# Patient Record
Sex: FEMALE | Race: WHITE | NOT HISPANIC OR LATINO | Employment: OTHER | URBAN - NONMETROPOLITAN AREA
[De-identification: names, ages, dates, MRNs, and addresses within clinical notes are randomized per-mention and may not be internally consistent; named-entity substitution may affect disease eponyms.]

---

## 2017-01-27 ENCOUNTER — TRANSCRIBE ORDERS (OUTPATIENT)
Dept: INTERNAL MEDICINE | Facility: CLINIC | Age: 62
End: 2017-01-27

## 2017-01-27 DIAGNOSIS — R10.9 ABDOMINAL PAIN, UNSPECIFIED LOCATION: Primary | ICD-10-CM

## 2017-01-27 DIAGNOSIS — R63.4 WEIGHT LOSS: ICD-10-CM

## 2017-01-27 DIAGNOSIS — R19.7 DIARRHEA, UNSPECIFIED TYPE: ICD-10-CM

## 2017-01-30 ENCOUNTER — APPOINTMENT (OUTPATIENT)
Dept: LAB | Facility: HOSPITAL | Age: 62
End: 2017-01-30
Attending: FAMILY MEDICINE

## 2017-01-30 ENCOUNTER — HOSPITAL ENCOUNTER (OUTPATIENT)
Dept: CT IMAGING | Facility: HOSPITAL | Age: 62
Discharge: HOME | End: 2017-01-30
Attending: FAMILY MEDICINE | Admitting: FAMILY MEDICINE

## 2017-01-30 DIAGNOSIS — R10.9 ABDOMINAL PAIN, UNSPECIFIED LOCATION: ICD-10-CM

## 2017-01-30 DIAGNOSIS — R19.7 DIARRHEA, UNSPECIFIED TYPE: ICD-10-CM

## 2017-01-30 DIAGNOSIS — R63.4 WEIGHT LOSS: ICD-10-CM

## 2017-01-30 LAB
ALBUMIN SERPL-MCNC: 4 G/DL (ref 3.5–5)
ALBUMIN/GLOB SERPL: 1.2 G/DL (ref 1.1–2.5)
ALP SERPL-CCNC: 59 U/L (ref 24–120)
ALT SERPL W P-5'-P-CCNC: 33 U/L (ref 0–54)
AMYLASE SERPL-CCNC: 43 U/L (ref 30–110)
ANION GAP SERPL CALCULATED.3IONS-SCNC: 8 MMOL/L (ref 4–13)
AST SERPL-CCNC: 22 U/L (ref 7–45)
BASOPHILS # BLD AUTO: 0.03 10*3/MM3 (ref 0–0.2)
BASOPHILS NFR BLD AUTO: 0.3 % (ref 0–2)
BILIRUB SERPL-MCNC: 0.5 MG/DL (ref 0.1–1)
BILIRUB UR QL STRIP: NEGATIVE
BUN BLD-MCNC: 10 MG/DL (ref 5–21)
BUN/CREAT SERPL: 13 (ref 7–25)
CALCIUM SPEC-SCNC: 9.2 MG/DL (ref 8.4–10.4)
CHLORIDE SERPL-SCNC: 104 MMOL/L (ref 98–110)
CLARITY UR: CLEAR
CO2 SERPL-SCNC: 31 MMOL/L (ref 24–31)
COLOR UR: YELLOW
CREAT BLD-MCNC: 0.77 MG/DL (ref 0.5–1.4)
CREAT BLDA-MCNC: 0.8 MG/DL (ref 0.6–1.3)
DEPRECATED RDW RBC AUTO: 41.7 FL (ref 40–54)
EOSINOPHIL # BLD AUTO: 0.26 10*3/MM3 (ref 0–0.7)
EOSINOPHIL NFR BLD AUTO: 2.8 % (ref 0–4)
ERYTHROCYTE [DISTWIDTH] IN BLOOD BY AUTOMATED COUNT: 13.1 % (ref 12–15)
GFR SERPL CREATININE-BSD FRML MDRD: 76 ML/MIN/1.73
GLOBULIN UR ELPH-MCNC: 3.4 GM/DL
GLUCOSE BLD-MCNC: 103 MG/DL (ref 70–100)
GLUCOSE UR STRIP-MCNC: NEGATIVE MG/DL
HCT VFR BLD AUTO: 42.9 % (ref 37–47)
HGB BLD-MCNC: 13.9 G/DL (ref 12–16)
HGB UR QL STRIP.AUTO: NEGATIVE
IMM GRANULOCYTES # BLD: 0.02 10*3/MM3 (ref 0–0.03)
IMM GRANULOCYTES NFR BLD: 0.2 % (ref 0–5)
KETONES UR QL STRIP: NEGATIVE
LEUKOCYTE ESTERASE UR QL STRIP.AUTO: NEGATIVE
LIPASE SERPL-CCNC: 50 U/L (ref 23–203)
LYMPHOCYTES # BLD AUTO: 2.43 10*3/MM3 (ref 0.72–4.86)
LYMPHOCYTES NFR BLD AUTO: 26.3 % (ref 15–45)
MAGNESIUM SERPL-MCNC: 1.9 MG/DL (ref 1.4–2.2)
MCH RBC QN AUTO: 28.5 PG (ref 28–32)
MCHC RBC AUTO-ENTMCNC: 32.4 G/DL (ref 33–36)
MCV RBC AUTO: 87.9 FL (ref 82–98)
MONOCYTES # BLD AUTO: 0.8 10*3/MM3 (ref 0.19–1.3)
MONOCYTES NFR BLD AUTO: 8.7 % (ref 4–12)
NEUTROPHILS # BLD AUTO: 5.7 10*3/MM3 (ref 1.87–8.4)
NEUTROPHILS NFR BLD AUTO: 61.7 % (ref 39–78)
NITRITE UR QL STRIP: NEGATIVE
PH UR STRIP.AUTO: 6 [PH] (ref 5–8)
PLATELET # BLD AUTO: 282 10*3/MM3 (ref 130–400)
PMV BLD AUTO: 12.4 FL (ref 6–12)
POTASSIUM BLD-SCNC: 4 MMOL/L (ref 3.5–5.3)
PROT SERPL-MCNC: 7.4 G/DL (ref 6.3–8.7)
PROT UR QL STRIP: NEGATIVE
RBC # BLD AUTO: 4.88 10*6/MM3 (ref 4.2–5.4)
SODIUM BLD-SCNC: 143 MMOL/L (ref 135–145)
SP GR UR STRIP: <=1.005 (ref 1–1.03)
T4 FREE SERPL-MCNC: 1.66 NG/DL (ref 0.78–2.19)
TSH SERPL DL<=0.05 MIU/L-ACNC: 2.31 MIU/ML (ref 0.47–4.68)
UROBILINOGEN UR QL STRIP: NORMAL
WBC NRBC COR # BLD: 9.24 10*3/MM3 (ref 4.8–10.8)

## 2017-01-30 PROCEDURE — 87086 URINE CULTURE/COLONY COUNT: CPT | Performed by: FAMILY MEDICINE

## 2017-01-30 PROCEDURE — 80053 COMPREHEN METABOLIC PANEL: CPT | Performed by: FAMILY MEDICINE

## 2017-01-30 PROCEDURE — 82150 ASSAY OF AMYLASE: CPT | Performed by: FAMILY MEDICINE

## 2017-01-30 PROCEDURE — 83690 ASSAY OF LIPASE: CPT | Performed by: FAMILY MEDICINE

## 2017-01-30 PROCEDURE — 85025 COMPLETE CBC W/AUTO DIFF WBC: CPT | Performed by: FAMILY MEDICINE

## 2017-01-30 PROCEDURE — 82565 ASSAY OF CREATININE: CPT

## 2017-01-30 PROCEDURE — 0 IOPAMIDOL PER 1 ML: Performed by: FAMILY MEDICINE

## 2017-01-30 PROCEDURE — 36415 COLL VENOUS BLD VENIPUNCTURE: CPT | Performed by: FAMILY MEDICINE

## 2017-01-30 PROCEDURE — 84443 ASSAY THYROID STIM HORMONE: CPT | Performed by: FAMILY MEDICINE

## 2017-01-30 PROCEDURE — 84439 ASSAY OF FREE THYROXINE: CPT | Performed by: FAMILY MEDICINE

## 2017-01-30 PROCEDURE — 81003 URINALYSIS AUTO W/O SCOPE: CPT | Performed by: FAMILY MEDICINE

## 2017-01-30 PROCEDURE — 83735 ASSAY OF MAGNESIUM: CPT | Performed by: FAMILY MEDICINE

## 2017-01-30 PROCEDURE — 74177 CT ABD & PELVIS W/CONTRAST: CPT

## 2017-01-30 RX ADMIN — IOPAMIDOL 100 ML: 755 INJECTION, SOLUTION INTRAVENOUS at 08:15

## 2017-02-01 ENCOUNTER — APPOINTMENT (OUTPATIENT)
Dept: LAB | Facility: HOSPITAL | Age: 62
End: 2017-02-01
Attending: FAMILY MEDICINE

## 2017-02-01 ENCOUNTER — TRANSCRIBE ORDERS (OUTPATIENT)
Dept: ADMINISTRATIVE | Facility: HOSPITAL | Age: 62
End: 2017-02-01

## 2017-02-01 DIAGNOSIS — R63.4 ABNORMAL WEIGHT LOSS: ICD-10-CM

## 2017-02-01 DIAGNOSIS — R19.7 DIARRHEA, UNSPECIFIED TYPE: ICD-10-CM

## 2017-02-01 DIAGNOSIS — R10.9 STOMACH ACHE: Primary | ICD-10-CM

## 2017-02-01 LAB
BACTERIA SPEC AEROBE CULT: NORMAL
COLLECT DATE SP2 STL: NORMAL
COLLECT DATE SP3 STL: NORMAL
COLLECT DATE STL: NORMAL
GASTROCULT GAST QL: NEGATIVE
Lab: 1015
Lab: 2000
Lab: 524

## 2017-02-01 PROCEDURE — 87205 SMEAR GRAM STAIN: CPT | Performed by: FAMILY MEDICINE

## 2017-02-01 PROCEDURE — 87046 STOOL CULTR AEROBIC BACT EA: CPT | Performed by: FAMILY MEDICINE

## 2017-02-01 PROCEDURE — 82270 OCCULT BLOOD FECES: CPT | Performed by: FAMILY MEDICINE

## 2017-02-01 PROCEDURE — 87209 SMEAR COMPLEX STAIN: CPT | Performed by: FAMILY MEDICINE

## 2017-02-01 PROCEDURE — 87177 OVA AND PARASITES SMEARS: CPT | Performed by: FAMILY MEDICINE

## 2017-02-01 PROCEDURE — 87046 STOOL CULTR AEROBIC BACT EA: CPT

## 2017-02-01 PROCEDURE — 87899 AGENT NOS ASSAY W/OPTIC: CPT | Performed by: FAMILY MEDICINE

## 2017-02-01 PROCEDURE — 87045 FECES CULTURE AEROBIC BACT: CPT | Performed by: FAMILY MEDICINE

## 2017-02-01 PROCEDURE — 87493 C DIFF AMPLIFIED PROBE: CPT | Performed by: FAMILY MEDICINE

## 2017-02-01 PROCEDURE — 82272 OCCULT BLD FECES 1-3 TESTS: CPT | Performed by: FAMILY MEDICINE

## 2017-02-02 LAB
C DIFF TOX GENS STL QL NAA+PROBE: NEGATIVE
WBC STL QL MICRO: NORMAL

## 2017-02-03 LAB
BACTERIA SPEC AEROBE CULT: NORMAL
E COLI SXT1 STL QL IA: NEGATIVE
E COLI SXT2 STL QL IA: NEGATIVE
O+P SPEC MICRO: NORMAL
O+P STL TRI STN: NORMAL

## 2017-02-08 ENCOUNTER — OFFICE VISIT (OUTPATIENT)
Dept: GASTROENTEROLOGY | Facility: CLINIC | Age: 62
End: 2017-02-08

## 2017-02-08 ENCOUNTER — TELEPHONE (OUTPATIENT)
Dept: GASTROENTEROLOGY | Facility: CLINIC | Age: 62
End: 2017-02-08

## 2017-02-08 VITALS
WEIGHT: 136 LBS | BODY MASS INDEX: 23.22 KG/M2 | OXYGEN SATURATION: 98 % | HEIGHT: 64 IN | TEMPERATURE: 98.4 F | HEART RATE: 63 BPM

## 2017-02-08 DIAGNOSIS — R11.0 NAUSEA: ICD-10-CM

## 2017-02-08 DIAGNOSIS — R10.9 ABDOMINAL CRAMPING: ICD-10-CM

## 2017-02-08 DIAGNOSIS — R19.7 DIARRHEA, UNSPECIFIED TYPE: Primary | ICD-10-CM

## 2017-02-08 PROCEDURE — 99204 OFFICE O/P NEW MOD 45 MIN: CPT | Performed by: NURSE PRACTITIONER

## 2017-02-08 RX ORDER — SODIUM, POTASSIUM,MAG SULFATES 17.5-3.13G
2 SOLUTION, RECONSTITUTED, ORAL ORAL ONCE
Qty: 2 BOTTLE | Refills: 0 | Status: SHIPPED | OUTPATIENT
Start: 2017-02-08 | End: 2017-02-08

## 2017-02-08 RX ORDER — AZELASTINE HYDROCHLORIDE, FLUTICASONE PROPIONATE 137; 50 UG/1; UG/1
SPRAY, METERED NASAL
COMMUNITY
End: 2022-06-08

## 2017-02-08 RX ORDER — DICYCLOMINE HYDROCHLORIDE 10 MG/1
10 CAPSULE ORAL
COMMUNITY
End: 2022-04-19

## 2017-02-08 NOTE — PROGRESS NOTES
Chief Complaint:   Chief Complaint   Patient presents with   • Colon Cancer Screening     Patient is here today for  colon screening and complaints of diarrhea for a month.   • Diarrhea         Patient ID: Giana Yepez is a 62 y.o. female     History of Present Illness:  This is a very pleasant 62-year-old female who was referred to our office by Dr. Jeimy Villatoro with complaints of diarrhea and abdominal cramping.  Patient tells me that she has been having diarrhea for approximately 4 weeks.  She states that when it initially started she would have 8-10 loose stools a day.  She dates that within the past week it has decreased to about 4 stools a day.  Patient states that she has noted this to be worse right after eating.  She states that Dr. Rapp discontinued her Glucophage and started her on Bentyl which seem to have helped some.    The patient denies any nausea, vomiting, epigastric pain or dysphagia.  She denies any fever or chills.  She denies any unintentional weight loss or loss of appetite.  Patient denies any bright red blood per rectum or black tarry stools.  She denies any constipation.    Stools for occult blood were found to be negative.  She was found to have negative C. difficile, fecal leukocytes and ova and parasite resulted 2/1/17. unremarkable CBC and CMP resulted on 1/30/17.  A CT scan on 1/30/17 reveal the stomach and small bowel to be a normal appearance.  Large bowel appeared grossly normal.  Narrowing of the proximal transverse colon felt to be due to a normal sphincter and peristalsis.  There was no surrounding inflammatory change.  No free air or fluid noted.  no obstruction noted      Colonoscopy done 2006 normal findings. The patient states that it was done in [facility] per Dr.David Swann 6/13/06.    Past Medical History   Diagnosis Date   • COPD (chronic obstructive pulmonary disease) 12/22/2016   • Diabetes mellitus    • Hypercholesterolemia 12/22/2016   • Intermittent chest pain  2016   • Vitamin B12 deficiency 2016   • Vitamin D deficiency 2016       Past Surgical History   Procedure Laterality Date   • Breast biopsy     •  section       X 2   • Cholecystectomy     • Appendectomy     • Tonsillectomy     • Ankle surgery Right    • Skin cancer excision  2016     FACE   • Colonoscopy       [facility]         Current Outpatient Prescriptions:   •  Azelastine-Fluticasone (DYMISTA) 137-50 MCG/ACT suspension, into each nostril., Disp: , Rfl:   •  calcium carbonate (OS-ZUNILDA) 600 MG tablet, Take 600 mg by mouth Daily. 2 TABLETS DAILY, Disp: , Rfl:   •  Cyanocobalamin (VITAMIN B-12 SL), Place 2,500 mcg under the tongue Daily., Disp: , Rfl:   •  dicyclomine (BENTYL) 10 MG capsule, Take 10 mg by mouth 4 (Four) Times a Day Before Meals & at Bedtime., Disp: , Rfl:   •  fexofenadine (ALLEGRA ALLERGY) 180 MG tablet, Take 1 tablet by mouth daily, Disp: , Rfl:   •  glipiZIDE (GLUCOTROL) 2.5 MG 24 hr tablet, Take 2.5 mg by mouth Daily., Disp: , Rfl:   •  Insulin Degludec (TRESIBA FLEXTOUCH) 200 UNIT/ML solution pen-injector, Inject 10 Units under the skin Every Night., Disp: , Rfl:   •  Insulin Pen Needle 32G X 4 MM misc, 1 each by Does not apply route daily, Disp: , Rfl:   •  omeprazole (PRILOSEC) 40 MG capsule, Take 1 capsule by mouth daily, Disp: , Rfl:   •  pravastatin (PRAVACHOL) 10 MG tablet, Take 10 mg by mouth Daily., Disp: , Rfl:   •  SITagliptin (JANUVIA) 100 MG tablet, Take 100 mg by mouth Daily., Disp: , Rfl:   •  sodium-potassium-magnesium sulfates (SUPREP BOWEL PREP) solution oral solution, Take 2 bottles by mouth 1 (One) Time for 1 dose. Split dose prep as directed by office instructions provided.  2 bottles = one kit., Disp: 2 bottle, Rfl: 0    Allergies   Allergen Reactions   • Corn-Containing Products Anaphylaxis   • Eggs Or Egg-Derived Products Anaphylaxis   • Nuts Anaphylaxis     peanuts   • Gabapentin    • Levemir [Insulin Detemir]   "      Social History     Social History   • Marital status:      Spouse name: N/A   • Number of children: N/A   • Years of education: N/A     Occupational History   • Not on file.     Social History Main Topics   • Smoking status: Former Smoker     Types: Cigarettes     Quit date: 07/2015   • Smokeless tobacco: Never Used   • Alcohol use No   • Drug use: No   • Sexual activity: Defer     Other Topics Concern   • Not on file     Social History Narrative       Family History   Problem Relation Age of Onset   • Heart disease Father    • Heart disease Sister    • Heart disease Brother    • Breast cancer Paternal Aunt    • Asthma Mother    • Heart disease Mother        Vitals:    02/08/17 1410   Pulse: 63   Temp: 98.4 °F (36.9 °C)   SpO2: 98%   Weight: 136 lb (61.7 kg)   Height: 64\" (162.6 cm)       Review of Systems:    General:    Present -feeling well   Skin:    Not Present-Rash   HEENT:     Not Present-Acute visual changes or Acute hearing changes   Neck :    Not Present- swollen glands   Genitourinary:      Not Present- burning, frequency, urgency hematuria, dysuria,   Cardiovascular:   Not Present-chest pain, palpitations, or pressure   Respiratory:   Not Present- shortness of breath or cough   Gastrointestinal:  Musculoskeletal:  Neurological:  Psychiatric:   Present as mentioned in the HP    Not Present. Recent gait disturbances.    Not Present-Seizures and weakness in extremities.    Not Present- Anxiety or Depression.       Physical Exam:    General Appearance:    Alert, cooperative, in no acute distress   Psych:    Mood appropriate    Eyes:          conjunctivae and sclerae normal, no   icterus, no pallor   ENMT:    Ears appear intact with no abnormalities noted oral mucosa moist   Neck:   No adenopathy, supple, trachea midline, no thyromegaly, no   carotid bruit, no JVD    Cardiovascular:    Regular rhythm and normal rate, normal S1 and S2, no            murmur, no gallop, no rub, no click "   Gastrointestinal:     Inspection normal.  Normal bowel sounds, no masses, no organomegaly, soft round non-tender, non-distended, no guarding, no rebound or tenderness. No hepatosplenomegaly.   Skin:   No bleeding, bruising or rash   Neurologic:   nonfocal       Lab Results   Component Value Date    WBC 9.24 01/30/2017    WBC 7.40 07/14/2015    HGB 13.9 01/30/2017    HGB 14.2 07/14/2015    HCT 42.9 01/30/2017    HCT 41.2 07/14/2015     01/30/2017     07/14/2015        Lab Results   Component Value Date     01/30/2017     07/14/2015    K 4.0 01/30/2017    K 4.5 07/14/2015     01/30/2017     07/14/2015    CO2 31.0 01/30/2017    CO2 23 (L) 07/14/2015    BUN 10 01/30/2017    BUN 12 07/14/2015    CREATININE 0.80 01/30/2017    CREATININE 0.77 01/30/2017    CREATININE 0.73 07/14/2015    BILITOT 0.5 01/30/2017    BILITOT 0.5 07/14/2015    ALKPHOS 59 01/30/2017    ALKPHOS 91 07/14/2015    ALT 33 01/30/2017    ALT 32 07/14/2015    AST 22 01/30/2017    AST 24 07/14/2015    GLUCOSE 103 (H) 01/30/2017    GLUCOSE 296 (H) 07/14/2015       Lab Results   Component Value Date    INR 0.88 (L) 07/14/2015       Assessment and Plan:    Giana was seen today for colon cancer screening and diarrhea.    Diagnoses and all orders for this visit:    Diarrhea, unspecified type  -     Case request colonoscopy    Abdominal cramping  -     Case request    Nausea  -     Case request    Other orders  -     sodium-potassium-magnesium sulfates (SUPREP BOWEL PREP) solution oral solution; Take 2 bottles by mouth 1 (One) Time for 1 dose. Split dose prep as directed by office instructions provided.  2 bottles = one kit.    Because the patient did not have a full gastrointestinal pathogen stool study I am going to order this today.      There are no Patient Instructions on file for this visit.    Next follow-up appointment          EMR Dragon/Transcription disclaimer:  Much of this encounter note is an electronic  transcription/translation of spoken language to printed text. The electronic translation of spoken language may permit erroneous, or at times, nonsensical words or phrases to be inadvertently transcribed; although I have reviewed the note for such errors, some may still exist.

## 2017-02-08 NOTE — TELEPHONE ENCOUNTER
----- Message from JAY Cintron sent at 2/8/2017  3:17 PM CST -----  Please call the patient and have her come by and  bottles as I decided to order stools on her. She had some cultures done prior but a full panel was not done and I want to rule out any other pathogen that could be causing her diarrhea. Thank you.      I explained this to her. She will go by the lab one day soon before colonoscopy on 3/3 and get this done.

## 2017-02-13 ENCOUNTER — LAB (OUTPATIENT)
Dept: LAB | Facility: HOSPITAL | Age: 62
End: 2017-02-13

## 2017-02-13 DIAGNOSIS — R10.9 ABDOMINAL CRAMPING: ICD-10-CM

## 2017-02-13 DIAGNOSIS — R19.7 DIARRHEA, UNSPECIFIED TYPE: ICD-10-CM

## 2017-02-13 PROCEDURE — 87507 IADNA-DNA/RNA PROBE TQ 12-25: CPT

## 2017-02-13 PROCEDURE — 87999 UNLISTED MICROBIOLOGY PX: CPT | Performed by: NURSE PRACTITIONER

## 2017-02-14 LAB
ADV 40+41 DNA STL QL NAA+NON-PROBE: NOT DETECTED
ASTRO TYP 1-8 RNA STL QL NAA+NON-PROBE: NOT DETECTED
C CAYETANENSIS DNA STL QL NAA+NON-PROBE: NOT DETECTED
C DIFF TOX GENS STL QL NAA+PROBE: NOT DETECTED
CAMPY SP DNA.DIARRHEA STL QL NAA+PROBE: NOT DETECTED
CRYPTOSP STL CULT: NOT DETECTED
E COLI DNA SPEC QL NAA+PROBE: NOT DETECTED
E HISTOLYT AG STL-ACNC: NOT DETECTED
EAEC PAA PLAS AGGR+AATA ST NAA+NON-PRB: NOT DETECTED
EC STX1+STX2 GENES STL QL NAA+NON-PROBE: NOT DETECTED
EPEC EAE GENE STL QL NAA+NON-PROBE: NOT DETECTED
ETEC LTA+ST1A+ST1B TOX ST NAA+NON-PROBE: NOT DETECTED
G LAMBLIA DNA SPEC QL NAA+PROBE: NOT DETECTED
NOROVIRUS GI+II RNA STL QL NAA+NON-PROBE: NOT DETECTED
P SHIGELLOIDES DNA STL QL NAA+NON-PROBE: NOT DETECTED
RV RNA STL NAA+PROBE: NOT DETECTED
SALMONELLA DNA SPEC QL NAA+PROBE: NOT DETECTED
SAPO I+II+IV+V RNA STL QL NAA+NON-PROBE: NOT DETECTED
SHIGELLA SP+EIEC IPAH ST NAA+NON-PROBE: NOT DETECTED
V CHOLERAE DNA SPEC QL NAA+PROBE: NOT DETECTED
VIBRIO DNA SPEC NAA+PROBE: NOT DETECTED
YERSINIA STL CULT: NOT DETECTED

## 2017-02-28 ENCOUNTER — ANESTHESIA EVENT (OUTPATIENT)
Dept: GASTROENTEROLOGY | Facility: HOSPITAL | Age: 62
End: 2017-02-28

## 2017-03-02 ENCOUNTER — HOSPITAL ENCOUNTER (OUTPATIENT)
Dept: GENERAL RADIOLOGY | Facility: HOSPITAL | Age: 62
Discharge: HOME | End: 2017-03-02
Attending: INTERNAL MEDICINE | Admitting: INTERNAL MEDICINE

## 2017-03-02 ENCOUNTER — TRANSCRIBE ORDERS (OUTPATIENT)
Dept: ADMINISTRATIVE | Facility: HOSPITAL | Age: 62
End: 2017-03-02

## 2017-03-02 DIAGNOSIS — R07.9 CHEST PAIN, UNSPECIFIED: Primary | ICD-10-CM

## 2017-03-03 ENCOUNTER — HOSPITAL ENCOUNTER (OUTPATIENT)
Facility: HOSPITAL | Age: 62
Setting detail: HOSPITAL OUTPATIENT SURGERY
Discharge: HOME | End: 2017-03-03
Attending: INTERNAL MEDICINE | Admitting: INTERNAL MEDICINE

## 2017-03-03 ENCOUNTER — ANESTHESIA (OUTPATIENT)
Dept: GASTROENTEROLOGY | Facility: HOSPITAL | Age: 62
End: 2017-03-03

## 2017-03-03 VITALS
RESPIRATION RATE: 19 BRPM | HEART RATE: 67 BPM | BODY MASS INDEX: 22.2 KG/M2 | TEMPERATURE: 98.2 F | DIASTOLIC BLOOD PRESSURE: 65 MMHG | HEIGHT: 64 IN | WEIGHT: 130 LBS | OXYGEN SATURATION: 100 % | SYSTOLIC BLOOD PRESSURE: 112 MMHG

## 2017-03-03 DIAGNOSIS — R10.9 ABDOMINAL CRAMPING: ICD-10-CM

## 2017-03-03 DIAGNOSIS — R11.0 NAUSEA: ICD-10-CM

## 2017-03-03 DIAGNOSIS — R19.7 DIARRHEA, UNSPECIFIED TYPE: ICD-10-CM

## 2017-03-03 LAB
GLUCOSE BLDC GLUCOMTR-MCNC: 122 MG/DL (ref 70–130)
GLUCOSE BLDC GLUCOMTR-MCNC: 86 MG/DL (ref 70–130)

## 2017-03-03 PROCEDURE — 82962 GLUCOSE BLOOD TEST: CPT

## 2017-03-03 PROCEDURE — 88305 TISSUE EXAM BY PATHOLOGIST: CPT | Performed by: INTERNAL MEDICINE

## 2017-03-03 PROCEDURE — 45380 COLONOSCOPY AND BIOPSY: CPT | Performed by: INTERNAL MEDICINE

## 2017-03-03 PROCEDURE — 25010000002 ONDANSETRON PER 1 MG: Performed by: NURSE ANESTHETIST, CERTIFIED REGISTERED

## 2017-03-03 PROCEDURE — 25010000002 PROPOFOL 10 MG/ML EMULSION: Performed by: NURSE ANESTHETIST, CERTIFIED REGISTERED

## 2017-03-03 RX ORDER — PROPOFOL 10 MG/ML
VIAL (ML) INTRAVENOUS AS NEEDED
Status: DISCONTINUED | OUTPATIENT
Start: 2017-03-03 | End: 2017-03-03 | Stop reason: SURG

## 2017-03-03 RX ORDER — SODIUM CHLORIDE 9 MG/ML
100 INJECTION, SOLUTION INTRAVENOUS CONTINUOUS
Status: DISCONTINUED | OUTPATIENT
Start: 2017-03-03 | End: 2017-03-03 | Stop reason: HOSPADM

## 2017-03-03 RX ORDER — DEXTROSE MONOHYDRATE 25 G/50ML
25 INJECTION, SOLUTION INTRAVENOUS ONCE
Status: COMPLETED | OUTPATIENT
Start: 2017-03-03 | End: 2017-03-03

## 2017-03-03 RX ORDER — SODIUM CHLORIDE 0.9 % (FLUSH) 0.9 %
1-10 SYRINGE (ML) INJECTION AS NEEDED
Status: DISCONTINUED | OUTPATIENT
Start: 2017-03-03 | End: 2017-03-03 | Stop reason: HOSPADM

## 2017-03-03 RX ORDER — ONDANSETRON 2 MG/ML
4 INJECTION INTRAMUSCULAR; INTRAVENOUS ONCE
Status: COMPLETED | OUTPATIENT
Start: 2017-03-03 | End: 2017-03-03

## 2017-03-03 RX ADMIN — DEXTROSE MONOHYDRATE 25 ML: 25 INJECTION, SOLUTION INTRAVENOUS at 08:34

## 2017-03-03 RX ADMIN — LIDOCAINE HYDROCHLORIDE 0.5 ML: 10 INJECTION, SOLUTION EPIDURAL; INFILTRATION; INTRACAUDAL; PERINEURAL at 07:47

## 2017-03-03 RX ADMIN — PROPOFOL 20 MG: 10 INJECTION, EMULSION INTRAVENOUS at 08:58

## 2017-03-03 RX ADMIN — PROPOFOL 20 MG: 10 INJECTION, EMULSION INTRAVENOUS at 08:46

## 2017-03-03 RX ADMIN — PROPOFOL 20 MG: 10 INJECTION, EMULSION INTRAVENOUS at 08:53

## 2017-03-03 RX ADMIN — PROPOFOL 20 MG: 10 INJECTION, EMULSION INTRAVENOUS at 08:55

## 2017-03-03 RX ADMIN — PROPOFOL 20 MG: 10 INJECTION, EMULSION INTRAVENOUS at 08:57

## 2017-03-03 RX ADMIN — PROPOFOL 20 MG: 10 INJECTION, EMULSION INTRAVENOUS at 08:59

## 2017-03-03 RX ADMIN — PROPOFOL 20 MG: 10 INJECTION, EMULSION INTRAVENOUS at 08:49

## 2017-03-03 RX ADMIN — PROPOFOL 20 MG: 10 INJECTION, EMULSION INTRAVENOUS at 09:00

## 2017-03-03 RX ADMIN — ONDANSETRON HYDROCHLORIDE 4 MG: 2 SOLUTION INTRAMUSCULAR; INTRAVENOUS at 08:33

## 2017-03-03 RX ADMIN — SODIUM CHLORIDE 100 ML/HR: 9 INJECTION, SOLUTION INTRAVENOUS at 07:48

## 2017-03-03 RX ADMIN — PROPOFOL 20 MG: 10 INJECTION, EMULSION INTRAVENOUS at 08:48

## 2017-03-03 RX ADMIN — PROPOFOL 20 MG: 10 INJECTION, EMULSION INTRAVENOUS at 08:51

## 2017-03-03 RX ADMIN — PROPOFOL 20 MG: 10 INJECTION, EMULSION INTRAVENOUS at 08:44

## 2017-03-03 NOTE — PLAN OF CARE
Problem: Patient Care Overview (Adult)  Goal: Plan of Care Review  Outcome: Ongoing (interventions implemented as appropriate)    03/03/17 0906   Coping/Psychosocial Response Interventions   Plan Of Care Reviewed With patient   Patient Care Overview   Progress no change   Outcome Evaluation   Outcome Summary/Follow up Plan pt tolerated procadure well

## 2017-03-03 NOTE — INTERVAL H&P NOTE
H&P updated. The patient was examined and the following changes are noted:  see below    Stool studies neg.  TFTs normal.

## 2017-03-03 NOTE — PLAN OF CARE
Problem: Patient Care Overview (Adult)  Goal: Plan of Care Review  Outcome: Outcome(s) achieved Date Met:  03/03/17

## 2017-03-03 NOTE — PLAN OF CARE
Problem: Patient Care Overview (Adult)  Goal: Adult Individualization and Mutuality    03/03/17 0746   Individualization   Patient Specific Preferences none

## 2017-03-03 NOTE — H&P (VIEW-ONLY)
Chief Complaint:   Chief Complaint   Patient presents with   • Colon Cancer Screening     Patient is here today for  colon screening and complaints of diarrhea for a month.   • Diarrhea         Patient ID: Giana Yepez is a 62 y.o. female     History of Present Illness:  This is a very pleasant 62-year-old female who was referred to our office by Dr. Jeimy Villatoro with complaints of diarrhea and abdominal cramping.  Patient tells me that she has been having diarrhea for approximately 4 weeks.  She states that when it initially started she would have 8-10 loose stools a day.  She dates that within the past week it has decreased to about 4 stools a day.  Patient states that she has noted this to be worse right after eating.  She states that Dr. Rapp discontinued her Glucophage and started her on Bentyl which seem to have helped some.    The patient denies any nausea, vomiting, epigastric pain or dysphagia.  She denies any fever or chills.  She denies any unintentional weight loss or loss of appetite.  Patient denies any bright red blood per rectum or black tarry stools.  She denies any constipation.    Stools for occult blood were found to be negative.  She was found to have negative C. difficile, fecal leukocytes and ova and parasite resulted 2/1/17. unremarkable CBC and CMP resulted on 1/30/17.  A CT scan on 1/30/17 reveal the stomach and small bowel to be a normal appearance.  Large bowel appeared grossly normal.  Narrowing of the proximal transverse colon felt to be due to a normal sphincter and peristalsis.  There was no surrounding inflammatory change.  No free air or fluid noted.  no obstruction noted      Colonoscopy done 2006 normal findings. The patient states that it was done in [facility] per Dr.David Swann 6/13/06.    Past Medical History   Diagnosis Date   • COPD (chronic obstructive pulmonary disease) 12/22/2016   • Diabetes mellitus    • Hypercholesterolemia 12/22/2016   • Intermittent chest pain  2016   • Vitamin B12 deficiency 2016   • Vitamin D deficiency 2016       Past Surgical History   Procedure Laterality Date   • Breast biopsy     •  section       X 2   • Cholecystectomy     • Appendectomy     • Tonsillectomy     • Ankle surgery Right    • Skin cancer excision  2016     FACE   • Colonoscopy       [facility]         Current Outpatient Prescriptions:   •  Azelastine-Fluticasone (DYMISTA) 137-50 MCG/ACT suspension, into each nostril., Disp: , Rfl:   •  calcium carbonate (OS-ZUNILDA) 600 MG tablet, Take 600 mg by mouth Daily. 2 TABLETS DAILY, Disp: , Rfl:   •  Cyanocobalamin (VITAMIN B-12 SL), Place 2,500 mcg under the tongue Daily., Disp: , Rfl:   •  dicyclomine (BENTYL) 10 MG capsule, Take 10 mg by mouth 4 (Four) Times a Day Before Meals & at Bedtime., Disp: , Rfl:   •  fexofenadine (ALLEGRA ALLERGY) 180 MG tablet, Take 1 tablet by mouth daily, Disp: , Rfl:   •  glipiZIDE (GLUCOTROL) 2.5 MG 24 hr tablet, Take 2.5 mg by mouth Daily., Disp: , Rfl:   •  Insulin Degludec (TRESIBA FLEXTOUCH) 200 UNIT/ML solution pen-injector, Inject 10 Units under the skin Every Night., Disp: , Rfl:   •  Insulin Pen Needle 32G X 4 MM misc, 1 each by Does not apply route daily, Disp: , Rfl:   •  omeprazole (PRILOSEC) 40 MG capsule, Take 1 capsule by mouth daily, Disp: , Rfl:   •  pravastatin (PRAVACHOL) 10 MG tablet, Take 10 mg by mouth Daily., Disp: , Rfl:   •  SITagliptin (JANUVIA) 100 MG tablet, Take 100 mg by mouth Daily., Disp: , Rfl:   •  sodium-potassium-magnesium sulfates (SUPREP BOWEL PREP) solution oral solution, Take 2 bottles by mouth 1 (One) Time for 1 dose. Split dose prep as directed by office instructions provided.  2 bottles = one kit., Disp: 2 bottle, Rfl: 0    Allergies   Allergen Reactions   • Corn-Containing Products Anaphylaxis   • Eggs Or Egg-Derived Products Anaphylaxis   • Nuts Anaphylaxis     peanuts   • Gabapentin    • Levemir [Insulin Detemir]   "      Social History     Social History   • Marital status:      Spouse name: N/A   • Number of children: N/A   • Years of education: N/A     Occupational History   • Not on file.     Social History Main Topics   • Smoking status: Former Smoker     Types: Cigarettes     Quit date: 07/2015   • Smokeless tobacco: Never Used   • Alcohol use No   • Drug use: No   • Sexual activity: Defer     Other Topics Concern   • Not on file     Social History Narrative       Family History   Problem Relation Age of Onset   • Heart disease Father    • Heart disease Sister    • Heart disease Brother    • Breast cancer Paternal Aunt    • Asthma Mother    • Heart disease Mother        Vitals:    02/08/17 1410   Pulse: 63   Temp: 98.4 °F (36.9 °C)   SpO2: 98%   Weight: 136 lb (61.7 kg)   Height: 64\" (162.6 cm)       Review of Systems:    General:    Present -feeling well   Skin:    Not Present-Rash   HEENT:     Not Present-Acute visual changes or Acute hearing changes   Neck :    Not Present- swollen glands   Genitourinary:      Not Present- burning, frequency, urgency hematuria, dysuria,   Cardiovascular:   Not Present-chest pain, palpitations, or pressure   Respiratory:   Not Present- shortness of breath or cough   Gastrointestinal:  Musculoskeletal:  Neurological:  Psychiatric:   Present as mentioned in the HP    Not Present. Recent gait disturbances.    Not Present-Seizures and weakness in extremities.    Not Present- Anxiety or Depression.       Physical Exam:    General Appearance:    Alert, cooperative, in no acute distress   Psych:    Mood appropriate    Eyes:          conjunctivae and sclerae normal, no   icterus, no pallor   ENMT:    Ears appear intact with no abnormalities noted oral mucosa moist   Neck:   No adenopathy, supple, trachea midline, no thyromegaly, no   carotid bruit, no JVD    Cardiovascular:    Regular rhythm and normal rate, normal S1 and S2, no            murmur, no gallop, no rub, no click "   Gastrointestinal:     Inspection normal.  Normal bowel sounds, no masses, no organomegaly, soft round non-tender, non-distended, no guarding, no rebound or tenderness. No hepatosplenomegaly.   Skin:   No bleeding, bruising or rash   Neurologic:   nonfocal       Lab Results   Component Value Date    WBC 9.24 01/30/2017    WBC 7.40 07/14/2015    HGB 13.9 01/30/2017    HGB 14.2 07/14/2015    HCT 42.9 01/30/2017    HCT 41.2 07/14/2015     01/30/2017     07/14/2015        Lab Results   Component Value Date     01/30/2017     07/14/2015    K 4.0 01/30/2017    K 4.5 07/14/2015     01/30/2017     07/14/2015    CO2 31.0 01/30/2017    CO2 23 (L) 07/14/2015    BUN 10 01/30/2017    BUN 12 07/14/2015    CREATININE 0.80 01/30/2017    CREATININE 0.77 01/30/2017    CREATININE 0.73 07/14/2015    BILITOT 0.5 01/30/2017    BILITOT 0.5 07/14/2015    ALKPHOS 59 01/30/2017    ALKPHOS 91 07/14/2015    ALT 33 01/30/2017    ALT 32 07/14/2015    AST 22 01/30/2017    AST 24 07/14/2015    GLUCOSE 103 (H) 01/30/2017    GLUCOSE 296 (H) 07/14/2015       Lab Results   Component Value Date    INR 0.88 (L) 07/14/2015       Assessment and Plan:    Giana was seen today for colon cancer screening and diarrhea.    Diagnoses and all orders for this visit:    Diarrhea, unspecified type  -     Case request colonoscopy    Abdominal cramping  -     Case request    Nausea  -     Case request    Other orders  -     sodium-potassium-magnesium sulfates (SUPREP BOWEL PREP) solution oral solution; Take 2 bottles by mouth 1 (One) Time for 1 dose. Split dose prep as directed by office instructions provided.  2 bottles = one kit.    Because the patient did not have a full gastrointestinal pathogen stool study I am going to order this today.      There are no Patient Instructions on file for this visit.    Next follow-up appointment          EMR Dragon/Transcription disclaimer:  Much of this encounter note is an electronic  transcription/translation of spoken language to printed text. The electronic translation of spoken language may permit erroneous, or at times, nonsensical words or phrases to be inadvertently transcribed; although I have reviewed the note for such errors, some may still exist.

## 2017-03-03 NOTE — PLAN OF CARE
Problem: GI Endoscopy (Adult)  Goal: Signs and Symptoms of Listed Potential Problems Will be Absent or Manageable (GI Endoscopy)  Outcome: Outcome(s) achieved Date Met:  03/03/17

## 2017-03-03 NOTE — ANESTHESIA PREPROCEDURE EVALUATION
Anesthesia Evaluation     Patient summary reviewed and Nursing notes reviewed   history of anesthetic complications: PONV  NPO Status: > 8 hours   Airway   Mallampati: II  TM distance: >3 FB  Neck ROM: full  no difficulty expected  Dental    (+) poor dentation        Pulmonary    (+) a smoker (1/4ppd smoker) Current, asthma,   (-) COPD  Cardiovascular - negative cardio ROS  Exercise tolerance: good (4-7 METS)        Neuro/Psych  (+) numbness (BLE neuropathy ),    GI/Hepatic/Renal/Endo    (+)  GERD well controlled, diabetes mellitus (BG 86 - patient symptomatic, will treat) poorly controlled using insulin,     ROS Comment: Patient currently nauseous - will treat with Zofran    Musculoskeletal     (+) back pain,   Abdominal    Substance History - negative use     OB/GYN negative ob/gyn ROS         Other        ROS/Med Hx Other: Corn allergy - patient states not a true allergy   Egg allergy - patient states it gives her GI upset                              Anesthesia Plan    ASA 3     general     intravenous induction   Anesthetic plan and risks discussed with patient.

## 2017-03-06 ENCOUNTER — TELEPHONE (OUTPATIENT)
Dept: GASTROENTEROLOGY | Facility: CLINIC | Age: 62
End: 2017-03-06

## 2017-03-06 LAB
CYTO UR: NORMAL
LAB AP CASE REPORT: NORMAL
LAB AP CLINICAL INFORMATION: NORMAL
Lab: NORMAL
PATH REPORT.FINAL DX SPEC: NORMAL
PATH REPORT.GROSS SPEC: NORMAL

## 2017-07-19 ENCOUNTER — OFFICE VISIT (OUTPATIENT)
Dept: GASTROENTEROLOGY | Facility: CLINIC | Age: 62
End: 2017-07-19

## 2017-07-19 VITALS
SYSTOLIC BLOOD PRESSURE: 124 MMHG | OXYGEN SATURATION: 99 % | HEIGHT: 64 IN | HEART RATE: 72 BPM | WEIGHT: 138 LBS | DIASTOLIC BLOOD PRESSURE: 66 MMHG | BODY MASS INDEX: 23.56 KG/M2 | TEMPERATURE: 97.9 F

## 2017-07-19 DIAGNOSIS — K52.831 COLLAGENOUS COLITIS: ICD-10-CM

## 2017-07-19 DIAGNOSIS — K21.9 GASTROESOPHAGEAL REFLUX DISEASE, ESOPHAGITIS PRESENCE NOT SPECIFIED: Primary | ICD-10-CM

## 2017-07-19 PROCEDURE — 99214 OFFICE O/P EST MOD 30 MIN: CPT | Performed by: INTERNAL MEDICINE

## 2017-07-19 RX ORDER — TRAMADOL HYDROCHLORIDE 50 MG/1
50 TABLET ORAL EVERY 6 HOURS PRN
COMMUNITY
End: 2022-04-19

## 2017-07-19 NOTE — PATIENT INSTRUCTIONS
Anti-reflux measures were reviewed and discussed with patient.  They were advised to refrain from chocolate, alcohol, smoking, peppermint and caffeine.  They were advised to limit fatty foods, large meals, and eating late at nighttime.  They were advised to reach an ideal body mass index.

## 2017-07-19 NOTE — PROGRESS NOTES
Primary Physician: Jeimy Villatoro DO    Chief Complaint   Patient presents with   • Follow-up     Patient is here today for 3 mo follow up.       Macy Yepez is a 62 y.o. female.    Heartburn   She complains of heartburn. She reports no abdominal pain, no belching, no chest pain, no coughing, no globus sensation or no nausea. This is a chronic problem. The current episode started more than 1 year ago. The problem occurs occasionally. The problem has been unchanged. The heartburn is located in the substernum. The heartburn is of moderate intensity. The heartburn does not wake her from sleep. The heartburn does not limit her activity. The heartburn doesn't change with position. Nothing (worse in the afternoon) aggravates the symptoms. Pertinent negatives include no fatigue, melena, muscle weakness or weight loss. There are no known risk factors. She has tried nothing for the symptoms. The treatment provided no relief (she is on omeprazole 40mg daily). Past procedures include an EGD (1970s).      This is a new complaint to me.    Collagenous colitis  07/19/17  The patient has had a long-standing history of diarrhea.  She states that when it initially started she would have 8-10 loose stools a day.  She dates that within the past week it has decreased to about 4 stools a day.  Patient states that she has noted this to be worse right after eating.  She states that Dr. Rapp discontinued her Glucophage and started her on Bentyl which seem to have helped some.     The patient denies any nausea, vomiting, epigastric pain or dysphagia.  She denies any fever or chills.  She denies any unintentional weight loss or loss of appetite.  Patient denies any bright red blood per rectum or black tarry stools.  She denies any constipation.     Stools for occult blood were found to be negative.  She was found to have negative C. difficile, fecal leukocytes and ova and parasite resulted 2/1/17. unremarkable CBC and  "CMP resulted on 17.  A CT scan on 17 reveal the stomach and small bowel to be a normal appearance.  Large bowel appeared grossly normal.  Narrowing of the proximal transverse colon felt to be due to a normal sphincter and peristalsis.  There was no surrounding inflammatory change.  No free air or fluid noted.  no obstruction noted.     I performed colonoscopy on 3/3/2017 for this complaint.  The biopsies returned positive for collagenous colitis.  Use Pepto-Bismol once we secured the diagnosis however she tells me that she is not able to tolerate it because she had it so much \"as a kid\".  She is currently using Bentyl any milligrams twice a day.  She is not using any Imodium.  She tells me that this strategy is working for her.  She is have diarrhea may be once a week and when she has it, it is no longer 10-12 times a day.  It is no melena or hematochezia.    Past Medical History:   Diagnosis Date   • COPD (chronic obstructive pulmonary disease) 2016   • Diabetes mellitus    • Hypercholesterolemia 2016   • Intermittent chest pain 2016   • Vitamin B12 deficiency 2016   • Vitamin D deficiency 2016       Past Surgical History:   Procedure Laterality Date   • ANKLE SURGERY Right    • APPENDECTOMY     • BREAST BIOPSY     •  SECTION      X 2   • CHOLECYSTECTOMY     • COLONOSCOPY      [facility]   • COLONOSCOPY N/A 3/3/2017    Procedure: COLONOSCOPY WITH ANESTHESIA;  Surgeon: Ondina Abdul MD;  Location: [facility] ENDOSCOPY;  Service:    • SKIN CANCER EXCISION      FACE   • TONSILLECTOMY          Current Outpatient Prescriptions:   •  Azelastine-Fluticasone (DYMISTA) 137-50 MCG/ACT suspension, into each nostril., Disp: , Rfl:   •  calcium carbonate (OS-ZUNILDA) 600 MG tablet, Take 600 mg by mouth Daily. 2 TABLETS DAILY, Disp: , Rfl:   •  Cyanocobalamin (VITAMIN B-12 SL), Place 2,500 mcg under the tongue Daily., Disp: , Rfl:   •  dicyclomine (BENTYL) 10 MG " "capsule, Take 10 mg by mouth 4 (Four) Times a Day Before Meals & at Bedtime., Disp: , Rfl:   •  fexofenadine (ALLEGRA ALLERGY) 180 MG tablet, Take 1 tablet by mouth daily, Disp: , Rfl:   •  glipiZIDE (GLUCOTROL) 2.5 MG 24 hr tablet, Take 2.5 mg by mouth Daily., Disp: , Rfl:   •  Insulin Degludec (TRESIBA FLEXTOUCH) 200 UNIT/ML solution pen-injector, Inject 10 Units under the skin Every Night., Disp: , Rfl:   •  Insulin Pen Needle 32G X 4 MM misc, 1 each by Does not apply route daily, Disp: , Rfl:   •  omeprazole (PRILOSEC) 40 MG capsule, Take 1 capsule by mouth daily, Disp: , Rfl:   •  pravastatin (PRAVACHOL) 10 MG tablet, Take 10 mg by mouth Daily., Disp: , Rfl:   •  SITagliptin (JANUVIA) 100 MG tablet, Take 100 mg by mouth Daily., Disp: , Rfl:   •  traMADol (ULTRAM) 50 MG tablet, Take 50 mg by mouth Every 6 (Six) Hours As Needed for Moderate Pain ., Disp: , Rfl:     Allergies   Allergen Reactions   • Corn-Containing Products Anaphylaxis   • Eggs Or Egg-Derived Products Anaphylaxis   • Nuts Anaphylaxis     peanuts   • Milk-Related Compounds Nausea And Vomiting   • Gabapentin Dizziness     Patient states becomes weak and dizzy if she takes Gabapentin   • Levemir [Insulin Detemir] Itching and Other (See Comments)     Patient states gets a \"hard knot and itching\" at injection site.       Social History     Social History   • Marital status:      Spouse name: N/A   • Number of children: N/A   • Years of education: N/A     Occupational History   • Not on file.     Social History Main Topics   • Smoking status: Former Smoker     Types: Cigarettes     Quit date: 07/2015   • Smokeless tobacco: Never Used   • Alcohol use No   • Drug use: No   • Sexual activity: Defer     Other Topics Concern   • Not on file     Social History Narrative       Family History   Problem Relation Age of Onset   • Heart disease Father    • Heart disease Sister    • Heart disease Brother    • Breast cancer Paternal Aunt    • Asthma Mother  " "  • Heart disease Mother        Review of Systems   Constitutional: Negative for fatigue, fever and weight loss.   Respiratory: Negative for cough and shortness of breath.    Cardiovascular: Negative for chest pain.   Gastrointestinal: Positive for heartburn. Negative for abdominal pain, melena and nausea.   Genitourinary: Negative for dysuria.   Musculoskeletal: Negative for muscle weakness.   Skin: Negative for rash.   Neurological: Negative for seizures.       Objective     /66  Pulse 72  Temp 97.9 °F (36.6 °C)  Ht 64\" (162.6 cm)  Wt 138 lb (62.6 kg)  SpO2 99%  BMI 23.69 kg/m2    Physical Exam   Constitutional: She is oriented to person, place, and time. She appears well-developed.   Eyes: No scleral icterus.   Cardiovascular: Normal rate and regular rhythm.    Pulmonary/Chest: Breath sounds normal.   Abdominal: Soft. Bowel sounds are normal. She exhibits no distension and no mass. There is no tenderness. There is no rebound and no guarding.   Musculoskeletal: Normal range of motion.   Neurological: She is alert and oriented to person, place, and time.   Skin: No rash noted.   Psychiatric: She has a normal mood and affect. Her behavior is normal.   Vitals reviewed.      Lab Results   Component Value Date    WBC 9.24 01/30/2017    WBC 7.40 07/14/2015    HGB 13.9 01/30/2017    HGB 14.2 07/14/2015    HCT 42.9 01/30/2017    HCT 41.2 07/14/2015     01/30/2017     07/14/2015        Lab Results   Component Value Date     01/30/2017     07/14/2015    K 4.0 01/30/2017    K 4.5 07/14/2015     01/30/2017     07/14/2015    CO2 31.0 01/30/2017    CO2 23 (L) 07/14/2015    BUN 10 01/30/2017    BUN 12 07/14/2015    CREATININE 0.80 01/30/2017    CREATININE 0.77 01/30/2017    CREATININE 0.73 07/14/2015    BILITOT 0.5 01/30/2017    BILITOT 0.5 07/14/2015    ALKPHOS 59 01/30/2017    ALKPHOS 91 07/14/2015    ALT 33 01/30/2017    ALT 32 07/14/2015    AST 22 01/30/2017    AST 24 " 07/14/2015    GLUCOSE 103 (H) 01/30/2017    GLUCOSE 296 (H) 07/14/2015       Lab Results   Component Value Date    INR 0.88 (L) 07/14/2015       IMPRESSION/PLAN:    Gastroesophageal reflux disease, esophagitis presence not specified  -     Case Request; Standing-Endoscopy  -     Implement Anesthesia Orders Day of Procedure; Standing  -     Obtain Informed Consent; Standing  -     Case Request    The risks, benefits, and alternatives of endoscopy were reviewed with the patient today.  Risks including perforation, with or without dilation, possibly requiring surgery.  Additional risks include risk of bleeding.  There is also the risk of a drug reaction or problems with anesthesia.  This will be discussed with the further by the anesthesia team on the day of the procedure. The benefits include the diagnosis and management of disease of the upper digestive tract.  Alternatives to endoscopy include upper GI series, laboratory testing, radiographic evaluation, or no intervention.  The patient verbalizes understanding and agrees.    She has not had a recent endoscopy.  We need to screen for Linton's esophagus.  She also carries a history of peptic ulcer disease in the very remote past.    Anti-reflux measures were reviewed and discussed with patient.  They were advised to refrain from chocolate, alcohol, smoking, peppermint and caffeine.  They were advised to limit fatty foods, large meals, and eating late at nighttime.  They were advised to reach an ideal body mass index.    Collagenous colitis  Review collagenous colitis with her.  I explained why it can lead to diarrhea.  The best treatment option as Pepto-Bismol but she does not tolerate this.  At the present time however she is doing well with her current strategy of Bentyl 20 mg twice a day.  She will continue this.         Ondina Abdul MD  07/19/17  3:43 PM    Much of this encounter note is an electronic transcription/translation of spoken language to printed  text. The electronic translation of spoken language may permit erroneous, or at times, nonsensical words or phrases to be inadvertently transcribed; although I have reviewed the note for such errors, some may still exist.

## 2017-08-31 ENCOUNTER — ANESTHESIA (OUTPATIENT)
Dept: GASTROENTEROLOGY | Facility: HOSPITAL | Age: 62
End: 2017-08-31

## 2017-08-31 ENCOUNTER — HOSPITAL ENCOUNTER (OUTPATIENT)
Facility: HOSPITAL | Age: 62
Setting detail: HOSPITAL OUTPATIENT SURGERY
Discharge: HOME | End: 2017-08-31
Attending: INTERNAL MEDICINE | Admitting: INTERNAL MEDICINE

## 2017-08-31 ENCOUNTER — ANESTHESIA EVENT (OUTPATIENT)
Dept: GASTROENTEROLOGY | Facility: HOSPITAL | Age: 62
End: 2017-08-31

## 2017-08-31 VITALS
HEIGHT: 64 IN | BODY MASS INDEX: 22.88 KG/M2 | DIASTOLIC BLOOD PRESSURE: 58 MMHG | SYSTOLIC BLOOD PRESSURE: 103 MMHG | RESPIRATION RATE: 21 BRPM | WEIGHT: 134 LBS | TEMPERATURE: 97.7 F | OXYGEN SATURATION: 98 % | HEART RATE: 69 BPM

## 2017-08-31 DIAGNOSIS — K21.9 GASTROESOPHAGEAL REFLUX DISEASE, ESOPHAGITIS PRESENCE NOT SPECIFIED: ICD-10-CM

## 2017-08-31 LAB — GLUCOSE BLDC GLUCOMTR-MCNC: 83 MG/DL (ref 70–130)

## 2017-08-31 PROCEDURE — 87081 CULTURE SCREEN ONLY: CPT | Performed by: INTERNAL MEDICINE

## 2017-08-31 PROCEDURE — 82962 GLUCOSE BLOOD TEST: CPT

## 2017-08-31 PROCEDURE — 88305 TISSUE EXAM BY PATHOLOGIST: CPT | Performed by: INTERNAL MEDICINE

## 2017-08-31 PROCEDURE — 25010000002 PROPOFOL 10 MG/ML EMULSION: Performed by: NURSE ANESTHETIST, CERTIFIED REGISTERED

## 2017-08-31 PROCEDURE — 43239 EGD BIOPSY SINGLE/MULTIPLE: CPT | Performed by: INTERNAL MEDICINE

## 2017-08-31 RX ORDER — LIDOCAINE HYDROCHLORIDE 20 MG/ML
INJECTION, SOLUTION INFILTRATION; PERINEURAL AS NEEDED
Status: DISCONTINUED | OUTPATIENT
Start: 2017-08-31 | End: 2017-08-31 | Stop reason: SURG

## 2017-08-31 RX ORDER — LIDOCAINE HYDROCHLORIDE 10 MG/ML
0.5 INJECTION, SOLUTION INFILTRATION; PERINEURAL ONCE AS NEEDED
Status: COMPLETED | OUTPATIENT
Start: 2017-08-31 | End: 2017-08-31

## 2017-08-31 RX ORDER — SODIUM CHLORIDE 0.9 % (FLUSH) 0.9 %
3 SYRINGE (ML) INJECTION AS NEEDED
Status: DISCONTINUED | OUTPATIENT
Start: 2017-08-31 | End: 2017-08-31 | Stop reason: HOSPADM

## 2017-08-31 RX ORDER — SODIUM CHLORIDE 9 MG/ML
500 INJECTION, SOLUTION INTRAVENOUS CONTINUOUS PRN
Status: DISCONTINUED | OUTPATIENT
Start: 2017-08-31 | End: 2017-08-31 | Stop reason: HOSPADM

## 2017-08-31 RX ORDER — PROPOFOL 10 MG/ML
VIAL (ML) INTRAVENOUS AS NEEDED
Status: DISCONTINUED | OUTPATIENT
Start: 2017-08-31 | End: 2017-08-31 | Stop reason: SURG

## 2017-08-31 RX ADMIN — SODIUM CHLORIDE 500 ML: 9 INJECTION, SOLUTION INTRAVENOUS at 08:03

## 2017-08-31 RX ADMIN — PROPOFOL 20 MG: 10 INJECTION, EMULSION INTRAVENOUS at 09:05

## 2017-08-31 RX ADMIN — LIDOCAINE HYDROCHLORIDE 100 MG: 20 INJECTION, SOLUTION INFILTRATION; PERINEURAL at 09:04

## 2017-08-31 RX ADMIN — LIDOCAINE HYDROCHLORIDE 0.5 ML: 10 INJECTION, SOLUTION INFILTRATION; PERINEURAL at 08:03

## 2017-08-31 RX ADMIN — PROPOFOL 80 MG: 10 INJECTION, EMULSION INTRAVENOUS at 09:04

## 2017-09-01 LAB — UREASE TISS QL: NEGATIVE

## 2017-09-08 ENCOUNTER — TELEPHONE (OUTPATIENT)
Dept: GASTROENTEROLOGY | Facility: CLINIC | Age: 62
End: 2017-09-08

## 2017-09-08 NOTE — TELEPHONE ENCOUNTER
----- Message from Ondina Abdul MD sent at 9/3/2017 10:04 PM CDT -----  I am writing to inform you that your endoscopy biopsies were all normal.     If you have any questions, please call our office.      Sincerely,        Ondina Abdul MD

## 2017-11-14 ENCOUNTER — TRANSCRIBE ORDERS (OUTPATIENT)
Dept: ADMINISTRATIVE | Facility: HOSPITAL | Age: 62
End: 2017-11-14

## 2017-11-14 DIAGNOSIS — Z12.31 ENCOUNTER FOR SCREENING MAMMOGRAM FOR MALIGNANT NEOPLASM OF BREAST: Primary | ICD-10-CM

## 2017-11-30 ENCOUNTER — HOSPITAL ENCOUNTER (OUTPATIENT)
Dept: MAMMOGRAPHY | Facility: HOSPITAL | Age: 62
Discharge: HOME | End: 2017-11-30
Attending: FAMILY MEDICINE | Admitting: FAMILY MEDICINE

## 2017-11-30 DIAGNOSIS — Z12.31 ENCOUNTER FOR SCREENING MAMMOGRAM FOR MALIGNANT NEOPLASM OF BREAST: ICD-10-CM

## 2017-11-30 PROCEDURE — 77063 BREAST TOMOSYNTHESIS BI: CPT

## 2017-11-30 PROCEDURE — G0202 SCR MAMMO BI INCL CAD: HCPCS

## 2020-03-14 NOTE — ANESTHESIA POSTPROCEDURE EVALUATION
Bone Marrow Transplant Progress Note      Patient Name: Calvin  MRN: [id number]  Date: 3/14/2020      Allo MUD Day +17    History of Present Illness  Calvin is a 62 year old  male  with a PMH of HTN and HIV (HIV treatment since 2014; known undetectable viral load). In Oct 2018, he was referred to [facility] for thrombocytopenia (~88k) for which he has had at least a mild thrombocytopenia since late 2014. He was monitored closely with labs and follow-up appointments until Oct 2019 when his labs demonstrated pancytopenia of unclear etiology. As a result, it was recommended that he undergo a bone marrow biopsy and aspiration to consider primary bone marrow disorders. Up until this point, his workup demonstrated no B12/folate deficiency, no acute hepatitis infections with normal US of liver and spleen. On 11/12/19, he underwent bone marrow biopsy and aspiration. Results demonstrated cellular bone marrow with dyserythropoiesis and dysmegakaryopoiesis consistent with myelodysplastic syndrome with multilineage dysplasia; overall cellularity ~70%; myeloid to erythroid ratio 1.6:1; paucispicular aspirate smears; blasts ~2%; dyspoietic forms ~15%; abnormal megakaryocytes ~20%. FISH negative for monosomy 5 or deletion of 5q31 region. 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 As a result, per Dr. Ivania Smalls, he was referred to our BMT clinic for consultation regarding BMT evaluation.      He completed his pre-transplant workup which revealed that he is HSV I and II positive.  He is positive for CMV, Syphilis, and HIV (on Triumeq; last viral load undetectable on 11/26/19). Negative for Hep C, Toxoplasma, HTLV I/II, West Nile Virus, and  Patient: Giana Green    Procedure Summary     Date Anesthesia Start Anesthesia Stop Room / Location    03/03/17 0842 0905 [facility] ENDOSCOPY 6 / [facility] ENDOSCOPY       Procedure Diagnosis Surgeon Provider    COLONOSCOPY WITH ANESTHESIA (N/A ) Nausea; Abdominal cramping; Diarrhea, unspecified type  (Nausea [R11.0]; Abdominal cramping [R10.9]; Diarrhea, unspecified type [R19.7]) MD Curly Taylor, LIS          Anesthesia Type: general  Last vitals  BP      Temp      Pulse     Resp      SpO2        Post Anesthesia Care and Evaluation    Patient location during evaluation: PACU  Patient participation: complete - patient participated  Level of consciousness: awake and alert  Pain score: 0  Pain management: adequate  Airway patency: patent  Anesthetic complications: No anesthetic complications    Cardiovascular status: acceptable and stable  Respiratory status: acceptable and unassisted  Hydration status: acceptable       T.cruzi. Of note, Hep B core antibody reactive. Plan to begin treatment for Syphilis this admission with ID to closely follow.  He presented to the [facility] unit on 2/19/20 for his MUD allogeneic SCT for MDS. He denied any complaints. He was afebrile without shortness of breath, chest pain, palpitations, N/V/D, skin changes, vision changes or bleeding.      2/20/20: Allo MUD Day -6. No acute events overnight. He reports mild fatigue. Remains afebrile without shortness of breath, chest pain, palpitations, N/V/D, skin changes, vision changes or bleeding. He received Busulfan this morning and tolerated well. Plan to continue to conditioning chemotherapy.      2/21/20: Allo MUD Day -5. Reports he developed a headache yesterday evening relieved with tramadol. Denies vision changes or N/V. States he has a history of intermittent headaches. Neuro assessment WNL. Remains afebrile without shortness of breath, chest pain, palpitations, skin changes or bleeding.     2/22/20:  Allo MUD Day -4.  No acute issues overnight.  He is tolerating chemotherapy well without any evidence of TLS or VOD.  He reported some mild nausea which was relieved with Ativan.  He remains afebrile and without any shortness of breath, chest pain, abdominal pain, nausea, vomiting, and diarrhea.  RPR negative.      2/23/20:  Allo MUD Day -3.  No acute issues overnight.  He continues to tolerate chemotherapy well with planned ATG test dosing today.  He remains afebrile and without any shortness of breath, chest pain, abdominal pain, vomiting, and diarrhea.  Continues with mild nausea and reports constipation.  Will add Miralax.  No evidence of VOD or opportunistic infection.    2/24/20: Allo MUD Day -2. No acute events overnight. He continues to have constipation despite miralax and colace. (+) bowel sounds. Tolerated ATG yesterday well with some mild nausea afterwards relieved with anti-emetics. Remains afebrile without  shortness of breath, chest pain, palpitations, diarrhea, skin changes, vision changes or bleeding.     2/25/20: Allo MUD Day -1. Reports a few small BMs. Mild lower abdominal discomfort with palpation. Continues with Colace and Miralax. Remains ab=febrile without shortness of breath, chest pain, palpitations, diarrhea, skin changes, vision changes, headache or bleeding. Tacrolimus was started yesterday. No fine hand tremors. No peripheral edema. Plan for stem cell infusion tomorrow.    2/26/20:  Allo MUD Day 0. No acute events overnight. He reports a small BM. He states he feels anxious regarding the stem cell infusion today. Reports nausea has resolved. Remains afebrile without shortness of breath, chest pain, palpitations, N/V/D, skin changes, vision changes or bleeding.     2/27/20: Allo MUD Day +1. Received stem cell infusion yesterday afternoon. He reports anxiety, fatigue, nausea, and headache. Rmains afebrile, shortness of breath, chest pain, palpitations, abdominal pain, dysuria, skin changes, vision changes or bleeding. Will add Tramadol PRN headache.     2/28/20: Allo MUD Day +2. Reports he continues to have a headache, nausea without emesis, and fatigue. Remains afebrile without shortness of breath, chest pain, palpitations, dysuria, skin changes, vision changes or bleeding. No signs of opportunistic infection, GHV, or VOD.     2/29/20: Allo MUD Day +3. No acute events overnight. He appears more fatigued this morning. Continues to have nausea and headache is slightly improved. Remains afebrile without shortness of breath, chest pain, palpitations, diarrhea, dysuria, skin changes, vision changes or bleeding. Bilirubin is trending up; will continue to monitor closely. No RUQ pain.     3/1/20: Allo MUD Day +4. No acute events overnight. He reports sleeping well. Reports headache and nausea still persist but slightly improved. Denies drinking caffeine prior to hospitalization. Appetite has decreased and more  fatigued. Remains afebrile without shortness of breath, chest pain, palpitations, N/V/D, skin changes, vision changes or bleeding. Denies RUQ pain or tenderness. Bilirubin is 1.4 from 1.3. Will continue to monitor closely.     3/2/20: Allo MUD Day +5. No acute events overnight. Reports sleeping well and having less nausea and better control with Zofran IVP. Reports headaches have resolved. Remains afebrile without shortness of breath, chest pain, palpitations, diarrhea, dysuria, skin changes, vision changes or bleeding Bilirubin level has improved. No signs of opportunistic infection, GHV, or VOD.     3/3/20: Allo MUD Day+6. No acute events overnight. Responds appropriately to questions; A&Ox4; NAD. This morning, he does endorse continued, mild headache, decreased appetite, in addition to nausea. He describes his headache as squeezing tension in the frontal and temporal area; nausea responds well to Zofran. No other complaints at this time. Afebrile. Denies chills, HA, vision changes, SOB, dyspnea, CP, palpitations, abdominal pain, n/v/d/c, skin changes, and bleeding. No evidence of GVHD, VOD, or OIs. T. Bili continues to downtrend (0.9 (03/02) --> 0.6 (03/03)) however, LFTs have been steadily trending up the past few days; weight has been downtrending (118.1kg (03/02/20)--> 117.7kg (03/03/20)). Plan for liver US today.    3/4/20: Allo MUD Day +7. No acute events overnight. Continues to do well; tolerated MTX without issues yesterday, 03/04/20. States that HA and nausea have since resolved and are no longer an issue at this point in time. However, he does endorse early satiety, intestinal gas, and tenderness localized to his bottom lip. No other complaints at this time. Afebrile. Denies chills, HA, vision changes, SOB, dyspnea, CP, palpitations, abdominal pain, n/v/d/c, skin changes, and bleeding. No evidence of GVHD, VOD, or OIs. His LFTs continue to down trend; t.bili WNL. US liver demonstrated GB polyps but no  liver lesions or dilatation. Wt stable this morning; -768.3 cc fluid balance.     3/5/20: Allo MUD Day +8. No acute events overnight. Started neupogen yesterday without issue. Endorses continued decreased appetite, in addition to headache, nausea, and generalized fatigue. Currently has Zofran and Ativan for antiemetics. No other complaints besides this. Continues to ambulate frequently around the unit without issue. LFTs continue to downtrend towards WNL; t.bili stable at 0.6. Afebrile. Denies chills, HA, vision changes, SOB, dyspnea, CP, palpitations, abdominal pain (william RUQ pain), n/v/d/c, skin changes, and bleeding. No evidence of GVHD, VOD, or OIs. Wt stable at 117.7 kg; I/Os +61.6 cc (03/04/20). Plan to continue supportive care while awaiting cell recovery    3/6/20: Allo MUD Day +9. No acute events overnight. Continues on Neupogen without issue. Reports no issues with nausea and headache. However, he does endorse continued decrease appetite, constipation, and oral pain. He reports that the oral pain started late yesterday afternoon. LFTs continue to downtrend to WNL; T.bili WNL. Plt 3 this AM requiring 1 unit which was actively infusing when assessed this AM. Remains Afebrile. Denies chills, HA, dysphagia, odynophagia, vision changes, SOB, dyspnea, CP, palpitations, abdominal pain, n/v/d/c, skin changes, and bleeding. No evidence of GVHD, VOD, or OIs.    3/7/20: Allo MUD Day +10. No acute events overnight. Yesterday evening, endorsed continued HA. Tramadol increased from 50 mg q6h to 100 mg q8h. This AM, pt reports good response to increase in Tramadol and will trial caffeine with breakfast later. Reports continued generalized fatigue, decreased appetite, and mouth pain. Pt states he has been actively ambulating the unit and has been attempting to increase oral intake despite decreased appetite. Possible discrepancy in pt's I/Os; reported +1109.6 fluid balance yesterday, 03/06/20. However, wt is exactly one  kilogram less than the day before. Remains afebrile. Denies chills, confusion, HA, odynophagia, dysphagia, CP, palpitations, SOB, dyspnea, abdominal pain, n/v/d/c, skin changes, and bleeding. No evidence of GVHD, VOD, or OIs. T.bili remains WNL and at baseline; similarly, LFTs continue to trend towards WNL. Plan to continue GCSF and supportive care while awaiting cell recovery. Cepacol for mouth pain.     3/8/20: Allo MUD Day +11. No acute events overnight. Continues with generalized fatigue and MSK aches, in addition to oral pain affecting appetite; however, reports good relief with Cepacol. Pt continues ambulate frequently; continues receiving GCSF. I/Os -2710 cc yesterday, 03/08/20; wt increase of roughly 1 kg noted. Pt's IVF was resumed and increased to 125 cc/hr given mild hyponatremia; trend continues with Na being 131 this AM. Plan to increase IVF rate to 150 cc/hr; no indication for 3% NS given mild decrease and asymptomatic. Afebrile. Denies chills, nasal congestion ,rhinrrhea, confusion, HA, odynophagia, dysphagia, CP, palpitations, SOB, dyspnea, abdominal pain, n/v/d/c, skin changes, and bleeding. No evidence of GVHD, VOD, or OIs. T.bili continues to remain WNL; LFTs continue to trend towards WNL.    3/9/20: Allo MUD Day +12. No acute events overnight. C/o headache and mucositis with painful swallowing. He has some dry blood on the external nares. He reports streaks of blood with blowing his nose but denies continuous bleeding. Reports constipation unrelieved with Miralax. Will add Colace. Reports he did not walk yesterday; encouraged to do so for today. Appetite is fair. T max 100.4 overnight with chills. ID ordered infectious workup and broadened antibiotics with Cefepime. Denies shortness of breath, chest pain, palpitations, N/V/D, skin changes or vision changes.     3/10/20: Allo MUD Day +13. No acute events overnight. Reports he had a large, formed BM after lactulose yesterday. Continues to have a  headache. Reports mouth soreness has improved. Remains afebrile. ID broadened abx to Cefepime yesterday. Denies shortness of breath, chest pain, palpitations, N/V/D, skin changes, vision changes or bleeding. No evidence of opportunistic infection, GVH, or VOD. Encouraged oral intake and physical activity.    3/11/20: Allo MUD Day +14. He is reporting low back and bilateral upper leg pain. He received Tramadol without much relief. Currently using a heat pad. Continues to have mouth sores with erythema. Increased white patches assessed. He is using Nystatin suspension. Reports stools are becoming more loose with abdominal pain or cramping. Remains afebrile without shortness of breath, chest pain, palpitations, dysuria, skin changes, vision changes or bleeding. Will discontinue laxatives. Bony pains likely 2/2 engraftment. Continues with daily GSF.     3/12/20: Allo MUD Day +15. Continues to c/o bone pain despite alternating Tramadol 100 mg and Dilaudid 2 mg. He is verbalizing frustration with bone pain. Mouth discomfort has improved but he reports discomfort with swallowing. Reports constipation. Lung sounds are slightly diminished. Oral sores and thrush are improved. (+) bowel sounds. Remains afebrile without shortness of breath, chest pain, palpitations, diarrhea, abdominal pain/cramping, dysuria, skin changes, vision changes or bleeding. Bony pain likely 2/2 engraftment; WBC is 0.5 today. Will increase Dilaudid dose. Continue with daily GSF.     3/13/20: Allo MUD Day +16. Continues to have bone pain worse at bedtime and he is unable to sleep. Denies numbness or tingling in bilateral lower extremities. Continues to have mucositis but improved. Headache is mild without vision disturbances. He was able to have a soft, formed BM without straining. Remains afebrile without shortness of breath, chest pain, palpitations, diarrhea, abdominal pain/cramping, dysuria, skin changes or bleeding. Will give hydrocortisone 50 mg  IV Q12H for 2 doses.     3/14/20:  Allo MUD Day +17.  No acute events were reported overnight.  The patient remains experiencing back pain and arthralgias but they are significantly improved since he started the hydromorphone and hydrocortisone.  On the other hand, his speech is slightly slurred although he remains awake, coherent, and conversational.  He is not in distress and denies cough, shortness of breath, chest pain, nausea, abdominal pain, dysuria, or bleeding.    Review of Systems   Constitutional: Positive for appetite change and fatigue. Negative for activity change, chills, diaphoresis, fever and unexpected weight change.   HENT: Negative for congestion, ear pain, mouth sores, nosebleeds, rhinorrhea, sinus pressure, sinus pain, sneezing and sore throat.         Mouth pain   Eyes: Negative for pain, discharge, redness and visual disturbance.   Respiratory: Negative for cough, chest tightness, shortness of breath, wheezing and stridor.    Cardiovascular: Negative for chest pain, palpitations and leg swelling.   Gastrointestinal: Negative for abdominal distention, abdominal pain, diarrhea, nausea and vomiting.   Endocrine: Negative.    Genitourinary: Negative for difficulty urinating, dysuria, frequency, hematuria and urgency.   Musculoskeletal: Positive for arthralgias and back pain. Negative for joint swelling and neck pain.   Skin: Negative for color change, pallor, rash and wound.   Allergic/Immunologic: Negative for immunocompromised state.   Neurological: Negative for dizziness, syncope, weakness, light-headedness and headaches.   Hematological: Negative for adenopathy. Does not bruise/bleed easily (  ).   Psychiatric/Behavioral: Negative for agitation (  ), behavioral problems, confusion, hallucinations and sleep disturbance.       ALLERGIES:   Allergen Reactions   • Pollen Other (See Comments)     Sneezing, watery eyes       Current Facility-Administered Medications   Medication Dose Route Frequency  Provider Last Rate Last Dose   • SODIUM CHLORIDE 0.9 % IV SOLN Pyxis Override            • valACYclovir (VALTREX) tablet 1,000 mg  1,000 mg Oral 2 times per day Jeannie Messer MD   1,000 mg at 03/14/20 0845   • HYDROmorphone (DILAUDID) 4 MG tablet 4 mg  4 mg Oral Q4H PRN Tammy Nguyen CNP   4 mg at 03/13/20 2243   • docusate sodium (COLACE) capsule 100 mg  100 mg Oral Daily Tammy Nguyen CNP   100 mg at 03/14/20 0844   • TACROlimus (PROGRAF) 1.7 mg in dextrose 5 % 250 mL  1.7 mg Intravenous Q24H Trae Anna MD   1.7 mg at 03/13/20 1721   • sodium chloride 0.9% infusion   Intravenous Continuous PRN Sen Martinez PA-C       • sodium chloride 0.9% infusion   Intravenous Continuous PRN Tammy Nguyen CNP       • levoFLOXacin (LEVAQUIN) tablet 500 mg  500 mg Oral QAM AC Jeannie Messer MD   500 mg at 03/14/20 0707   • nystatin (MYCOSTATIN) 244647 UNIT/ML suspension 500,000 Units  500,000 Units Swish & Spit 4x Daily Jeannie Messer MD   500,000 Units at 03/14/20 0844   • prochlorperazine (COMPAZINE) injection 5 mg  5 mg Intravenous Q6H PRN Tammy Nguyen CNP   5 mg at 03/13/20 0912   • traMADol (ULTRAM) tablet 100 mg  100 mg Oral Q8H PRN Sen Martinez PA-C   100 mg at 03/13/20 0006   • LORazepam (ATIVAN) tablet 0.5 mg  0.5 mg Oral On Call PRN Amira Najera MD   0.5 mg at 03/12/20 2136   • CARBOXYMethylcellulose (REFRESH PLUS) 0.5 % ophthalmic solution 1 drop  1 drop Both Eyes 4x Daily PRN Randa Monte CNP   1 drop at 03/09/20 0857   • voriconazole (VFEND) tablet 200 mg  200 mg Oral 2 times per day Trae Anna MD   200 mg at 03/14/20 0843   • filgrastim (G-CSF) (NEUPOGEN) injection 300 mcg  300 mcg Subcutaneous Daily at noon Trae E Wilfrido, MD   300 mcg at 03/13/20 1109   • filgrastim (G-CSF) (NEUPOGEN) injection 480 mcg  480 mcg Subcutaneous Daily at ana Anna MD   480 mcg at 03/13/20 1110   • sodium chloride 0.9 % flush bag 25 mL  25 mL Intravenous PRN Tammy Nguyen CNP 25 mL/hr at  03/10/20 1323 25 mL at 03/10/20 1323   • lisinopril (ZESTRIL) tablet 20 mg  20 mg Oral Daily Tammy Nguyen CNP   20 mg at 03/14/20 0844   • hydrochlorothiazide (HYDRODIURIL) tablet 25 mg  25 mg Oral Daily Tammy Nguyen CNP   25 mg at 03/14/20 0843   • [Held by provider] traZODone (DESYREL) tablet 50 mg  50 mg Oral Nightly Tammy Nguyen CNP   Stopped at 03/09/20 2100   • abacavir-dolutegravir-lamiVUDine (TRIUMEQ) 600- MG tablet 1 tablet  1 tablet Oral Daily Jeannie Messer MD   1 tablet at 03/14/20 0846   • sodium chloride (PF) 0.9 % injection 10 mL  10 mL Intracatheter 2 times per day Trae Anna MD   10 mL at 03/14/20 0846   • potassium CHLORIDE 20 MEQ/50ML IVPB premix 40 mEq  40 mEq Intravenous PRN Trae Anna MD   Stopped at 02/24/20 1304   • magnesium sulfate 4 g in sterile water IVPB  4 g Intravenous PRN Trae Anna MD 12.5 mL/hr at 03/12/20 0804 4 g at 03/12/20 0804   • calcium gluconate 4 g in sodium chloride 0.9 % 250 mL IVPB   Intravenous PRN Trae Anna MD       • sodium phosphate 20 mmol in sodium chloride 0.9 % 250 mL IVPB   Intravenous PRN Trae Anna MD       • sodium phosphate 40 mmol in sodium chloride 0.9 % 250 mL IVPB   Intravenous PRN Trae Anna MD       • potassium phosphate 20 mmol in sodium chloride 0.9 % 250 mL IVPB   Intravenous PRN Trae Anna MD       • potassium phosphate 40 mmol in sodium chloride 0.9 % 250 mL IVPB   Intravenous PRN Trae Anna MD       • heparin 100 UNIT/ML lock flush 80 Units  80 Units Intracatheter PRN Trae Anna MD       • heparin 100 UNIT/ML lock flush 90 Units  90 Units Intracatheter PRN Trae Anna MD       • sodium chloride 0.9% infusion   Intravenous Continuous Tammy Nguyen  mL/hr at 03/13/20 0913     • ursodiol (ACTIGALL) capsule 300 mg  300 mg Oral TID Trae Anna MD   300 mg at 03/14/20 0891   • nystatin (MYCOSTATIN) powder   Topical TID PRN Tammy Nguyen, CNP        • EPINEPHrine (ADRENALIN) injection 0.3 mg  0.3 mg Intramuscular Q5 Min PRN Trae Anna MD       • diphenhydrAMINE (BENADRYL) injection 50 mg  50 mg Intravenous Once PRN Trae Anna MD       • famotidine (PEPCID) injection 20 mg  20 mg Intravenous Once PRN Trae Anna MD       • methylPREDNISolone (SOLU-Medrol) PF injection 125 mg  125 mg Intravenous Once PRN Trae Anna MD       • albuterol inhaler 2 puff  2 puff Inhalation Q20 Min PRN Trae Anna MD       • furosemide (LASIX INJECT) injection 20 mg  20 mg Intravenous Daily PRN Trae Anna MD   20 mg at 02/28/20 1820   • mycophenolate (CELLCEPT) tablet 1,000 mg  1,000 mg Oral Q12H Trae Anna MD   1,000 mg at 03/14/20 0845   • LORazepam (ATIVAN) injection 0.5 mg  0.5 mg Intravenous Q4H PRN Tammy Nguyen, CNP   0.5 mg at 03/13/20 2244       Vital Last Value 24 Hour Range   Temperature 97.3 °F (36.3 °C) (03/14/20 0948) Temp  Min: 97.3 °F (36.3 °C)  Max: 98.6 °F (37 °C)   Pulse 73 (03/14/20 0948) Pulse  Min: 73  Max: 94   Respiratory 16 (03/14/20 0948) Resp  Min: 16  Max: 18   Non-Invasive  Blood Pressure 110/71 (03/14/20 0948) BP  Min: 98/58  Max: 148/89   Pulse Oximetry 97 % (03/14/20 0901) SpO2  Min: 96 %  Max: 100 %   Arterial   Blood Pressure   No data recorded      Wt Readings from Last 1 Encounters:   03/14/20 121.5 kg (267 lb 13.7 oz)          Weight change: 1.1 kg (2 lb 6.8 oz)       Intake/Output Summary (Last 24 hours) at 3/14/2020 1007  Last data filed at 3/14/2020 0300  Gross per 24 hour   Intake 360 ml   Output 1250 ml   Net -890 ml       Physical Exam  Vitals signs and nursing note reviewed.   Constitutional:       General: He is not in acute distress.     Appearance: Normal appearance. He is well-developed. He is obese. He is not ill-appearing, toxic-appearing or diaphoretic.   HENT:      Head: Normocephalic and atraumatic.      Right Ear: External ear normal.      Left Ear: External ear normal.       Nose: Nose normal. No congestion or rhinorrhea.      Mouth/Throat:      Mouth: Mucous membranes are moist.      Pharynx: Posterior oropharyngeal erythema present. No oropharyngeal exudate.      Comments: Snow, white buccal mucosa. Solitary, dark red blistering on the left palatopharyngeal arch with trace blood in posterior oropharynx. No discharge or blistering.   Eyes:      General: No scleral icterus.        Right eye: No discharge.         Left eye: No discharge.      Extraocular Movements: Extraocular movements intact.      Conjunctiva/sclera: Conjunctivae normal.      Pupils: Pupils are equal, round, and reactive to light.   Neck:      Musculoskeletal: Normal range of motion and neck supple. No neck rigidity or muscular tenderness.      Thyroid: No thyromegaly.      Vascular: No JVD.   Cardiovascular:      Rate and Rhythm: Normal rate and regular rhythm.      Heart sounds: Normal heart sounds. No murmur. No friction rub. No gallop.    Pulmonary:      Effort: Pulmonary effort is normal. No respiratory distress.      Breath sounds: Normal breath sounds. No stridor. No wheezing or rales.   Chest:      Chest wall: No tenderness.   Abdominal:      General: Bowel sounds are normal. There is no distension.      Palpations: Abdomen is soft. There is no mass.      Tenderness: There is no abdominal tenderness (No RUQ tenderness).   Musculoskeletal: Normal range of motion.         General: No swelling, tenderness, deformity or signs of injury.      Right lower leg: No edema.      Left lower leg: No edema.   Lymphadenopathy:      Cervical: No cervical adenopathy.   Skin:     General: Skin is warm and dry.      Capillary Refill: Capillary refill takes less than 2 seconds.      Coloration: Skin is not jaundiced or pale.      Findings: No bruising, erythema, lesion or rash.   Neurological:      General: No focal deficit present.      Mental Status: He is alert and oriented to person, place, and time. Mental status is at  baseline.      Cranial Nerves: No cranial nerve deficit.      Sensory: No sensory deficit.      Motor: No weakness.      Coordination: Coordination normal.      Gait: Gait normal.   Psychiatric:         Mood and Affect: Mood normal.         Behavior: Behavior normal.         Thought Content: Thought content normal.         Judgment: Judgment normal.       Laboratory Results  Recent Results (from the past 24 hour(s))   Magnesium    Collection Time: 03/14/20  4:30 AM   Result Value Ref Range    MAGNESIUM 1.5 (L) 1.7 - 2.4 mg/dL   Phosphorus    Collection Time: 03/14/20  4:30 AM   Result Value Ref Range    PHOSPHORUS 3.4 2.4 - 4.7 mg/dL   CBC with Automated Differential    Collection Time: 03/14/20  4:30 AM   Result Value Ref Range    WBC 0.4 (LL) 4.2 - 11.0 K/mcL    RBC 2.04 (L) 4.50 - 5.90 mil/mcL    HGB 6.4 (LL) 13.0 - 17.0 g/dL    HCT 18.9 (L) 39.0 - 51.0 %    MCV 92.6 78.0 - 100.0 fl    MCH 31.4 26.0 - 34.0 pg    MCHC 33.9 32.0 - 36.5 g/dL    RDW-CV 14.1 11.0 - 15.0 %    PLT 5 (LL) 140 - 450 K/mcL    NRBC 0 0 /100 WBC    DIFF TYPE AUTOMATED DIFFERENTIAL    Comprehensive Metabolic Panel    Collection Time: 03/14/20  4:30 AM   Result Value Ref Range    Sodium 136 135 - 145 mmol/L    Potassium 3.7 3.4 - 5.1 mmol/L    Chloride 105 98 - 107 mmol/L    Carbon Dioxide 24 21 - 32 mmol/L    Anion Gap 11 10 - 20 mmol/L    Glucose 110 (H) 65 - 99 mg/dL    BUN 12 6 - 20 mg/dL    Creatinine 0.71 0.67 - 1.17 mg/dL    GFR Estimate,  >90     GFR Estimate, Non African American >90     BUN/Creatinine Ratio 17 7 - 25    CALCIUM 7.8 (L) 8.4 - 10.2 mg/dL    TOTAL BILIRUBIN 1.3 (H) 0.2 - 1.0 mg/dL    AST/SGOT 8 <38 Units/L    ALT/SGPT 39 <64 Units/L    ALK PHOSPHATASE 45 45 - 117 Units/L    TOTAL PROTEIN 5.2 (L) 6.4 - 8.2 g/dL    Albumin 2.4 (L) 3.6 - 5.1 g/dL    GLOBULIN 2.8 2.0 - 4.0 g/dL    A/G Ratio, Serum 0.9 (L) 1.0 - 2.4   C Reactive Protein    Collection Time: 03/14/20  4:30 AM   Result Value Ref Range     C-REACTIVE PROTEIN 6.0 (H) <1.0 mg/dL   Prepare Platelets    Collection Time: 03/14/20  6:59 AM   Result Value Ref Range    UNITS ORDERED 1     UNIT NUMBER [id number]     BLOOD COMPONENT TYPE PLAT PHERESIS LR IRRAD 2     UNIT DIVISION 0     STATUS OF UNIT ISSUED     TRANSFUSION STATUS OK TO TRANSFUSE        Imaging  XR CHEST PA OR AP 1 VIEW   Final Result   No acute abnormality      Electronically Signed by: SAHIL THAKKAR M.D.    Signed on: 3/9/2020 9:24 AM          US LIVER   Final Result   1.  No liver lesion or biliary dilatation   2.  Gallbladder polyps      Electronically Signed by: SAHIL THAKKAR M.D.    Signed on: 3/3/2020 3:26 PM          IR TUNNELED CENTRAL LINE INSERTION AGE 6 OR OLDER   Final Result   Impression:      Status post right IJ tunneled Trifusion catheter placement.  Ready for use.       Electronically Signed by: DENAE DUTTON M.D.    Signed on: 2/19/2020 12:53 PM              Assessment and Plan  Active Hospital Problems    Diagnosis   • Myelodysplastic syndrome (CMS/HCC)     Bu/Flu+MTX  Allogeneic Stem Cell Transplant  - Day -6     - Busulfan     - Fludarabine  - Day -5     - Busulfan      - Fludarabine  - Day -4     - Busulfan     - Fludarabine  - Day -3     - Busulfan     - Fludarabine  - Day -2     - GVHD/Rejection Prophylaxis        - Tacrolimus     - Infectious Disease Prophylaxis        - Levofloxacin 500 mg PO Daily        - Fluconazole 400 mg PO Daily        - Valtrex 500 mg PO Q12H  - Day 0 (2/26/20)     - Stem Cell Infusion- 1.2 x 10(6) cells/kg     - GVHD/Rejection Prophylaxis        - Mycophenolate  - Day +1     - GVHD Prophylaxis        - Methotrexate  - Day +2     - Leucovorin  - Day +3     - GVHD Prophylaxis        - Methotrexate  - Day +4     - Leucovorin  - Day +6     - GVHD Prophylaxis        - Methotrexate  - Day +7     - Leucovorin     - Growth Factors        - Neupogen           • Bone pain     2/2 Cell Engraftment   - Tramadol 100 mg Q8H PRN    - Dilaudid 4 mg PO  Q4H PRN   - Heating pad   - Hydrocortisone 50 mg IV Q12H for 2 doses on 3/13/20     • Neutropenic fever (CMS/HCC)     2/2 Infectious Process vs Engraftment   - ID is following and ordered infectious workup on 3/9/20   - Cefepime IVPB starting 3/9/20 per ID        • Mild Hyponatremia     -HOLD continuous IVF (03/06/20)   -Resume IVF 0.9% NS at 125 cc/hr (03/07/20)   -Increase to 150 cc/hr (03/08/20)  -Monitor CMP closely   -Consider hypertonic saline (3%) bolus should continue to downtrend and become symptomatic  -Monitor for signs and symptoms hyponatremia  -Strict I/Os; monitor urine output     • Mucositis due to antineoplastic therapy     -2/2 chemotherapy (likely) vs HSV reactivation  -Biotin and good oral hygiene  -Begin Cepacol PRN (03/07/20)  -Valtrex ppx  -Magic mouthwash TID PRN   -Monitor closely     • Mild Transaminitis     2/2 Chemotherapy vs Voriconazole vs Veno-Occlusive Disease   - Monitor bilirubin level and LFTs closely   - Daily weights   - Strict I/Os   - Liver US (03/03/20)    -No liver lesions or dilatation; GB polyps   -HBV Level    -Not detected (03/06/20)   - Voriconazole Level (03/03/20)    -PENDING     • Nausea     2/2 Chemotherapy-induced vs Antibiotic-induced   -Zofran 4 mg IVP Q6H PRN - discontinued 3/10/20   -Compazine 5 mg IVP Q6H PRN     • Pancytopenia due to chemotherapy (CMS/HCC)     - Continue ppx antibiotics  - Monitor counts closely  - 1 unit PRBCs leukoreduced and irradiated for hgb <7  - 1 unit plt leukoreduced and irradiated for plt <10     • Headache     2/2 Hypovolemia   - Increase IVF rate from 83 ml/hr to 100 ml/hr    -Increased to 125 mg (03/07/20)   - Tramadol 50 mg PO Q6H PRN    -Increased to 100 mg PO q8h PRN   - Encourage increase in oral intake   - Discontinue Zofran     • Drug-induced pancytopenia (CMS/HCC)     2/2 chemotherapy-induced   - Infectious Disease Prophylaxis to begin on Day 0   - Levofloxacin 500 mg QD    - Voriconazole 200 mg Daily   - Valtrex 500 mg PO  BID    - Discontinue Neupogen when ANC >1.5   - Transfuse PRBCs leukoreduced and irradiated for Hgb <7.0   - Transfuse Platelets leukoreduced and irradiated for plt <10, or <20 if bleeding   - Closely monitor daily labs and VS     • Exposure to syphilis     - Syphilis confirmatory test negative from 02/08/20  - RPR negative from 02/19/20     • Other constipation     - Colace 100 mg PO Daily  - Miralax 17 gm PO Daily     • Skin candidiasis     - 2/2 Topical Fungal Infection from Immunocompromised State  - Nystatin powder QID      • Insomnia due to medical condition     - Trazodone 50 mg QHS PRN     • Essential hypertension     - Lisinopril/HCTZ 20-25 mg PO daily     • HIV (human immunodeficiency virus infection) (CMS/HCC)     - Triumeq 600- PO daily       Plan  1.)  Continue IVF rate to 125 ml/hr given sodium level is improving  2.) FU Voriconazole (In process as of 03/08/20)   -Monitor LFTs and t.bili  3.) Maintain good oral hygiene given mucositis   -Nystatin suspension   4.) Continue GCSF and supportive care while awaiting cell recovery   5.) Tramadol 100 mg PO Q8H PRN and Dilaudid 4 mg PO Q4H PRN  6.) Heating pad for back pain  7.) Hydrocortisone 50 mg IV Q12H for 2 doses (therapy completed)  8.)  Activity and oral intake encouraged.    Time  Total face to face time spent with patient 35 minutes.  Greater than 50% of the total time was spent counseling and/or coordinating care.      Trae Anna MD

## 2022-03-30 ENCOUNTER — TRANSCRIBE ORDERS (OUTPATIENT)
Dept: ADMINISTRATIVE | Facility: HOSPITAL | Age: 67
End: 2022-03-30

## 2022-03-30 DIAGNOSIS — R09.89 OTHER SPECIFIED SYMPTOMS AND SIGNS INVOLVING THE CIRCULATORY AND RESPIRATORY SYSTEMS: ICD-10-CM

## 2022-03-30 DIAGNOSIS — R26.0 ATAXIC GAIT: Primary | ICD-10-CM

## 2022-03-31 ENCOUNTER — TRANSCRIBE ORDERS (OUTPATIENT)
Dept: ADMINISTRATIVE | Facility: HOSPITAL | Age: 67
End: 2022-03-31

## 2022-03-31 ENCOUNTER — HOSPITAL ENCOUNTER (OUTPATIENT)
Dept: GENERAL RADIOLOGY | Facility: HOSPITAL | Age: 67
Discharge: HOME | End: 2022-03-31

## 2022-03-31 ENCOUNTER — LAB (OUTPATIENT)
Dept: LAB | Facility: HOSPITAL | Age: 67
End: 2022-03-31

## 2022-03-31 ENCOUNTER — HOSPITAL ENCOUNTER (EMERGENCY)
Facility: HOSPITAL | Age: 67
Discharge: LEFT BEFORE DISCHARGE | End: 2022-03-31

## 2022-03-31 VITALS
TEMPERATURE: 98.6 F | WEIGHT: 142 LBS | DIASTOLIC BLOOD PRESSURE: 67 MMHG | RESPIRATION RATE: 28 BRPM | HEIGHT: 64 IN | BODY MASS INDEX: 24.24 KG/M2 | OXYGEN SATURATION: 99 % | SYSTOLIC BLOOD PRESSURE: 139 MMHG | HEART RATE: 95 BPM

## 2022-03-31 DIAGNOSIS — N18.30 ANEMIA DUE TO STAGE 3 CHRONIC KIDNEY DISEASE, UNSPECIFIED WHETHER STAGE 3A OR 3B CKD: ICD-10-CM

## 2022-03-31 DIAGNOSIS — R79.89 ABNORMAL PEAK TOTAL BILIRUBIN LEVEL: ICD-10-CM

## 2022-03-31 DIAGNOSIS — D63.1 ANEMIA DUE TO STAGE 3 CHRONIC KIDNEY DISEASE, UNSPECIFIED WHETHER STAGE 3A OR 3B CKD: ICD-10-CM

## 2022-03-31 DIAGNOSIS — D72.829 LEUKOCYTOSIS, UNSPECIFIED TYPE: ICD-10-CM

## 2022-03-31 DIAGNOSIS — D64.9 ANEMIA, UNSPECIFIED TYPE: ICD-10-CM

## 2022-03-31 DIAGNOSIS — D72.829 LEUKOCYTOSIS, UNSPECIFIED TYPE: Primary | ICD-10-CM

## 2022-03-31 DIAGNOSIS — R79.89 ABNORMAL PEAK TOTAL BILIRUBIN LEVEL: Primary | ICD-10-CM

## 2022-03-31 LAB
BACTERIA UR QL AUTO: ABNORMAL /HPF
BILIRUB CONJ SERPL-MCNC: 0.6 MG/DL (ref 0–0.3)
BILIRUB INDIRECT SERPL-MCNC: 2.3 MG/DL
BILIRUB SERPL-MCNC: 2.9 MG/DL (ref 0–1.2)
BILIRUB UR QL STRIP: NEGATIVE
CLARITY UR: CLEAR
COLOR UR: ABNORMAL
DAT IGG GEL: POSITIVE
DAT POLY-SP REAG RBC QL: POSITIVE
DEPRECATED RDW RBC AUTO: 71.6 FL (ref 37–54)
ERYTHROCYTE [DISTWIDTH] IN BLOOD BY AUTOMATED COUNT: 20.1 % (ref 12.3–15.4)
FERRITIN SERPL-MCNC: 454.6 NG/ML (ref 13–150)
GGT SERPL-CCNC: 42 U/L (ref 5–36)
GLUCOSE UR STRIP-MCNC: ABNORMAL MG/DL
HCT VFR BLD AUTO: 21.1 % (ref 34–46.6)
HGB BLD-MCNC: 7.1 G/DL (ref 12–15.9)
HGB UR QL STRIP.AUTO: ABNORMAL
HYALINE CASTS UR QL AUTO: ABNORMAL /LPF
IRON 24H UR-MRATE: 129 MCG/DL (ref 37–145)
IRON SATN MFR SERPL: 42 % (ref 20–50)
KETONES UR QL STRIP: NEGATIVE
LEUKOCYTE ESTERASE UR QL STRIP.AUTO: NEGATIVE
MCH RBC QN AUTO: 36.2 PG (ref 26.6–33)
MCHC RBC AUTO-ENTMCNC: 33.6 G/DL (ref 31.5–35.7)
MCV RBC AUTO: 107.7 FL (ref 79–97)
NITRITE UR QL STRIP: NEGATIVE
PH UR STRIP.AUTO: 5.5 [PH] (ref 5–8)
PLATELET # BLD AUTO: 353 10*3/MM3 (ref 140–450)
PMV BLD AUTO: 11.6 FL (ref 6–12)
PROT UR QL STRIP: NEGATIVE
RBC # BLD AUTO: 1.96 10*6/MM3 (ref 3.77–5.28)
RBC # UR STRIP: ABNORMAL /HPF
REF LAB TEST METHOD: ABNORMAL
RETICS # AUTO: 0.39 10*6/MM3 (ref 0.02–0.13)
RETICS/RBC NFR AUTO: 19.82 % (ref 0.7–1.9)
SP GR UR STRIP: <=1.005 (ref 1–1.03)
SQUAMOUS #/AREA URNS HPF: ABNORMAL /HPF
TIBC SERPL-MCNC: 310 MCG/DL (ref 298–536)
TRANSFERRIN SERPL-MCNC: 208 MG/DL (ref 200–360)
UROBILINOGEN UR QL STRIP: ABNORMAL
WBC # UR STRIP: ABNORMAL /HPF
WBC NRBC COR # BLD: 21.68 10*3/MM3 (ref 3.4–10.8)

## 2022-03-31 PROCEDURE — 83540 ASSAY OF IRON: CPT

## 2022-03-31 PROCEDURE — 85045 AUTOMATED RETICULOCYTE COUNT: CPT

## 2022-03-31 PROCEDURE — 71046 X-RAY EXAM CHEST 2 VIEWS: CPT

## 2022-03-31 PROCEDURE — 85027 COMPLETE CBC AUTOMATED: CPT

## 2022-03-31 PROCEDURE — 99211 OFF/OP EST MAY X REQ PHY/QHP: CPT

## 2022-03-31 PROCEDURE — 36415 COLL VENOUS BLD VENIPUNCTURE: CPT

## 2022-03-31 PROCEDURE — 81001 URINALYSIS AUTO W/SCOPE: CPT

## 2022-03-31 PROCEDURE — 82728 ASSAY OF FERRITIN: CPT

## 2022-03-31 PROCEDURE — 86880 COOMBS TEST DIRECT: CPT

## 2022-03-31 PROCEDURE — 87086 URINE CULTURE/COLONY COUNT: CPT

## 2022-03-31 PROCEDURE — 82248 BILIRUBIN DIRECT: CPT

## 2022-03-31 PROCEDURE — 84466 ASSAY OF TRANSFERRIN: CPT

## 2022-03-31 PROCEDURE — 82977 ASSAY OF GGT: CPT

## 2022-03-31 PROCEDURE — 82247 BILIRUBIN TOTAL: CPT

## 2022-04-01 LAB — BACTERIA SPEC AEROBE CULT: NO GROWTH

## 2022-04-04 ENCOUNTER — HOSPITAL ENCOUNTER (EMERGENCY)
Facility: HOSPITAL | Age: 67
Discharge: HOME | End: 2022-04-04
Admitting: EMERGENCY MEDICINE

## 2022-04-04 ENCOUNTER — APPOINTMENT (OUTPATIENT)
Dept: GENERAL RADIOLOGY | Facility: HOSPITAL | Age: 67
End: 2022-04-04

## 2022-04-04 VITALS
HEART RATE: 81 BPM | RESPIRATION RATE: 14 BRPM | OXYGEN SATURATION: 93 % | BODY MASS INDEX: 23.9 KG/M2 | HEIGHT: 64 IN | TEMPERATURE: 98.1 F | WEIGHT: 140 LBS | SYSTOLIC BLOOD PRESSURE: 111 MMHG | DIASTOLIC BLOOD PRESSURE: 52 MMHG

## 2022-04-04 DIAGNOSIS — D72.829 LEUKOCYTOSIS, UNSPECIFIED TYPE: ICD-10-CM

## 2022-04-04 DIAGNOSIS — R53.1 GENERALIZED WEAKNESS: ICD-10-CM

## 2022-04-04 DIAGNOSIS — D64.9 ANEMIA, UNSPECIFIED TYPE: Primary | ICD-10-CM

## 2022-04-04 LAB
ABO GROUP BLD: NORMAL
ALBUMIN SERPL-MCNC: 3.7 G/DL (ref 3.5–5.2)
ALBUMIN/GLOB SERPL: 1.5 G/DL
ALP SERPL-CCNC: 77 U/L (ref 39–117)
ALT SERPL W P-5'-P-CCNC: 10 U/L (ref 1–33)
ANION GAP SERPL CALCULATED.3IONS-SCNC: 12 MMOL/L (ref 5–15)
ANISOCYTOSIS BLD QL: ABNORMAL
APTT PPP: 27.2 SECONDS (ref 24.1–35)
AST SERPL-CCNC: 29 U/L (ref 1–32)
BACTERIA UR QL AUTO: ABNORMAL /HPF
BASOPHILS # BLD MANUAL: 0.19 10*3/MM3 (ref 0–0.2)
BASOPHILS NFR BLD MANUAL: 1 % (ref 0–1.5)
BILIRUB SERPL-MCNC: 2.1 MG/DL (ref 0–1.2)
BILIRUB UR QL STRIP: NEGATIVE
BLD GP AB SCN SERPL QL: POSITIVE
BLD GP AB SCN SERPL QL: POSITIVE
BUN SERPL-MCNC: 11 MG/DL (ref 8–23)
BUN/CREAT SERPL: 20.8 (ref 7–25)
CALCIUM SPEC-SCNC: 8.3 MG/DL (ref 8.6–10.5)
CHLORIDE SERPL-SCNC: 101 MMOL/L (ref 98–107)
CLARITY UR: CLEAR
CO2 SERPL-SCNC: 22 MMOL/L (ref 22–29)
COLOR UR: YELLOW
CREAT SERPL-MCNC: 0.53 MG/DL (ref 0.57–1)
DEPRECATED RDW RBC AUTO: 88 FL (ref 37–54)
EGFRCR SERPLBLD CKD-EPI 2021: 102.1 ML/MIN/1.73
EOSINOPHIL # BLD MANUAL: 0.19 10*3/MM3 (ref 0–0.4)
EOSINOPHIL NFR BLD MANUAL: 1 % (ref 0.3–6.2)
ERYTHROCYTE [DISTWIDTH] IN BLOOD BY AUTOMATED COUNT: 22.4 % (ref 12.3–15.4)
FERRITIN SERPL-MCNC: 311.9 NG/ML (ref 13–150)
FOLATE SERPL-MCNC: 14.2 NG/ML (ref 4.78–24.2)
GLOBULIN UR ELPH-MCNC: 2.4 GM/DL
GLUCOSE SERPL-MCNC: 206 MG/DL (ref 65–99)
GLUCOSE UR STRIP-MCNC: NEGATIVE MG/DL
HCT VFR BLD AUTO: 22.7 % (ref 34–46.6)
HGB BLD-MCNC: 7 G/DL (ref 12–15.9)
HGB UR QL STRIP.AUTO: ABNORMAL
HOLD SPECIMEN: NORMAL
HOLD SPECIMEN: NORMAL
HYALINE CASTS UR QL AUTO: ABNORMAL /LPF
INR PPP: 1.09 (ref 0.91–1.09)
IRON 24H UR-MRATE: 44 MCG/DL (ref 37–145)
IRON SATN MFR SERPL: 17 % (ref 20–50)
KETONES UR QL STRIP: ABNORMAL
LEUKOCYTE ESTERASE UR QL STRIP.AUTO: NEGATIVE
LYMPHOCYTES # BLD MANUAL: 1.96 10*3/MM3 (ref 0.7–3.1)
LYMPHOCYTES NFR BLD MANUAL: 8.2 % (ref 5–12)
MACROCYTES BLD QL SMEAR: ABNORMAL
MAGNESIUM SERPL-MCNC: 2 MG/DL (ref 1.6–2.4)
MCH RBC QN AUTO: 34.8 PG (ref 26.6–33)
MCHC RBC AUTO-ENTMCNC: 30.8 G/DL (ref 31.5–35.7)
MCV RBC AUTO: 112.9 FL (ref 79–97)
METAMYELOCYTES NFR BLD MANUAL: 2.1 % (ref 0–0)
MONOCYTES # BLD: 1.56 10*3/MM3 (ref 0.1–0.9)
MYELOCYTES NFR BLD MANUAL: 2.1 % (ref 0–0)
NEUTROPHILS # BLD AUTO: 14.29 10*3/MM3 (ref 1.7–7)
NEUTROPHILS NFR BLD MANUAL: 74.2 % (ref 42.7–76)
NEUTS BAND NFR BLD MANUAL: 1 % (ref 0–5)
NITRITE UR QL STRIP: NEGATIVE
PH UR STRIP.AUTO: 5.5 [PH] (ref 5–8)
PLAT MORPH BLD: NORMAL
PLATELET # BLD AUTO: 329 10*3/MM3 (ref 140–450)
PMV BLD AUTO: 11.2 FL (ref 6–12)
POIKILOCYTOSIS BLD QL SMEAR: ABNORMAL
POLYCHROMASIA BLD QL SMEAR: ABNORMAL
POTASSIUM SERPL-SCNC: 4 MMOL/L (ref 3.5–5.2)
PROT SERPL-MCNC: 6.1 G/DL (ref 6–8.5)
PROT UR QL STRIP: NEGATIVE
PROTHROMBIN TIME: 13.7 SECONDS (ref 11.9–14.6)
RBC # BLD AUTO: 2.01 10*6/MM3 (ref 3.77–5.28)
RBC # UR STRIP: ABNORMAL /HPF
REF LAB TEST METHOD: ABNORMAL
RETICS # AUTO: 0.43 10*6/MM3 (ref 0.02–0.13)
RETICS/RBC NFR AUTO: 21.99 % (ref 0.7–1.9)
RH BLD: POSITIVE
SODIUM SERPL-SCNC: 135 MMOL/L (ref 136–145)
SP GR UR STRIP: 1.01 (ref 1–1.03)
SQUAMOUS #/AREA URNS HPF: ABNORMAL /HPF
STOMATOCYTES BLD QL SMEAR: ABNORMAL
T&S EXPIRATION DATE: NORMAL
T3FREE SERPL-MCNC: 2.1 PG/ML (ref 2–4.4)
T4 FREE SERPL-MCNC: 1.29 NG/DL (ref 0.93–1.7)
TARGETS BLD QL SMEAR: ABNORMAL
TIBC SERPL-MCNC: 261 MCG/DL (ref 298–536)
TRANSFERRIN SERPL-MCNC: 175 MG/DL (ref 200–360)
TROPONIN T SERPL-MCNC: <0.01 NG/ML (ref 0–0.03)
TSH SERPL DL<=0.05 MIU/L-ACNC: 2.1 UIU/ML (ref 0.27–4.2)
UROBILINOGEN UR QL STRIP: ABNORMAL
VARIANT LYMPHS NFR BLD MANUAL: 10.3 % (ref 19.6–45.3)
VIT B12 BLD-MCNC: >2000 PG/ML (ref 211–946)
WBC # UR STRIP: ABNORMAL /HPF
WBC MORPH BLD: NORMAL
WBC NRBC COR # BLD: 18.99 10*3/MM3 (ref 3.4–10.8)
WHOLE BLOOD HOLD SPECIMEN: NORMAL
WHOLE BLOOD HOLD SPECIMEN: NORMAL

## 2022-04-04 PROCEDURE — 86850 RBC ANTIBODY SCREEN: CPT

## 2022-04-04 PROCEDURE — 85007 BL SMEAR W/DIFF WBC COUNT: CPT | Performed by: NURSE PRACTITIONER

## 2022-04-04 PROCEDURE — 85730 THROMBOPLASTIN TIME PARTIAL: CPT | Performed by: NURSE PRACTITIONER

## 2022-04-04 PROCEDURE — 86978 RBC PRETREATMENT SERUM: CPT

## 2022-04-04 PROCEDURE — 80053 COMPREHEN METABOLIC PANEL: CPT | Performed by: NURSE PRACTITIONER

## 2022-04-04 PROCEDURE — 84481 FREE ASSAY (FT-3): CPT | Performed by: NURSE PRACTITIONER

## 2022-04-04 PROCEDURE — 84484 ASSAY OF TROPONIN QUANT: CPT | Performed by: NURSE PRACTITIONER

## 2022-04-04 PROCEDURE — 93010 ELECTROCARDIOGRAM REPORT: CPT | Performed by: INTERNAL MEDICINE

## 2022-04-04 PROCEDURE — 86920 COMPATIBILITY TEST SPIN: CPT

## 2022-04-04 PROCEDURE — 81403 MOPATH PROCEDURE LEVEL 4: CPT

## 2022-04-04 PROCEDURE — 86850 RBC ANTIBODY SCREEN: CPT | Performed by: NURSE PRACTITIONER

## 2022-04-04 PROCEDURE — 82746 ASSAY OF FOLIC ACID SERUM: CPT | Performed by: NURSE PRACTITIONER

## 2022-04-04 PROCEDURE — 86901 BLOOD TYPING SEROLOGIC RH(D): CPT

## 2022-04-04 PROCEDURE — 86900 BLOOD TYPING SEROLOGIC ABO: CPT

## 2022-04-04 PROCEDURE — 86870 RBC ANTIBODY IDENTIFICATION: CPT

## 2022-04-04 PROCEDURE — 86905 BLOOD TYPING RBC ANTIGENS: CPT

## 2022-04-04 PROCEDURE — 83540 ASSAY OF IRON: CPT | Performed by: NURSE PRACTITIONER

## 2022-04-04 PROCEDURE — 84439 ASSAY OF FREE THYROXINE: CPT | Performed by: NURSE PRACTITIONER

## 2022-04-04 PROCEDURE — 81001 URINALYSIS AUTO W/SCOPE: CPT | Performed by: NURSE PRACTITIONER

## 2022-04-04 PROCEDURE — 85045 AUTOMATED RETICULOCYTE COUNT: CPT | Performed by: NURSE PRACTITIONER

## 2022-04-04 PROCEDURE — 86922 COMPATIBILITY TEST ANTIGLOB: CPT

## 2022-04-04 PROCEDURE — 85610 PROTHROMBIN TIME: CPT | Performed by: NURSE PRACTITIONER

## 2022-04-04 PROCEDURE — 84443 ASSAY THYROID STIM HORMONE: CPT | Performed by: NURSE PRACTITIONER

## 2022-04-04 PROCEDURE — 93005 ELECTROCARDIOGRAM TRACING: CPT | Performed by: NURSE PRACTITIONER

## 2022-04-04 PROCEDURE — 86870 RBC ANTIBODY IDENTIFICATION: CPT | Performed by: NURSE PRACTITIONER

## 2022-04-04 PROCEDURE — 99283 EMERGENCY DEPT VISIT LOW MDM: CPT

## 2022-04-04 PROCEDURE — 82607 VITAMIN B-12: CPT | Performed by: NURSE PRACTITIONER

## 2022-04-04 PROCEDURE — 86860 RBC ANTIBODY ELUTION: CPT

## 2022-04-04 PROCEDURE — 83735 ASSAY OF MAGNESIUM: CPT | Performed by: NURSE PRACTITIONER

## 2022-04-04 PROCEDURE — 86901 BLOOD TYPING SEROLOGIC RH(D): CPT | Performed by: NURSE PRACTITIONER

## 2022-04-04 PROCEDURE — 82728 ASSAY OF FERRITIN: CPT | Performed by: NURSE PRACTITIONER

## 2022-04-04 PROCEDURE — 85025 COMPLETE CBC W/AUTO DIFF WBC: CPT | Performed by: NURSE PRACTITIONER

## 2022-04-04 PROCEDURE — 86900 BLOOD TYPING SEROLOGIC ABO: CPT | Performed by: NURSE PRACTITIONER

## 2022-04-04 PROCEDURE — 71045 X-RAY EXAM CHEST 1 VIEW: CPT

## 2022-04-04 PROCEDURE — 84466 ASSAY OF TRANSFERRIN: CPT | Performed by: NURSE PRACTITIONER

## 2022-04-04 PROCEDURE — 86880 COOMBS TEST DIRECT: CPT

## 2022-04-04 RX ORDER — MELATONIN
2000 DAILY
COMMUNITY

## 2022-04-04 RX ORDER — SODIUM CHLORIDE 0.9 % (FLUSH) 0.9 %
10 SYRINGE (ML) INJECTION AS NEEDED
Status: DISCONTINUED | OUTPATIENT
Start: 2022-04-04 | End: 2022-04-04 | Stop reason: HOSPADM

## 2022-04-04 RX ORDER — ROSUVASTATIN CALCIUM 20 MG/1
20 TABLET, COATED ORAL DAILY
COMMUNITY

## 2022-04-04 RX ORDER — MONTELUKAST SODIUM 10 MG/1
10 TABLET ORAL NIGHTLY
COMMUNITY
End: 2022-04-19

## 2022-04-04 NOTE — ED NOTES
Blood bank called and notified this RN that blood will need to be sent out d/t antibodies. Pt will not be able to receive blood today. ELINA Mckenzie aware.

## 2022-04-04 NOTE — ED PROVIDER NOTES
Subjective   Patient is a 57-year-old female presents ER with chief complaints of headache and generalized weakness.  She states she has felt weak for the past 3 days.  She additionally had pain to her head describing sharp pain to the back of her skull and along the left side of her scalp.  She denies this being the worst headache of her life.  She has had history of migraine headaches, typically reports having a few migrainous headaches a year.  Patient reports photophobia without any phonophobia.  Patient reports having outpatient labs performed on Thursday by her PCP.  She was instructed to come to the ER.  She ultimately was evaluated on Friday at [facility].  Labs revealed patient was anemic with a hemoglobin in the range of 7.  She had CT scans which were reviewed by this examiner.  CT of the chest with IV contrast revealed mild mediastinal adenopathy.  Right middle lobe and right lower lobe atelectasis.  CT of abdomen pelvis with IV contrast revealed mildly distended bladder otherwise negative CT scan.  Patient denies any nausea, vomiting, or diarrhea.  She denies any recorded fevers.  Positive history significant for arthritis, asthma, basal cell skin cancer, diabetes type 1, hypercholesterolemia, spinal headache, B12 deficiency and vitamin D deficiency.  She denies any black or bloody stools.  She denies hematemesis.  She reports a change in her overall skin coloring describing mild jaundice.  She has never had history of blood transfusions.  Due to symptoms described in the ER for evaluation and treatment.          Review of Systems   Constitutional: Negative for fever.   HENT: Negative for congestion.    Eyes: Positive for photophobia.   Respiratory: Negative.  Negative for cough and shortness of breath.    Cardiovascular: Negative.  Negative for chest pain.   Gastrointestinal: Negative.  Negative for abdominal pain, blood in stool, diarrhea, nausea and vomiting.   Genitourinary:  "Negative.  Negative for dysuria.   Musculoskeletal: Negative.  Negative for back pain.   Skin: Positive for color change.   Neurological: Positive for weakness and headaches.   All other systems reviewed and are negative.      Past Medical History:   Diagnosis Date   • Arthritis    • Asthma    • Cancer (HCC)     skin basal cell   • Diabetes mellitus (HCC)    • Hypercholesterolemia 2016   • Intermittent chest pain 2016   • PONV (postoperative nausea and vomiting)    • Spinal headache    • Vitamin B12 deficiency 2016   • Vitamin D deficiency 2016       Allergies   Allergen Reactions   • Corn-Containing Products Anaphylaxis   • Eggs Or Egg-Derived Products Anaphylaxis   • Nuts Anaphylaxis     peanuts   • Milk-Related Compounds Nausea And Vomiting   • Gabapentin Dizziness     Patient states becomes weak and dizzy if she takes Gabapentin   • Levemir [Insulin Detemir] Itching and Other (See Comments)     Patient states gets a \"hard knot and itching\" at injection site.   • Wheat Rash       Past Surgical History:   Procedure Laterality Date   • ANKLE SURGERY Right    • APPENDECTOMY     • BREAST BIOPSY     •  SECTION      X 2   • CHOLECYSTECTOMY     • COLONOSCOPY      [facility]   • COLONOSCOPY N/A 3/3/2017    Procedure: COLONOSCOPY WITH ANESTHESIA;  Surgeon: Ondina Abdul MD;  Location: [facility] ENDOSCOPY;  Service:    • ENDOSCOPY N/A 2017    Procedure: ESOPHAGOGASTRODUODENOSCOPY WITH ANESTHESIA;  Surgeon: Ondina Abdul MD;  Location: [facility] ENDOSCOPY;  Service:    • SKIN CANCER EXCISION      FACE   • TONSILLECTOMY         Family History   Problem Relation Age of Onset   • Heart disease Father    • Heart disease Sister    • Heart disease Brother    • Breast cancer Paternal Aunt    • Asthma Mother    • Heart disease Mother        Social History     Socioeconomic History   • Marital status:    Tobacco Use   • Smoking status: Former Smoker     Types: " Cigarettes     Quit date: 2015     Years since quittin.7   • Smokeless tobacco: Never Used   Substance and Sexual Activity   • Alcohol use: No   • Drug use: No   • Sexual activity: Defer           Objective   Physical Exam  Vitals and nursing note reviewed.   Constitutional:       Appearance: She is well-developed. She is ill-appearing.   HENT:      Head: Normocephalic and atraumatic.      Right Ear: External ear normal.      Left Ear: External ear normal.      Nose: Nose normal.   Eyes:      General: Scleral icterus present.      Conjunctiva/sclera: Conjunctivae normal.      Pupils: Pupils are equal, round, and reactive to light.   Cardiovascular:      Rate and Rhythm: Normal rate and regular rhythm.      Pulses: Normal pulses.      Heart sounds: Normal heart sounds.   Pulmonary:      Effort: Pulmonary effort is normal.      Breath sounds: Normal breath sounds.   Abdominal:      General: Bowel sounds are normal.      Palpations: Abdomen is soft.   Genitourinary:     Rectum: Guaiac result negative.      Comments: External hemorrhoid noted, occult stool is negative  Musculoskeletal:         General: Normal range of motion.      Cervical back: Normal range of motion and neck supple.   Skin:     General: Skin is warm and dry.      Coloration: Skin is jaundiced.   Neurological:      General: No focal deficit present.      Mental Status: She is alert and oriented to person, place, and time.   Psychiatric:         Mood and Affect: Mood normal.         Behavior: Behavior normal.         Thought Content: Thought content normal.         Judgment: Judgment normal.         Procedures           ED Course patient will need her outpatient blood transfusion as scheduled. She will need f/u with pcp and recommend f/u with hematology. Patient and  both expressed understanding.   Labs Reviewed   COMPREHENSIVE METABOLIC PANEL - Abnormal; Notable for the following components:       Result Value    Glucose 206 (*)      Creatinine 0.53 (*)     Sodium 135 (*)     Calcium 8.3 (*)     Total Bilirubin 2.1 (*)     All other components within normal limits    Narrative:     GFR Normal >60  Chronic Kidney Disease <60  Kidney Failure <15     CBC WITH AUTO DIFFERENTIAL - Abnormal; Notable for the following components:    WBC 18.99 (*)     RBC 2.01 (*)     Hemoglobin 7.0 (*)     Hematocrit 22.7 (*)     .9 (*)     MCH 34.8 (*)     MCHC 30.8 (*)     RDW 22.4 (*)     RDW-SD 88.0 (*)     All other components within normal limits   MANUAL DIFFERENTIAL - Abnormal; Notable for the following components:    Lymphocyte % 10.3 (*)     Metamyelocyte % 2.1 (*)     Myelocyte % 2.1 (*)     Neutrophils Absolute 14.29 (*)     Monocytes Absolute 1.56 (*)     All other components within normal limits   IRON PROFILE - Abnormal; Notable for the following components:    Iron Saturation 17 (*)     Transferrin 175 (*)     TIBC 261 (*)     All other components within normal limits   FERRITIN - Abnormal; Notable for the following components:    Ferritin 311.90 (*)     All other components within normal limits    Narrative:     Results may be falsely decreased if patient taking Biotin.     RETICULOCYTES - Abnormal; Notable for the following components:    Reticulocyte % 21.99 (*)     Reticulocyte Absolute 0.4290 (*)     All other components within normal limits   URINALYSIS W/ CULTURE IF INDICATED - Abnormal; Notable for the following components:    Ketones, UA 15 mg/dL (1+) (*)     Blood, UA Trace (*)     All other components within normal limits   URINALYSIS, MICROSCOPIC ONLY - Abnormal; Notable for the following components:    RBC, UA 3-5 (*)     WBC, UA 0-2 (*)     All other components within normal limits   PROTIME-INR - Normal   APTT - Normal   TSH - Normal   T4, FREE - Normal    Narrative:     Results may be falsely increased if patient taking Biotin.     MAGNESIUM - Normal   TROPONIN (IN-HOUSE) - Normal    Narrative:     Troponin T Reference Range:  <=  0.03 ng/mL-   Negative for AMI  >0.03 ng/mL-     Abnormal for myocardial necrosis.  Clinicians would have to utilize clinical acumen, EKG, Troponin and serial changes to determine if it is an Acute Myocardial Infarction or myocardial injury due to an underlying chronic condition.       Results may be falsely decreased if patient taking Biotin.     RAINBOW DRAW    Narrative:     The following orders were created for panel order [place] Draw.  Procedure                               Abnormality         Status                     ---------                               -----------         ------                     Green Top (Gel)[[id number]]                                  Final result               Lavender Top[[id number]]                                     Final result               Gray Top[[id number]]                                         Final result               Light Blue Top[[id number]]                                   Final result                 Please view results for these tests on the individual orders.   T3, FREE   FOLATE   VITAMIN B12   POCT GASTRIC OCCULT BLOOD   TYPE AND SCREEN   ANTIBODY SCREEN   PREPARE RBC   CBC AND DIFFERENTIAL    Narrative:     The following orders were created for panel order CBC & Differential.  Procedure                               Abnormality         Status                     ---------                               -----------         ------                     CBC Auto Differential[[id number]]        Abnormal            Final result                 Please view results for these tests on the individual orders.   GREEN TOP   LAVENDER TOP   GRAY TOP   LIGHT BLUE TOP     ED Course as of 04/04/22 1805   Mon Apr 04, 2022   1551 Ordered 1 unit of prbc however patient apparently has an antibody an unable to transfuse today. Spoke with blood bank and the transfusion center and have patient scheduled for outpatient blood transfusion on 4/6/22 at 9:15.  [TW]      ED Course User  Index  [TW] Magaly aCrlos, APRN                                                 MDM  Number of Diagnoses or Management Options  Anemia, unspecified type: new and requires workup  Generalized weakness: new and requires workup  Leukocytosis, unspecified type: new and requires workup     Amount and/or Complexity of Data Reviewed  Clinical lab tests: ordered and reviewed  Tests in the radiology section of CPT®: ordered and reviewed  Decide to obtain previous medical records or to obtain history from someone other than the patient: yes  Discuss the patient with other providers: yes    Risk of Complications, Morbidity, and/or Mortality  Presenting problems: moderate  Diagnostic procedures: moderate  Management options: moderate    Patient Progress  Patient progress: improved      Final diagnoses:   Anemia, unspecified type   Generalized weakness   Leukocytosis, unspecified type       ED Disposition  ED Disposition     ED Disposition   Discharge    Condition   Good    Comment   --             No follow-up provider specified.       Medication List      No changes were made to your prescriptions during this visit.          Magaly Carlos, APRN  04/04/22 1803

## 2022-04-04 NOTE — DISCHARGE INSTRUCTIONS
Known history of Vitamin D and Vitamin B12 deficiency. [facility] will [facility] (which is [facility] to the hospital just down from ER) on 4/6/22 - scheduled outpatient blood transfusion is at 9:15    F/u with pcp for re-evaluation and to review all recent work-ups. Recommend evaluation by hematology    CT scans of the chest, abd/pelvis performed at [facility] were essentially unremarkable.

## 2022-04-05 LAB
Lab: NORMAL
WARM AUTOANTIBODY: NORMAL

## 2022-04-06 ENCOUNTER — LAB (OUTPATIENT)
Dept: LAB | Facility: HOSPITAL | Age: 67
End: 2022-04-06

## 2022-04-06 ENCOUNTER — INFUSION (OUTPATIENT)
Dept: ONCOLOGY | Facility: HOSPITAL | Age: 67
End: 2022-04-06

## 2022-04-06 VITALS
OXYGEN SATURATION: 97 % | DIASTOLIC BLOOD PRESSURE: 50 MMHG | HEART RATE: 80 BPM | WEIGHT: 140 LBS | BODY MASS INDEX: 23.9 KG/M2 | HEIGHT: 64 IN | TEMPERATURE: 97.7 F | RESPIRATION RATE: 18 BRPM | SYSTOLIC BLOOD PRESSURE: 104 MMHG

## 2022-04-06 DIAGNOSIS — D64.9 ANEMIA, UNSPECIFIED TYPE: Primary | ICD-10-CM

## 2022-04-06 PROCEDURE — 86900 BLOOD TYPING SEROLOGIC ABO: CPT

## 2022-04-06 PROCEDURE — 86902 BLOOD TYPE ANTIGEN DONOR EA: CPT

## 2022-04-06 PROCEDURE — 36430 TRANSFUSION BLD/BLD COMPNT: CPT

## 2022-04-06 PROCEDURE — P9016 RBC LEUKOCYTES REDUCED: HCPCS

## 2022-04-06 RX ORDER — FAMOTIDINE 10 MG/ML
20 INJECTION, SOLUTION INTRAVENOUS AS NEEDED
Status: DISCONTINUED | OUTPATIENT
Start: 2022-04-06 | End: 2022-04-06 | Stop reason: HOSPADM

## 2022-04-06 RX ORDER — DIPHENHYDRAMINE HYDROCHLORIDE 50 MG/ML
25 INJECTION INTRAMUSCULAR; INTRAVENOUS AS NEEDED
Status: DISCONTINUED | OUTPATIENT
Start: 2022-04-06 | End: 2022-04-25

## 2022-04-06 RX ORDER — SODIUM CHLORIDE 9 MG/ML
250 INJECTION, SOLUTION INTRAVENOUS AS NEEDED
Status: DISCONTINUED | OUTPATIENT
Start: 2022-04-06 | End: 2022-04-06 | Stop reason: HOSPADM

## 2022-04-06 RX ORDER — METHYLPREDNISOLONE SODIUM SUCCINATE 125 MG/2ML
125 INJECTION, POWDER, LYOPHILIZED, FOR SOLUTION INTRAMUSCULAR; INTRAVENOUS AS NEEDED
Status: DISCONTINUED | OUTPATIENT
Start: 2022-04-06 | End: 2022-04-25

## 2022-04-06 NOTE — PROGRESS NOTES
0930 pt arrived here at the cancer center for her transfusion. She will receive 1 unit of prbc today, it is ready. However, pt had taken off her red blood band at home and left it there, stating nobody told her to keep it on. I called the blood bank, spoke with Rosetta, and she checked and stated if the pt can go home and get the red band we can proceed with the transfusion today. If she cant, she will have to be type and crossed again and that will take 3-3.5 hours to complete and then transfuse, bc of the antibodies. The pt has sent her  home to retrieve the red blood band and then when he returns we can proceed. One unit of blood is ready when we are. I let Rosetta in blood bank know that the pt's spouse is going to get the arm band.

## 2022-04-06 NOTE — PROGRESS NOTES
[id number] called the er and spoke with marnie murcia, regarding need for orders for the blood transfusion today. Awaiting a fax from her.

## 2022-04-07 LAB
BH BB BLOOD EXPIRATION DATE: NORMAL
BH BB BLOOD TYPE BARCODE: 5100
BH BB DISPENSE STATUS: NORMAL
BH BB PRODUCT CODE: NORMAL
BH BB UNIT NUMBER: NORMAL
CROSSMATCH INTERPRETATION: NORMAL
QT INTERVAL: 400 MS
QTC INTERVAL: 464 MS
UNIT  ABO: NORMAL
UNIT  RH: NORMAL

## 2022-04-18 NOTE — PROGRESS NOTES
"[facility] HEMATOLOGY & ONCOLOGY  [address]  [facility] [address] [phone]    Patient Name: Giana Yepez  Encounter Date: 04/19/2022  YOB: 1955  Patient Number: [id number]    Initial Note    REASON FOR CONSULTATION: Patient states \" I was a bad anemic \".    HISTORY OF PRESENT ILLNESS: Giana Yepez is a 67 y.o. female referred by Jeimy Villatoro, * for diagnostic and management recommendations for anemia and leukocytosis.  History is obtained from patient. History is considered to be accurate.  She last saw her PCP on March 29, 2022.    She has health history significant for Type 1 DM, GERD,  Vitamin B12 Deficiency, Hyperlipidemia.     About a month ago she started to feel weak and have no energy which progressively worsened.    She had upper and lower scope in 2017 which were unremarkable.  She denies any illness. States only seasonal allergies.  She denies any fever, N/V/D.  She reports she weighed 146#  on Feb 28.  Today she weighs 139#.  She states she has not changed activity level or eating.  She is a Type 1 Diabetic so she does not take any medications other than insulin.  She denies any palpable lymph nodes.      ER note from on 04/04/22 was reviewed.  It appears that pt was seen in ER at [facility] on 04/01/22 and was found to have an anemia and leukocytosis.  WBC 18.99 and H/H 7.0 / 22.7.  At that time, neutrophils were elevated. Anemia profile was drawn and the only deficiency was iron saturation of 17%.   She was advised to start oral iron once daily.  She states she has antigens in her blood which required her blood transfusion to be postponed until April 6. She did get 1 unit PRBC at that time.      LABS    Lab Results - Last 18 Months   Lab Units 04/19/22  0859 04/04/22  1034 03/31/22  1226   HEMOGLOBIN g/dL 7.6* 7.0* 7.1*   HEMATOCRIT % 24.1* 22.7* 21.1*   MCV fL 110.0* 112.9* 107.7*   WBC 10*3/mm3 " "18.23* 18.99* 21.68*   RDW % 21.3* 22.4* 20.1*   MPV fL 11.5 11.2 11.6   PLATELETS 10*3/mm3 348 329 353   NEUTROS ABS 10*3/mm3 12.44* 14.29*  --    LYMPHS ABS 10*3/mm3 2.84  --   --    MONOS ABS 10*3/mm3 1.35*  --   --    EOS ABS 10*3/mm3 0.60* 0.19  --    BASOS ABS 10*3/mm3 0.13 0.19  --    NEUTROPHIL % %  --  74.2  --    MONOCYTES % %  --  8.2  --    BASOPHIL % %  --  1.0  --    ANISOCYTOSIS   --  Mod/2+  --        Lab Results - Last 18 Months   Lab Units 04/19/22  0859 04/04/22  1035 03/31/22  1226   GLUCOSE mg/dL 263* 206*  --    SODIUM mmol/L 135* 135*  --    POTASSIUM mmol/L 3.9 4.0  --    CO2 mmol/L 25.0 22.0  --    CHLORIDE mmol/L 98 101  --    ANION GAP mmol/L 12.0 12.0  --    CREATININE mg/dL 0.62 0.53*  --    BUN mg/dL 16 11  --    BUN / CREAT RATIO  25.8* 20.8  --    CALCIUM mg/dL 8.8 8.3*  --    ALK PHOS U/L 76 77  --    TOTAL PROTEIN g/dL 6.7 6.1  --    ALT (SGPT) U/L 12 10  --    AST (SGOT) U/L 33* 29  --    BILIRUBIN mg/dL 2.3* 2.1* 2.9*   ALBUMIN g/dL 4.20 3.70  --    GLOBULIN gm/dL 2.5 2.4  --        Lab Results - Last 18 Months   Lab Units 04/19/22  0859   LDH U/L 834*       Lab Results - Last 18 Months   Lab Units 04/19/22  0859 04/04/22  1219 04/04/22  1035 03/31/22  1226 08/17/21  0932 01/07/21  1243   IRON mcg/dL  --   --  44 129  --   --    TIBC mcg/dL  --   --  261* 310  --   --    IRON SATURATION %  --   --  17* 42  --   --    FERRITIN ng/mL  --   --  311.90* 454.60*  --   --    TSH uIU/mL 1.960  --  2.100  --  1.302 1.857   FOLATE ng/mL  --  14.20  --   --   --   --          PAST MEDICAL HISTORY:  ALLERGIES:  Allergies   Allergen Reactions   • Corn-Containing Products Anaphylaxis   • Eggs Or Egg-Derived Products Anaphylaxis   • Nuts Anaphylaxis     peanuts   • Milk-Related Compounds Nausea And Vomiting   • Gabapentin Dizziness     Patient states becomes weak and dizzy if she takes Gabapentin   • Levemir [Insulin Detemir] Itching and Other (See Comments)     Patient states gets a \"hard knot " "and itching\" at injection site.   • Wheat Rash     CURRENT MEDICATIONS:  Outpatient Encounter Medications as of 4/19/2022   Medication Sig Dispense Refill   • Azelastine-Fluticasone 137-50 MCG/ACT suspension into each nostril.     • calcium carbonate (OS-ZUNILDA) 600 MG tablet Take 600 mg by mouth Daily. 2 TABLETS DAILY     • cholecalciferol (VITAMIN D3) 25 MCG (1000 UT) tablet Take 2,000 Units by mouth Daily.     • Continuous Blood Gluc  (Dexcom G5  Kit) device Continuous.     • Cyanocobalamin (VITAMIN B-12 SL) Place 2,500 mcg under the tongue Daily.     • ferrous sulfate 325 (65 FE) MG tablet Take 325 mg by mouth Daily With Breakfast.     • Insulin Infusion Pump device Continuous.     • omeprazole (priLOSEC) 40 MG capsule Take 1 capsule by mouth daily     • rosuvastatin (CRESTOR) 20 MG tablet Take 20 mg by mouth Daily.     • [DISCONTINUED] Insulin Pen Needle 32G X 4 MM misc 1 each by Does not apply route daily     • [DISCONTINUED] dicyclomine (BENTYL) 10 MG capsule Take 10 mg by mouth 4 (Four) Times a Day Before Meals & at Bedtime.     • [DISCONTINUED] fexofenadine (ALLEGRA) 180 MG tablet Take 1 tablet by mouth daily     • [DISCONTINUED] glipiZIDE (GLUCOTROL) 2.5 MG 24 hr tablet Take 2.5 mg by mouth Daily.     • [DISCONTINUED] Insulin Degludec (TRESIBA FLEXTOUCH) 200 UNIT/ML solution pen-injector pen injection Inject 10 Units under the skin Every Night.     • [DISCONTINUED] insulin lispro (humaLOG) 100 UNIT/ML injection Inject 1 Units under the skin into the appropriate area as directed 3 (Three) Times a Day Before Meals. Sliding scale     • [DISCONTINUED] montelukast (SINGULAIR) 10 MG tablet Take 10 mg by mouth Every Night.     • [DISCONTINUED] pravastatin (PRAVACHOL) 10 MG tablet Take 10 mg by mouth Daily.     • [DISCONTINUED] SITagliptin (JANUVIA) 100 MG tablet Take 100 mg by mouth Daily.     • [DISCONTINUED] traMADol (ULTRAM) 50 MG tablet Take 50 mg by mouth Every 6 (Six) Hours As Needed for " Moderate Pain .       Facility-Administered Encounter Medications as of 2022   Medication Dose Route Frequency Provider Last Rate Last Admin   • diphenhydrAMINE (BENADRYL) injection 25 mg  25 mg Intravenous PRN Magaly Carlos, JAY       • methylPREDNISolone sodium succinate (SOLU-Medrol) injection 125 mg  125 mg Intravenous PRN Magaly Carlos, JAY         ADULT ILLNESSES:  Patient Active Problem List   Diagnosis Code   • Gastroesophageal reflux disease K21.9   • Diabetes mellitus (HCC) E11.9   • Intermittent chest pain R07.9   • Hypercholesterolemia E78.00   • Vitamin D deficiency E55.9   • Vitamin B12 deficiency E53.8   • Diarrhea R19.7   • Abdominal cramping R10.9   • Nausea R11.0   • Collagenous colitis K52.831     SURGERIES:  Past Surgical History:   Procedure Laterality Date   • ANKLE SURGERY Right    • APPENDECTOMY     • BREAST BIOPSY     •  SECTION      X 2   • CHOLECYSTECTOMY     • COLONOSCOPY      [facility]   • COLONOSCOPY N/A 3/3/2017    Procedure: COLONOSCOPY WITH ANESTHESIA;  Surgeon: Ondina Abdul MD;  Location: [facility] ENDOSCOPY;  Service:    • ENDOSCOPY N/A 2017    Procedure: ESOPHAGOGASTRODUODENOSCOPY WITH ANESTHESIA;  Surgeon: Ondina Abdul MD;  Location: [facility] ENDOSCOPY;  Service:    • SKIN CANCER EXCISION  2016    FACE   • TONSILLECTOMY       HEALTH MAINTENANCE ITEMS:  Health Maintenance Due   Topic Date Due   • COVID-19 Vaccine (1) Never done   • Pneumococcal Vaccine 65+ (1 - PCV) Never done   • TDAP/TD VACCINES (1 - Tdap) Never done   • ZOSTER VACCINE (1 of 2) Never done   • HEPATITIS C SCREENING  Never done   • ANNUAL WELLNESS VISIT  Never done   • DIABETIC FOOT EXAM  Never done   • PAP SMEAR  Never done   • DIABETIC EYE EXAM  Never done   • DXA SCAN  2018   • MAMMOGRAM  2019   • HEMOGLOBIN A1C  2022       <no information>  Last Completed Colonoscopy          COLORECTAL CANCER SCREENING (COLONOSCOPY - Every 10 Years) Next  due on 3/3/2027    03/03/2017  Surgical Procedure: COLONOSCOPY    03/03/2017  COLONOSCOPY    02/01/2017  Occult Blood X 3, Stool                There is no immunization history on file for this patient.  Last Completed Mammogram     This patient has no relevant Health Maintenance data.            FAMILY HISTORY:  Family History   Problem Relation Age of Onset   • Heart disease Father    • Heart disease Sister    • Heart disease Brother    • Breast cancer Paternal Aunt    • Asthma Mother    • Heart disease Mother      SOCIAL HISTORY:  Social History     Socioeconomic History   • Marital status:    Tobacco Use   • Smoking status: Current Every Day Smoker     Packs/day: 0.25     Years: 40.00     Pack years: 10.00     Types: Cigarettes   • Smokeless tobacco: Never Used   Vaping Use   • Vaping Use: Never used   Substance and Sexual Activity   • Alcohol use: No   • Drug use: No   • Sexual activity: Defer       REVIEW OF SYSTEMS:  Review of Systems   Constitutional: Positive for diaphoresis, fatigue and unexpected weight loss. Negative for activity change, appetite change, chills, fever and unexpected weight gain.   HENT: Negative for congestion, dental problem, drooling, ear discharge, ear pain, facial swelling, hearing loss, mouth sores, nosebleeds, postnasal drip, rhinorrhea, sinus pressure, sneezing, sore throat, swollen glands, tinnitus, trouble swallowing and voice change.         States she can hear her heartbeat in her ears.    Eyes: Negative.  Negative for blurred vision, double vision, photophobia, pain, discharge, redness, itching and visual disturbance.   Respiratory: Positive for cough (attributes to sinus / allergy drainage). Negative for apnea, choking, chest tightness, shortness of breath, wheezing and stridor.    Cardiovascular: Positive for leg swelling (states they swell often). Negative for chest pain and palpitations.   Gastrointestinal: Positive for GERD. Negative for abdominal distention,  "abdominal pain, anal bleeding, blood in stool, constipation, diarrhea, nausea, vomiting and indigestion.   Endocrine: Positive for cold intolerance and polydipsia. Negative for heat intolerance.   Genitourinary: Negative for amenorrhea, breast discharge, breast lump, breast pain, decreased libido, decreased urine volume, difficulty urinating, dyspareunia, dysuria, flank pain, frequency, genital sores, hematuria, menstrual problem, pelvic pain, pelvic pressure, urgency, urinary incontinence, vaginal bleeding, vaginal discharge and vaginal pain.   Musculoskeletal: Positive for arthralgias. Negative for back pain, gait problem, joint swelling, myalgias, neck pain, neck stiffness and bursitis.        Arthritis,   Skin: Negative for color change, dry skin, pallor, rash, skin lesions, wound and bruise.   Allergic/Immunologic: Negative for environmental allergies, food allergies and immunocompromised state.   Neurological: Negative.  Negative for dizziness, tremors, seizures, syncope, facial asymmetry, speech difficulty, weakness, light-headedness and numbness.   Hematological: Negative for adenopathy. Bruises/bleeds easily.   Psychiatric/Behavioral: Negative.  Negative for agitation, behavioral problems, decreased concentration, dysphoric mood, hallucinations, self-injury, sleep disturbance, suicidal ideas, negative for hyperactivity, depressed mood and stress. The patient is not nervous/anxious.        /74   Pulse 78   Temp 96.9 °F (36.1 °C) (Temporal)   Resp 16   Ht 162.6 cm (64\")   Wt 63.5 kg (139 lb 14.4 oz)   SpO2 98%   Breastfeeding No   BMI 24.01 kg/m²  Body surface area is 1.68 meters squared.  Pain Score    04/19/22 0748   PainSc: 0-No pain       Physical Exam:  Physical Exam  Vitals reviewed.   Constitutional:       Appearance: Normal appearance.   HENT:      Head: Normocephalic and atraumatic.   Eyes:      Pupils: Pupils are equal, round, and reactive to light.   Cardiovascular:      Rate and " Rhythm: Normal rate and regular rhythm.   Pulmonary:      Breath sounds: Examination of the right-upper field reveals wheezing. Examination of the right-middle field reveals wheezing. Examination of the right-lower field reveals wheezing. Wheezing present.   Abdominal:      General: Bowel sounds are normal.      Palpations: Abdomen is soft.   Musculoskeletal:         General: Normal range of motion.      Cervical back: Normal range of motion.   Skin:     General: Skin is warm and dry.   Neurological:      General: No focal deficit present.      Mental Status: She is alert and oriented to person, place, and time.   Psychiatric:         Mood and Affect: Mood normal.         Behavior: Behavior normal.         Thought Content: Thought content normal.         Judgment: Judgment normal.         Giana Yepez reports a pain score of 0.  No interventions indicated.        ASSESSMENT / PLAN:    Diagnoses:  1. Leukocytosis, unspecified type    2. Anemia, unspecified type      PLAN:   1.   Re: Apprise of why she is here to see this examiner.   2.   Re: Anemia and the other differential considerations.   3. Labs:    Orders Placed This Encounter   Procedures   • BCR-ABL1, CML / ALL, PCR, Quant   • Comprehensive Metabolic Panel   • Lactate Dehydrogenase   • Sedimentation Rate   • Erythropoietin   • Haptoglobin   • TSH   • T4, free   • Vitamin B12   • Folate   • CBC & Differential      4.  Continue currently identified medications.   5.  Continue ongoing management per primary care physician and other specialists.   6.  She will RTC in 1 week where we will review test results, discuss possible diagnoses and create treatment plan.     7.  Plan of care discussed with patient.  Understanding expressed.  Patient agreeable to proceed.      Thank you for the referral.    I spent 45 minutes caring for Giana on this date of service. This time includes time spent by me in the following activities: preparing for the visit,  reviewing tests, obtaining and/or reviewing a separately obtained history, performing a medically appropriate examination and/or evaluation, counseling and educating the patient/family/caregiver, ordering medications, tests, or procedures, documenting information in the medical record and care coordination.

## 2022-04-19 ENCOUNTER — CONSULT (OUTPATIENT)
Dept: ONCOLOGY | Facility: CLINIC | Age: 67
End: 2022-04-19

## 2022-04-19 ENCOUNTER — LAB (OUTPATIENT)
Dept: LAB | Facility: HOSPITAL | Age: 67
End: 2022-04-19

## 2022-04-19 VITALS
BODY MASS INDEX: 23.88 KG/M2 | SYSTOLIC BLOOD PRESSURE: 136 MMHG | HEIGHT: 64 IN | WEIGHT: 139.9 LBS | RESPIRATION RATE: 16 BRPM | OXYGEN SATURATION: 98 % | DIASTOLIC BLOOD PRESSURE: 74 MMHG | HEART RATE: 78 BPM | TEMPERATURE: 96.9 F

## 2022-04-19 DIAGNOSIS — D72.829 LEUKOCYTOSIS, UNSPECIFIED TYPE: Primary | ICD-10-CM

## 2022-04-19 DIAGNOSIS — D59.10 AUTOIMMUNE HEMOLYTIC ANEMIA: ICD-10-CM

## 2022-04-19 DIAGNOSIS — D72.829 LEUKOCYTOSIS, UNSPECIFIED TYPE: ICD-10-CM

## 2022-04-19 DIAGNOSIS — D64.9 ANEMIA, UNSPECIFIED TYPE: ICD-10-CM

## 2022-04-19 LAB
ALBUMIN SERPL-MCNC: 4.2 G/DL (ref 3.5–5.2)
ALBUMIN/GLOB SERPL: 1.7 G/DL
ALP SERPL-CCNC: 76 U/L (ref 39–117)
ALT SERPL W P-5'-P-CCNC: 12 U/L (ref 1–33)
ANION GAP SERPL CALCULATED.3IONS-SCNC: 12 MMOL/L (ref 5–15)
AST SERPL-CCNC: 33 U/L (ref 1–32)
BASOPHILS # BLD AUTO: 0.13 10*3/MM3 (ref 0–0.2)
BASOPHILS NFR BLD AUTO: 0.7 % (ref 0–1.5)
BILIRUB SERPL-MCNC: 2.3 MG/DL (ref 0–1.2)
BUN SERPL-MCNC: 16 MG/DL (ref 8–23)
BUN/CREAT SERPL: 25.8 (ref 7–25)
CALCIUM SPEC-SCNC: 8.8 MG/DL (ref 8.6–10.5)
CHLORIDE SERPL-SCNC: 98 MMOL/L (ref 98–107)
CO2 SERPL-SCNC: 25 MMOL/L (ref 22–29)
CREAT SERPL-MCNC: 0.62 MG/DL (ref 0.57–1)
DEPRECATED RDW RBC AUTO: 83.6 FL (ref 37–54)
EGFRCR SERPLBLD CKD-EPI 2021: 98.4 ML/MIN/1.73
EOSINOPHIL # BLD AUTO: 0.6 10*3/MM3 (ref 0–0.4)
EOSINOPHIL NFR BLD AUTO: 3.3 % (ref 0.3–6.2)
ERYTHROCYTE [DISTWIDTH] IN BLOOD BY AUTOMATED COUNT: 21.3 % (ref 12.3–15.4)
ERYTHROCYTE [SEDIMENTATION RATE] IN BLOOD: 3 MM/HR (ref 0–30)
FOLATE SERPL-MCNC: 12.6 NG/ML (ref 4.78–24.2)
GLOBULIN UR ELPH-MCNC: 2.5 GM/DL
GLUCOSE SERPL-MCNC: 263 MG/DL (ref 65–99)
HAPTOGLOB SERPL-MCNC: <10 MG/DL (ref 30–200)
HCT VFR BLD AUTO: 24.1 % (ref 34–46.6)
HGB BLD-MCNC: 7.6 G/DL (ref 12–15.9)
LDH SERPL-CCNC: 834 U/L (ref 135–214)
LYMPHOCYTES # BLD AUTO: 2.84 10*3/MM3 (ref 0.7–3.1)
LYMPHOCYTES NFR BLD AUTO: 15.6 % (ref 19.6–45.3)
MCH RBC QN AUTO: 34.7 PG (ref 26.6–33)
MCHC RBC AUTO-ENTMCNC: 31.5 G/DL (ref 31.5–35.7)
MCV RBC AUTO: 110 FL (ref 79–97)
MONOCYTES # BLD AUTO: 1.35 10*3/MM3 (ref 0.1–0.9)
MONOCYTES NFR BLD AUTO: 7.4 % (ref 5–12)
NEUTROPHILS NFR BLD AUTO: 12.44 10*3/MM3 (ref 1.7–7)
NEUTROPHILS NFR BLD AUTO: 68.2 % (ref 42.7–76)
PLATELET # BLD AUTO: 348 10*3/MM3 (ref 140–450)
PMV BLD AUTO: 11.5 FL (ref 6–12)
POTASSIUM SERPL-SCNC: 3.9 MMOL/L (ref 3.5–5.2)
PROT SERPL-MCNC: 6.7 G/DL (ref 6–8.5)
RBC # BLD AUTO: 2.19 10*6/MM3 (ref 3.77–5.28)
SODIUM SERPL-SCNC: 135 MMOL/L (ref 136–145)
T4 FREE SERPL-MCNC: 1.45 NG/DL (ref 0.93–1.7)
TSH SERPL DL<=0.05 MIU/L-ACNC: 1.96 UIU/ML (ref 0.27–4.2)
VIT B12 BLD-MCNC: 1988 PG/ML (ref 211–946)
WBC NRBC COR # BLD: 18.23 10*3/MM3 (ref 3.4–10.8)

## 2022-04-19 PROCEDURE — 85652 RBC SED RATE AUTOMATED: CPT

## 2022-04-19 PROCEDURE — 85025 COMPLETE CBC W/AUTO DIFF WBC: CPT

## 2022-04-19 PROCEDURE — 82746 ASSAY OF FOLIC ACID SERUM: CPT

## 2022-04-19 PROCEDURE — 82607 VITAMIN B-12: CPT

## 2022-04-19 PROCEDURE — 84439 ASSAY OF FREE THYROXINE: CPT

## 2022-04-19 PROCEDURE — 83615 LACTATE (LD) (LDH) ENZYME: CPT

## 2022-04-19 PROCEDURE — 82668 ASSAY OF ERYTHROPOIETIN: CPT

## 2022-04-19 PROCEDURE — 82784 ASSAY IGA/IGD/IGG/IGM EACH: CPT

## 2022-04-19 PROCEDURE — 84443 ASSAY THYROID STIM HORMONE: CPT

## 2022-04-19 PROCEDURE — 36415 COLL VENOUS BLD VENIPUNCTURE: CPT

## 2022-04-19 PROCEDURE — 85060 BLOOD SMEAR INTERPRETATION: CPT

## 2022-04-19 PROCEDURE — 99204 OFFICE O/P NEW MOD 45 MIN: CPT | Performed by: NURSE PRACTITIONER

## 2022-04-19 PROCEDURE — 83010 ASSAY OF HAPTOGLOBIN QUANT: CPT

## 2022-04-19 PROCEDURE — 80053 COMPREHEN METABOLIC PANEL: CPT

## 2022-04-19 RX ORDER — BLOOD-GLUCOSE,RECEIVER,CONT
EACH MISCELLANEOUS CONTINUOUS
COMMUNITY

## 2022-04-19 RX ORDER — FERROUS SULFATE 325(65) MG
325 TABLET ORAL
COMMUNITY
End: 2022-10-13

## 2022-04-20 LAB
CYTOLOGIST CVX/VAG CYTO: NORMAL
EPO SERPL-ACNC: 61.5 MIU/ML (ref 2.6–18.5)
PATH INTERP BLD-IMP: NORMAL

## 2022-04-21 ENCOUNTER — DOCUMENTATION (OUTPATIENT)
Dept: ONCOLOGY | Facility: CLINIC | Age: 67
End: 2022-04-21

## 2022-04-21 DIAGNOSIS — D59.10 AUTOIMMUNE HEMOLYTIC ANEMIA: ICD-10-CM

## 2022-04-21 DIAGNOSIS — D64.9 ANEMIA, UNSPECIFIED TYPE: Primary | ICD-10-CM

## 2022-04-21 RX ORDER — PREDNISONE 20 MG/1
60 TABLET ORAL DAILY
Status: CANCELLED | OUTPATIENT
Start: 2022-04-21

## 2022-04-21 NOTE — PROGRESS NOTES
Significant message from patient stating she feels really bad and was seeking advice for intervention.   She was seen in the office on 4/19.  Labs were drawn and WBC 13.3, hemoglobin 7.6, hematocrit 29.9.  Platelet count 25 chemistry with bilirubin 13.3, LDH 3 4,.  Peripheral blood smear showed moderate macrocytic anemia and anisopoikilocytosis.  There is also mild leukocytosis with absolute neutrophilia, monocytosis and eosinophilia.  Erythropoetin 61.5.  Hapatoglobin <10.  Pt was typed and screened on 4/4/22 and found to have presence of warm antibody.      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  Of note, pt is a type 1 diabetic and has an insulin pump.  Insulin has been noted to be associated with AIHA.  Discussed case with Dr. Goldberg who suggested oral corticosteroids and IVIG infusion.  Pt is allergic to corn with a reaction of anaphylaxis.  EMR alerted prednisone was contraindicated with corn allergy.  Spoke with pharmacist who advised it would be reasonable to avoid this drug especially since the reaction listed was anaphylaxis.      We will proceed with IVIG infusion at 400 mg / kg, will give 25,000 mg daily x 5 days.  Pt will have CBC drawn before each infusion to monitor for effectiveness.  She will come to the lab tomorrow (04/22/22) to have CBC, Reticulocyte count, Direct Rona, and IgG. Pt will start her IVIG infusion on Monday 04/25/2022.  She voices understanding and agrees to this treatment plan.

## 2022-04-22 ENCOUNTER — HOSPITAL ENCOUNTER (INPATIENT)
Facility: HOSPITAL | Age: 67
LOS: 7 days | Discharge: HOME | End: 2022-04-29
Attending: EMERGENCY MEDICINE | Admitting: INTERNAL MEDICINE

## 2022-04-22 ENCOUNTER — LAB (OUTPATIENT)
Dept: LAB | Facility: HOSPITAL | Age: 67
End: 2022-04-22

## 2022-04-22 ENCOUNTER — DOCUMENTATION (OUTPATIENT)
Dept: ONCOLOGY | Facility: CLINIC | Age: 67
End: 2022-04-22

## 2022-04-22 DIAGNOSIS — R59.0 MEDIASTINAL LYMPHADENOPATHY: ICD-10-CM

## 2022-04-22 DIAGNOSIS — D59.10 AUTOIMMUNE HEMOLYTIC ANEMIA: ICD-10-CM

## 2022-04-22 DIAGNOSIS — R53.83 FATIGUE, UNSPECIFIED TYPE: Primary | ICD-10-CM

## 2022-04-22 PROBLEM — E80.6 HYPERBILIRUBINEMIA: Status: ACTIVE | Noted: 2022-04-22

## 2022-04-22 PROBLEM — Z96.41 INSULIN PUMP IN PLACE: Status: ACTIVE | Noted: 2022-04-22

## 2022-04-22 LAB
ALBUMIN SERPL-MCNC: 3.7 G/DL (ref 3.5–5.2)
ALBUMIN/GLOB SERPL: 1.3 G/DL
ALP SERPL-CCNC: 74 U/L (ref 39–117)
ALT SERPL W P-5'-P-CCNC: 14 U/L (ref 1–33)
ANION GAP SERPL CALCULATED.3IONS-SCNC: 14 MMOL/L (ref 5–15)
AST SERPL-CCNC: 35 U/L (ref 1–32)
BASOPHILS # BLD AUTO: 0.12 10*3/MM3 (ref 0–0.2)
BASOPHILS # BLD AUTO: 0.14 10*3/MM3 (ref 0–0.2)
BASOPHILS NFR BLD AUTO: 0.8 % (ref 0–1.5)
BASOPHILS NFR BLD AUTO: 0.9 % (ref 0–1.5)
BILIRUB CONJ SERPL-MCNC: 0.6 MG/DL (ref 0–0.3)
BILIRUB SERPL-MCNC: 2.3 MG/DL (ref 0–1.2)
BUN SERPL-MCNC: 19 MG/DL (ref 8–23)
BUN/CREAT SERPL: 32.8 (ref 7–25)
CALCIUM SPEC-SCNC: 8.9 MG/DL (ref 8.6–10.5)
CHLORIDE SERPL-SCNC: 97 MMOL/L (ref 98–107)
CO2 SERPL-SCNC: 23 MMOL/L (ref 22–29)
CREAT SERPL-MCNC: 0.58 MG/DL (ref 0.57–1)
DAT IGG GEL: POSITIVE
DAT IGG GEL: POSITIVE
DAT POLY-SP REAG RBC QL: POSITIVE
DAT POLY-SP REAG RBC QL: POSITIVE
DEPRECATED RDW RBC AUTO: 80 FL (ref 37–54)
DEPRECATED RDW RBC AUTO: 81.9 FL (ref 37–54)
EGFRCR SERPLBLD CKD-EPI 2021: 99.9 ML/MIN/1.73
EOSINOPHIL # BLD AUTO: 0.45 10*3/MM3 (ref 0–0.4)
EOSINOPHIL # BLD AUTO: 0.57 10*3/MM3 (ref 0–0.4)
EOSINOPHIL NFR BLD AUTO: 2.7 % (ref 0.3–6.2)
EOSINOPHIL NFR BLD AUTO: 3.6 % (ref 0.3–6.2)
ERYTHROCYTE [DISTWIDTH] IN BLOOD BY AUTOMATED COUNT: 21 % (ref 12.3–15.4)
ERYTHROCYTE [DISTWIDTH] IN BLOOD BY AUTOMATED COUNT: 21.2 % (ref 12.3–15.4)
GLOBULIN UR ELPH-MCNC: 2.9 GM/DL
GLUCOSE BLDC GLUCOMTR-MCNC: 586 MG/DL (ref 70–130)
GLUCOSE BLDC GLUCOMTR-MCNC: 592 MG/DL (ref 70–130)
GLUCOSE SERPL-MCNC: 413 MG/DL (ref 65–99)
HCT VFR BLD AUTO: 23.3 % (ref 34–46.6)
HCT VFR BLD AUTO: 23.5 % (ref 34–46.6)
HGB BLD-MCNC: 7.4 G/DL (ref 12–15.9)
HGB BLD-MCNC: 7.4 G/DL (ref 12–15.9)
HIV1+2 AB SER QL: NORMAL
IGG1 SER-MCNC: 1035 MG/DL (ref 700–1600)
IMM GRANULOCYTES # BLD AUTO: 0.71 10*3/MM3 (ref 0–0.05)
IMM GRANULOCYTES NFR BLD AUTO: 4.3 % (ref 0–0.5)
LDH SERPL-CCNC: 772 U/L (ref 135–214)
LYMPHOCYTES # BLD AUTO: 2.5 10*3/MM3 (ref 0.7–3.1)
LYMPHOCYTES # BLD AUTO: 2.78 10*3/MM3 (ref 0.7–3.1)
LYMPHOCYTES NFR BLD AUTO: 15.9 % (ref 19.6–45.3)
LYMPHOCYTES NFR BLD AUTO: 17 % (ref 19.6–45.3)
MCH RBC QN AUTO: 34.4 PG (ref 26.6–33)
MCH RBC QN AUTO: 34.6 PG (ref 26.6–33)
MCHC RBC AUTO-ENTMCNC: 31.5 G/DL (ref 31.5–35.7)
MCHC RBC AUTO-ENTMCNC: 31.8 G/DL (ref 31.5–35.7)
MCV RBC AUTO: 108.4 FL (ref 79–97)
MCV RBC AUTO: 109.8 FL (ref 79–97)
MONOCYTES # BLD AUTO: 1 10*3/MM3 (ref 0.1–0.9)
MONOCYTES # BLD AUTO: 1.16 10*3/MM3 (ref 0.1–0.9)
MONOCYTES NFR BLD AUTO: 6.4 % (ref 5–12)
MONOCYTES NFR BLD AUTO: 7.1 % (ref 5–12)
NEUTROPHILS NFR BLD AUTO: 10.87 10*3/MM3 (ref 1.7–7)
NEUTROPHILS NFR BLD AUTO: 11.13 10*3/MM3 (ref 1.7–7)
NEUTROPHILS NFR BLD AUTO: 68 % (ref 42.7–76)
NEUTROPHILS NFR BLD AUTO: 69 % (ref 42.7–76)
NRBC BLD AUTO-RTO: 1.4 /100 WBC (ref 0–0.2)
PLATELET # BLD AUTO: 315 10*3/MM3 (ref 140–450)
PLATELET # BLD AUTO: 334 10*3/MM3 (ref 140–450)
PMV BLD AUTO: 11.3 FL (ref 6–12)
PMV BLD AUTO: 11.7 FL (ref 6–12)
POTASSIUM SERPL-SCNC: 4.9 MMOL/L (ref 3.5–5.2)
PROT SERPL-MCNC: 6.6 G/DL (ref 6–8.5)
RBC # BLD AUTO: 2.14 10*6/MM3 (ref 3.77–5.28)
RBC # BLD AUTO: 2.15 10*6/MM3 (ref 3.77–5.28)
RETICS # AUTO: 0.55 10*6/MM3 (ref 0.02–0.13)
RETICS # AUTO: 0.7 10*6/MM3 (ref 0.02–0.13)
RETICS/RBC NFR AUTO: 25.69 % (ref 0.7–1.9)
RETICS/RBC NFR AUTO: >30 % (ref 0.7–1.9)
SARS-COV-2 RNA PNL SPEC NAA+PROBE: NOT DETECTED
SODIUM SERPL-SCNC: 134 MMOL/L (ref 136–145)
URATE SERPL-MCNC: 5.8 MG/DL (ref 2.4–5.7)
WBC NRBC COR # BLD: 15.73 10*3/MM3 (ref 3.4–10.8)
WBC NRBC COR # BLD: 16.37 10*3/MM3 (ref 3.4–10.8)

## 2022-04-22 PROCEDURE — 82248 BILIRUBIN DIRECT: CPT | Performed by: INTERNAL MEDICINE

## 2022-04-22 PROCEDURE — 83010 ASSAY OF HAPTOGLOBIN QUANT: CPT | Performed by: INTERNAL MEDICINE

## 2022-04-22 PROCEDURE — 80053 COMPREHEN METABOLIC PANEL: CPT | Performed by: INTERNAL MEDICINE

## 2022-04-22 PROCEDURE — 86706 HEP B SURFACE ANTIBODY: CPT | Performed by: INTERNAL MEDICINE

## 2022-04-22 PROCEDURE — 82746 ASSAY OF FOLIC ACID SERUM: CPT | Performed by: INTERNAL MEDICINE

## 2022-04-22 PROCEDURE — 99223 1ST HOSP IP/OBS HIGH 75: CPT | Performed by: INTERNAL MEDICINE

## 2022-04-22 PROCEDURE — 63710000001 PREDNISONE PER 1 MG: Performed by: INTERNAL MEDICINE

## 2022-04-22 PROCEDURE — 86704 HEP B CORE ANTIBODY TOTAL: CPT | Performed by: INTERNAL MEDICINE

## 2022-04-22 PROCEDURE — 86235 NUCLEAR ANTIGEN ANTIBODY: CPT | Performed by: INTERNAL MEDICINE

## 2022-04-22 PROCEDURE — 85025 COMPLETE CBC W/AUTO DIFF WBC: CPT

## 2022-04-22 PROCEDURE — 84630 ASSAY OF ZINC: CPT | Performed by: INTERNAL MEDICINE

## 2022-04-22 PROCEDURE — 82525 ASSAY OF COPPER: CPT | Performed by: INTERNAL MEDICINE

## 2022-04-22 PROCEDURE — 83615 LACTATE (LD) (LDH) ENZYME: CPT | Performed by: INTERNAL MEDICINE

## 2022-04-22 PROCEDURE — 88275 CYTOGENETICS 100-300: CPT

## 2022-04-22 PROCEDURE — 63710000001 INSULIN LISPRO (HUMAN) PER 5 UNITS: Performed by: INTERNAL MEDICINE

## 2022-04-22 PROCEDURE — 36415 COLL VENOUS BLD VENIPUNCTURE: CPT

## 2022-04-22 PROCEDURE — 86880 COOMBS TEST DIRECT: CPT | Performed by: INTERNAL MEDICINE

## 2022-04-22 PROCEDURE — G0432 EIA HIV-1/HIV-2 SCREEN: HCPCS | Performed by: INTERNAL MEDICINE

## 2022-04-22 PROCEDURE — 85045 AUTOMATED RETICULOCYTE COUNT: CPT | Performed by: INTERNAL MEDICINE

## 2022-04-22 PROCEDURE — 86225 DNA ANTIBODY NATIVE: CPT | Performed by: INTERNAL MEDICINE

## 2022-04-22 PROCEDURE — 88271 CYTOGENETICS DNA PROBE: CPT

## 2022-04-22 PROCEDURE — 86880 COOMBS TEST DIRECT: CPT

## 2022-04-22 PROCEDURE — 82962 GLUCOSE BLOOD TEST: CPT

## 2022-04-22 PROCEDURE — 84550 ASSAY OF BLOOD/URIC ACID: CPT | Performed by: INTERNAL MEDICINE

## 2022-04-22 PROCEDURE — 99284 EMERGENCY DEPT VISIT MOD MDM: CPT

## 2022-04-22 PROCEDURE — 85045 AUTOMATED RETICULOCYTE COUNT: CPT

## 2022-04-22 PROCEDURE — 87635 SARS-COV-2 COVID-19 AMP PRB: CPT | Performed by: EMERGENCY MEDICINE

## 2022-04-22 PROCEDURE — 85025 COMPLETE CBC W/AUTO DIFF WBC: CPT | Performed by: INTERNAL MEDICINE

## 2022-04-22 RX ORDER — SODIUM CHLORIDE 0.9 % (FLUSH) 0.9 %
10 SYRINGE (ML) INJECTION AS NEEDED
Status: DISCONTINUED | OUTPATIENT
Start: 2022-04-22 | End: 2022-04-29 | Stop reason: HOSPADM

## 2022-04-22 RX ORDER — ACETAMINOPHEN 325 MG/1
650 TABLET ORAL EVERY 4 HOURS PRN
Status: DISCONTINUED | OUTPATIENT
Start: 2022-04-22 | End: 2022-04-29 | Stop reason: HOSPADM

## 2022-04-22 RX ORDER — FOLIC ACID 1 MG/1
1 TABLET ORAL DAILY
Status: DISCONTINUED | OUTPATIENT
Start: 2022-04-22 | End: 2022-04-22

## 2022-04-22 RX ORDER — PANTOPRAZOLE SODIUM 40 MG/1
40 TABLET, DELAYED RELEASE ORAL EVERY MORNING
Status: DISCONTINUED | OUTPATIENT
Start: 2022-04-23 | End: 2022-04-29 | Stop reason: HOSPADM

## 2022-04-22 RX ORDER — SODIUM CHLORIDE 0.9 % (FLUSH) 0.9 %
10 SYRINGE (ML) INJECTION EVERY 12 HOURS SCHEDULED
Status: DISCONTINUED | OUTPATIENT
Start: 2022-04-22 | End: 2022-04-29 | Stop reason: HOSPADM

## 2022-04-22 RX ORDER — DEXAMETHASONE 4 MG/1
4 TABLET ORAL 2 TIMES DAILY WITH MEALS
Status: CANCELLED | OUTPATIENT
Start: 2022-04-22

## 2022-04-22 RX ORDER — PREDNISONE 20 MG/1
60 TABLET ORAL
Status: DISCONTINUED | OUTPATIENT
Start: 2022-04-22 | End: 2022-04-29 | Stop reason: HOSPADM

## 2022-04-22 RX ORDER — ACETAMINOPHEN 650 MG/1
650 SUPPOSITORY RECTAL EVERY 4 HOURS PRN
Status: DISCONTINUED | OUTPATIENT
Start: 2022-04-22 | End: 2022-04-29 | Stop reason: HOSPADM

## 2022-04-22 RX ORDER — ALLOPURINOL 100 MG/1
100 TABLET ORAL 2 TIMES DAILY
Status: DISCONTINUED | OUTPATIENT
Start: 2022-04-22 | End: 2022-04-23

## 2022-04-22 RX ORDER — DEXTROSE MONOHYDRATE 25 G/50ML
25 INJECTION, SOLUTION INTRAVENOUS
Status: DISCONTINUED | OUTPATIENT
Start: 2022-04-22 | End: 2022-04-22 | Stop reason: SDUPTHER

## 2022-04-22 RX ORDER — NICOTINE POLACRILEX 4 MG
15 LOZENGE BUCCAL
Status: DISCONTINUED | OUTPATIENT
Start: 2022-04-22 | End: 2022-04-24 | Stop reason: SDUPTHER

## 2022-04-22 RX ORDER — DEXTROSE MONOHYDRATE 25 G/50ML
25 INJECTION, SOLUTION INTRAVENOUS
Status: DISCONTINUED | OUTPATIENT
Start: 2022-04-22 | End: 2022-04-24 | Stop reason: SDUPTHER

## 2022-04-22 RX ORDER — ONDANSETRON 2 MG/ML
4 INJECTION INTRAMUSCULAR; INTRAVENOUS EVERY 6 HOURS PRN
Status: DISCONTINUED | OUTPATIENT
Start: 2022-04-22 | End: 2022-04-29 | Stop reason: HOSPADM

## 2022-04-22 RX ORDER — SODIUM CHLORIDE 9 MG/ML
75 INJECTION, SOLUTION INTRAVENOUS CONTINUOUS
Status: DISCONTINUED | OUTPATIENT
Start: 2022-04-22 | End: 2022-04-22

## 2022-04-22 RX ORDER — FOLIC ACID 1 MG/1
1 TABLET ORAL DAILY
Status: DISCONTINUED | OUTPATIENT
Start: 2022-04-22 | End: 2022-04-29 | Stop reason: HOSPADM

## 2022-04-22 RX ORDER — NICOTINE POLACRILEX 4 MG
15 LOZENGE BUCCAL
Status: DISCONTINUED | OUTPATIENT
Start: 2022-04-22 | End: 2022-04-22 | Stop reason: SDUPTHER

## 2022-04-22 RX ORDER — SODIUM CHLORIDE, SODIUM LACTATE, POTASSIUM CHLORIDE, CALCIUM CHLORIDE 600; 310; 30; 20 MG/100ML; MG/100ML; MG/100ML; MG/100ML
100 INJECTION, SOLUTION INTRAVENOUS CONTINUOUS
Status: DISCONTINUED | OUTPATIENT
Start: 2022-04-22 | End: 2022-04-29 | Stop reason: HOSPADM

## 2022-04-22 RX ORDER — ONDANSETRON 4 MG/1
4 TABLET, FILM COATED ORAL EVERY 6 HOURS PRN
Status: DISCONTINUED | OUTPATIENT
Start: 2022-04-22 | End: 2022-04-29 | Stop reason: HOSPADM

## 2022-04-22 RX ADMIN — Medication 10 ML: at 20:14

## 2022-04-22 RX ADMIN — ALLOPURINOL 100 MG: 100 TABLET ORAL at 20:14

## 2022-04-22 RX ADMIN — PREDNISONE 60 MG: 20 TABLET ORAL at 14:37

## 2022-04-22 RX ADMIN — SODIUM CHLORIDE, POTASSIUM CHLORIDE, SODIUM LACTATE AND CALCIUM CHLORIDE 75 ML/HR: 600; 310; 30; 20 INJECTION, SOLUTION INTRAVENOUS at 17:26

## 2022-04-22 RX ADMIN — ACETAMINOPHEN 650 MG: 325 TABLET ORAL at 21:20

## 2022-04-22 RX ADMIN — INSULIN LISPRO 14 UNITS: 100 INJECTION, SOLUTION INTRAVENOUS; SUBCUTANEOUS at 23:30

## 2022-04-22 NOTE — PROGRESS NOTES
Pt presented to clinic today for lab draw.  She is noted today with persistent fatigue and slightly jaundiced sclera.  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  She was initially seen in the office on April 19.  At that time labs were as follows: WBC 18.23, absolute neutrophil count 12.44 , hemoglobin 7.6, hematocrit 24.1, platelet count 348.  Patient has had a recent chest x-ray which was unremarkable for any acute cardiopulmonary disease.  Total bilirubin was 2.3.  .      Case was discussed with Dr. Goldberg.  We discussed treatment with prednisone.  EMR warned that the patient's allergy to corn was a contraindication to use as there is corn starch listed as an inactive ingredient.   IVIG would be a reasonable consideration, however, per pharmacy we are unable to give it with a normal IgG level, despite being for hemolytic anemia and not low IgG.      Currently, treatment plan is for patient to present to the ER for evaluation.  Patient was contacted by office staff and voices understanding and agreed to this plan.

## 2022-04-23 ENCOUNTER — APPOINTMENT (OUTPATIENT)
Dept: CT IMAGING | Facility: HOSPITAL | Age: 67
End: 2022-04-23

## 2022-04-23 LAB
ALBUMIN SERPL-MCNC: 4 G/DL (ref 3.5–5.2)
ALBUMIN/GLOB SERPL: 1.8 G/DL
ALP SERPL-CCNC: 71 U/L (ref 39–117)
ALT SERPL W P-5'-P-CCNC: 14 U/L (ref 1–33)
ANION GAP SERPL CALCULATED.3IONS-SCNC: 18 MMOL/L (ref 5–15)
ANION GAP SERPL CALCULATED.3IONS-SCNC: 22 MMOL/L (ref 5–15)
AST SERPL-CCNC: 28 U/L (ref 1–32)
BASOPHILS # BLD AUTO: 0.13 10*3/MM3 (ref 0–0.2)
BASOPHILS NFR BLD AUTO: 0.6 % (ref 0–1.5)
BILIRUB SERPL-MCNC: 1.9 MG/DL (ref 0–1.2)
BUN SERPL-MCNC: 23 MG/DL (ref 8–23)
BUN SERPL-MCNC: 23 MG/DL (ref 8–23)
BUN/CREAT SERPL: 30.3 (ref 7–25)
BUN/CREAT SERPL: 32.4 (ref 7–25)
CALCIUM SPEC-SCNC: 9 MG/DL (ref 8.6–10.5)
CALCIUM SPEC-SCNC: 9.1 MG/DL (ref 8.6–10.5)
CHLORIDE SERPL-SCNC: 94 MMOL/L (ref 98–107)
CHLORIDE SERPL-SCNC: 96 MMOL/L (ref 98–107)
CO2 SERPL-SCNC: 15 MMOL/L (ref 22–29)
CO2 SERPL-SCNC: 17 MMOL/L (ref 22–29)
CREAT SERPL-MCNC: 0.71 MG/DL (ref 0.57–1)
CREAT SERPL-MCNC: 0.76 MG/DL (ref 0.57–1)
DEPRECATED RDW RBC AUTO: 77.7 FL (ref 37–54)
EGFRCR SERPLBLD CKD-EPI 2021: 86.5 ML/MIN/1.73
EGFRCR SERPLBLD CKD-EPI 2021: 93.9 ML/MIN/1.73
EOSINOPHIL # BLD AUTO: 0.21 10*3/MM3 (ref 0–0.4)
EOSINOPHIL NFR BLD AUTO: 1 % (ref 0.3–6.2)
ERYTHROCYTE [DISTWIDTH] IN BLOOD BY AUTOMATED COUNT: 20.6 % (ref 12.3–15.4)
FOLATE SERPL-MCNC: 13.7 NG/ML (ref 4.78–24.2)
GLOBULIN UR ELPH-MCNC: 2.2 GM/DL
GLUCOSE BLDC GLUCOMTR-MCNC: 249 MG/DL (ref 70–130)
GLUCOSE BLDC GLUCOMTR-MCNC: 280 MG/DL (ref 70–130)
GLUCOSE BLDC GLUCOMTR-MCNC: 330 MG/DL (ref 70–130)
GLUCOSE BLDC GLUCOMTR-MCNC: 336 MG/DL (ref 70–130)
GLUCOSE BLDC GLUCOMTR-MCNC: 390 MG/DL (ref 70–130)
GLUCOSE BLDC GLUCOMTR-MCNC: 413 MG/DL (ref 70–130)
GLUCOSE BLDC GLUCOMTR-MCNC: 417 MG/DL (ref 70–130)
GLUCOSE BLDC GLUCOMTR-MCNC: 431 MG/DL (ref 70–130)
GLUCOSE BLDC GLUCOMTR-MCNC: 504 MG/DL (ref 70–130)
GLUCOSE BLDC GLUCOMTR-MCNC: 513 MG/DL (ref 70–130)
GLUCOSE BLDC GLUCOMTR-MCNC: 516 MG/DL (ref 70–130)
GLUCOSE BLDC GLUCOMTR-MCNC: 521 MG/DL (ref 70–130)
GLUCOSE BLDC GLUCOMTR-MCNC: 522 MG/DL (ref 70–130)
GLUCOSE SERPL-MCNC: 334 MG/DL (ref 65–99)
GLUCOSE SERPL-MCNC: 339 MG/DL (ref 65–99)
HAPTOGLOB SERPL-MCNC: <10 MG/DL (ref 30–200)
HAPTOGLOB SERPL-MCNC: <10 MG/DL (ref 30–200)
HAV IGM SERPL QL IA: NORMAL
HBA1C MFR BLD: 4.3 % (ref 4.8–5.6)
HBV CORE AB SERPL QL IA: NEGATIVE
HBV CORE IGM SERPL QL IA: NORMAL
HBV SURFACE AB SER RIA-ACNC: ABNORMAL
HBV SURFACE AG SERPL QL IA: NORMAL
HCT VFR BLD AUTO: 23.5 % (ref 34–46.6)
HCV AB SER DONR QL: NORMAL
HGB BLD-MCNC: 7.6 G/DL (ref 12–15.9)
LDH SERPL-CCNC: 749 U/L (ref 135–214)
LYMPHOCYTES # BLD AUTO: 2.67 10*3/MM3 (ref 0.7–3.1)
LYMPHOCYTES NFR BLD AUTO: 12.1 % (ref 19.6–45.3)
MCH RBC QN AUTO: 34.9 PG (ref 26.6–33)
MCHC RBC AUTO-ENTMCNC: 32.3 G/DL (ref 31.5–35.7)
MCV RBC AUTO: 107.8 FL (ref 79–97)
MONOCYTES # BLD AUTO: 1.73 10*3/MM3 (ref 0.1–0.9)
MONOCYTES NFR BLD AUTO: 7.8 % (ref 5–12)
NEUTROPHILS NFR BLD AUTO: 16.45 10*3/MM3 (ref 1.7–7)
NEUTROPHILS NFR BLD AUTO: 74.4 % (ref 42.7–76)
PLATELET # BLD AUTO: 340 10*3/MM3 (ref 140–450)
PMV BLD AUTO: 11.2 FL (ref 6–12)
POTASSIUM SERPL-SCNC: 4.6 MMOL/L (ref 3.5–5.2)
POTASSIUM SERPL-SCNC: 4.9 MMOL/L (ref 3.5–5.2)
PROT SERPL-MCNC: 6.2 G/DL (ref 6–8.5)
RBC # BLD AUTO: 2.18 10*6/MM3 (ref 3.77–5.28)
RETICS # AUTO: 0.69 10*6/MM3 (ref 0.02–0.13)
RETICS/RBC NFR AUTO: >30 % (ref 0.7–1.9)
SODIUM SERPL-SCNC: 129 MMOL/L (ref 136–145)
SODIUM SERPL-SCNC: 133 MMOL/L (ref 136–145)
URATE SERPL-MCNC: 8.2 MG/DL (ref 2.4–5.7)
WBC NRBC COR # BLD: 22.09 10*3/MM3 (ref 3.4–10.8)

## 2022-04-23 PROCEDURE — 83036 HEMOGLOBIN GLYCOSYLATED A1C: CPT | Performed by: NURSE PRACTITIONER

## 2022-04-23 PROCEDURE — 63710000001 INSULIN REGULAR HUMAN PER 5 UNITS: Performed by: INTERNAL MEDICINE

## 2022-04-23 PROCEDURE — 25010000002 IOPAMIDOL 61 % SOLUTION: Performed by: INTERNAL MEDICINE

## 2022-04-23 PROCEDURE — 85045 AUTOMATED RETICULOCYTE COUNT: CPT | Performed by: INTERNAL MEDICINE

## 2022-04-23 PROCEDURE — 83615 LACTATE (LD) (LDH) ENZYME: CPT | Performed by: INTERNAL MEDICINE

## 2022-04-23 PROCEDURE — 63710000001 INSULIN LISPRO (HUMAN) PER 5 UNITS: Performed by: INTERNAL MEDICINE

## 2022-04-23 PROCEDURE — 80053 COMPREHEN METABOLIC PANEL: CPT | Performed by: INTERNAL MEDICINE

## 2022-04-23 PROCEDURE — 82962 GLUCOSE BLOOD TEST: CPT

## 2022-04-23 PROCEDURE — 84550 ASSAY OF BLOOD/URIC ACID: CPT | Performed by: INTERNAL MEDICINE

## 2022-04-23 PROCEDURE — 63710000001 PREDNISONE PER 1 MG: Performed by: INTERNAL MEDICINE

## 2022-04-23 PROCEDURE — 99233 SBSQ HOSP IP/OBS HIGH 50: CPT | Performed by: INTERNAL MEDICINE

## 2022-04-23 PROCEDURE — 70491 CT SOFT TISSUE NECK W/DYE: CPT

## 2022-04-23 PROCEDURE — 74177 CT ABD & PELVIS W/CONTRAST: CPT

## 2022-04-23 PROCEDURE — 83010 ASSAY OF HAPTOGLOBIN QUANT: CPT | Performed by: INTERNAL MEDICINE

## 2022-04-23 PROCEDURE — 85025 COMPLETE CBC W/AUTO DIFF WBC: CPT | Performed by: INTERNAL MEDICINE

## 2022-04-23 PROCEDURE — 80074 ACUTE HEPATITIS PANEL: CPT | Performed by: INTERNAL MEDICINE

## 2022-04-23 PROCEDURE — 71260 CT THORAX DX C+: CPT

## 2022-04-23 RX ORDER — ALLOPURINOL 300 MG/1
300 TABLET ORAL 2 TIMES DAILY
Status: DISCONTINUED | OUTPATIENT
Start: 2022-04-23 | End: 2022-04-29

## 2022-04-23 RX ORDER — AMOXICILLIN 250 MG
1 CAPSULE ORAL 2 TIMES DAILY
Status: DISCONTINUED | OUTPATIENT
Start: 2022-04-23 | End: 2022-04-28

## 2022-04-23 RX ORDER — POLYETHYLENE GLYCOL 3350 17 G/17G
17 POWDER, FOR SOLUTION ORAL DAILY
Status: DISCONTINUED | OUTPATIENT
Start: 2022-04-23 | End: 2022-04-28

## 2022-04-23 RX ADMIN — Medication 10 ML: at 08:48

## 2022-04-23 RX ADMIN — PANTOPRAZOLE SODIUM 40 MG: 40 TABLET, DELAYED RELEASE ORAL at 08:48

## 2022-04-23 RX ADMIN — INSULIN LISPRO 10 UNITS: 100 INJECTION, SOLUTION INTRAVENOUS; SUBCUTANEOUS at 12:51

## 2022-04-23 RX ADMIN — Medication 10 ML: at 20:00

## 2022-04-23 RX ADMIN — SODIUM CHLORIDE, POTASSIUM CHLORIDE, SODIUM LACTATE AND CALCIUM CHLORIDE 100 ML/HR: 600; 310; 30; 20 INJECTION, SOLUTION INTRAVENOUS at 18:18

## 2022-04-23 RX ADMIN — ALLOPURINOL 300 MG: 300 TABLET ORAL at 11:33

## 2022-04-23 RX ADMIN — ALLOPURINOL 100 MG: 100 TABLET ORAL at 08:48

## 2022-04-23 RX ADMIN — INSULIN HUMAN 15 UNITS: 100 INJECTION, SOLUTION PARENTERAL at 10:06

## 2022-04-23 RX ADMIN — ALLOPURINOL 300 MG: 300 TABLET ORAL at 20:00

## 2022-04-23 RX ADMIN — DOCUSATE SODIUM 50 MG AND SENNOSIDES 8.6 MG 1 TABLET: 8.6; 5 TABLET, FILM COATED ORAL at 20:00

## 2022-04-23 RX ADMIN — IOPAMIDOL 100 ML: 612 INJECTION, SOLUTION INTRAVENOUS at 11:03

## 2022-04-23 RX ADMIN — POLYETHYLENE GLYCOL 3350 17 G: 17 POWDER, FOR SOLUTION ORAL at 14:46

## 2022-04-23 RX ADMIN — FOLIC ACID 1 MG: 1 TABLET ORAL at 08:48

## 2022-04-23 RX ADMIN — PREDNISONE 60 MG: 20 TABLET ORAL at 08:48

## 2022-04-23 RX ADMIN — SODIUM CHLORIDE, POTASSIUM CHLORIDE, SODIUM LACTATE AND CALCIUM CHLORIDE 75 ML/HR: 600; 310; 30; 20 INJECTION, SOLUTION INTRAVENOUS at 05:02

## 2022-04-23 RX ADMIN — ACETAMINOPHEN 650 MG: 325 TABLET ORAL at 10:01

## 2022-04-24 ENCOUNTER — APPOINTMENT (OUTPATIENT)
Dept: CT IMAGING | Facility: HOSPITAL | Age: 67
End: 2022-04-24

## 2022-04-24 LAB
ALBUMIN SERPL-MCNC: 3.8 G/DL (ref 3.5–5.2)
ALBUMIN/GLOB SERPL: 1.5 G/DL
ALP SERPL-CCNC: 65 U/L (ref 39–117)
ALT SERPL W P-5'-P-CCNC: 11 U/L (ref 1–33)
ANION GAP SERPL CALCULATED.3IONS-SCNC: 19 MMOL/L (ref 5–15)
AST SERPL-CCNC: 27 U/L (ref 1–32)
BASOPHILS # BLD AUTO: 0.11 10*3/MM3 (ref 0–0.2)
BASOPHILS NFR BLD AUTO: 0.5 % (ref 0–1.5)
BILIRUB SERPL-MCNC: 1.7 MG/DL (ref 0–1.2)
BUN SERPL-MCNC: 20 MG/DL (ref 8–23)
BUN/CREAT SERPL: 25 (ref 7–25)
CALCIUM SPEC-SCNC: 8.9 MG/DL (ref 8.6–10.5)
CHLORIDE SERPL-SCNC: 98 MMOL/L (ref 98–107)
CO2 SERPL-SCNC: 17 MMOL/L (ref 22–29)
CREAT SERPL-MCNC: 0.8 MG/DL (ref 0.57–1)
DEPRECATED RDW RBC AUTO: 79.4 FL (ref 37–54)
EGFRCR SERPLBLD CKD-EPI 2021: 81.4 ML/MIN/1.73
EOSINOPHIL # BLD AUTO: 0.31 10*3/MM3 (ref 0–0.4)
EOSINOPHIL NFR BLD AUTO: 1.5 % (ref 0.3–6.2)
ERYTHROCYTE [DISTWIDTH] IN BLOOD BY AUTOMATED COUNT: 20.9 % (ref 12.3–15.4)
GLOBULIN UR ELPH-MCNC: 2.5 GM/DL
GLUCOSE BLDC GLUCOMTR-MCNC: 134 MG/DL (ref 70–130)
GLUCOSE BLDC GLUCOMTR-MCNC: 356 MG/DL (ref 70–130)
GLUCOSE BLDC GLUCOMTR-MCNC: 364 MG/DL (ref 70–130)
GLUCOSE BLDC GLUCOMTR-MCNC: 415 MG/DL (ref 70–130)
GLUCOSE BLDC GLUCOMTR-MCNC: 449 MG/DL (ref 70–130)
GLUCOSE BLDC GLUCOMTR-MCNC: 450 MG/DL (ref 70–130)
GLUCOSE BLDC GLUCOMTR-MCNC: 452 MG/DL (ref 70–130)
GLUCOSE BLDC GLUCOMTR-MCNC: 475 MG/DL (ref 70–130)
GLUCOSE BLDC GLUCOMTR-MCNC: 479 MG/DL (ref 70–130)
GLUCOSE BLDC GLUCOMTR-MCNC: 488 MG/DL (ref 70–130)
GLUCOSE SERPL-MCNC: 224 MG/DL (ref 65–99)
HAPTOGLOB SERPL-MCNC: <10 MG/DL (ref 30–200)
HBV SURFACE AG SERPL QL IA: NORMAL
HCT VFR BLD AUTO: 22.4 % (ref 34–46.6)
HGB BLD-MCNC: 7 G/DL (ref 12–15.9)
LDH SERPL-CCNC: 652 U/L (ref 135–214)
LYMPHOCYTES # BLD AUTO: 3.29 10*3/MM3 (ref 0.7–3.1)
LYMPHOCYTES NFR BLD AUTO: 16.4 % (ref 19.6–45.3)
MCH RBC QN AUTO: 34 PG (ref 26.6–33)
MCHC RBC AUTO-ENTMCNC: 31.3 G/DL (ref 31.5–35.7)
MCV RBC AUTO: 108.7 FL (ref 79–97)
MONOCYTES # BLD AUTO: 1.42 10*3/MM3 (ref 0.1–0.9)
MONOCYTES NFR BLD AUTO: 7.1 % (ref 5–12)
NEUTROPHILS NFR BLD AUTO: 14.25 10*3/MM3 (ref 1.7–7)
NEUTROPHILS NFR BLD AUTO: 70.8 % (ref 42.7–76)
PLATELET # BLD AUTO: 328 10*3/MM3 (ref 140–450)
PMV BLD AUTO: 11.3 FL (ref 6–12)
POTASSIUM SERPL-SCNC: 4.3 MMOL/L (ref 3.5–5.2)
PROT SERPL-MCNC: 6.3 G/DL (ref 6–8.5)
RBC # BLD AUTO: 2.06 10*6/MM3 (ref 3.77–5.28)
RETICS # AUTO: 0.57 10*6/MM3 (ref 0.02–0.13)
RETICS/RBC NFR AUTO: 27.6 % (ref 0.7–1.9)
SODIUM SERPL-SCNC: 134 MMOL/L (ref 136–145)
URATE SERPL-MCNC: 6.4 MG/DL (ref 2.4–5.7)
WBC NRBC COR # BLD: 20.12 10*3/MM3 (ref 3.4–10.8)

## 2022-04-24 PROCEDURE — 87340 HEPATITIS B SURFACE AG IA: CPT | Performed by: INTERNAL MEDICINE

## 2022-04-24 PROCEDURE — 63710000001 INSULIN LISPRO (HUMAN) PER 5 UNITS: Performed by: INTERNAL MEDICINE

## 2022-04-24 PROCEDURE — 80053 COMPREHEN METABOLIC PANEL: CPT | Performed by: INTERNAL MEDICINE

## 2022-04-24 PROCEDURE — 85045 AUTOMATED RETICULOCYTE COUNT: CPT | Performed by: INTERNAL MEDICINE

## 2022-04-24 PROCEDURE — 63710000001 PREDNISONE PER 1 MG: Performed by: INTERNAL MEDICINE

## 2022-04-24 PROCEDURE — 36415 COLL VENOUS BLD VENIPUNCTURE: CPT | Performed by: INTERNAL MEDICINE

## 2022-04-24 PROCEDURE — 99233 SBSQ HOSP IP/OBS HIGH 50: CPT | Performed by: INTERNAL MEDICINE

## 2022-04-24 PROCEDURE — 85025 COMPLETE CBC W/AUTO DIFF WBC: CPT | Performed by: INTERNAL MEDICINE

## 2022-04-24 PROCEDURE — 83010 ASSAY OF HAPTOGLOBIN QUANT: CPT | Performed by: INTERNAL MEDICINE

## 2022-04-24 PROCEDURE — 82962 GLUCOSE BLOOD TEST: CPT

## 2022-04-24 PROCEDURE — 83615 LACTATE (LD) (LDH) ENZYME: CPT | Performed by: INTERNAL MEDICINE

## 2022-04-24 PROCEDURE — 84550 ASSAY OF BLOOD/URIC ACID: CPT | Performed by: INTERNAL MEDICINE

## 2022-04-24 RX ORDER — NICOTINE POLACRILEX 4 MG
15 LOZENGE BUCCAL
Status: DISCONTINUED | OUTPATIENT
Start: 2022-04-24 | End: 2022-04-29 | Stop reason: HOSPADM

## 2022-04-24 RX ORDER — DEXTROSE MONOHYDRATE 25 G/50ML
25 INJECTION, SOLUTION INTRAVENOUS
Status: DISCONTINUED | OUTPATIENT
Start: 2022-04-24 | End: 2022-04-29 | Stop reason: HOSPADM

## 2022-04-24 RX ADMIN — SODIUM CHLORIDE, POTASSIUM CHLORIDE, SODIUM LACTATE AND CALCIUM CHLORIDE 100 ML/HR: 600; 310; 30; 20 INJECTION, SOLUTION INTRAVENOUS at 20:48

## 2022-04-24 RX ADMIN — FOLIC ACID 1 MG: 1 TABLET ORAL at 08:57

## 2022-04-24 RX ADMIN — INSULIN LISPRO 18 UNITS: 100 INJECTION, SOLUTION INTRAVENOUS; SUBCUTANEOUS at 00:35

## 2022-04-24 RX ADMIN — PANTOPRAZOLE SODIUM 40 MG: 40 TABLET, DELAYED RELEASE ORAL at 06:12

## 2022-04-24 RX ADMIN — POLYETHYLENE GLYCOL 3350 17 G: 17 POWDER, FOR SOLUTION ORAL at 08:57

## 2022-04-24 RX ADMIN — ACETAMINOPHEN 650 MG: 325 TABLET ORAL at 06:22

## 2022-04-24 RX ADMIN — Medication 10 ML: at 08:57

## 2022-04-24 RX ADMIN — INSULIN LISPRO 12 UNITS: 100 INJECTION, SOLUTION INTRAVENOUS; SUBCUTANEOUS at 17:08

## 2022-04-24 RX ADMIN — DOCUSATE SODIUM 50 MG AND SENNOSIDES 8.6 MG 1 TABLET: 8.6; 5 TABLET, FILM COATED ORAL at 20:52

## 2022-04-24 RX ADMIN — Medication 10 ML: at 20:48

## 2022-04-24 RX ADMIN — DOCUSATE SODIUM 50 MG AND SENNOSIDES 8.6 MG 1 TABLET: 8.6; 5 TABLET, FILM COATED ORAL at 08:57

## 2022-04-24 RX ADMIN — INSULIN LISPRO 14 UNITS: 100 INJECTION, SOLUTION INTRAVENOUS; SUBCUTANEOUS at 11:04

## 2022-04-24 RX ADMIN — ALLOPURINOL 300 MG: 300 TABLET ORAL at 20:48

## 2022-04-24 RX ADMIN — SODIUM CHLORIDE, POTASSIUM CHLORIDE, SODIUM LACTATE AND CALCIUM CHLORIDE 100 ML/HR: 600; 310; 30; 20 INJECTION, SOLUTION INTRAVENOUS at 06:15

## 2022-04-24 RX ADMIN — PREDNISONE 60 MG: 20 TABLET ORAL at 08:57

## 2022-04-24 RX ADMIN — ALLOPURINOL 300 MG: 300 TABLET ORAL at 08:57

## 2022-04-25 ENCOUNTER — APPOINTMENT (OUTPATIENT)
Dept: CT IMAGING | Facility: HOSPITAL | Age: 67
End: 2022-04-25

## 2022-04-25 ENCOUNTER — APPOINTMENT (OUTPATIENT)
Dept: ONCOLOGY | Facility: HOSPITAL | Age: 67
End: 2022-04-25

## 2022-04-25 ENCOUNTER — APPOINTMENT (OUTPATIENT)
Dept: LAB | Facility: HOSPITAL | Age: 67
End: 2022-04-25

## 2022-04-25 LAB
ABO GROUP BLD: NORMAL
ALBUMIN SERPL-MCNC: 3.7 G/DL (ref 3.5–5.2)
ALBUMIN/GLOB SERPL: 2.1 G/DL
ALP SERPL-CCNC: 59 U/L (ref 39–117)
ALT SERPL W P-5'-P-CCNC: 11 U/L (ref 1–33)
ANION GAP SERPL CALCULATED.3IONS-SCNC: 10 MMOL/L (ref 5–15)
AST SERPL-CCNC: 23 U/L (ref 1–32)
BASOPHILS # BLD AUTO: 0.08 10*3/MM3 (ref 0–0.2)
BASOPHILS # BLD AUTO: 0.1 10*3/MM3 (ref 0–0.2)
BASOPHILS NFR BLD AUTO: 0.5 % (ref 0–1.5)
BASOPHILS NFR BLD AUTO: 0.6 % (ref 0–1.5)
BILIRUB SERPL-MCNC: 1.8 MG/DL (ref 0–1.2)
BLD GP AB SCN SERPL QL: POSITIVE
BLD GP AB SCN SERPL QL: POSITIVE
BUN SERPL-MCNC: 14 MG/DL (ref 8–23)
BUN/CREAT SERPL: 22.6 (ref 7–25)
CALCIUM SPEC-SCNC: 8.6 MG/DL (ref 8.6–10.5)
CENTROMERE B AB SER-ACNC: <0.2 AI (ref 0–0.9)
CHLORIDE SERPL-SCNC: 101 MMOL/L (ref 98–107)
CHROMATIN AB SERPL-ACNC: <0.2 AI (ref 0–0.9)
CO2 SERPL-SCNC: 25 MMOL/L (ref 22–29)
CREAT SERPL-MCNC: 0.62 MG/DL (ref 0.57–1)
DEPRECATED RDW RBC AUTO: 77.4 FL (ref 37–54)
DEPRECATED RDW RBC AUTO: 78.7 FL (ref 37–54)
DSDNA AB SER-ACNC: 1 IU/ML (ref 0–9)
EGFRCR SERPLBLD CKD-EPI 2021: 98.4 ML/MIN/1.73
ENA JO1 AB SER-ACNC: <0.2 AI (ref 0–0.9)
ENA RNP AB SER-ACNC: <0.2 AI (ref 0–0.9)
ENA SCL70 AB SER-ACNC: <0.2 AI (ref 0–0.9)
ENA SM AB SER-ACNC: <0.2 AI (ref 0–0.9)
ENA SS-A AB SER-ACNC: <0.2 AI (ref 0–0.9)
ENA SS-B AB SER-ACNC: <0.2 AI (ref 0–0.9)
EOSINOPHIL # BLD AUTO: 0.28 10*3/MM3 (ref 0–0.4)
EOSINOPHIL # BLD AUTO: 0.31 10*3/MM3 (ref 0–0.4)
EOSINOPHIL NFR BLD AUTO: 1.6 % (ref 0.3–6.2)
EOSINOPHIL NFR BLD AUTO: 2 % (ref 0.3–6.2)
ERYTHROCYTE [DISTWIDTH] IN BLOOD BY AUTOMATED COUNT: 21 % (ref 12.3–15.4)
ERYTHROCYTE [DISTWIDTH] IN BLOOD BY AUTOMATED COUNT: 21.1 % (ref 12.3–15.4)
GLOBULIN UR ELPH-MCNC: 1.8 GM/DL
GLUCOSE BLDC GLUCOMTR-MCNC: 128 MG/DL (ref 70–130)
GLUCOSE BLDC GLUCOMTR-MCNC: 129 MG/DL (ref 70–130)
GLUCOSE BLDC GLUCOMTR-MCNC: 144 MG/DL (ref 70–130)
GLUCOSE BLDC GLUCOMTR-MCNC: 233 MG/DL (ref 70–130)
GLUCOSE BLDC GLUCOMTR-MCNC: 446 MG/DL (ref 70–130)
GLUCOSE SERPL-MCNC: 99 MG/DL (ref 65–99)
HAPTOGLOB SERPL-MCNC: <10 MG/DL (ref 30–200)
HCT VFR BLD AUTO: 20.9 % (ref 34–46.6)
HCT VFR BLD AUTO: 22.1 % (ref 34–46.6)
HGB BLD-MCNC: 6.6 G/DL (ref 12–15.9)
HGB BLD-MCNC: 7 G/DL (ref 12–15.9)
INR PPP: 1.09 (ref 0.91–1.09)
LDH SERPL-CCNC: 593 U/L (ref 135–214)
LYMPHOCYTES # BLD AUTO: 3.01 10*3/MM3 (ref 0.7–3.1)
LYMPHOCYTES # BLD AUTO: 3.06 10*3/MM3 (ref 0.7–3.1)
LYMPHOCYTES NFR BLD AUTO: 17.5 % (ref 19.6–45.3)
LYMPHOCYTES NFR BLD AUTO: 19.5 % (ref 19.6–45.3)
Lab: NORMAL
Lab: NORMAL
MCH RBC QN AUTO: 34.4 PG (ref 26.6–33)
MCH RBC QN AUTO: 34.7 PG (ref 26.6–33)
MCHC RBC AUTO-ENTMCNC: 31.6 G/DL (ref 31.5–35.7)
MCHC RBC AUTO-ENTMCNC: 31.7 G/DL (ref 31.5–35.7)
MCV RBC AUTO: 108.9 FL (ref 79–97)
MCV RBC AUTO: 109.4 FL (ref 79–97)
MONOCYTES # BLD AUTO: 1.09 10*3/MM3 (ref 0.1–0.9)
MONOCYTES # BLD AUTO: 1.36 10*3/MM3 (ref 0.1–0.9)
MONOCYTES NFR BLD AUTO: 7.1 % (ref 5–12)
MONOCYTES NFR BLD AUTO: 7.8 % (ref 5–12)
NEUTROPHILS NFR BLD AUTO: 10.4 10*3/MM3 (ref 1.7–7)
NEUTROPHILS NFR BLD AUTO: 12.01 10*3/MM3 (ref 1.7–7)
NEUTROPHILS NFR BLD AUTO: 67.6 % (ref 42.7–76)
NEUTROPHILS NFR BLD AUTO: 68.5 % (ref 42.7–76)
PLATELET # BLD AUTO: 272 10*3/MM3 (ref 140–450)
PLATELET # BLD AUTO: 275 10*3/MM3 (ref 140–450)
PMV BLD AUTO: 10.9 FL (ref 6–12)
PMV BLD AUTO: 11.3 FL (ref 6–12)
POTASSIUM SERPL-SCNC: 3.6 MMOL/L (ref 3.5–5.2)
PROT SERPL-MCNC: 5.5 G/DL (ref 6–8.5)
PROTHROMBIN TIME: 13.7 SECONDS (ref 11.9–14.6)
RBC # BLD AUTO: 1.92 10*6/MM3 (ref 3.77–5.28)
RBC # BLD AUTO: 2.02 10*6/MM3 (ref 3.77–5.28)
RETICS # AUTO: 0.46 10*6/MM3 (ref 0.02–0.13)
RETICS/RBC NFR AUTO: 23.79 % (ref 0.7–1.9)
RH BLD: POSITIVE
SODIUM SERPL-SCNC: 136 MMOL/L (ref 136–145)
T&S EXPIRATION DATE: NORMAL
URATE SERPL-MCNC: 3.7 MG/DL (ref 2.4–5.7)
WARM AUTOANTIBODY: NORMAL
WBC NRBC COR # BLD: 15.41 10*3/MM3 (ref 3.4–10.8)
WBC NRBC COR # BLD: 17.5 10*3/MM3 (ref 3.4–10.8)

## 2022-04-25 PROCEDURE — 88262 CHROMOSOME ANALYSIS 15-20: CPT | Performed by: INTERNAL MEDICINE

## 2022-04-25 PROCEDURE — 86860 RBC ANTIBODY ELUTION: CPT

## 2022-04-25 PROCEDURE — 87517 HEPATITIS B DNA QUANT: CPT | Performed by: INTERNAL MEDICINE

## 2022-04-25 PROCEDURE — 82962 GLUCOSE BLOOD TEST: CPT

## 2022-04-25 PROCEDURE — 88313 SPECIAL STAINS GROUP 2: CPT

## 2022-04-25 PROCEDURE — 85025 COMPLETE CBC W/AUTO DIFF WBC: CPT | Performed by: INTERNAL MEDICINE

## 2022-04-25 PROCEDURE — 80053 COMPREHEN METABOLIC PANEL: CPT | Performed by: INTERNAL MEDICINE

## 2022-04-25 PROCEDURE — 88342 IMHCHEM/IMCYTCHM 1ST ANTB: CPT

## 2022-04-25 PROCEDURE — 63710000001 PREDNISONE PER 1 MG: Performed by: INTERNAL MEDICINE

## 2022-04-25 PROCEDURE — 86900 BLOOD TYPING SEROLOGIC ABO: CPT | Performed by: INTERNAL MEDICINE

## 2022-04-25 PROCEDURE — 0 LIDOCAINE 1 % SOLUTION: Performed by: RADIOLOGY

## 2022-04-25 PROCEDURE — 86850 RBC ANTIBODY SCREEN: CPT

## 2022-04-25 PROCEDURE — 85045 AUTOMATED RETICULOCYTE COUNT: CPT | Performed by: INTERNAL MEDICINE

## 2022-04-25 PROCEDURE — 86920 COMPATIBILITY TEST SPIN: CPT

## 2022-04-25 PROCEDURE — 25010000002 MIDAZOLAM PER 1 MG: Performed by: RADIOLOGY

## 2022-04-25 PROCEDURE — 86901 BLOOD TYPING SEROLOGIC RH(D): CPT

## 2022-04-25 PROCEDURE — 86850 RBC ANTIBODY SCREEN: CPT | Performed by: INTERNAL MEDICINE

## 2022-04-25 PROCEDURE — 88264 CHROMOSOME ANALYSIS 20-25: CPT | Performed by: INTERNAL MEDICINE

## 2022-04-25 PROCEDURE — 88305 TISSUE EXAM BY PATHOLOGIST: CPT | Performed by: INTERNAL MEDICINE

## 2022-04-25 PROCEDURE — 86870 RBC ANTIBODY IDENTIFICATION: CPT

## 2022-04-25 PROCEDURE — 36415 COLL VENOUS BLD VENIPUNCTURE: CPT | Performed by: INTERNAL MEDICINE

## 2022-04-25 PROCEDURE — 88184 FLOWCYTOMETRY/ TC 1 MARKER: CPT | Performed by: INTERNAL MEDICINE

## 2022-04-25 PROCEDURE — 77012 CT SCAN FOR NEEDLE BIOPSY: CPT

## 2022-04-25 PROCEDURE — 86880 COOMBS TEST DIRECT: CPT

## 2022-04-25 PROCEDURE — 83615 LACTATE (LD) (LDH) ENZYME: CPT | Performed by: INTERNAL MEDICINE

## 2022-04-25 PROCEDURE — 88185 FLOWCYTOMETRY/TC ADD-ON: CPT | Performed by: INTERNAL MEDICINE

## 2022-04-25 PROCEDURE — 85610 PROTHROMBIN TIME: CPT | Performed by: RADIOLOGY

## 2022-04-25 PROCEDURE — 07DR3ZX EXTRACTION OF ILIAC BONE MARROW, PERCUTANEOUS APPROACH, DIAGNOSTIC: ICD-10-PCS | Performed by: INTERNAL MEDICINE

## 2022-04-25 PROCEDURE — 83010 ASSAY OF HAPTOGLOBIN QUANT: CPT | Performed by: INTERNAL MEDICINE

## 2022-04-25 PROCEDURE — 86902 BLOOD TYPE ANTIGEN DONOR EA: CPT

## 2022-04-25 PROCEDURE — 25010000002: Performed by: INTERNAL MEDICINE

## 2022-04-25 PROCEDURE — 86900 BLOOD TYPING SEROLOGIC ABO: CPT

## 2022-04-25 PROCEDURE — 86921 COMPATIBILITY TEST INCUBATE: CPT

## 2022-04-25 PROCEDURE — 86978 RBC PRETREATMENT SERUM: CPT

## 2022-04-25 PROCEDURE — 25010000002 IMMUNE GLOBULIN (HUMAN) 5 GM/50ML SOLUTION 50 ML VIAL: Performed by: INTERNAL MEDICINE

## 2022-04-25 PROCEDURE — 63710000001 INSULIN LISPRO (HUMAN) PER 5 UNITS: Performed by: INTERNAL MEDICINE

## 2022-04-25 PROCEDURE — 99233 SBSQ HOSP IP/OBS HIGH 50: CPT | Performed by: INTERNAL MEDICINE

## 2022-04-25 PROCEDURE — 88341 IMHCHEM/IMCYTCHM EA ADD ANTB: CPT

## 2022-04-25 PROCEDURE — 99222 1ST HOSP IP/OBS MODERATE 55: CPT | Performed by: INTERNAL MEDICINE

## 2022-04-25 PROCEDURE — 88313 SPECIAL STAINS GROUP 2: CPT | Performed by: INTERNAL MEDICINE

## 2022-04-25 PROCEDURE — 86901 BLOOD TYPING SEROLOGIC RH(D): CPT | Performed by: INTERNAL MEDICINE

## 2022-04-25 PROCEDURE — 86922 COMPATIBILITY TEST ANTIGLOB: CPT

## 2022-04-25 PROCEDURE — 25010000002 FENTANYL CITRATE (PF) 50 MCG/ML SOLUTION: Performed by: RADIOLOGY

## 2022-04-25 PROCEDURE — 88305 TISSUE EXAM BY PATHOLOGIST: CPT

## 2022-04-25 PROCEDURE — 86870 RBC ANTIBODY IDENTIFICATION: CPT | Performed by: INTERNAL MEDICINE

## 2022-04-25 PROCEDURE — 88237 TISSUE CULTURE BONE MARROW: CPT | Performed by: INTERNAL MEDICINE

## 2022-04-25 PROCEDURE — 88311 DECALCIFY TISSUE: CPT | Performed by: INTERNAL MEDICINE

## 2022-04-25 PROCEDURE — 84550 ASSAY OF BLOOD/URIC ACID: CPT | Performed by: INTERNAL MEDICINE

## 2022-04-25 RX ORDER — PHENOL 1.4 %
600 AEROSOL, SPRAY (ML) MUCOUS MEMBRANE DAILY
COMMUNITY
End: 2022-05-17

## 2022-04-25 RX ORDER — NICOTINE 21 MG/24HR
1 PATCH, TRANSDERMAL 24 HOURS TRANSDERMAL
Status: DISCONTINUED | OUTPATIENT
Start: 2022-04-25 | End: 2022-04-29 | Stop reason: HOSPADM

## 2022-04-25 RX ORDER — MIDAZOLAM HYDROCHLORIDE 1 MG/ML
INJECTION INTRAMUSCULAR; INTRAVENOUS
Status: COMPLETED | OUTPATIENT
Start: 2022-04-25 | End: 2022-04-25

## 2022-04-25 RX ORDER — LIDOCAINE HYDROCHLORIDE 10 MG/ML
INJECTION, SOLUTION INFILTRATION; PERINEURAL
Status: COMPLETED | OUTPATIENT
Start: 2022-04-25 | End: 2022-04-25

## 2022-04-25 RX ORDER — LANOLIN ALCOHOL/MO/W.PET/CERES
1000 CREAM (GRAM) TOPICAL DAILY
COMMUNITY

## 2022-04-25 RX ORDER — FENTANYL CITRATE 50 UG/ML
INJECTION, SOLUTION INTRAMUSCULAR; INTRAVENOUS
Status: COMPLETED | OUTPATIENT
Start: 2022-04-25 | End: 2022-04-25

## 2022-04-25 RX ADMIN — SODIUM CHLORIDE, POTASSIUM CHLORIDE, SODIUM LACTATE AND CALCIUM CHLORIDE 100 ML/HR: 600; 310; 30; 20 INJECTION, SOLUTION INTRAVENOUS at 05:58

## 2022-04-25 RX ADMIN — FOLIC ACID 1 MG: 1 TABLET ORAL at 11:28

## 2022-04-25 RX ADMIN — INSULIN LISPRO 8 UNITS: 100 INJECTION, SOLUTION INTRAVENOUS; SUBCUTANEOUS at 17:53

## 2022-04-25 RX ADMIN — MIDAZOLAM 1 MG: 1 INJECTION INTRAMUSCULAR; INTRAVENOUS at 10:21

## 2022-04-25 RX ADMIN — PANTOPRAZOLE SODIUM 40 MG: 40 TABLET, DELAYED RELEASE ORAL at 05:58

## 2022-04-25 RX ADMIN — DOCUSATE SODIUM 50 MG AND SENNOSIDES 8.6 MG 1 TABLET: 8.6; 5 TABLET, FILM COATED ORAL at 20:16

## 2022-04-25 RX ADMIN — LIDOCAINE HYDROCHLORIDE 15 ML: 10 INJECTION, SOLUTION INFILTRATION; PERINEURAL at 10:23

## 2022-04-25 RX ADMIN — FENTANYL CITRATE 25 MCG: 50 INJECTION, SOLUTION INTRAMUSCULAR; INTRAVENOUS at 10:25

## 2022-04-25 RX ADMIN — Medication 10 ML: at 20:16

## 2022-04-25 RX ADMIN — DOCUSATE SODIUM 50 MG AND SENNOSIDES 8.6 MG 1 TABLET: 8.6; 5 TABLET, FILM COATED ORAL at 11:28

## 2022-04-25 RX ADMIN — INSULIN LISPRO 5 UNITS: 100 INJECTION, SOLUTION INTRAVENOUS; SUBCUTANEOUS at 11:41

## 2022-04-25 RX ADMIN — ALLOPURINOL 300 MG: 300 TABLET ORAL at 11:28

## 2022-04-25 RX ADMIN — Medication 10 ML: at 11:32

## 2022-04-25 RX ADMIN — INSULIN LISPRO 14 UNITS: 100 INJECTION, SOLUTION INTRAVENOUS; SUBCUTANEOUS at 16:19

## 2022-04-25 RX ADMIN — ALLOPURINOL 300 MG: 300 TABLET ORAL at 20:16

## 2022-04-25 RX ADMIN — PREDNISONE 60 MG: 20 TABLET ORAL at 11:28

## 2022-04-25 RX ADMIN — IMMUNE GLOBULIN INFUSION (HUMAN) 25 G: 100 INJECTION, SOLUTION INTRAVENOUS; SUBCUTANEOUS at 14:44

## 2022-04-25 RX ADMIN — POLYETHYLENE GLYCOL 3350 17 G: 17 POWDER, FOR SOLUTION ORAL at 11:29

## 2022-04-25 RX ADMIN — NICOTINE 1 PATCH: 14 PATCH, EXTENDED RELEASE TRANSDERMAL at 11:30

## 2022-04-25 RX ADMIN — ACETAMINOPHEN 650 MG: 325 TABLET ORAL at 16:18

## 2022-04-25 RX ADMIN — FENTANYL CITRATE 25 MCG: 50 INJECTION, SOLUTION INTRAMUSCULAR; INTRAVENOUS at 10:21

## 2022-04-25 RX ADMIN — SODIUM CHLORIDE, POTASSIUM CHLORIDE, SODIUM LACTATE AND CALCIUM CHLORIDE 100 ML/HR: 600; 310; 30; 20 INJECTION, SOLUTION INTRAVENOUS at 22:43

## 2022-04-26 ENCOUNTER — APPOINTMENT (OUTPATIENT)
Dept: ONCOLOGY | Facility: HOSPITAL | Age: 67
End: 2022-04-26

## 2022-04-26 ENCOUNTER — APPOINTMENT (OUTPATIENT)
Dept: LAB | Facility: HOSPITAL | Age: 67
End: 2022-04-26

## 2022-04-26 PROBLEM — R59.0 MEDIASTINAL LYMPHADENOPATHY: Status: ACTIVE | Noted: 2022-04-26

## 2022-04-26 PROBLEM — Z72.0 TOBACCO ABUSE: Status: ACTIVE | Noted: 2022-04-26

## 2022-04-26 LAB
ALBUMIN SERPL-MCNC: 3.6 G/DL (ref 3.5–5.2)
ALBUMIN/GLOB SERPL: 1.4 G/DL
ALP SERPL-CCNC: 61 U/L (ref 39–117)
ALT SERPL W P-5'-P-CCNC: 12 U/L (ref 1–33)
ANION GAP SERPL CALCULATED.3IONS-SCNC: 8 MMOL/L (ref 5–15)
ANISOCYTOSIS BLD QL: ABNORMAL
AST SERPL-CCNC: 39 U/L (ref 1–32)
BILIRUB SERPL-MCNC: 2 MG/DL (ref 0–1.2)
BUN SERPL-MCNC: 11 MG/DL (ref 8–23)
BUN/CREAT SERPL: 19.6 (ref 7–25)
CALCIUM SPEC-SCNC: 8.4 MG/DL (ref 8.6–10.5)
CHLORIDE SERPL-SCNC: 98 MMOL/L (ref 98–107)
CO2 SERPL-SCNC: 26 MMOL/L (ref 22–29)
CREAT SERPL-MCNC: 0.56 MG/DL (ref 0.57–1)
DEPRECATED RDW RBC AUTO: 81.1 FL (ref 37–54)
EGFRCR SERPLBLD CKD-EPI 2021: 100.8 ML/MIN/1.73
ERYTHROCYTE [DISTWIDTH] IN BLOOD BY AUTOMATED COUNT: 21.7 % (ref 12.3–15.4)
GLOBULIN UR ELPH-MCNC: 2.6 GM/DL
GLUCOSE BLDC GLUCOMTR-MCNC: 82 MG/DL (ref 70–130)
GLUCOSE SERPL-MCNC: 115 MG/DL (ref 65–99)
HAPTOGLOB SERPL-MCNC: <10 MG/DL (ref 30–200)
HBV DNA SERPL NAA+PROBE-ACNC: NORMAL IU/ML
HBV DNA SERPL NAA+PROBE-LOG IU: NORMAL {LOG_IU}/ML
HCT VFR BLD AUTO: 21.7 % (ref 34–46.6)
HGB BLD-MCNC: 6.8 G/DL (ref 12–15.9)
LDH SERPL-CCNC: 634 U/L (ref 135–214)
LYMPHOCYTES # BLD MANUAL: 2.45 10*3/MM3 (ref 0.7–3.1)
LYMPHOCYTES NFR BLD MANUAL: 9.1 % (ref 5–12)
MACROCYTES BLD QL SMEAR: ABNORMAL
MCH RBC QN AUTO: 34.5 PG (ref 26.6–33)
MCHC RBC AUTO-ENTMCNC: 31.3 G/DL (ref 31.5–35.7)
MCV RBC AUTO: 110.2 FL (ref 79–97)
MONOCYTES # BLD: 1.46 10*3/MM3 (ref 0.1–0.9)
MYELOCYTES NFR BLD MANUAL: 1 % (ref 0–0)
NEUTROPHILS # BLD AUTO: 12.03 10*3/MM3 (ref 1.7–7)
NEUTROPHILS NFR BLD MANUAL: 70.7 % (ref 42.7–76)
NEUTS BAND NFR BLD MANUAL: 4 % (ref 0–5)
NRBC SPEC MANUAL: 3 /100 WBC (ref 0–0.2)
PLAT MORPH BLD: NORMAL
PLATELET # BLD AUTO: 235 10*3/MM3 (ref 140–450)
PMV BLD AUTO: 11.2 FL (ref 6–12)
POIKILOCYTOSIS BLD QL SMEAR: ABNORMAL
POLYCHROMASIA BLD QL SMEAR: ABNORMAL
POTASSIUM SERPL-SCNC: 3.4 MMOL/L (ref 3.5–5.2)
PROT SERPL-MCNC: 6.2 G/DL (ref 6–8.5)
RBC # BLD AUTO: 1.97 10*6/MM3 (ref 3.77–5.28)
REF LAB TEST REF RANGE: NORMAL
RETICS # AUTO: 0.47 10*6/MM3 (ref 0.02–0.13)
RETICS/RBC NFR AUTO: 23.66 % (ref 0.7–1.9)
SCHISTOCYTES BLD QL SMEAR: ABNORMAL
SODIUM SERPL-SCNC: 132 MMOL/L (ref 136–145)
SPHEROCYTES BLD QL SMEAR: ABNORMAL
URATE SERPL-MCNC: 2.1 MG/DL (ref 2.4–5.7)
VARIANT LYMPHS NFR BLD MANUAL: 15.2 % (ref 19.6–45.3)
WBC MORPH BLD: NORMAL
WBC NRBC COR # BLD: 16.09 10*3/MM3 (ref 3.4–10.8)

## 2022-04-26 PROCEDURE — 36415 COLL VENOUS BLD VENIPUNCTURE: CPT | Performed by: INTERNAL MEDICINE

## 2022-04-26 PROCEDURE — 84550 ASSAY OF BLOOD/URIC ACID: CPT | Performed by: INTERNAL MEDICINE

## 2022-04-26 PROCEDURE — 85025 COMPLETE CBC W/AUTO DIFF WBC: CPT | Performed by: INTERNAL MEDICINE

## 2022-04-26 PROCEDURE — 99232 SBSQ HOSP IP/OBS MODERATE 35: CPT | Performed by: INTERNAL MEDICINE

## 2022-04-26 PROCEDURE — 63710000001 PREDNISONE PER 1 MG: Performed by: INTERNAL MEDICINE

## 2022-04-26 PROCEDURE — 80053 COMPREHEN METABOLIC PANEL: CPT | Performed by: INTERNAL MEDICINE

## 2022-04-26 PROCEDURE — 85045 AUTOMATED RETICULOCYTE COUNT: CPT | Performed by: INTERNAL MEDICINE

## 2022-04-26 PROCEDURE — 25010000002: Performed by: INTERNAL MEDICINE

## 2022-04-26 PROCEDURE — 85007 BL SMEAR W/DIFF WBC COUNT: CPT | Performed by: INTERNAL MEDICINE

## 2022-04-26 PROCEDURE — 25010000002 IMMUNE GLOBULIN (HUMAN) 5 GM/50ML SOLUTION 50 ML VIAL: Performed by: INTERNAL MEDICINE

## 2022-04-26 PROCEDURE — 36430 TRANSFUSION BLD/BLD COMPNT: CPT

## 2022-04-26 PROCEDURE — P9016 RBC LEUKOCYTES REDUCED: HCPCS

## 2022-04-26 PROCEDURE — 83010 ASSAY OF HAPTOGLOBIN QUANT: CPT | Performed by: INTERNAL MEDICINE

## 2022-04-26 PROCEDURE — 83615 LACTATE (LD) (LDH) ENZYME: CPT | Performed by: INTERNAL MEDICINE

## 2022-04-26 PROCEDURE — 82962 GLUCOSE BLOOD TEST: CPT

## 2022-04-26 PROCEDURE — 86900 BLOOD TYPING SEROLOGIC ABO: CPT

## 2022-04-26 RX ORDER — INSULIN LISPRO 100 [IU]/ML
3-14 INJECTION, SOLUTION INTRAVENOUS; SUBCUTANEOUS
Status: DISCONTINUED | OUTPATIENT
Start: 2022-04-26 | End: 2022-04-27

## 2022-04-26 RX ORDER — INSULIN LISPRO 100 [IU]/ML
8 INJECTION, SOLUTION INTRAVENOUS; SUBCUTANEOUS
Status: DISCONTINUED | OUTPATIENT
Start: 2022-04-26 | End: 2022-04-26

## 2022-04-26 RX ORDER — POTASSIUM CHLORIDE 750 MG/1
40 CAPSULE, EXTENDED RELEASE ORAL ONCE
Status: COMPLETED | OUTPATIENT
Start: 2022-04-26 | End: 2022-04-26

## 2022-04-26 RX ADMIN — PREDNISONE 60 MG: 20 TABLET ORAL at 08:24

## 2022-04-26 RX ADMIN — FOLIC ACID 1 MG: 1 TABLET ORAL at 08:24

## 2022-04-26 RX ADMIN — ACETAMINOPHEN 650 MG: 325 TABLET ORAL at 01:50

## 2022-04-26 RX ADMIN — IMMUNE GLOBULIN INFUSION (HUMAN) 25 G: 100 INJECTION, SOLUTION INTRAVENOUS; SUBCUTANEOUS at 15:04

## 2022-04-26 RX ADMIN — DOCUSATE SODIUM 50 MG AND SENNOSIDES 8.6 MG 1 TABLET: 8.6; 5 TABLET, FILM COATED ORAL at 08:24

## 2022-04-26 RX ADMIN — NICOTINE 1 PATCH: 14 PATCH, EXTENDED RELEASE TRANSDERMAL at 08:26

## 2022-04-26 RX ADMIN — SODIUM CHLORIDE, POTASSIUM CHLORIDE, SODIUM LACTATE AND CALCIUM CHLORIDE 100 ML/HR: 600; 310; 30; 20 INJECTION, SOLUTION INTRAVENOUS at 22:23

## 2022-04-26 RX ADMIN — POTASSIUM CHLORIDE 40 MEQ: 10 CAPSULE, COATED, EXTENDED RELEASE ORAL at 13:49

## 2022-04-26 RX ADMIN — Medication 10 ML: at 08:25

## 2022-04-26 RX ADMIN — PANTOPRAZOLE SODIUM 40 MG: 40 TABLET, DELAYED RELEASE ORAL at 06:01

## 2022-04-26 RX ADMIN — ALLOPURINOL 300 MG: 300 TABLET ORAL at 21:05

## 2022-04-26 RX ADMIN — DOCUSATE SODIUM 50 MG AND SENNOSIDES 8.6 MG 1 TABLET: 8.6; 5 TABLET, FILM COATED ORAL at 21:05

## 2022-04-26 RX ADMIN — ALLOPURINOL 300 MG: 300 TABLET ORAL at 08:24

## 2022-04-27 ENCOUNTER — APPOINTMENT (OUTPATIENT)
Dept: ONCOLOGY | Facility: HOSPITAL | Age: 67
End: 2022-04-27

## 2022-04-27 ENCOUNTER — APPOINTMENT (OUTPATIENT)
Dept: GENERAL RADIOLOGY | Facility: HOSPITAL | Age: 67
End: 2022-04-27

## 2022-04-27 ENCOUNTER — APPOINTMENT (OUTPATIENT)
Dept: LAB | Facility: HOSPITAL | Age: 67
End: 2022-04-27

## 2022-04-27 LAB
ALBUMIN SERPL-MCNC: 3.9 G/DL (ref 3.5–5.2)
ALBUMIN/GLOB SERPL: 1.2 G/DL
ALP SERPL-CCNC: 70 U/L (ref 39–117)
ALT SERPL W P-5'-P-CCNC: 15 U/L (ref 1–33)
ANION GAP SERPL CALCULATED.3IONS-SCNC: 10 MMOL/L (ref 5–15)
ANISOCYTOSIS BLD QL: ABNORMAL
AST SERPL-CCNC: 36 U/L (ref 1–32)
BASO STIPL COARSE BLD QL SMEAR: ABNORMAL
BH BB BLOOD EXPIRATION DATE: NORMAL
BH BB BLOOD TYPE BARCODE: 6200
BH BB DISPENSE STATUS: NORMAL
BH BB PRODUCT CODE: NORMAL
BH BB UNIT NUMBER: NORMAL
BILIRUB SERPL-MCNC: 2 MG/DL (ref 0–1.2)
BUN SERPL-MCNC: 9 MG/DL (ref 8–23)
BUN/CREAT SERPL: 16.7 (ref 7–25)
CALCIUM SPEC-SCNC: 8.5 MG/DL (ref 8.6–10.5)
CHLORIDE SERPL-SCNC: 98 MMOL/L (ref 98–107)
CO2 SERPL-SCNC: 27 MMOL/L (ref 22–29)
COPPER SERPL-MCNC: 150 UG/DL (ref 80–158)
CREAT SERPL-MCNC: 0.54 MG/DL (ref 0.57–1)
CROSSMATCH INTERPRETATION: NORMAL
DEPRECATED RDW RBC AUTO: 85.5 FL (ref 37–54)
EGFRCR SERPLBLD CKD-EPI 2021: 101.7 ML/MIN/1.73
EOSINOPHIL # BLD MANUAL: 0.78 10*3/MM3 (ref 0–0.4)
EOSINOPHIL NFR BLD MANUAL: 5.1 % (ref 0.3–6.2)
ERYTHROCYTE [DISTWIDTH] IN BLOOD BY AUTOMATED COUNT: 25.2 % (ref 12.3–15.4)
GLOBULIN UR ELPH-MCNC: 3.2 GM/DL
GLUCOSE SERPL-MCNC: 160 MG/DL (ref 65–99)
HAPTOGLOB SERPL-MCNC: <10 MG/DL (ref 30–200)
HCT VFR BLD AUTO: 29.4 % (ref 34–46.6)
HGB BLD-MCNC: 9.4 G/DL (ref 12–15.9)
LDH SERPL-CCNC: 668 U/L (ref 135–214)
LYMPHOCYTES # BLD MANUAL: 1.86 10*3/MM3 (ref 0.7–3.1)
LYMPHOCYTES NFR BLD MANUAL: 9.2 % (ref 5–12)
MACROCYTES BLD QL SMEAR: ABNORMAL
MCH RBC QN AUTO: 33.9 PG (ref 26.6–33)
MCHC RBC AUTO-ENTMCNC: 32 G/DL (ref 31.5–35.7)
MCV RBC AUTO: 106.1 FL (ref 79–97)
METAMYELOCYTES NFR BLD MANUAL: 3.1 % (ref 0–0)
MONOCYTES # BLD: 1.4 10*3/MM3 (ref 0.1–0.9)
MYELOCYTES NFR BLD MANUAL: 4.1 % (ref 0–0)
NEUTROPHILS # BLD AUTO: 10.11 10*3/MM3 (ref 1.7–7)
NEUTROPHILS NFR BLD MANUAL: 64.3 % (ref 42.7–76)
NEUTS BAND NFR BLD MANUAL: 2 % (ref 0–5)
NRBC SPEC MANUAL: 3.1 /100 WBC (ref 0–0.2)
PLAT MORPH BLD: NORMAL
PLATELET # BLD AUTO: 218 10*3/MM3 (ref 140–450)
PMV BLD AUTO: 11.5 FL (ref 6–12)
POLYCHROMASIA BLD QL SMEAR: ABNORMAL
POTASSIUM SERPL-SCNC: 3.8 MMOL/L (ref 3.5–5.2)
PROT SERPL-MCNC: 7.1 G/DL (ref 6–8.5)
RBC # BLD AUTO: 2.77 10*6/MM3 (ref 3.77–5.28)
RETICS # AUTO: 0.46 10*6/MM3 (ref 0.02–0.13)
RETICS/RBC NFR AUTO: 17.28 % (ref 0.7–1.9)
SODIUM SERPL-SCNC: 135 MMOL/L (ref 136–145)
UNIT  ABO: NORMAL
UNIT  RH: NORMAL
URATE SERPL-MCNC: 1.5 MG/DL (ref 2.4–5.7)
VARIANT LYMPHS NFR BLD MANUAL: 12.2 % (ref 19.6–45.3)
WBC MORPH BLD: NORMAL
WBC NRBC COR # BLD: 15.25 10*3/MM3 (ref 3.4–10.8)
ZINC SERPL-MCNC: 61 UG/DL (ref 44–115)

## 2022-04-27 PROCEDURE — 63710000001 PREDNISONE PER 1 MG: Performed by: INTERNAL MEDICINE

## 2022-04-27 PROCEDURE — 85007 BL SMEAR W/DIFF WBC COUNT: CPT | Performed by: INTERNAL MEDICINE

## 2022-04-27 PROCEDURE — 94799 UNLISTED PULMONARY SVC/PX: CPT

## 2022-04-27 PROCEDURE — 85045 AUTOMATED RETICULOCYTE COUNT: CPT | Performed by: INTERNAL MEDICINE

## 2022-04-27 PROCEDURE — 83010 ASSAY OF HAPTOGLOBIN QUANT: CPT | Performed by: INTERNAL MEDICINE

## 2022-04-27 PROCEDURE — 99232 SBSQ HOSP IP/OBS MODERATE 35: CPT | Performed by: INTERNAL MEDICINE

## 2022-04-27 PROCEDURE — 71046 X-RAY EXAM CHEST 2 VIEWS: CPT

## 2022-04-27 PROCEDURE — 85025 COMPLETE CBC W/AUTO DIFF WBC: CPT | Performed by: INTERNAL MEDICINE

## 2022-04-27 PROCEDURE — 80053 COMPREHEN METABOLIC PANEL: CPT | Performed by: INTERNAL MEDICINE

## 2022-04-27 PROCEDURE — 84550 ASSAY OF BLOOD/URIC ACID: CPT | Performed by: INTERNAL MEDICINE

## 2022-04-27 PROCEDURE — 83615 LACTATE (LD) (LDH) ENZYME: CPT | Performed by: INTERNAL MEDICINE

## 2022-04-27 RX ORDER — IPRATROPIUM BROMIDE AND ALBUTEROL SULFATE 2.5; .5 MG/3ML; MG/3ML
3 SOLUTION RESPIRATORY (INHALATION) EVERY 4 HOURS PRN
Status: DISCONTINUED | OUTPATIENT
Start: 2022-04-27 | End: 2022-04-29 | Stop reason: HOSPADM

## 2022-04-27 RX ADMIN — PANTOPRAZOLE SODIUM 40 MG: 40 TABLET, DELAYED RELEASE ORAL at 06:00

## 2022-04-27 RX ADMIN — FOLIC ACID 1 MG: 1 TABLET ORAL at 09:33

## 2022-04-27 RX ADMIN — IPRATROPIUM BROMIDE AND ALBUTEROL SULFATE 3 ML: .5; 3 SOLUTION RESPIRATORY (INHALATION) at 16:13

## 2022-04-27 RX ADMIN — ALLOPURINOL 300 MG: 300 TABLET ORAL at 09:33

## 2022-04-27 RX ADMIN — PREDNISONE 60 MG: 20 TABLET ORAL at 09:33

## 2022-04-27 RX ADMIN — SODIUM CHLORIDE, POTASSIUM CHLORIDE, SODIUM LACTATE AND CALCIUM CHLORIDE 100 ML/HR: 600; 310; 30; 20 INJECTION, SOLUTION INTRAVENOUS at 16:11

## 2022-04-27 RX ADMIN — Medication 10 ML: at 09:34

## 2022-04-27 RX ADMIN — ALLOPURINOL 300 MG: 300 TABLET ORAL at 20:45

## 2022-04-27 RX ADMIN — DOCUSATE SODIUM 50 MG AND SENNOSIDES 8.6 MG 1 TABLET: 8.6; 5 TABLET, FILM COATED ORAL at 09:33

## 2022-04-27 RX ADMIN — NICOTINE 1 PATCH: 14 PATCH, EXTENDED RELEASE TRANSDERMAL at 09:35

## 2022-04-28 ENCOUNTER — APPOINTMENT (OUTPATIENT)
Dept: LAB | Facility: HOSPITAL | Age: 67
End: 2022-04-28

## 2022-04-28 ENCOUNTER — APPOINTMENT (OUTPATIENT)
Dept: ONCOLOGY | Facility: HOSPITAL | Age: 67
End: 2022-04-28

## 2022-04-28 DIAGNOSIS — D59.10 AUTOIMMUNE HEMOLYTIC ANEMIA: Primary | ICD-10-CM

## 2022-04-28 LAB
ALBUMIN SERPL-MCNC: 3.6 G/DL (ref 3.5–5.2)
ALBUMIN/GLOB SERPL: 1 G/DL
ALP SERPL-CCNC: 74 U/L (ref 39–117)
ALT SERPL W P-5'-P-CCNC: 14 U/L (ref 1–33)
ANION GAP SERPL CALCULATED.3IONS-SCNC: 12 MMOL/L (ref 5–15)
AST SERPL-CCNC: 31 U/L (ref 1–32)
BASOPHILS # BLD AUTO: 0.1 10*3/MM3 (ref 0–0.2)
BASOPHILS NFR BLD AUTO: 0.8 % (ref 0–1.5)
BILIRUB SERPL-MCNC: 2.1 MG/DL (ref 0–1.2)
BUN SERPL-MCNC: 7 MG/DL (ref 8–23)
BUN/CREAT SERPL: 12.5 (ref 7–25)
CALCIUM SPEC-SCNC: 8.9 MG/DL (ref 8.6–10.5)
CHLORIDE SERPL-SCNC: 96 MMOL/L (ref 98–107)
CO2 SERPL-SCNC: 27 MMOL/L (ref 22–29)
CREAT SERPL-MCNC: 0.56 MG/DL (ref 0.57–1)
DEPRECATED RDW RBC AUTO: 93.6 FL (ref 37–54)
EGFRCR SERPLBLD CKD-EPI 2021: 100.8 ML/MIN/1.73
EOSINOPHIL # BLD AUTO: 0.41 10*3/MM3 (ref 0–0.4)
EOSINOPHIL NFR BLD AUTO: 3.3 % (ref 0.3–6.2)
ERYTHROCYTE [DISTWIDTH] IN BLOOD BY AUTOMATED COUNT: 25.7 % (ref 12.3–15.4)
GLOBULIN UR ELPH-MCNC: 3.5 GM/DL
GLUCOSE SERPL-MCNC: 134 MG/DL (ref 65–99)
HAPTOGLOB SERPL-MCNC: <10 MG/DL (ref 30–200)
HCT VFR BLD AUTO: 29.8 % (ref 34–46.6)
HGB BLD-MCNC: 9.5 G/DL (ref 12–15.9)
LDH SERPL-CCNC: 638 U/L (ref 135–214)
LYMPHOCYTES # BLD AUTO: 1.79 10*3/MM3 (ref 0.7–3.1)
LYMPHOCYTES NFR BLD AUTO: 14.3 % (ref 19.6–45.3)
MCH RBC QN AUTO: 34.5 PG (ref 26.6–33)
MCHC RBC AUTO-ENTMCNC: 31.9 G/DL (ref 31.5–35.7)
MCV RBC AUTO: 108.4 FL (ref 79–97)
MONOCYTES # BLD AUTO: 0.74 10*3/MM3 (ref 0.1–0.9)
MONOCYTES NFR BLD AUTO: 5.9 % (ref 5–12)
NEUTROPHILS NFR BLD AUTO: 72.5 % (ref 42.7–76)
NEUTROPHILS NFR BLD AUTO: 9.06 10*3/MM3 (ref 1.7–7)
PLATELET # BLD AUTO: 208 10*3/MM3 (ref 140–450)
PMV BLD AUTO: 11.7 FL (ref 6–12)
POTASSIUM SERPL-SCNC: 3.1 MMOL/L (ref 3.5–5.2)
PROT SERPL-MCNC: 7.1 G/DL (ref 6–8.5)
RBC # BLD AUTO: 2.75 10*6/MM3 (ref 3.77–5.28)
REF LAB TEST METHOD: NORMAL
RETICS # AUTO: 0.59 10*6/MM3 (ref 0.02–0.13)
RETICS/RBC NFR AUTO: 21.42 % (ref 0.7–1.9)
SODIUM SERPL-SCNC: 135 MMOL/L (ref 136–145)
URATE SERPL-MCNC: 1.6 MG/DL (ref 2.4–5.7)
WBC NRBC COR # BLD: 12.5 10*3/MM3 (ref 3.4–10.8)

## 2022-04-28 PROCEDURE — 83615 LACTATE (LD) (LDH) ENZYME: CPT | Performed by: INTERNAL MEDICINE

## 2022-04-28 PROCEDURE — 83010 ASSAY OF HAPTOGLOBIN QUANT: CPT | Performed by: INTERNAL MEDICINE

## 2022-04-28 PROCEDURE — 25010000002 RITUXIMAB 10 MG/ML SOLUTION 50 ML VIAL: Performed by: INTERNAL MEDICINE

## 2022-04-28 PROCEDURE — 80053 COMPREHEN METABOLIC PANEL: CPT | Performed by: INTERNAL MEDICINE

## 2022-04-28 PROCEDURE — 84550 ASSAY OF BLOOD/URIC ACID: CPT | Performed by: INTERNAL MEDICINE

## 2022-04-28 PROCEDURE — 25010000002 RITUXIMAB 10 MG/ML SOLUTION 10 ML VIAL: Performed by: INTERNAL MEDICINE

## 2022-04-28 PROCEDURE — 85045 AUTOMATED RETICULOCYTE COUNT: CPT | Performed by: INTERNAL MEDICINE

## 2022-04-28 PROCEDURE — 25010000002 DIPHENHYDRAMINE PER 50 MG: Performed by: INTERNAL MEDICINE

## 2022-04-28 PROCEDURE — 36415 COLL VENOUS BLD VENIPUNCTURE: CPT | Performed by: INTERNAL MEDICINE

## 2022-04-28 PROCEDURE — 85025 COMPLETE CBC W/AUTO DIFF WBC: CPT | Performed by: INTERNAL MEDICINE

## 2022-04-28 PROCEDURE — 99232 SBSQ HOSP IP/OBS MODERATE 35: CPT | Performed by: INTERNAL MEDICINE

## 2022-04-28 PROCEDURE — 63710000001 PREDNISONE PER 1 MG: Performed by: INTERNAL MEDICINE

## 2022-04-28 RX ORDER — ACETAMINOPHEN 325 MG/1
650 TABLET ORAL ONCE
Status: COMPLETED | OUTPATIENT
Start: 2022-04-28 | End: 2022-04-28

## 2022-04-28 RX ORDER — POTASSIUM CHLORIDE 750 MG/1
40 CAPSULE, EXTENDED RELEASE ORAL ONCE
Status: COMPLETED | OUTPATIENT
Start: 2022-04-28 | End: 2022-04-28

## 2022-04-28 RX ADMIN — Medication 10 ML: at 20:03

## 2022-04-28 RX ADMIN — ACETAMINOPHEN 650 MG: 325 TABLET ORAL at 12:53

## 2022-04-28 RX ADMIN — PREDNISONE 60 MG: 20 TABLET ORAL at 08:37

## 2022-04-28 RX ADMIN — NICOTINE 1 PATCH: 14 PATCH, EXTENDED RELEASE TRANSDERMAL at 08:41

## 2022-04-28 RX ADMIN — RITUXIMAB 610 MG: 10 INJECTION, SOLUTION INTRAVENOUS at 13:36

## 2022-04-28 RX ADMIN — DIPHENHYDRAMINE HYDROCHLORIDE 50 MG: 50 INJECTION INTRAMUSCULAR; INTRAVENOUS at 12:56

## 2022-04-28 RX ADMIN — DOCUSATE SODIUM 50 MG AND SENNOSIDES 8.6 MG 1 TABLET: 8.6; 5 TABLET, FILM COATED ORAL at 08:37

## 2022-04-28 RX ADMIN — Medication 10 ML: at 08:37

## 2022-04-28 RX ADMIN — PANTOPRAZOLE SODIUM 40 MG: 40 TABLET, DELAYED RELEASE ORAL at 06:42

## 2022-04-28 RX ADMIN — SODIUM CHLORIDE, POTASSIUM CHLORIDE, SODIUM LACTATE AND CALCIUM CHLORIDE 100 ML/HR: 600; 310; 30; 20 INJECTION, SOLUTION INTRAVENOUS at 11:07

## 2022-04-28 RX ADMIN — FOLIC ACID 1 MG: 1 TABLET ORAL at 08:37

## 2022-04-28 RX ADMIN — ALLOPURINOL 300 MG: 300 TABLET ORAL at 08:37

## 2022-04-28 RX ADMIN — POTASSIUM CHLORIDE 40 MEQ: 10 CAPSULE, COATED, EXTENDED RELEASE ORAL at 11:05

## 2022-04-28 RX ADMIN — ALLOPURINOL 300 MG: 300 TABLET ORAL at 20:03

## 2022-04-29 ENCOUNTER — APPOINTMENT (OUTPATIENT)
Dept: ONCOLOGY | Facility: HOSPITAL | Age: 67
End: 2022-04-29

## 2022-04-29 ENCOUNTER — APPOINTMENT (OUTPATIENT)
Dept: LAB | Facility: HOSPITAL | Age: 67
End: 2022-04-29

## 2022-04-29 VITALS
HEART RATE: 80 BPM | SYSTOLIC BLOOD PRESSURE: 96 MMHG | TEMPERATURE: 98.1 F | RESPIRATION RATE: 18 BRPM | DIASTOLIC BLOOD PRESSURE: 72 MMHG | WEIGHT: 141 LBS | HEIGHT: 64 IN | OXYGEN SATURATION: 98 % | BODY MASS INDEX: 24.07 KG/M2

## 2022-04-29 DIAGNOSIS — D59.10 AUTOIMMUNE HEMOLYTIC ANEMIA: Primary | ICD-10-CM

## 2022-04-29 PROBLEM — E87.1 HYPONATREMIA: Status: ACTIVE | Noted: 2022-04-29

## 2022-04-29 LAB
ALBUMIN SERPL-MCNC: 3.3 G/DL (ref 3.5–5.2)
ALBUMIN/GLOB SERPL: 1.1 G/DL
ALP SERPL-CCNC: 65 U/L (ref 39–117)
ALT SERPL W P-5'-P-CCNC: 13 U/L (ref 1–33)
ANION GAP SERPL CALCULATED.3IONS-SCNC: 12 MMOL/L (ref 5–15)
AST SERPL-CCNC: 33 U/L (ref 1–32)
BASOPHILS # BLD AUTO: 0.08 10*3/MM3 (ref 0–0.2)
BASOPHILS NFR BLD AUTO: 0.8 % (ref 0–1.5)
BILIRUB SERPL-MCNC: 1.8 MG/DL (ref 0–1.2)
BUN SERPL-MCNC: 11 MG/DL (ref 8–23)
BUN/CREAT SERPL: 14.9 (ref 7–25)
CALCIUM SPEC-SCNC: 8.5 MG/DL (ref 8.6–10.5)
CHLORIDE SERPL-SCNC: 94 MMOL/L (ref 98–107)
CO2 SERPL-SCNC: 23 MMOL/L (ref 22–29)
CREAT SERPL-MCNC: 0.74 MG/DL (ref 0.57–1)
DEPRECATED RDW RBC AUTO: 84.1 FL (ref 37–54)
EGFRCR SERPLBLD CKD-EPI 2021: 89.4 ML/MIN/1.73
EOSINOPHIL # BLD AUTO: 0.3 10*3/MM3 (ref 0–0.4)
EOSINOPHIL NFR BLD AUTO: 2.9 % (ref 0.3–6.2)
ERYTHROCYTE [DISTWIDTH] IN BLOOD BY AUTOMATED COUNT: 23.1 % (ref 12.3–15.4)
GLOBULIN UR ELPH-MCNC: 2.9 GM/DL
GLUCOSE SERPL-MCNC: 199 MG/DL (ref 65–99)
HAPTOGLOB SERPL-MCNC: <10 MG/DL (ref 30–200)
HCT VFR BLD AUTO: 26.1 % (ref 34–46.6)
HGB BLD-MCNC: 8.3 G/DL (ref 12–15.9)
IMM GRANULOCYTES # BLD AUTO: 0.2 10*3/MM3 (ref 0–0.05)
IMM GRANULOCYTES NFR BLD AUTO: 1.9 % (ref 0–0.5)
LDH SERPL-CCNC: 570 U/L (ref 135–214)
LDH SERPL-CCNC: 650 U/L (ref 135–214)
LYMPHOCYTES # BLD AUTO: 0.79 10*3/MM3 (ref 0.7–3.1)
LYMPHOCYTES NFR BLD AUTO: 7.7 % (ref 19.6–45.3)
MCH RBC QN AUTO: 33.2 PG (ref 26.6–33)
MCHC RBC AUTO-ENTMCNC: 31.8 G/DL (ref 31.5–35.7)
MCV RBC AUTO: 104.4 FL (ref 79–97)
MONOCYTES # BLD AUTO: 0.8 10*3/MM3 (ref 0.1–0.9)
MONOCYTES NFR BLD AUTO: 7.8 % (ref 5–12)
NEUTROPHILS NFR BLD AUTO: 78.9 % (ref 42.7–76)
NEUTROPHILS NFR BLD AUTO: 8.09 10*3/MM3 (ref 1.7–7)
NRBC BLD AUTO-RTO: 1.3 /100 WBC (ref 0–0.2)
PLATELET # BLD AUTO: 155 10*3/MM3 (ref 140–450)
PMV BLD AUTO: 11.9 FL (ref 6–12)
POTASSIUM SERPL-SCNC: 3.8 MMOL/L (ref 3.5–5.2)
PROT SERPL-MCNC: 6.2 G/DL (ref 6–8.5)
RBC # BLD AUTO: 2.5 10*6/MM3 (ref 3.77–5.28)
RETICS # AUTO: 0.55 10*6/MM3 (ref 0.02–0.13)
RETICS/RBC NFR AUTO: 22.71 % (ref 0.7–1.9)
SODIUM SERPL-SCNC: 129 MMOL/L (ref 136–145)
URATE SERPL-MCNC: 1.9 MG/DL (ref 2.4–5.7)
WBC NRBC COR # BLD: 10.26 10*3/MM3 (ref 3.4–10.8)

## 2022-04-29 PROCEDURE — 80053 COMPREHEN METABOLIC PANEL: CPT | Performed by: INTERNAL MEDICINE

## 2022-04-29 PROCEDURE — 83010 ASSAY OF HAPTOGLOBIN QUANT: CPT | Performed by: INTERNAL MEDICINE

## 2022-04-29 PROCEDURE — 85045 AUTOMATED RETICULOCYTE COUNT: CPT | Performed by: INTERNAL MEDICINE

## 2022-04-29 PROCEDURE — 99232 SBSQ HOSP IP/OBS MODERATE 35: CPT | Performed by: INTERNAL MEDICINE

## 2022-04-29 PROCEDURE — 84550 ASSAY OF BLOOD/URIC ACID: CPT | Performed by: INTERNAL MEDICINE

## 2022-04-29 PROCEDURE — 83615 LACTATE (LD) (LDH) ENZYME: CPT | Performed by: INTERNAL MEDICINE

## 2022-04-29 PROCEDURE — 85025 COMPLETE CBC W/AUTO DIFF WBC: CPT | Performed by: INTERNAL MEDICINE

## 2022-04-29 PROCEDURE — 63710000001 PREDNISONE PER 1 MG: Performed by: INTERNAL MEDICINE

## 2022-04-29 RX ORDER — FOLIC ACID 1 MG/1
1 TABLET ORAL DAILY
Qty: 30 TABLET | Refills: 2 | Status: SHIPPED | OUTPATIENT
Start: 2022-04-30 | End: 2022-08-10 | Stop reason: SDUPTHER

## 2022-04-29 RX ORDER — DIPHENHYDRAMINE HYDROCHLORIDE 50 MG/ML
50 INJECTION INTRAMUSCULAR; INTRAVENOUS AS NEEDED
Status: CANCELLED | OUTPATIENT
Start: 2022-05-05

## 2022-04-29 RX ORDER — FAMOTIDINE 10 MG/ML
20 INJECTION, SOLUTION INTRAVENOUS AS NEEDED
Status: CANCELLED | OUTPATIENT
Start: 2022-05-05

## 2022-04-29 RX ORDER — MEPERIDINE HYDROCHLORIDE 50 MG/ML
25 INJECTION INTRAMUSCULAR; INTRAVENOUS; SUBCUTANEOUS
Status: CANCELLED | OUTPATIENT
Start: 2022-05-05

## 2022-04-29 RX ORDER — ACETAMINOPHEN 325 MG/1
650 TABLET ORAL ONCE
Status: CANCELLED | OUTPATIENT
Start: 2022-05-05

## 2022-04-29 RX ORDER — SODIUM CHLORIDE 9 MG/ML
250 INJECTION, SOLUTION INTRAVENOUS ONCE
Status: CANCELLED | OUTPATIENT
Start: 2022-05-05

## 2022-04-29 RX ORDER — PREDNISONE 20 MG/1
60 TABLET ORAL
Qty: 9 TABLET | Refills: 0 | Status: SHIPPED | OUTPATIENT
Start: 2022-04-30 | End: 2022-05-03

## 2022-04-29 RX ORDER — ALLOPURINOL 100 MG/1
100 TABLET ORAL DAILY
Qty: 30 TABLET | Refills: 1 | Status: SHIPPED | OUTPATIENT
Start: 2022-04-30 | End: 2022-07-05 | Stop reason: SDUPTHER

## 2022-04-29 RX ORDER — ALLOPURINOL 100 MG/1
100 TABLET ORAL DAILY
Status: DISCONTINUED | OUTPATIENT
Start: 2022-04-29 | End: 2022-04-29 | Stop reason: HOSPADM

## 2022-04-29 RX ADMIN — FOLIC ACID 1 MG: 1 TABLET ORAL at 08:08

## 2022-04-29 RX ADMIN — ACETAMINOPHEN 650 MG: 325 TABLET ORAL at 04:25

## 2022-04-29 RX ADMIN — ALLOPURINOL 300 MG: 300 TABLET ORAL at 08:08

## 2022-04-29 RX ADMIN — Medication 10 ML: at 08:08

## 2022-04-29 RX ADMIN — PANTOPRAZOLE SODIUM 40 MG: 40 TABLET, DELAYED RELEASE ORAL at 06:00

## 2022-04-29 RX ADMIN — SODIUM CHLORIDE, POTASSIUM CHLORIDE, SODIUM LACTATE AND CALCIUM CHLORIDE 100 ML/HR: 600; 310; 30; 20 INJECTION, SOLUTION INTRAVENOUS at 03:17

## 2022-04-29 RX ADMIN — NICOTINE 1 PATCH: 14 PATCH, EXTENDED RELEASE TRANSDERMAL at 08:10

## 2022-04-29 RX ADMIN — PREDNISONE 60 MG: 20 TABLET ORAL at 08:07

## 2022-04-30 ENCOUNTER — READMISSION MANAGEMENT (OUTPATIENT)
Dept: CALL CENTER | Facility: HOSPITAL | Age: 67
End: 2022-04-30

## 2022-04-30 NOTE — OUTREACH NOTE
Prep Survey    Flowsheet Row Responses   [facility] facility patient discharged from? [facility]   Is LACE score < 7 ? No   Emergency Room discharge w/ pulse ox? No   Eligibility Readm Mgmt   Discharge diagnosis Autoimmune hemolytic anemia     Does the patient have one of the following disease processes/diagnoses(primary or secondary)? Other   Does the patient have Home health ordered? No   Is there a DME ordered? No   Prep survey completed? Yes          RENETTA LORA - Registered Nurse

## 2022-05-02 ENCOUNTER — LAB (OUTPATIENT)
Dept: LAB | Facility: HOSPITAL | Age: 67
End: 2022-05-02

## 2022-05-02 DIAGNOSIS — D59.10 AUTOIMMUNE HEMOLYTIC ANEMIA: ICD-10-CM

## 2022-05-02 LAB
ALBUMIN SERPL-MCNC: 3.7 G/DL (ref 3.5–5.2)
ALBUMIN/GLOB SERPL: 1.3 G/DL
ALP SERPL-CCNC: 74 U/L (ref 39–117)
ALT SERPL W P-5'-P-CCNC: 18 U/L (ref 1–33)
ANION GAP SERPL CALCULATED.3IONS-SCNC: 11 MMOL/L (ref 5–15)
AST SERPL-CCNC: 45 U/L (ref 1–32)
BASOPHILS # BLD AUTO: 0.1 10*3/MM3 (ref 0–0.2)
BASOPHILS NFR BLD AUTO: 0.6 % (ref 0–1.5)
BILIRUB SERPL-MCNC: 1.8 MG/DL (ref 0–1.2)
BUN SERPL-MCNC: 12 MG/DL (ref 8–23)
BUN/CREAT SERPL: 17.4 (ref 7–25)
CALCIUM SPEC-SCNC: 8.5 MG/DL (ref 8.6–10.5)
CHLORIDE SERPL-SCNC: 102 MMOL/L (ref 98–107)
CO2 SERPL-SCNC: 23 MMOL/L (ref 22–29)
CREAT SERPL-MCNC: 0.69 MG/DL (ref 0.57–1)
DAT IGG GEL: POSITIVE
DAT POLY-SP REAG RBC QL: POSITIVE
DEPRECATED RDW RBC AUTO: 66.4 FL (ref 37–54)
EGFRCR SERPLBLD CKD-EPI 2021: 95.9 ML/MIN/1.73
EOSINOPHIL # BLD AUTO: 0.53 10*3/MM3 (ref 0–0.4)
EOSINOPHIL NFR BLD AUTO: 3.3 % (ref 0.3–6.2)
ERYTHROCYTE [DISTWIDTH] IN BLOOD BY AUTOMATED COUNT: 18.6 % (ref 12.3–15.4)
GLOBULIN UR ELPH-MCNC: 2.9 GM/DL
GLUCOSE SERPL-MCNC: 216 MG/DL (ref 65–99)
HAPTOGLOB SERPL-MCNC: <10 MG/DL (ref 30–200)
HBV SURFACE AB SER RIA-ACNC: REACTIVE
HBV SURFACE AG SERPL QL IA: NORMAL
HCT VFR BLD AUTO: 24.8 % (ref 34–46.6)
HGB BLD-MCNC: 8.3 G/DL (ref 12–15.9)
IMM GRANULOCYTES # BLD AUTO: 0.27 10*3/MM3 (ref 0–0.05)
IMM GRANULOCYTES NFR BLD AUTO: 1.7 % (ref 0–0.5)
LYMPHOCYTES # BLD AUTO: 1.42 10*3/MM3 (ref 0.7–3.1)
LYMPHOCYTES NFR BLD AUTO: 8.9 % (ref 19.6–45.3)
MCH RBC QN AUTO: 33.2 PG (ref 26.6–33)
MCHC RBC AUTO-ENTMCNC: 33.5 G/DL (ref 31.5–35.7)
MCV RBC AUTO: 99.2 FL (ref 79–97)
MONOCYTES # BLD AUTO: 1.43 10*3/MM3 (ref 0.1–0.9)
MONOCYTES NFR BLD AUTO: 8.9 % (ref 5–12)
NEUTROPHILS NFR BLD AUTO: 12.27 10*3/MM3 (ref 1.7–7)
NEUTROPHILS NFR BLD AUTO: 76.6 % (ref 42.7–76)
NRBC BLD AUTO-RTO: 0.2 /100 WBC (ref 0–0.2)
PLATELET # BLD AUTO: 195 10*3/MM3 (ref 140–450)
PMV BLD AUTO: 12.1 FL (ref 6–12)
POTASSIUM SERPL-SCNC: 3.9 MMOL/L (ref 3.5–5.2)
PROT SERPL-MCNC: 6.6 G/DL (ref 6–8.5)
RBC # BLD AUTO: 2.5 10*6/MM3 (ref 3.77–5.28)
RETICS # AUTO: 0.41 10*6/MM3 (ref 0.02–0.13)
RETICS/RBC NFR AUTO: 16.04 % (ref 0.7–1.9)
SODIUM SERPL-SCNC: 136 MMOL/L (ref 136–145)
WBC NRBC COR # BLD: 16.02 10*3/MM3 (ref 3.4–10.8)

## 2022-05-02 PROCEDURE — 36415 COLL VENOUS BLD VENIPUNCTURE: CPT

## 2022-05-02 PROCEDURE — 83010 ASSAY OF HAPTOGLOBIN QUANT: CPT

## 2022-05-02 PROCEDURE — 86706 HEP B SURFACE ANTIBODY: CPT

## 2022-05-02 PROCEDURE — 87340 HEPATITIS B SURFACE AG IA: CPT

## 2022-05-02 PROCEDURE — 80053 COMPREHEN METABOLIC PANEL: CPT

## 2022-05-02 PROCEDURE — 85025 COMPLETE CBC W/AUTO DIFF WBC: CPT

## 2022-05-02 PROCEDURE — 85045 AUTOMATED RETICULOCYTE COUNT: CPT

## 2022-05-02 PROCEDURE — 86880 COOMBS TEST DIRECT: CPT

## 2022-05-02 PROCEDURE — 86704 HEP B CORE ANTIBODY TOTAL: CPT

## 2022-05-03 LAB — HBV CORE AB SERPL QL IA: NEGATIVE

## 2022-05-04 ENCOUNTER — READMISSION MANAGEMENT (OUTPATIENT)
Dept: CALL CENTER | Facility: HOSPITAL | Age: 67
End: 2022-05-04

## 2022-05-04 LAB
LAB AP CASE REPORT: NORMAL
PATH REPORT.FINAL DX SPEC: NORMAL
PATH REPORT.GROSS SPEC: NORMAL

## 2022-05-04 NOTE — OUTREACH NOTE
Medical Week 1 Survey    Flowsheet Row Responses   [facility] patient discharged from? [place]   Does the patient have one of the following disease processes/diagnoses(primary or secondary)? Other   Week 1 attempt successful? No  [Please call patient only for updates per request of spouse.]   Call start time 1501   Unsuccessful attempts Attempt 1   General alerts for this patient Please call patient only for updates-per request of spouse.          CHI ANTONIO - Registered Nurse

## 2022-05-04 NOTE — PROGRESS NOTES
"[facility] ON[facility] HEMATOLOGY & ONCOLOGY  [address]  [facility] [address] [phone]    Patient Name: Giana Yepez  Encounter Date: 05/09/2022  YOB: 1955  Patient Number: [id number]      REASON FOR FOLLOW-UP: iGana Yepez is a pleasant 67 y.o.  female who is seen on follow-up for autoimmune hemolytic anemia.  She was started on rituximab on 04/28/2022 through present. \"My mouth felt like I ate a mouthful of hot peepers, blisters roof of mouth, nose burning, sneezing like smelt burned peppers, and my eyes were pouring. All within 45 minutes of infusion.  Low blood pressure the next 4 days.\"  Plan for 4 weekly treatments, however she is intolerant.  She is seen sister, Kellee.  She is a reliable historian.      DIAGNOSTIC ABNORMALITIES:  She presented with worsening fatigue and weight loss approximately 10 pounds in the last 2 months.  WBC 18.2, ANC 12.4, hemoglobin 7.6, hematocrit 24.1, , and platelet 348, folate 12.6, B12 at 1988, TSH 1.96, erythropoietin 61.5, sed rate 3, , haptoglobin less than 10, CMP remarkable for AST of 33 and bilirubin 2.3, IgG 1035, and negative BCR ABL on 04/19/2022.  Reticulocyte 25.69% and Rona test was positive on 04/22/2022.  Negative hepatitis B core antibody, negative HIV, negative LIZ panel and blood for flow cytometry 04/22/2022, negative Bcl-2, BCL6 and MYC.  CT neck, chest, abdomen and pelvis on 04/23/2022.9 mm relatively hypodense well-circumscribed asymmetry at the level of the upper left vallecula and base of the tongue on the left. No associated enhancement of the wall or intraluminal air making an infectious process less likely. This measures Hounsfield units of 36 suggesting it may be soft tissue in nature. Direct visualization is recommended.  No pathologically enlarged cervical chain or posterior triangle lymphadenopathy is present. There are some borderline " enlarged left supraclavicular nodes with measurements as above. No additional discrete mass lesions are appreciated. Level of the true and false cords is unremarkable. The thyroid gland is homogeneous in density without evidence of nodularity or mass.  Borderline enlarged mediastinal nodes are present as well as a few left supraclavicular nodes. No additional enlarged nodes are identified.  Right middle lobe atelectasis or scarring is present. There is mild patchy pneumonitis within the superior segment of both lower lobes and apical posterior segment of the left upper lobe suggesting a patchy pneumonitis/bronchopneumonia. Lungs are otherwise clear. No evidence of lobar consolidation. No discrete endobronchial lesion is identified.  The heart is normal in size. The thoracic aorta and great vessels are normal in caliber.  No evidence of intraperitoneal or retroperitoneal lymphadenopathy.  Homogeneous enhancement of the kidneys. No evidence of nephrolithiasis or obstructive uropathy.  Constipation with increased stool throughout the colon. No evidence of mechanical obstruction or free air.  The uterus and adnexa are unremarkable.  Bone marrow biopsy 04/25/2022.  Hypercellular marrow for age 70 to 80% with maturing trilineage hematopoiesis.  Erythroid hyperplasia.  Slight megakaryocyte ptosis.  Stainable iron is present.  No evidence of acute leukemia, lymphoproliferative disorder or plasma cell dyscrasia.  No morphologic evidence of an overt or advanced myeloid neoplasm.  Cytogenetics 46 XX. Flow cytometry showed no evidence for abnormal myeloid maturation or an increased blast population.  No evidence for a limp or proliferative disorder.  Antibody identification, autoantibody LE specificity on 04/26/2022.      PREVIOUS INTERVENTIONS:  1 unit packed RBC on 04/06/2022 and 04/26/2022.  Prednisone 60 mg on 04/22/2022 through 05/02/2022. Resume 05/09/2022.   IVIG on 04/26/2020 04/27/2022.  Folic acid 1 mg p.o. daily from  "04/30/2022 through present.  Rituximab 04/28/2022, intolerant. \"My mouth felt like I ate a mouthful of hot peepers, blisters roof of mouth, nose burning, sneezing like smelt burned peppers, and my eyes were pouring. All within 45 minutes of infusion.  Low blood pressure the next 4 days.\"         Oncology/Hematology History    No history exists.       PAST MEDICAL HISTORY:  ALLERGIES:  Allergies   Allergen Reactions   • Corn-Containing Products Diarrhea   • Eggs Or Egg-Derived Products Diarrhea   • Nuts Shortness Of Breath     peanuts   • Milk-Related Compounds Nausea And Vomiting   • Gabapentin Dizziness     Patient states becomes weak and dizzy if she takes Gabapentin   • Levemir [Insulin Detemir] Itching and Other (See Comments)     Patient states gets a \"hard knot and itching\" at injection site.   • Wheat Rash     CURRENT MEDICATIONS:  Outpatient Encounter Medications as of 5/9/2022   Medication Sig Dispense Refill   • allopurinol (ZYLOPRIM) 100 MG tablet Take 1 tablet by mouth Daily. 30 tablet 1   • Azelastine-Fluticasone 137-50 MCG/ACT suspension into each nostril.     • calcium carbonate (OS-ZUNILDA) 600 MG tablet Take 600 mg by mouth Daily.     • cholecalciferol (VITAMIN D3) 25 MCG (1000 UT) tablet Take 2,000 Units by mouth Daily.     • Continuous Blood Gluc  (Dexcom G5  Kit) device Continuous.     • ferrous sulfate 325 (65 FE) MG tablet Take 325 mg by mouth Daily With Breakfast.     • folic acid (FOLVITE) 1 MG tablet Take 1 tablet by mouth Daily. 30 tablet 2   • Insulin Infusion Pump device Continuous.     • Insulin Lispro (humaLOG) 100 UNIT/ML injection Inject 25-40 Units under the skin into the appropriate area as directed Daily.     • omeprazole (priLOSEC) 40 MG capsule Take 40 mg by mouth Daily.     • rosuvastatin (CRESTOR) 20 MG tablet Take 20 mg by mouth Daily.     • vitamin B-12 (CYANOCOBALAMIN) 1000 MCG tablet Take 1,000 mcg by mouth Daily.     • cyanocobalamin (VITAMIN B-12) 1000 MCG " tablet Take  by mouth Daily.     • fluticasone (FLONASE) 50 MCG/ACT nasal spray into the nostril(s) as directed by provider.     • [] predniSONE (DELTASONE) 20 MG tablet Take 3 tablets by mouth Daily With Breakfast for 3 doses. 9 tablet 0     No facility-administered encounter medications on file as of 2022.     ADULT ILLNESSES:  Patient Active Problem List   Diagnosis Code   • Gastroesophageal reflux disease K21.9   • Type 1 diabetes mellitus with hyperglycemia (HCC), insulin depdendent E10.65   • Intermittent chest pain R07.9   • Hypercholesterolemia E78.00   • Vitamin D deficiency E55.9   • Vitamin B12 deficiency E53.8   • Diarrhea R19.7   • Abdominal cramping R10.9   • Nausea R11.0   • Collagenous colitis K52.831   • Autoimmune hemolytic anemia (HCC) D59.10   • Insulin pump in place Z96.41   • Hyperbilirubinemia E80.6   • Mediastinal lymphadenopathy R59.0   • Tobacco abuse Z72.0   • Hyponatremia E87.1     SURGERIES:  Past Surgical History:   Procedure Laterality Date   • ANKLE SURGERY Right    • APPENDECTOMY     • BREAST BIOPSY     •  SECTION      X 2   • CHOLECYSTECTOMY     • COLONOSCOPY      [facility]   • COLONOSCOPY N/A 3/3/2017    Procedure: COLONOSCOPY WITH ANESTHESIA;  Surgeon: Ondina Abdul MD;  Location: [facility] ENDOSCOPY;  Service:    • ENDOSCOPY N/A 2017    Procedure: ESOPHAGOGASTRODUODENOSCOPY WITH ANESTHESIA;  Surgeon: Ondina Abdul MD;  Location: [facility] ENDOSCOPY;  Service:    • SKIN CANCER EXCISION  2016    FACE   • TONSILLECTOMY       HEALTH MAINTENANCE ITEMS:  Health Maintenance Due   Topic Date Due   • COVID-19 Vaccine (1) Never done   • Pneumococcal Vaccine 65+ (1 - PCV) Never done   • TDAP/TD VACCINES (1 - Tdap) Never done   • ZOSTER VACCINE (1 of 2) Never done   • ANNUAL WELLNESS VISIT  Never done   • DIABETIC FOOT EXAM  Never done   • PAP SMEAR  Never done   • DIABETIC EYE EXAM  Never done   • DXA SCAN  2018   • MAMMOGRAM  2019  "      <no information>  Last Completed Colonoscopy          COLORECTAL CANCER SCREENING (COLONOSCOPY - Every 10 Years) Next due on 3/3/2027    2017  Surgical Procedure: COLONOSCOPY    2017  COLONOSCOPY    2017  Occult Blood X 3, Stool                There is no immunization history on file for this patient.  Last Completed Mammogram     This patient has no relevant Health Maintenance data.            FAMILY HISTORY:  Family History   Problem Relation Age of Onset   • Asthma Mother    • Heart disease Mother    • Heart disease Father    • Heart disease Sister    • Hypertension Sister    • Diabetes Sister    • Other Sister         removal of lung tumor (benign), gastroperises   • Heart disease Brother    • Breast cancer Paternal Aunt    • No Known Problems Son    • No Known Problems Son      SOCIAL HISTORY:  Social History     Socioeconomic History   • Marital status:    Tobacco Use   • Smoking status: Former Smoker     Packs/day: 0.25     Years: 40.00     Pack years: 10.00     Types: Cigarettes     Quit date: 2022     Years since quittin.0   • Smokeless tobacco: Never Used   Vaping Use   • Vaping Use: Never used   Substance and Sexual Activity   • Alcohol use: Not Currently     Alcohol/week: 12.0 standard drinks     Types: 12 Cans of beer per week     Comment: drank a lot for 10 years   • Drug use: No   • Sexual activity: Defer       REVIEW OF SYSTEMS:    Review of Systems   Constitutional: Positive for fatigue. Negative for chills and fever.        \"Not as bad.\"   HENT: Negative for congestion, mouth sores and trouble swallowing.    Eyes: Negative for redness and visual disturbance.   Respiratory: Negative for cough, shortness of breath and wheezing.    Cardiovascular: Negative for chest pain and palpitations.   Gastrointestinal: Negative for abdominal pain, blood in stool, constipation, diarrhea, nausea and vomiting.   Endocrine: Negative for polydipsia and polyphagia. " "  Genitourinary: Negative for difficulty urinating, dysuria and hematuria.   Musculoskeletal: Negative for gait problem and neck stiffness.   Skin: Positive for pallor.   Allergic/Immunologic: Positive for food allergies.   Neurological: Negative for dizziness, speech difficulty and weakness.   Hematological: Negative for adenopathy. Does not bruise/bleed easily.   Psychiatric/Behavioral: Negative for agitation, confusion and hallucinations.       VITAL SIGNS: /62   Pulse 83   Temp 96.8 °F (36 °C)   Resp 18   Ht 162.6 cm (64\")   Wt 64.7 kg (142 lb 11.2 oz)   SpO2 98%   Breastfeeding No   BMI 24.49 kg/m²   Pain Score    05/09/22 0811   PainSc: 0-No pain       PHYSICAL EXAMINATION:     Physical Exam  Vitals reviewed.   Constitutional:       General: She is not in acute distress.  HENT:      Head: Normocephalic and atraumatic.   Eyes:      General: No scleral icterus.  Cardiovascular:      Rate and Rhythm: Normal rate.   Pulmonary:      Breath sounds: No wheezing or rales.   Abdominal:      General: Bowel sounds are normal.      Palpations: Abdomen is soft.      Tenderness: There is no abdominal tenderness.   Musculoskeletal:      Cervical back: Neck supple.      Comments: 2 + bilateral leg edema. No calf tenderness.    Skin:     General: Skin is warm and dry.      Coloration: Skin is pale.   Neurological:      Mental Status: She is alert and oriented to person, place, and time.   Psychiatric:         Mood and Affect: Mood normal.         Behavior: Behavior normal.         Thought Content: Thought content normal.         Judgment: Judgment normal.         LABS    Lab Results - Last 18 Months   Lab Units 05/09/22  0738 05/02/22  0833 04/29/22  0536 04/28/22  0709 04/27/22  0539 04/26/22  0646 04/25/22  1226 04/25/22  0630 04/23/22  0743 04/22/22  1322 04/19/22  0859 04/04/22  1034   HEMOGLOBIN g/dL 7.5* 8.3* 8.3* 9.5* 9.4* 6.8* 7.0* 6.6*   < > 7.4*   < > 7.0*   HEMATOCRIT % 24.5* 24.8* 26.1* 29.8* 29.4* " 21.7* 22.1* 20.9*   < > 23.3*   < > 22.7*   MCV fL 104.3* 99.2* 104.4* 108.4* 106.1* 110.2* 109.4* 108.9*   < > 108.4*   < > 112.9*   WBC 10*3/mm3 13.54* 16.02* 10.26 12.50* 15.25* 16.09* 15.41* 17.50*   < > 16.37*   < > 18.99*   RDW % 20.2* 18.6* 23.1* 25.7* 25.2* 21.7* 21.1* 21.0*   < > 21.0*   < > 22.4*   MPV fL 11.6 12.1* 11.9 11.7 11.5 11.2 11.3 10.9   < > 11.7   < > 11.2   PLATELETS 10*3/mm3 331 195 155 208 218 235 272 275   < > 315   < > 329   IMM GRAN % % 4.5* 1.7* 1.9*  --   --   --   --   --   --  4.3*  --   --    NEUTROS ABS 10*3/mm3 9.11* 12.27* 8.09* 9.06* 10.11* 12.03* 10.40* 12.01*   < > 11.13*   < > 14.29*   LYMPHS ABS 10*3/mm3 1.86 1.42 0.79 1.79  --   --  3.01 3.06   < > 2.78   < >  --    MONOS ABS 10*3/mm3 1.28* 1.43* 0.80 0.74  --   --  1.09* 1.36*   < > 1.16*   < >  --    EOS ABS 10*3/mm3 0.48* 0.53* 0.30 0.41* 0.78*  --  0.31 0.28   < > 0.45*   < > 0.19   BASOS ABS 10*3/mm3 0.20 0.10 0.08 0.10  --   --  0.10 0.08   < > 0.14   < > 0.19   IMMATURE GRANS (ABS) 10*3/mm3 0.61* 0.27* 0.20*  --   --   --   --   --   --  0.71*  --   --    NRBC /100 WBC 0.4* 0.2 1.3*  --  3.1* 3.0*  --   --   --  1.4*  --   --    NEUTROPHIL % %  --   --   --   --  64.3 70.7  --   --   --   --   --  74.2   MONOCYTES % %  --   --   --   --  9.2 9.1  --   --   --   --   --  8.2   BASOPHIL % %  --   --   --   --   --   --   --   --   --   --   --  1.0   ANISOCYTOSIS   --   --   --   --  Mod/2+ Mod/2+  --   --   --   --   --  Mod/2+    < > = values in this interval not displayed.       Lab Results - Last 18 Months   Lab Units 05/09/22  0738 05/02/22  0833 04/29/22  0536 04/28/22  0709 04/27/22  0539 04/26/22  0646   GLUCOSE mg/dL 232* 216* 199* 134* 160* 115*   SODIUM mmol/L 133* 136 129* 135* 135* 132*   POTASSIUM mmol/L 4.2 3.9 3.8 3.1* 3.8 3.4*   CO2 mmol/L 24.0 23.0 23.0 27.0 27.0 26.0   CHLORIDE mmol/L 99 102 94* 96* 98 98   ANION GAP mmol/L 10.0 11.0 12.0 12.0 10.0 8.0   CREATININE mg/dL 0.84 0.69 0.74 0.56* 0.54*  0.56*   BUN mg/dL 11 12 11 7* 9 11   BUN / CREAT RATIO  13.1 17.4 14.9 12.5 16.7 19.6   CALCIUM mg/dL 8.6 8.5* 8.5* 8.9 8.5* 8.4*   ALK PHOS U/L 81 74 65 74 70 61   TOTAL PROTEIN g/dL 6.6 6.6 6.2 7.1 7.1 6.2   ALT (SGPT) U/L 22 18 13 14 15 12   AST (SGOT) U/L 40* 45* 33* 31 36* 39*   BILIRUBIN mg/dL 1.7* 1.8* 1.8* 2.1* 2.0* 2.0*   ALBUMIN g/dL 3.80 3.70 3.30* 3.60 3.90 3.60   GLOBULIN gm/dL 2.8 2.9 2.9 3.5 3.2 2.6       Lab Results - Last 18 Months   Lab Units 05/09/22  0738 04/29/22  0536 04/28/22  0709 04/27/22  0539 04/26/22  0646 04/25/22  0630 04/24/22  0819 04/23/22  0743 04/22/22  1322 04/19/22  0859   URIC ACID mg/dL  --  1.9* 1.6* 1.5* 2.1* 3.7 6.4*   < > 5.8*  --    LDH U/L 639* 570* 650*  638* 668* 634* 593* 652*   < > 772* 834*   REFERENCE LAB REPORT   --   --   --   --   --   --   --   --  See Attached Report SEE ATTACHED REPORT    < > = values in this interval not displayed.       Lab Results - Last 18 Months   Lab Units 04/22/22  1542 04/19/22  0859 04/04/22  1219 04/04/22  1035 03/31/22  1226 08/17/21  0932 01/07/21  1243   IRON mcg/dL  --   --   --  44 129  --   --    TIBC mcg/dL  --   --   --  261* 310  --   --    IRON SATURATION %  --   --   --  17* 42  --   --    FERRITIN ng/mL  --   --   --  311.90* 454.60*  --   --    TSH uIU/mL  --  1.960  --  2.100  --  1.302 1.857   FOLATE ng/mL 13.70 12.60 14.20  --   --   --   --        Giana Yepez reports a pain score of 0.       ASSESSMENT:  1.  Autoimmune hemolytic anemia.  Treatment status: Post IVIG x2 doses on 04/27/2022.  On prednisone 1 mg/kg and on rituximab 375 mgm2 from 04/20/2022 through present.  2.  Borderline enlarged mediastinal nodes on 04/23/2022.  3.  9 mm hypodense well-circumscribed asymmetry at the level of the upper left vallecula and base of the tongue on the left on 04/23/2022.  4.  LE autoantibody.       PLAN:  1.   Re: The reason for the follow-up.  2.   Re: Poor tolerance to rituximab.  3.   Re: Heme  status.  WBC 13.5, hemoglobin 7.5, .3, and platelet 331.  Reticulocyte count 21.4 from 16.04.  4.   Re: CMP.  Total bilirubin 1.7 from 1.8 and glucose 232.  5.   regarding haptoglobin pending and .  6.  Blood for CBC with differential, CMP, haptoglobin, LDH and reticulocyte count today.  7.  eRx prednisone 60 mg p.o. daily taken with food or milk #60 with 2 refills if needed. Observe for gastrointestinal irritation or bleed.   8.  eRx folic acid 1 mg p.o. daily #90 with 3 refills if needed.  9.  Hold rituximab 375 mg/m2 on 05/09/2022 due to poor tolerance and until seen at [facility].  10.  Premed:  Tylenol 500 mg p.o.  Benadryl 25 mg IV  Pepcid 20 mg IV  11.  CBC with differential, CMP, reticulocyte count, LDH and haptoglobin weekly.  12.  Refer to [facility] for second opinion, autoimmune hemolytic anemia.  13.  Continue care per primary care physician.  14.  Plan of care discussed with patient and sister.  Understanding expressed.  Patient agreeable to proceed.  15.  Transfuse 1 unit packed RBC if hemoglobin less than 6.9.  Premed Tylenol 500 mg p.o.  Lasix 20 mg IV push after transfusion.  Monitor for transfusion reactions.  16.  Advance Care Planning   ACP discussion was declined by the patient. Patient does not have an advance directive, information provided.   17.  Return to office in 2 weeks.      I have reviewed the assessment and plan and verified the accuracy of it. No changes to assessment and plan since the information was documented. Filemon Elizalde MD 05/09/22       I spent 46 total minutes, face-to-face, caring for Giana today.  Greater than 50% of this time involved counseling and/or coordination of care as documented within this note regarding the patient's illness(es), pros and cons of various treatment options, instructions and/or risk reduction.        Jeimy Villatoro DO  (Dominick Fernandez MD [facility])

## 2022-05-06 ENCOUNTER — READMISSION MANAGEMENT (OUTPATIENT)
Dept: CALL CENTER | Facility: HOSPITAL | Age: 67
End: 2022-05-06

## 2022-05-06 NOTE — OUTREACH NOTE
Medical Week 1 Survey    Flowsheet Row Responses   [facility] patient discharged from? [place]   Does the patient have one of the following disease processes/diagnoses(primary or secondary)? Other   Week 1 attempt successful? Yes   Call start time 1523   Call end time 1527   General alerts for this patient Please call patient only for updates-per request of spouse.   Discharge diagnosis Autoimmune hemolytic anemia     Meds reviewed with patient/caregiver? Yes   Is the patient having any side effects they believe may be caused by any medication additions or changes? No   Does the patient have all medications ordered at discharge? Yes   Is the patient taking all medications as directed (includes completed medication regime)? Yes   Does the patient have a primary care provider?  Yes   Does the patient have an appointment with their PCP within 7 days of discharge? No   Comments regarding PCP Will see ONC on Monday   What is preventing the patient from scheduling follow up appointments within 7 days of discharge? Haven't had time   Comments States she went to Cardiology with her  and will reschedule with PCP on MOnday   Psychosocial issues? No   Did the patient receive a copy of their discharge instructions? Yes   Nursing interventions Reviewed instructions with patient   What is the patient's perception of their health status since discharge? Improving   Is the patient/caregiver able to teach back signs and symptoms related to disease process for when to call PCP? Yes   Is the patient/caregiver able to teach back signs and symptoms related to disease process for when to call 911? Yes   Is the patient/caregiver able to teach back the hierarchy of who to call/visit for symptoms/problems? PCP, Specialist, Home health nurse, Urgent Care, ED, 911 Yes   If the patient is a current smoker, are they able to teach back resources for cessation? Smoking cessation medications   Additional teach back comments Another  infusion on Monday   Week 1 call completed? Yes   Wrap up additional comments Pt states she is improving. Missed her PCP appment because she wanted to go to her 's Cardiology apptment with him. Will reschedule with PCP on Monday          MARIAM HINDS - Registered Nurse

## 2022-05-09 ENCOUNTER — OFFICE VISIT (OUTPATIENT)
Dept: ONCOLOGY | Facility: CLINIC | Age: 67
End: 2022-05-09

## 2022-05-09 ENCOUNTER — INFUSION (OUTPATIENT)
Dept: ONCOLOGY | Facility: HOSPITAL | Age: 67
End: 2022-05-09

## 2022-05-09 ENCOUNTER — LAB (OUTPATIENT)
Dept: LAB | Facility: HOSPITAL | Age: 67
End: 2022-05-09

## 2022-05-09 VITALS
OXYGEN SATURATION: 100 % | WEIGHT: 141 LBS | HEART RATE: 85 BPM | SYSTOLIC BLOOD PRESSURE: 133 MMHG | DIASTOLIC BLOOD PRESSURE: 56 MMHG | RESPIRATION RATE: 17 BRPM | HEIGHT: 64 IN | TEMPERATURE: 97.3 F | BODY MASS INDEX: 24.07 KG/M2

## 2022-05-09 VITALS
HEIGHT: 64 IN | HEART RATE: 83 BPM | DIASTOLIC BLOOD PRESSURE: 62 MMHG | OXYGEN SATURATION: 98 % | BODY MASS INDEX: 24.36 KG/M2 | TEMPERATURE: 96.8 F | WEIGHT: 142.7 LBS | SYSTOLIC BLOOD PRESSURE: 144 MMHG | RESPIRATION RATE: 18 BRPM

## 2022-05-09 DIAGNOSIS — D59.10 AUTOIMMUNE HEMOLYTIC ANEMIA: Primary | ICD-10-CM

## 2022-05-09 DIAGNOSIS — E87.1 HYPONATREMIA: ICD-10-CM

## 2022-05-09 LAB
ALBUMIN SERPL-MCNC: 3.8 G/DL (ref 3.5–5.2)
ALBUMIN/GLOB SERPL: 1.4 G/DL
ALP SERPL-CCNC: 81 U/L (ref 39–117)
ALT SERPL W P-5'-P-CCNC: 22 U/L (ref 1–33)
ANION GAP SERPL CALCULATED.3IONS-SCNC: 10 MMOL/L (ref 5–15)
AST SERPL-CCNC: 40 U/L (ref 1–32)
BASOPHILS # BLD AUTO: 0.2 10*3/MM3 (ref 0–0.2)
BASOPHILS NFR BLD AUTO: 1.5 % (ref 0–1.5)
BILIRUB SERPL-MCNC: 1.7 MG/DL (ref 0–1.2)
BUN SERPL-MCNC: 11 MG/DL (ref 8–23)
BUN/CREAT SERPL: 13.1 (ref 7–25)
CALCIUM SPEC-SCNC: 8.6 MG/DL (ref 8.6–10.5)
CHLORIDE SERPL-SCNC: 99 MMOL/L (ref 98–107)
CO2 SERPL-SCNC: 24 MMOL/L (ref 22–29)
CREAT SERPL-MCNC: 0.84 MG/DL (ref 0.57–1)
DEPRECATED RDW RBC AUTO: 73.2 FL (ref 37–54)
EGFRCR SERPLBLD CKD-EPI 2021: 76.8 ML/MIN/1.73
EOSINOPHIL # BLD AUTO: 0.48 10*3/MM3 (ref 0–0.4)
EOSINOPHIL NFR BLD AUTO: 3.5 % (ref 0.3–6.2)
ERYTHROCYTE [DISTWIDTH] IN BLOOD BY AUTOMATED COUNT: 20.2 % (ref 12.3–15.4)
GLOBULIN UR ELPH-MCNC: 2.8 GM/DL
GLUCOSE SERPL-MCNC: 232 MG/DL (ref 65–99)
HAPTOGLOB SERPL-MCNC: <10 MG/DL (ref 30–200)
HCT VFR BLD AUTO: 24.5 % (ref 34–46.6)
HGB BLD-MCNC: 7.5 G/DL (ref 12–15.9)
IMM GRANULOCYTES # BLD AUTO: 0.61 10*3/MM3 (ref 0–0.05)
IMM GRANULOCYTES NFR BLD AUTO: 4.5 % (ref 0–0.5)
LDH SERPL-CCNC: 639 U/L (ref 135–214)
LYMPHOCYTES # BLD AUTO: 1.86 10*3/MM3 (ref 0.7–3.1)
LYMPHOCYTES NFR BLD AUTO: 13.7 % (ref 19.6–45.3)
MCH RBC QN AUTO: 31.9 PG (ref 26.6–33)
MCHC RBC AUTO-ENTMCNC: 30.6 G/DL (ref 31.5–35.7)
MCV RBC AUTO: 104.3 FL (ref 79–97)
MONOCYTES # BLD AUTO: 1.28 10*3/MM3 (ref 0.1–0.9)
MONOCYTES NFR BLD AUTO: 9.5 % (ref 5–12)
NEUTROPHILS NFR BLD AUTO: 67.3 % (ref 42.7–76)
NEUTROPHILS NFR BLD AUTO: 9.11 10*3/MM3 (ref 1.7–7)
NRBC BLD AUTO-RTO: 0.4 /100 WBC (ref 0–0.2)
PLATELET # BLD AUTO: 331 10*3/MM3 (ref 140–450)
PMV BLD AUTO: 11.6 FL (ref 6–12)
POTASSIUM SERPL-SCNC: 4.2 MMOL/L (ref 3.5–5.2)
PROT SERPL-MCNC: 6.6 G/DL (ref 6–8.5)
RBC # BLD AUTO: 2.35 10*6/MM3 (ref 3.77–5.28)
RETICS # AUTO: 0.54 10*6/MM3 (ref 0.02–0.13)
RETICS/RBC NFR AUTO: 21.43 % (ref 0.7–1.9)
SODIUM SERPL-SCNC: 133 MMOL/L (ref 136–145)
WBC NRBC COR # BLD: 13.54 10*3/MM3 (ref 3.4–10.8)

## 2022-05-09 PROCEDURE — 83615 LACTATE (LD) (LDH) ENZYME: CPT | Performed by: INTERNAL MEDICINE

## 2022-05-09 PROCEDURE — 80053 COMPREHEN METABOLIC PANEL: CPT | Performed by: INTERNAL MEDICINE

## 2022-05-09 PROCEDURE — 99215 OFFICE O/P EST HI 40 MIN: CPT | Performed by: INTERNAL MEDICINE

## 2022-05-09 PROCEDURE — 85025 COMPLETE CBC W/AUTO DIFF WBC: CPT | Performed by: INTERNAL MEDICINE

## 2022-05-09 PROCEDURE — 36415 COLL VENOUS BLD VENIPUNCTURE: CPT

## 2022-05-09 PROCEDURE — 83010 ASSAY OF HAPTOGLOBIN QUANT: CPT | Performed by: INTERNAL MEDICINE

## 2022-05-09 PROCEDURE — G0463 HOSPITAL OUTPT CLINIC VISIT: HCPCS

## 2022-05-09 PROCEDURE — 85045 AUTOMATED RETICULOCYTE COUNT: CPT | Performed by: INTERNAL MEDICINE

## 2022-05-09 RX ORDER — INSULIN LISPRO 100 [IU]/ML
25-40 INJECTION, SOLUTION INTRAVENOUS; SUBCUTANEOUS DAILY
COMMUNITY
Start: 2022-02-28 | End: 2023-03-01

## 2022-05-09 RX ORDER — FLUTICASONE PROPIONATE 50 MCG
SPRAY, SUSPENSION (ML) NASAL
COMMUNITY
End: 2022-05-17

## 2022-05-09 RX ORDER — PREDNISONE 20 MG/1
TABLET ORAL
Qty: 90 TABLET | Refills: 2 | Status: SHIPPED | OUTPATIENT
Start: 2022-05-09 | End: 2022-09-15

## 2022-05-09 NOTE — PROGRESS NOTES
0749 - s/w Nancy CMA in Dr. Elizalde's office. Explained what had happened while patient was in hospital receiving Rituxan. Nancy will speak with Dr. Elizalde re: treatment. - CHASIDY Balderas RN    0755 - Per Nancy hold Rituxan and patient is to go to office and see Dr. Elizalde now - CHASIDY Balderas RN

## 2022-05-11 ENCOUNTER — TELEPHONE (OUTPATIENT)
Dept: ONCOLOGY | Facility: CLINIC | Age: 67
End: 2022-05-11

## 2022-05-11 PROBLEM — I95.1 ORTHOSTATIC HYPOTENSION: Status: ACTIVE | Noted: 2022-05-11

## 2022-05-11 NOTE — TELEPHONE ENCOUNTER
Received call from patient Giana Yepez she calls to report that she continues to have issues with low b/p readings since she received her treatment of Rituxan on 4/25/22.   Todays b/p: 112/49  Yesterdays b/p: 89/48    Patient asked if she has contacted her PCP regarding low b/p readings and said no that she felt this is something Dr Elizalde should address since the low b/p readings began after her txt

## 2022-05-11 NOTE — TELEPHONE ENCOUNTER
Called patient and message was left regarding scheduling her for 1 liter NS x 2 hours tomorrow Thursday 5/12/22 @ 12:30, if questions to please call

## 2022-05-12 ENCOUNTER — INFUSION (OUTPATIENT)
Dept: ONCOLOGY | Facility: HOSPITAL | Age: 67
End: 2022-05-12

## 2022-05-12 VITALS
HEIGHT: 64 IN | OXYGEN SATURATION: 97 % | DIASTOLIC BLOOD PRESSURE: 67 MMHG | WEIGHT: 143 LBS | HEART RATE: 97 BPM | TEMPERATURE: 97.9 F | BODY MASS INDEX: 24.41 KG/M2 | RESPIRATION RATE: 18 BRPM | SYSTOLIC BLOOD PRESSURE: 145 MMHG

## 2022-05-12 DIAGNOSIS — I95.1 ORTHOSTATIC HYPOTENSION: Primary | ICD-10-CM

## 2022-05-12 PROCEDURE — G0463 HOSPITAL OUTPT CLINIC VISIT: HCPCS

## 2022-05-12 NOTE — PROGRESS NOTES
[facility] HEMATOLOGY & ONCOLOGY  [address]  [facility] [address] [phone]    Patient Name: Giana Yepez  Encounter Date: 05/23/2022  YOB: 1955  Patient Number: [id number]      REASON FOR FOLLOW-UP: Giana Yepez is a pleasant 67 y.o.  female who is seen on follow-up for autoimmune hemolytic anemia.  She had a dose of rituximab on 04/28/2022.  Plan for 4 weekly treatments, however she is intolerant.  She is seen sister, Kellee.  She is a reliable historian.        DIAGNOSTIC ABNORMALITIES:  She presented with worsening fatigue and weight loss approximately 10 pounds in the last 2 months.  WBC 18.2, ANC 12.4, hemoglobin 7.6, hematocrit 24.1, , and platelet 348, folate 12.6, B12 at 1988, TSH 1.96, erythropoietin 61.5, sed rate 3, , haptoglobin less than 10, CMP remarkable for AST of 33 and bilirubin 2.3, IgG 1035, and negative BCR ABL on 04/19/2022.  Reticulocyte 25.69% and Rona test was positive on 04/22/2022.  Negative hepatitis B core antibody, negative HIV, negative LIZ panel and blood for flow cytometry 04/22/2022, negative Bcl-2, BCL6 and MYC.  CT neck, chest, abdomen and pelvis on 04/23/2022.9 mm relatively hypodense well-circumscribed asymmetry at the level of the upper left vallecula and base of the tongue on the left. No associated enhancement of the wall or intraluminal air making an infectious process less likely. This measures Hounsfield units of 36 suggesting it may be soft tissue in nature. Direct visualization is recommended.  No pathologically enlarged cervical chain or posterior triangle lymphadenopathy is present. There are some borderline enlarged left supraclavicular nodes with measurements as above. No additional discrete mass lesions are appreciated. Level of the true and false cords is unremarkable. The thyroid gland is homogeneous in density without evidence of nodularity or  "mass.  Borderline enlarged mediastinal nodes are present as well as a few left supraclavicular nodes. No additional enlarged nodes are identified.  Right middle lobe atelectasis or scarring is present. There is mild patchy pneumonitis within the superior segment of both lower lobes and apical posterior segment of the left upper lobe suggesting a patchy pneumonitis/bronchopneumonia. Lungs are otherwise clear. No evidence of lobar consolidation. No discrete endobronchial lesion is identified.  The heart is normal in size. The thoracic aorta and great vessels are normal in caliber.  No evidence of intraperitoneal or retroperitoneal lymphadenopathy.  Homogeneous enhancement of the kidneys. No evidence of nephrolithiasis or obstructive uropathy.  Constipation with increased stool throughout the colon. No evidence of mechanical obstruction or free air.  The uterus and adnexa are unremarkable.  Bone marrow biopsy 04/25/2022.  Hypercellular marrow for age 70 to 80% with maturing trilineage hematopoiesis.  Erythroid hyperplasia.  Slight megakaryocyte ptosis.  Stainable iron is present.  No evidence of acute leukemia, lymphoproliferative disorder or plasma cell dyscrasia.  No morphologic evidence of an overt or advanced myeloid neoplasm.  Cytogenetics 46 XX. Flow cytometry showed no evidence for abnormal myeloid maturation or an increased blast population.  No evidence for a limp or proliferative disorder.  Antibody identification, autoantibody LE specificity on 04/26/2022.        PREVIOUS INTERVENTIONS:  1 unit packed RBC on 04/06/2022 and 04/26/2022.  Prednisone 60 mg on 04/22/2022 through 05/02/2022. Resume 05/09/2022 through present.   IVIG on 04/26/2020 04/27/2022.  Folic acid 1 mg p.o. daily from 04/30/2022 through present.  Rituximab 04/28/2022, intolerant. \"My mouth felt like I ate a mouthful of hot peepers, blisters roof of mouth, nose burning, sneezing like smelt burned peppers, and my eyes were pouring. All within " "45 minutes of infusion.  Low blood pressure the next 4 days.\"           Problem List Items Addressed This Visit    None       Oncology/Hematology History    No history exists.       PAST MEDICAL HISTORY:  ALLERGIES:  Allergies   Allergen Reactions   • Corn-Containing Products Diarrhea   • Eggs Or Egg-Derived Products Diarrhea   • Nuts Shortness Of Breath     peanuts   • Milk-Related Compounds Nausea And Vomiting   • Gabapentin Dizziness     Patient states becomes weak and dizzy if she takes Gabapentin   • Levemir [Insulin Detemir] Itching and Other (See Comments)     Patient states gets a \"hard knot and itching\" at injection site.   • Wheat Rash     CURRENT MEDICATIONS:  Outpatient Encounter Medications as of 5/23/2022   Medication Sig Dispense Refill   • allopurinol (ZYLOPRIM) 100 MG tablet Take 1 tablet by mouth Daily. 30 tablet 1   • Azelastine-Fluticasone 137-50 MCG/ACT suspension into each nostril.     • Calcium Citrate (CITRACAL PO) Take  by mouth.     • cholecalciferol (VITAMIN D3) 25 MCG (1000 UT) tablet Take 2,000 Units by mouth Daily.     • Continuous Blood Gluc  (Dexcom G5  Kit) device Continuous.     • cyanocobalamin (VITAMIN B-12) 1000 MCG tablet Take  by mouth Daily.     • ferrous sulfate 325 (65 FE) MG tablet Take 325 mg by mouth Daily With Breakfast.     • folic acid (FOLVITE) 1 MG tablet Take 1 tablet by mouth Daily. 30 tablet 2   • Insulin Infusion Pump device Continuous.     • Insulin Lispro (humaLOG) 100 UNIT/ML injection Inject 25-40 Units under the skin into the appropriate area as directed Daily.     • omeprazole (priLOSEC) 40 MG capsule Take 40 mg by mouth Daily.     • predniSONE (DELTASONE) 20 MG tablet Take 3 tabs (60 mg total dose) daily with food 90 tablet 2   • rosuvastatin (CRESTOR) 20 MG tablet Take 20 mg by mouth Daily.     • vitamin B-12 (CYANOCOBALAMIN) 1000 MCG tablet Take 1,000 mcg by mouth Daily.     • [DISCONTINUED] calcium carbonate (OS-ZUNILDA) 600 MG tablet Take " 600 mg by mouth Daily.     • [DISCONTINUED] fluticasone (FLONASE) 50 MCG/ACT nasal spray into the nostril(s) as directed by provider.       No facility-administered encounter medications on file as of 2022.     ADULT ILLNESSES:  Patient Active Problem List   Diagnosis Code   • Gastroesophageal reflux disease K21.9   • Type 1 diabetes mellitus with hyperglycemia (HCC), insulin depdendent E10.65   • Intermittent chest pain R07.9   • Hypercholesterolemia E78.00   • Vitamin D deficiency E55.9   • Vitamin B12 deficiency E53.8   • Diarrhea R19.7   • Abdominal cramping R10.9   • Nausea R11.0   • Collagenous colitis K52.831   • Autoimmune hemolytic anemia (HCC) D59.10   • Insulin pump in place Z96.41   • Hyperbilirubinemia E80.6   • Mediastinal lymphadenopathy R59.0   • Tobacco abuse Z72.0   • Hyponatremia E87.1   • Orthostatic hypotension I95.1     SURGERIES:  Past Surgical History:   Procedure Laterality Date   • ANKLE SURGERY Right    • APPENDECTOMY     • BREAST BIOPSY     •  SECTION      X 2   • CHOLECYSTECTOMY     • COLONOSCOPY      [facility]   • COLONOSCOPY N/A 2017    Procedure: COLONOSCOPY WITH ANESTHESIA;  Surgeon: Ondina Abdul MD;  Location: [facility] ENDOSCOPY;  Service:    • ENDOSCOPY N/A 2017    Procedure: ESOPHAGOGASTRODUODENOSCOPY WITH ANESTHESIA;  Surgeon: Ondina Abdul MD;  Location: [facility] ENDOSCOPY;  Service:    • EYE SURGERY  2019    cataract   • SKIN CANCER EXCISION  2016    FACE   • TONSILLECTOMY       HEALTH MAINTENANCE ITEMS:  Health Maintenance Due   Topic Date Due   • COVID-19 Vaccine (1) Never done   • Pneumococcal Vaccine 65+ (1 - PCV) Never done   • TDAP/TD VACCINES (1 - Tdap) Never done   • ZOSTER VACCINE (1 of 2) Never done   • ANNUAL WELLNESS VISIT  Never done   • DIABETIC FOOT EXAM  Never done   • PAP SMEAR  Never done   • DIABETIC EYE EXAM  Never done   • DXA SCAN  2018   • MAMMOGRAM  2019       <no information>  Last Completed  Colonoscopy          COLORECTAL CANCER SCREENING (COLONOSCOPY - Every 10 Years) Next due on 3/3/2027    2017  Surgical Procedure: COLONOSCOPY    2017  COLONOSCOPY    2017  Occult Blood X 3, Stool                There is no immunization history on file for this patient.  Last Completed Mammogram     This patient has no relevant Health Maintenance data.            FAMILY HISTORY:  Family History   Problem Relation Age of Onset   • Asthma Mother    • Heart disease Mother    • COPD Mother    • Heart disease Father    • Early death Father         heart attack @ 60   • Heart disease Sister    • Hypertension Sister    • COPD Sister    • Diabetes Sister         Type 2   • Diabetes Sister    • Other Sister    • Hearing loss Sister         born deaf   • Heart disease Brother    • Breast cancer Paternal Aunt    • No Known Problems Son    • No Known Problems Son    • Hypertension Maternal Grandfather    • Stroke Maternal Grandfather    • Diabetes Maternal Grandmother    • Vision loss Maternal Grandmother         from diabetes   • Heart disease Paternal Grandfather    • Hypertension Paternal Grandfather    • Cancer Paternal Aunt    • Hearing loss Brother         wears hearing aids   • Heart disease Brother    • Hypertension Brother    • Heart disease Sister    • Hypertension Sister      SOCIAL HISTORY:  Social History     Socioeconomic History   • Marital status:    Tobacco Use   • Smoking status: Former Smoker     Packs/day: 0.25     Years: 40.00     Pack years: 10.00     Types: Cigarettes     Start date: 1975     Quit date: 2022     Years since quittin.0   • Smokeless tobacco: Never Used   • Tobacco comment: quit during two pregnancies but went back to smoking after delivery   Vaping Use   • Vaping Use: Never used   Substance and Sexual Activity   • Alcohol use: Not Currently     Alcohol/week: 12.0 standard drinks     Types: 12 Cans of beer per week     Comment: drink at early 20's   •  "Drug use: No   • Sexual activity: Not Currently     Partners: Male     Birth control/protection: None     Comment: went through menopause over 10 years go       REVIEW OF SYSTEMS:    Review of Systems   Constitutional: Positive for fatigue. Negative for chills and fever.        \"Nervous to see you. I am afraid that I may need blood.\"   HENT: Negative for congestion, mouth sores and trouble swallowing.    Eyes: Negative for redness and visual disturbance.   Respiratory: Negative for cough and shortness of breath.    Cardiovascular: Negative for chest pain and palpitations.   Gastrointestinal: Negative for abdominal pain, nausea and vomiting.   Endocrine: Negative for polydipsia and polyphagia.   Genitourinary: Negative for difficulty urinating, dysuria and flank pain.   Musculoskeletal: Negative for gait problem and joint swelling.   Skin: Positive for pallor.   Allergic/Immunologic: Negative for food allergies.   Neurological: Negative for speech difficulty, weakness and confusion.   Hematological: Negative for adenopathy. Does not bruise/bleed easily.   Psychiatric/Behavioral: Negative for agitation. The patient is nervous/anxious.          VITAL SIGNS: /88   Pulse 78   Temp 97.8 °F (36.6 °C)   Resp 18   Ht 162.6 cm (64\")   Wt 66 kg (145 lb 8 oz)   SpO2 98%   Breastfeeding No   BMI 24.98 kg/m²  Body surface area is 1.71 meters squared.   Pain Score    05/23/22 0805   PainSc: 0-No pain           PHYSICAL EXAMINATION:     Physical Exam  Vitals reviewed.   Constitutional:       General: She is not in acute distress.  HENT:      Head: Normocephalic and atraumatic.   Eyes:      General: No scleral icterus.  Cardiovascular:      Rate and Rhythm: Normal rate.   Pulmonary:      Effort: No respiratory distress.      Breath sounds: No wheezing or rales.   Abdominal:      General: Bowel sounds are normal.      Palpations: Abdomen is soft.      Tenderness: There is no abdominal tenderness.   Musculoskeletal:    "      General: No swelling.      Cervical back: Neck supple.   Skin:     Coloration: Skin is pale.   Neurological:      Mental Status: She is alert and oriented to person, place, and time.   Psychiatric:         Thought Content: Thought content normal.         Judgment: Judgment normal.      Comments: She is anxious.         LABS    Lab Results - Last 18 Months   Lab Units 05/23/22  0711 05/16/22  0729 05/09/22  0738 05/02/22  0833 04/29/22  0536 04/28/22  0709 04/27/22  0539 04/26/22  0646 04/23/22  0743 04/22/22  1322 04/19/22  0859 04/04/22  1034   HEMOGLOBIN g/dL 9.6* 8.5* 7.5* 8.3* 8.3* 9.5* 9.4* 6.8*   < > 7.4*   < > 7.0*   HEMATOCRIT % 29.9* 28.2* 24.5* 24.8* 26.1* 29.8* 29.4* 21.7*   < > 23.3*   < > 22.7*   MCV fL 103.8* 108.0* 104.3* 99.2* 104.4* 108.4* 106.1* 110.2*   < > 108.4*   < > 112.9*   WBC 10*3/mm3 13.73* 14.03* 13.54* 16.02* 10.26 12.50* 15.25* 16.09*   < > 16.37*   < > 18.99*   RDW % 19.8* 23.0* 20.2* 18.6* 23.1* 25.7* 25.2* 21.7*   < > 21.0*   < > 22.4*   MPV fL 11.6 11.1 11.6 12.1* 11.9 11.7 11.5 11.2   < > 11.7   < > 11.2   PLATELETS 10*3/mm3 274 371 331 195 155 208 218 235   < > 315   < > 329   IMM GRAN % % 1.5*  --  4.5* 1.7* 1.9*  --   --   --   --  4.3*  --   --    NEUTROS ABS 10*3/mm3 11.56* 10.60* 9.11* 12.27* 8.09* 9.06* 10.11* 12.03*   < > 11.13*   < > 14.29*   LYMPHS ABS 10*3/mm3 0.84 1.82 1.86 1.42 0.79 1.79  --   --    < > 2.78   < >  --    MONOS ABS 10*3/mm3 0.87 1.05* 1.28* 1.43* 0.80 0.74  --   --    < > 1.16*   < >  --    EOS ABS 10*3/mm3 0.13 0.20 0.48* 0.53* 0.30 0.41* 0.78*  --    < > 0.45*   < > 0.19   BASOS ABS 10*3/mm3 0.12 0.13 0.20 0.10 0.08 0.10  --   --    < > 0.14   < > 0.19   IMMATURE GRANS (ABS) 10*3/mm3 0.21*  --  0.61* 0.27* 0.20*  --   --   --   --  0.71*  --   --    NRBC /100 WBC 0.0  --  0.4* 0.2 1.3*  --  3.1* 3.0*  --  1.4*  --   --    NEUTROPHIL % %  --   --   --   --   --   --  64.3 70.7  --   --   --  74.2   MONOCYTES % %  --   --   --   --   --   --  9.2  9.1  --   --   --  8.2   BASOPHIL % %  --   --   --   --   --   --   --   --   --   --   --  1.0   ANISOCYTOSIS   --   --   --   --   --   --  Mod/2+ Mod/2+  --   --   --  Mod/2+    < > = values in this interval not displayed.       Lab Results - Last 18 Months   Lab Units 05/23/22  0711 05/16/22  0729 05/09/22  0738 05/02/22  0833 04/29/22  0536 04/28/22  0709   GLUCOSE mg/dL 523* 156* 232* 216* 199* 134*   SODIUM mmol/L 132* 137 133* 136 129* 135*   POTASSIUM mmol/L 4.2 3.9 4.2 3.9 3.8 3.1*   CO2 mmol/L 25.0 25.0 24.0 23.0 23.0 27.0   CHLORIDE mmol/L 97* 102 99 102 94* 96*   ANION GAP mmol/L 10.0 10.0 10.0 11.0 12.0 12.0   CREATININE mg/dL 0.91 0.85 0.84 0.69 0.74 0.56*   BUN mg/dL 13 10 11 12 11 7*   BUN / CREAT RATIO  14.3 11.8 13.1 17.4 14.9 12.5   CALCIUM mg/dL 8.8 8.4* 8.6 8.5* 8.5* 8.9   ALK PHOS U/L 63 65 81 74 65 74   TOTAL PROTEIN g/dL 6.5 6.6 6.6 6.6 6.2 7.1   ALT (SGPT) U/L 19 20 22 18 13 14   AST (SGOT) U/L 25 27 40* 45* 33* 31   BILIRUBIN mg/dL 1.7* 1.3* 1.7* 1.8* 1.8* 2.1*   ALBUMIN g/dL 4.30 3.80 3.80 3.70 3.30* 3.60   GLOBULIN gm/dL 2.2 2.8 2.8 2.9 2.9 3.5       Lab Results - Last 18 Months   Lab Units 05/23/22  0711 05/16/22  0729 05/09/22  0738 04/29/22  0536 04/28/22  0709 04/27/22  0539 04/26/22  0646 04/25/22  0630 04/24/22  0819 04/23/22  0743 04/22/22  1322 04/19/22  0859   URIC ACID mg/dL  --   --   --  1.9* 1.6* 1.5* 2.1* 3.7 6.4*   < > 5.8*  --    LDH U/L 398* 489* 639* 570* 650*  638* 668* 634* 593* 652*   < > 772* 834*   REFERENCE LAB REPORT   --   --   --   --   --   --   --   --   --   --  See Attached Report SEE ATTACHED REPORT    < > = values in this interval not displayed.       Lab Results - Last 18 Months   Lab Units 04/22/22  1542 04/19/22  0859 04/04/22  1219 04/04/22  1035 03/31/22  1226 08/17/21  0932 01/07/21  1243   IRON mcg/dL  --   --   --  44 129  --   --    TIBC mcg/dL  --   --   --  261* 310  --   --    IRON SATURATION %  --   --   --  17* 42  --   --    FERRITIN  "ng/mL  --   --   --  311.90* 454.60*  --   --    TSH uIU/mL  --  1.960  --  2.100  --  1.302 1.857   FOLATE ng/mL 13.70 12.60 14.20  --   --   --   --          Giana Yepez reports a pain score of 00.        ASSESSMENT:  1.  Autoimmune hemolytic anemia.  Treatment status: Post IVIG x2 doses on 04/27/2022.  On prednisone 1 mg/kg and was on rituximab 375 mgm2 from 04/28/2022.  Intolerant to rituximab.  2.  Borderline enlarged mediastinal nodes on 04/23/2022.  3.  9 mm hypodense well-circumscribed asymmetry at the level of the upper left vallecula and base of the tongue on the left on 04/23/2022.  4.  LE autoantibody.           PLAN:  1.   Re: Tolerance to prednisone. \"It's no problem, Just my sugar.\"  2.   Re:  Second opinion from [facility] pending for 07/2022.  Move appointment sooner if possible.  3.   Re: Heme status.  WBC 13.7, hemoglobin 9.6, .8, and platelet 274.  Reticulocyte count 13.2 from  21.4 from 16.04.  4.   Re: CMP.  Total bilirubin 1.7 from 1.3 from 1.7 from 1.8 and glucose 523.  \"I put more insulin.\"  5.   Re: Haptoglobin pending  from <10 and  from 639 from 570 from 650.   6.  Blood for CBC with differential, CMP, haptoglobin, LDH and reticulocyte count today.  7.  eRx prednisone 60 mg p.o. daily taken with food or milk #60 with 2 refills if needed. Observe for gastrointestinal irritation or bleed.   8.  eRx folic acid 1 mg p.o. daily #90 with 3 refills if needed.  9.  Hold rituximab 375 mg/m2 on 05/09/2022 due to poor tolerance and until seen at [facility].  10.  Premed:  Tylenol 500 mg p.o.  Benadryl 25 mg IV  Pepcid 20 mg IV  11.  CBC with differential, CMP, reticulocyte count, LDH and haptoglobin weekly.  12.  Continue care per primary care physician.  13.  Plan of care discussed with patient and sister.  Understanding expressed.  Patient agreeable to proceed.  14.  Transfuse 1 unit packed RBC if hemoglobin less than 6.9.  Premed Tylenol 500 mg " p.o.  Lasix 20 mg IV push after transfusion.  Monitor for transfusion reactions.  15.  Advance Care Planning   ACP discussion was declined by the patient. Patient does not have an advance directive, information provided.  16.  Return to office in 2 weeks.       I have reviewed the assessment and plan and verified the accuracy of it. No changes to assessment and plan since the information was documented. Filemon Elizalde MD 05/23/22        I spent 33 total minutes, face-to-face, caring for Giana today.  Greater than 50% of this time involved counseling and/or coordination of care as documented within this note regarding the patient's illness(es), pros and cons of various treatment options, instructions and/or risk reduction.        Camila Montes MD  (Dominick Fernandez MD [facility])

## 2022-05-12 NOTE — PROGRESS NOTES
Pt here for hydration fluids due to low blood pressure.  gary lower extremeties with pitting edema and tight.  /67 today on arrival.  S/w L:edwin Winkler LPN with Dr. Elizalde and orders received to hold fluids today and see as scheduled. Pt v/u. S. STEPHANIE Overton

## 2022-05-16 ENCOUNTER — LAB (OUTPATIENT)
Dept: LAB | Facility: HOSPITAL | Age: 67
End: 2022-05-16

## 2022-05-16 ENCOUNTER — INFUSION (OUTPATIENT)
Dept: ONCOLOGY | Facility: HOSPITAL | Age: 67
End: 2022-05-16

## 2022-05-16 VITALS
BODY MASS INDEX: 25.27 KG/M2 | WEIGHT: 148 LBS | SYSTOLIC BLOOD PRESSURE: 130 MMHG | TEMPERATURE: 97.1 F | HEART RATE: 81 BPM | RESPIRATION RATE: 16 BRPM | OXYGEN SATURATION: 100 % | DIASTOLIC BLOOD PRESSURE: 61 MMHG | HEIGHT: 64 IN

## 2022-05-16 DIAGNOSIS — D59.10 AUTOIMMUNE HEMOLYTIC ANEMIA: ICD-10-CM

## 2022-05-16 LAB
ALBUMIN SERPL-MCNC: 3.8 G/DL (ref 3.5–5.2)
ALBUMIN/GLOB SERPL: 1.4 G/DL
ALP SERPL-CCNC: 65 U/L (ref 39–117)
ALT SERPL W P-5'-P-CCNC: 20 U/L (ref 1–33)
ANION GAP SERPL CALCULATED.3IONS-SCNC: 10 MMOL/L (ref 5–15)
AST SERPL-CCNC: 27 U/L (ref 1–32)
BASOPHILS # BLD AUTO: 0.13 10*3/MM3 (ref 0–0.2)
BASOPHILS NFR BLD AUTO: 0.9 % (ref 0–1.5)
BILIRUB SERPL-MCNC: 1.3 MG/DL (ref 0–1.2)
BUN SERPL-MCNC: 10 MG/DL (ref 8–23)
BUN/CREAT SERPL: 11.8 (ref 7–25)
CALCIUM SPEC-SCNC: 8.4 MG/DL (ref 8.6–10.5)
CHLORIDE SERPL-SCNC: 102 MMOL/L (ref 98–107)
CO2 SERPL-SCNC: 25 MMOL/L (ref 22–29)
CREAT SERPL-MCNC: 0.85 MG/DL (ref 0.57–1)
DEPRECATED RDW RBC AUTO: 89 FL (ref 37–54)
EGFRCR SERPLBLD CKD-EPI 2021: 75.7 ML/MIN/1.73
EOSINOPHIL # BLD AUTO: 0.2 10*3/MM3 (ref 0–0.4)
EOSINOPHIL NFR BLD AUTO: 1.4 % (ref 0.3–6.2)
ERYTHROCYTE [DISTWIDTH] IN BLOOD BY AUTOMATED COUNT: 23 % (ref 12.3–15.4)
GLOBULIN UR ELPH-MCNC: 2.8 GM/DL
GLUCOSE SERPL-MCNC: 156 MG/DL (ref 65–99)
HAPTOGLOB SERPL-MCNC: <10 MG/DL (ref 30–200)
HBV SURFACE AG SERPL QL IA: NORMAL
HCT VFR BLD AUTO: 28.2 % (ref 34–46.6)
HGB BLD-MCNC: 8.5 G/DL (ref 12–15.9)
LDH SERPL-CCNC: 489 U/L (ref 135–214)
LYMPHOCYTES # BLD AUTO: 1.82 10*3/MM3 (ref 0.7–3.1)
LYMPHOCYTES NFR BLD AUTO: 13 % (ref 19.6–45.3)
MCH RBC QN AUTO: 32.6 PG (ref 26.6–33)
MCHC RBC AUTO-ENTMCNC: 30.1 G/DL (ref 31.5–35.7)
MCV RBC AUTO: 108 FL (ref 79–97)
MONOCYTES # BLD AUTO: 1.05 10*3/MM3 (ref 0.1–0.9)
MONOCYTES NFR BLD AUTO: 7.5 % (ref 5–12)
NEUTROPHILS NFR BLD AUTO: 10.6 10*3/MM3 (ref 1.7–7)
NEUTROPHILS NFR BLD AUTO: 75.6 % (ref 42.7–76)
PLATELET # BLD AUTO: 371 10*3/MM3 (ref 140–450)
PMV BLD AUTO: 11.1 FL (ref 6–12)
POTASSIUM SERPL-SCNC: 3.9 MMOL/L (ref 3.5–5.2)
PROT SERPL-MCNC: 6.6 G/DL (ref 6–8.5)
RBC # BLD AUTO: 2.61 10*6/MM3 (ref 3.77–5.28)
RETICS # AUTO: 0.56 10*6/MM3 (ref 0.02–0.13)
RETICS/RBC NFR AUTO: 21.34 % (ref 0.7–1.9)
SODIUM SERPL-SCNC: 137 MMOL/L (ref 136–145)
WBC NRBC COR # BLD: 14.03 10*3/MM3 (ref 3.4–10.8)

## 2022-05-16 PROCEDURE — 86706 HEP B SURFACE ANTIBODY: CPT

## 2022-05-16 PROCEDURE — 80053 COMPREHEN METABOLIC PANEL: CPT

## 2022-05-16 PROCEDURE — 85045 AUTOMATED RETICULOCYTE COUNT: CPT

## 2022-05-16 PROCEDURE — G0463 HOSPITAL OUTPT CLINIC VISIT: HCPCS

## 2022-05-16 PROCEDURE — 85025 COMPLETE CBC W/AUTO DIFF WBC: CPT

## 2022-05-16 PROCEDURE — 86704 HEP B CORE ANTIBODY TOTAL: CPT

## 2022-05-16 PROCEDURE — 83615 LACTATE (LD) (LDH) ENZYME: CPT

## 2022-05-16 PROCEDURE — 36415 COLL VENOUS BLD VENIPUNCTURE: CPT

## 2022-05-16 PROCEDURE — 87340 HEPATITIS B SURFACE AG IA: CPT

## 2022-05-16 PROCEDURE — 83010 ASSAY OF HAPTOGLOBIN QUANT: CPT

## 2022-05-17 ENCOUNTER — OFFICE VISIT (OUTPATIENT)
Dept: FAMILY MEDICINE CLINIC | Facility: CLINIC | Age: 67
End: 2022-05-17

## 2022-05-17 ENCOUNTER — READMISSION MANAGEMENT (OUTPATIENT)
Dept: CALL CENTER | Facility: HOSPITAL | Age: 67
End: 2022-05-17

## 2022-05-17 VITALS
HEART RATE: 74 BPM | DIASTOLIC BLOOD PRESSURE: 80 MMHG | OXYGEN SATURATION: 100 % | BODY MASS INDEX: 25.06 KG/M2 | TEMPERATURE: 98 F | SYSTOLIC BLOOD PRESSURE: 140 MMHG | WEIGHT: 146.8 LBS | HEIGHT: 64 IN

## 2022-05-17 DIAGNOSIS — R60.9 PERIPHERAL EDEMA: ICD-10-CM

## 2022-05-17 DIAGNOSIS — D59.10 AUTOIMMUNE HEMOLYTIC ANEMIA: Primary | ICD-10-CM

## 2022-05-17 DIAGNOSIS — I95.89 OTHER SPECIFIED HYPOTENSION: ICD-10-CM

## 2022-05-17 DIAGNOSIS — E10.65 TYPE 1 DIABETES MELLITUS WITH HYPERGLYCEMIA: ICD-10-CM

## 2022-05-17 DIAGNOSIS — F17.211 CIGARETTE NICOTINE DEPENDENCE IN REMISSION: ICD-10-CM

## 2022-05-17 LAB
HBV CORE AB SERPL QL IA: NEGATIVE
HBV SURFACE AB SER RIA-ACNC: REACTIVE
REF LAB TEST METHOD: NORMAL

## 2022-05-17 PROCEDURE — 99204 OFFICE O/P NEW MOD 45 MIN: CPT | Performed by: FAMILY MEDICINE

## 2022-05-17 NOTE — OUTREACH NOTE
Medical Week 2 Survey    Flowsheet Row Responses   [facility] patient discharged from? [place]   Does the patient have one of the following disease processes/diagnoses(primary or secondary)? Other   Week 2 attempt successful? Yes   Call start time 1259   General alerts for this patient Please call patient only for updates-per request of spouse.   Discharge diagnosis Autoimmune hemolytic anemia     Call end time 1301   Is patient permission given to speak with other caregiver? No   Meds reviewed with patient/caregiver? Yes   Is the patient taking all medications as directed (includes completed medication regime)? Yes   Has the patient kept scheduled appointments due by today? Yes   Psychosocial issues? No   Comments BP is low at home per pt 100/50's will go up if get up and moves about, MD aware. Still having fatigue. Planning on going to [facility] to inquire about cause of anemia.   What is the patient's perception of their health status since discharge? Improving   Is the patient/caregiver able to teach back signs and symptoms related to disease process for when to call PCP? Yes   Week 2 Call Completed? Yes   Is the patient interested in additional calls from an ambulatory ?  NOTE:  applies to high risk patients requiring additional follow-up. No   Revoked No further contact(revokes)-requires comment   Graduated/Revoked comments vincent RAYMUNDO - Registered Nurse

## 2022-05-18 ENCOUNTER — PATIENT ROUNDING (BHMG ONLY) (OUTPATIENT)
Dept: FAMILY MEDICINE CLINIC | Facility: CLINIC | Age: 67
End: 2022-05-18

## 2022-05-18 NOTE — PROGRESS NOTES
May 18, 2022    Hello, may I speak with Giana Yepez?    My name is Sudha    I am  with [facility]  [address] [place] [id number] [phone].    Before we get started may I verify your date of birth? 1955    I am calling to officially welcome you to our practice and ask about your recent visit. Is this a good time to talk? yes    Tell me about your visit with us. What things went well?  was wonderful       We're always looking for ways to make our patients' experiences even better. Do you have recommendations on ways we may improve?  no    Overall were you satisfied with your first visit to our practice? yes       I appreciate you taking the time to speak with me today. Is there anything else I can do for you? no      Thank you, and have a great day.

## 2022-05-23 ENCOUNTER — APPOINTMENT (OUTPATIENT)
Dept: ONCOLOGY | Facility: HOSPITAL | Age: 67
End: 2022-05-23

## 2022-05-23 ENCOUNTER — TELEPHONE (OUTPATIENT)
Dept: ONCOLOGY | Facility: CLINIC | Age: 67
End: 2022-05-23

## 2022-05-23 ENCOUNTER — OFFICE VISIT (OUTPATIENT)
Dept: ONCOLOGY | Facility: CLINIC | Age: 67
End: 2022-05-23

## 2022-05-23 ENCOUNTER — LAB (OUTPATIENT)
Dept: LAB | Facility: HOSPITAL | Age: 67
End: 2022-05-23

## 2022-05-23 VITALS
HEART RATE: 78 BPM | SYSTOLIC BLOOD PRESSURE: 162 MMHG | DIASTOLIC BLOOD PRESSURE: 88 MMHG | TEMPERATURE: 97.8 F | OXYGEN SATURATION: 98 % | HEIGHT: 64 IN | BODY MASS INDEX: 24.84 KG/M2 | RESPIRATION RATE: 18 BRPM | WEIGHT: 145.5 LBS

## 2022-05-23 DIAGNOSIS — D59.10 AUTOIMMUNE HEMOLYTIC ANEMIA: Primary | ICD-10-CM

## 2022-05-23 LAB
ALBUMIN SERPL-MCNC: 4.3 G/DL (ref 3.5–5.2)
ALBUMIN/GLOB SERPL: 2 G/DL
ALP SERPL-CCNC: 63 U/L (ref 39–117)
ALT SERPL W P-5'-P-CCNC: 19 U/L (ref 1–33)
ANION GAP SERPL CALCULATED.3IONS-SCNC: 10 MMOL/L (ref 5–15)
AST SERPL-CCNC: 25 U/L (ref 1–32)
BASOPHILS # BLD AUTO: 0.12 10*3/MM3 (ref 0–0.2)
BASOPHILS NFR BLD AUTO: 0.9 % (ref 0–1.5)
BILIRUB SERPL-MCNC: 1.7 MG/DL (ref 0–1.2)
BUN SERPL-MCNC: 13 MG/DL (ref 8–23)
BUN/CREAT SERPL: 14.3 (ref 7–25)
CALCIUM SPEC-SCNC: 8.8 MG/DL (ref 8.6–10.5)
CHLORIDE SERPL-SCNC: 97 MMOL/L (ref 98–107)
CO2 SERPL-SCNC: 25 MMOL/L (ref 22–29)
CREAT SERPL-MCNC: 0.91 MG/DL (ref 0.57–1)
DEPRECATED RDW RBC AUTO: 74 FL (ref 37–54)
EGFRCR SERPLBLD CKD-EPI 2021: 69.7 ML/MIN/1.73
EOSINOPHIL # BLD AUTO: 0.13 10*3/MM3 (ref 0–0.4)
EOSINOPHIL NFR BLD AUTO: 0.9 % (ref 0.3–6.2)
ERYTHROCYTE [DISTWIDTH] IN BLOOD BY AUTOMATED COUNT: 19.8 % (ref 12.3–15.4)
GLOBULIN UR ELPH-MCNC: 2.2 GM/DL
GLUCOSE SERPL-MCNC: 523 MG/DL (ref 65–99)
HAPTOGLOB SERPL-MCNC: <10 MG/DL (ref 30–200)
HCT VFR BLD AUTO: 29.9 % (ref 34–46.6)
HGB BLD-MCNC: 9.6 G/DL (ref 12–15.9)
HOLD SPECIMEN: NORMAL
IMM GRANULOCYTES # BLD AUTO: 0.21 10*3/MM3 (ref 0–0.05)
IMM GRANULOCYTES NFR BLD AUTO: 1.5 % (ref 0–0.5)
LDH SERPL-CCNC: 398 U/L (ref 135–214)
LYMPHOCYTES # BLD AUTO: 0.84 10*3/MM3 (ref 0.7–3.1)
LYMPHOCYTES NFR BLD AUTO: 6.1 % (ref 19.6–45.3)
MCH RBC QN AUTO: 33.3 PG (ref 26.6–33)
MCHC RBC AUTO-ENTMCNC: 32.1 G/DL (ref 31.5–35.7)
MCV RBC AUTO: 103.8 FL (ref 79–97)
MONOCYTES # BLD AUTO: 0.87 10*3/MM3 (ref 0.1–0.9)
MONOCYTES NFR BLD AUTO: 6.3 % (ref 5–12)
NEUTROPHILS NFR BLD AUTO: 11.56 10*3/MM3 (ref 1.7–7)
NEUTROPHILS NFR BLD AUTO: 84.3 % (ref 42.7–76)
NRBC BLD AUTO-RTO: 0 /100 WBC (ref 0–0.2)
PLATELET # BLD AUTO: 274 10*3/MM3 (ref 140–450)
PMV BLD AUTO: 11.6 FL (ref 6–12)
POTASSIUM SERPL-SCNC: 4.2 MMOL/L (ref 3.5–5.2)
PROT SERPL-MCNC: 6.5 G/DL (ref 6–8.5)
RBC # BLD AUTO: 2.88 10*6/MM3 (ref 3.77–5.28)
RETICS # AUTO: 0.36 10*6/MM3 (ref 0.02–0.13)
RETICS/RBC NFR AUTO: 13.22 % (ref 0.7–1.9)
SODIUM SERPL-SCNC: 132 MMOL/L (ref 136–145)
WBC NRBC COR # BLD: 13.73 10*3/MM3 (ref 3.4–10.8)

## 2022-05-23 PROCEDURE — 85045 AUTOMATED RETICULOCYTE COUNT: CPT

## 2022-05-23 PROCEDURE — 99214 OFFICE O/P EST MOD 30 MIN: CPT | Performed by: INTERNAL MEDICINE

## 2022-05-23 PROCEDURE — 36415 COLL VENOUS BLD VENIPUNCTURE: CPT

## 2022-05-23 PROCEDURE — 80053 COMPREHEN METABOLIC PANEL: CPT

## 2022-05-23 PROCEDURE — 83010 ASSAY OF HAPTOGLOBIN QUANT: CPT

## 2022-05-23 PROCEDURE — 83615 LACTATE (LD) (LDH) ENZYME: CPT

## 2022-05-23 PROCEDURE — 85025 COMPLETE CBC W/AUTO DIFF WBC: CPT

## 2022-05-23 NOTE — TELEPHONE ENCOUNTER
Call from Jemma in chemistry lab to report a critical high glucose of 523. Dr. Elizalde was notified.

## 2022-05-31 ENCOUNTER — LAB (OUTPATIENT)
Dept: LAB | Facility: HOSPITAL | Age: 67
End: 2022-05-31

## 2022-05-31 DIAGNOSIS — D59.10 AUTOIMMUNE HEMOLYTIC ANEMIA: ICD-10-CM

## 2022-05-31 LAB
ALBUMIN SERPL-MCNC: 4.1 G/DL (ref 3.5–5.2)
ALBUMIN/GLOB SERPL: 2.1 G/DL
ALP SERPL-CCNC: 56 U/L (ref 39–117)
ALT SERPL W P-5'-P-CCNC: 15 U/L (ref 1–33)
ANION GAP SERPL CALCULATED.3IONS-SCNC: 10 MMOL/L (ref 5–15)
AST SERPL-CCNC: 23 U/L (ref 1–32)
BASOPHILS # BLD AUTO: 0.12 10*3/MM3 (ref 0–0.2)
BASOPHILS NFR BLD AUTO: 0.8 % (ref 0–1.5)
BILIRUB SERPL-MCNC: 1.3 MG/DL (ref 0–1.2)
BUN SERPL-MCNC: 14 MG/DL (ref 8–23)
BUN/CREAT SERPL: 16.9 (ref 7–25)
CALCIUM SPEC-SCNC: 8.4 MG/DL (ref 8.6–10.5)
CHLORIDE SERPL-SCNC: 103 MMOL/L (ref 98–107)
CO2 SERPL-SCNC: 25 MMOL/L (ref 22–29)
CREAT SERPL-MCNC: 0.83 MG/DL (ref 0.57–1)
DEPRECATED RDW RBC AUTO: 69.4 FL (ref 37–54)
EGFRCR SERPLBLD CKD-EPI 2021: 77.9 ML/MIN/1.73
EOSINOPHIL # BLD AUTO: 0.2 10*3/MM3 (ref 0–0.4)
EOSINOPHIL NFR BLD AUTO: 1.3 % (ref 0.3–6.2)
ERYTHROCYTE [DISTWIDTH] IN BLOOD BY AUTOMATED COUNT: 18.9 % (ref 12.3–15.4)
GLOBULIN UR ELPH-MCNC: 2 GM/DL
GLUCOSE SERPL-MCNC: 197 MG/DL (ref 65–99)
HAPTOGLOB SERPL-MCNC: <10 MG/DL (ref 30–200)
HCT VFR BLD AUTO: 29.7 % (ref 34–46.6)
HGB BLD-MCNC: 9.8 G/DL (ref 12–15.9)
IMM GRANULOCYTES # BLD AUTO: 0.34 10*3/MM3 (ref 0–0.05)
IMM GRANULOCYTES NFR BLD AUTO: 2.3 % (ref 0–0.5)
LDH SERPL-CCNC: 353 U/L (ref 135–214)
LYMPHOCYTES # BLD AUTO: 1.48 10*3/MM3 (ref 0.7–3.1)
LYMPHOCYTES NFR BLD AUTO: 10 % (ref 19.6–45.3)
MCH RBC QN AUTO: 33.6 PG (ref 26.6–33)
MCHC RBC AUTO-ENTMCNC: 33 G/DL (ref 31.5–35.7)
MCV RBC AUTO: 101.7 FL (ref 79–97)
MONOCYTES # BLD AUTO: 1.06 10*3/MM3 (ref 0.1–0.9)
MONOCYTES NFR BLD AUTO: 7.1 % (ref 5–12)
NEUTROPHILS NFR BLD AUTO: 11.67 10*3/MM3 (ref 1.7–7)
NEUTROPHILS NFR BLD AUTO: 78.5 % (ref 42.7–76)
NRBC BLD AUTO-RTO: 0 /100 WBC (ref 0–0.2)
PLATELET # BLD AUTO: 260 10*3/MM3 (ref 140–450)
PMV BLD AUTO: 11.5 FL (ref 6–12)
POTASSIUM SERPL-SCNC: 3.8 MMOL/L (ref 3.5–5.2)
PROT SERPL-MCNC: 6.1 G/DL (ref 6–8.5)
RBC # BLD AUTO: 2.92 10*6/MM3 (ref 3.77–5.28)
RETICS # AUTO: 0.36 10*6/MM3 (ref 0.02–0.13)
RETICS/RBC NFR AUTO: 12.27 % (ref 0.7–1.9)
SODIUM SERPL-SCNC: 138 MMOL/L (ref 136–145)
WBC NRBC COR # BLD: 14.87 10*3/MM3 (ref 3.4–10.8)

## 2022-05-31 PROCEDURE — 83010 ASSAY OF HAPTOGLOBIN QUANT: CPT

## 2022-05-31 PROCEDURE — 80053 COMPREHEN METABOLIC PANEL: CPT

## 2022-05-31 PROCEDURE — 83615 LACTATE (LD) (LDH) ENZYME: CPT

## 2022-05-31 PROCEDURE — 85045 AUTOMATED RETICULOCYTE COUNT: CPT

## 2022-05-31 PROCEDURE — 36415 COLL VENOUS BLD VENIPUNCTURE: CPT

## 2022-05-31 PROCEDURE — 85025 COMPLETE CBC W/AUTO DIFF WBC: CPT

## 2022-06-03 LAB — REF LAB TEST RESULTS: NORMAL

## 2022-06-06 ENCOUNTER — LAB (OUTPATIENT)
Dept: LAB | Facility: HOSPITAL | Age: 67
End: 2022-06-06

## 2022-06-06 ENCOUNTER — TELEPHONE (OUTPATIENT)
Dept: ONCOLOGY | Facility: CLINIC | Age: 67
End: 2022-06-06

## 2022-06-06 ENCOUNTER — OFFICE VISIT (OUTPATIENT)
Dept: ONCOLOGY | Facility: CLINIC | Age: 67
End: 2022-06-06

## 2022-06-06 VITALS
DIASTOLIC BLOOD PRESSURE: 68 MMHG | WEIGHT: 147.7 LBS | SYSTOLIC BLOOD PRESSURE: 132 MMHG | BODY MASS INDEX: 25.22 KG/M2 | RESPIRATION RATE: 18 BRPM | OXYGEN SATURATION: 98 % | HEIGHT: 64 IN | TEMPERATURE: 97.5 F | HEART RATE: 80 BPM

## 2022-06-06 DIAGNOSIS — D59.10 AUTOIMMUNE HEMOLYTIC ANEMIA: Primary | ICD-10-CM

## 2022-06-06 DIAGNOSIS — D59.10 AUTOIMMUNE HEMOLYTIC ANEMIA: ICD-10-CM

## 2022-06-06 LAB
ALBUMIN SERPL-MCNC: 4.2 G/DL (ref 3.5–5.2)
ALBUMIN/GLOB SERPL: 1.6 G/DL
ALP SERPL-CCNC: 70 U/L (ref 39–117)
ALT SERPL W P-5'-P-CCNC: 19 U/L (ref 1–33)
ANION GAP SERPL CALCULATED.3IONS-SCNC: 9 MMOL/L (ref 5–15)
AST SERPL-CCNC: 25 U/L (ref 1–32)
BASOPHILS # BLD AUTO: 0.13 10*3/MM3 (ref 0–0.2)
BASOPHILS NFR BLD AUTO: 0.7 % (ref 0–1.5)
BILIRUB SERPL-MCNC: 1.7 MG/DL (ref 0–1.2)
BUN SERPL-MCNC: 19 MG/DL (ref 8–23)
BUN/CREAT SERPL: 21.8 (ref 7–25)
CALCIUM SPEC-SCNC: 8.9 MG/DL (ref 8.6–10.5)
CHLORIDE SERPL-SCNC: 101 MMOL/L (ref 98–107)
CO2 SERPL-SCNC: 27 MMOL/L (ref 22–29)
CREAT SERPL-MCNC: 0.87 MG/DL (ref 0.57–1)
DEPRECATED RDW RBC AUTO: 69.4 FL (ref 37–54)
EGFRCR SERPLBLD CKD-EPI 2021: 73.6 ML/MIN/1.73
EOSINOPHIL # BLD AUTO: 0.3 10*3/MM3 (ref 0–0.4)
EOSINOPHIL NFR BLD AUTO: 1.6 % (ref 0.3–6.2)
ERYTHROCYTE [DISTWIDTH] IN BLOOD BY AUTOMATED COUNT: 19.2 % (ref 12.3–15.4)
GLOBULIN UR ELPH-MCNC: 2.6 GM/DL
GLUCOSE SERPL-MCNC: 147 MG/DL (ref 65–99)
HAPTOGLOB SERPL-MCNC: <10 MG/DL (ref 30–200)
HCT VFR BLD AUTO: 29.9 % (ref 34–46.6)
HGB BLD-MCNC: 9.8 G/DL (ref 12–15.9)
IMM GRANULOCYTES # BLD AUTO: 0.76 10*3/MM3 (ref 0–0.05)
IMM GRANULOCYTES NFR BLD AUTO: 3.9 % (ref 0–0.5)
LDH SERPL-CCNC: 372 U/L (ref 135–214)
LYMPHOCYTES # BLD AUTO: 1.3 10*3/MM3 (ref 0.7–3.1)
LYMPHOCYTES NFR BLD AUTO: 6.7 % (ref 19.6–45.3)
MCH RBC QN AUTO: 33.2 PG (ref 26.6–33)
MCHC RBC AUTO-ENTMCNC: 32.8 G/DL (ref 31.5–35.7)
MCV RBC AUTO: 101.4 FL (ref 79–97)
MONOCYTES # BLD AUTO: 1.4 10*3/MM3 (ref 0.1–0.9)
MONOCYTES NFR BLD AUTO: 7.2 % (ref 5–12)
NEUTROPHILS NFR BLD AUTO: 15.44 10*3/MM3 (ref 1.7–7)
NEUTROPHILS NFR BLD AUTO: 79.9 % (ref 42.7–76)
NRBC BLD AUTO-RTO: 0.1 /100 WBC (ref 0–0.2)
PLATELET # BLD AUTO: 298 10*3/MM3 (ref 140–450)
PMV BLD AUTO: 11.3 FL (ref 6–12)
POTASSIUM SERPL-SCNC: 4.2 MMOL/L (ref 3.5–5.2)
PROT SERPL-MCNC: 6.8 G/DL (ref 6–8.5)
RBC # BLD AUTO: 2.95 10*6/MM3 (ref 3.77–5.28)
RETICS # AUTO: 0.45 10*6/MM3 (ref 0.02–0.13)
RETICS/RBC NFR AUTO: 15.5 % (ref 0.7–1.9)
SODIUM SERPL-SCNC: 137 MMOL/L (ref 136–145)
WBC NRBC COR # BLD: 19.33 10*3/MM3 (ref 3.4–10.8)

## 2022-06-06 PROCEDURE — 83615 LACTATE (LD) (LDH) ENZYME: CPT

## 2022-06-06 PROCEDURE — 85045 AUTOMATED RETICULOCYTE COUNT: CPT

## 2022-06-06 PROCEDURE — 80053 COMPREHEN METABOLIC PANEL: CPT

## 2022-06-06 PROCEDURE — 99214 OFFICE O/P EST MOD 30 MIN: CPT | Performed by: INTERNAL MEDICINE

## 2022-06-06 PROCEDURE — 83010 ASSAY OF HAPTOGLOBIN QUANT: CPT

## 2022-06-06 PROCEDURE — 36415 COLL VENOUS BLD VENIPUNCTURE: CPT

## 2022-06-06 PROCEDURE — 85025 COMPLETE CBC W/AUTO DIFF WBC: CPT

## 2022-06-06 NOTE — TELEPHONE ENCOUNTER
Caller: CHRISTOPHER    Relationship to patient: SELF    Best call back number: [phone]    Patient is needing: TO KNOW IF SHE NEEDS TO BE SCHEDULED FOR WEEKLY LABS. THEY ARE NOT SCHEDULED LIKE USUAL.

## 2022-06-08 ENCOUNTER — OFFICE VISIT (OUTPATIENT)
Dept: FAMILY MEDICINE CLINIC | Facility: CLINIC | Age: 67
End: 2022-06-08

## 2022-06-08 VITALS
OXYGEN SATURATION: 97 % | HEIGHT: 64 IN | BODY MASS INDEX: 24.21 KG/M2 | TEMPERATURE: 96.9 F | HEART RATE: 80 BPM | DIASTOLIC BLOOD PRESSURE: 70 MMHG | SYSTOLIC BLOOD PRESSURE: 117 MMHG | WEIGHT: 141.8 LBS

## 2022-06-08 DIAGNOSIS — D59.10 AUTOIMMUNE HEMOLYTIC ANEMIA: ICD-10-CM

## 2022-06-08 DIAGNOSIS — F17.211 CIGARETTE NICOTINE DEPENDENCE IN REMISSION: ICD-10-CM

## 2022-06-08 DIAGNOSIS — J30.9 ALLERGIC RHINITIS, UNSPECIFIED SEASONALITY, UNSPECIFIED TRIGGER: Primary | ICD-10-CM

## 2022-06-08 DIAGNOSIS — R60.9 PERIPHERAL EDEMA: ICD-10-CM

## 2022-06-08 DIAGNOSIS — R05.9 COUGH: ICD-10-CM

## 2022-06-08 DIAGNOSIS — Z96.41 INSULIN PUMP IN PLACE: ICD-10-CM

## 2022-06-08 DIAGNOSIS — E10.65 TYPE 1 DIABETES MELLITUS WITH HYPERGLYCEMIA: ICD-10-CM

## 2022-06-08 PROCEDURE — 99214 OFFICE O/P EST MOD 30 MIN: CPT | Performed by: FAMILY MEDICINE

## 2022-06-08 RX ORDER — FLUTICASONE PROPIONATE 50 MCG
2 SPRAY, SUSPENSION (ML) NASAL DAILY
Qty: 16 G | Refills: 11 | Status: SHIPPED | OUTPATIENT
Start: 2022-06-08 | End: 2023-02-09 | Stop reason: ALTCHOICE

## 2022-06-08 RX ORDER — LORATADINE 10 MG/1
10 CAPSULE, LIQUID FILLED ORAL
COMMUNITY

## 2022-06-13 ENCOUNTER — CLINICAL SUPPORT (OUTPATIENT)
Dept: ONCOLOGY | Facility: HOSPITAL | Age: 67
End: 2022-06-13

## 2022-06-13 ENCOUNTER — LAB (OUTPATIENT)
Dept: LAB | Facility: HOSPITAL | Age: 67
End: 2022-06-13

## 2022-06-13 DIAGNOSIS — D59.10 AUTOIMMUNE HEMOLYTIC ANEMIA: ICD-10-CM

## 2022-06-13 LAB
ALBUMIN SERPL-MCNC: 4.3 G/DL (ref 3.5–5.2)
ALBUMIN/GLOB SERPL: 2.2 G/DL
ALP SERPL-CCNC: 68 U/L (ref 39–117)
ALT SERPL W P-5'-P-CCNC: 20 U/L (ref 1–33)
ANION GAP SERPL CALCULATED.3IONS-SCNC: 11 MMOL/L (ref 5–15)
AST SERPL-CCNC: 23 U/L (ref 1–32)
BASOPHILS # BLD AUTO: 0.11 10*3/MM3 (ref 0–0.2)
BASOPHILS NFR BLD AUTO: 0.7 % (ref 0–1.5)
BILIRUB SERPL-MCNC: 1.4 MG/DL (ref 0–1.2)
BUN SERPL-MCNC: 12 MG/DL (ref 8–23)
BUN/CREAT SERPL: 14.6 (ref 7–25)
CALCIUM SPEC-SCNC: 8.8 MG/DL (ref 8.6–10.5)
CHLORIDE SERPL-SCNC: 103 MMOL/L (ref 98–107)
CO2 SERPL-SCNC: 24 MMOL/L (ref 22–29)
CREAT SERPL-MCNC: 0.82 MG/DL (ref 0.57–1)
DEPRECATED RDW RBC AUTO: 68.1 FL (ref 37–54)
EGFRCR SERPLBLD CKD-EPI 2021: 79 ML/MIN/1.73
EOSINOPHIL # BLD AUTO: 0.04 10*3/MM3 (ref 0–0.4)
EOSINOPHIL NFR BLD AUTO: 0.3 % (ref 0.3–6.2)
ERYTHROCYTE [DISTWIDTH] IN BLOOD BY AUTOMATED COUNT: 19.5 % (ref 12.3–15.4)
GLOBULIN UR ELPH-MCNC: 2 GM/DL
GLUCOSE SERPL-MCNC: 290 MG/DL (ref 65–99)
HCT VFR BLD AUTO: 28.1 % (ref 34–46.6)
HGB BLD-MCNC: 9.3 G/DL (ref 12–15.9)
IMM GRANULOCYTES # BLD AUTO: 0.67 10*3/MM3 (ref 0–0.05)
IMM GRANULOCYTES NFR BLD AUTO: 4.3 % (ref 0–0.5)
LDH SERPL-CCNC: 402 U/L (ref 135–214)
LYMPHOCYTES # BLD AUTO: 0.62 10*3/MM3 (ref 0.7–3.1)
LYMPHOCYTES NFR BLD AUTO: 4 % (ref 19.6–45.3)
MCH RBC QN AUTO: 33 PG (ref 26.6–33)
MCHC RBC AUTO-ENTMCNC: 33.1 G/DL (ref 31.5–35.7)
MCV RBC AUTO: 99.6 FL (ref 79–97)
MONOCYTES # BLD AUTO: 0.58 10*3/MM3 (ref 0.1–0.9)
MONOCYTES NFR BLD AUTO: 3.7 % (ref 5–12)
NEUTROPHILS NFR BLD AUTO: 13.52 10*3/MM3 (ref 1.7–7)
NEUTROPHILS NFR BLD AUTO: 87 % (ref 42.7–76)
NRBC BLD AUTO-RTO: 0.2 /100 WBC (ref 0–0.2)
PLATELET # BLD AUTO: 351 10*3/MM3 (ref 140–450)
PMV BLD AUTO: 11.1 FL (ref 6–12)
POTASSIUM SERPL-SCNC: 4.3 MMOL/L (ref 3.5–5.2)
PROT SERPL-MCNC: 6.3 G/DL (ref 6–8.5)
RBC # BLD AUTO: 2.82 10*6/MM3 (ref 3.77–5.28)
RETICS # AUTO: 0.37 10*6/MM3 (ref 0.02–0.13)
RETICS/RBC NFR AUTO: 13.2 % (ref 0.7–1.9)
SODIUM SERPL-SCNC: 138 MMOL/L (ref 136–145)
WBC NRBC COR # BLD: 15.54 10*3/MM3 (ref 3.4–10.8)

## 2022-06-13 PROCEDURE — 83010 ASSAY OF HAPTOGLOBIN QUANT: CPT

## 2022-06-13 PROCEDURE — 83615 LACTATE (LD) (LDH) ENZYME: CPT

## 2022-06-13 PROCEDURE — 85045 AUTOMATED RETICULOCYTE COUNT: CPT

## 2022-06-13 PROCEDURE — 80053 COMPREHEN METABOLIC PANEL: CPT

## 2022-06-13 PROCEDURE — 85025 COMPLETE CBC W/AUTO DIFF WBC: CPT

## 2022-06-13 PROCEDURE — G0463 HOSPITAL OUTPT CLINIC VISIT: HCPCS

## 2022-06-13 PROCEDURE — 36415 COLL VENOUS BLD VENIPUNCTURE: CPT

## 2022-06-13 NOTE — PROGRESS NOTES
Pt here for labs only. Per Alyce Mascorro Rn with Dr. ley nothing needed today.  Pt v/u. Home in stable condition. GENO Overton RN

## 2022-06-14 LAB — HAPTOGLOB SERPL-MCNC: <10 MG/DL (ref 30–200)

## 2022-06-16 NOTE — PROGRESS NOTES
[facility] ON[facility] HEMATOLOGY & ONCOLOGY  [address]  [facility] [address] [phone]    Patient Name: Giana Yepez  Encounter Date: 06/29/2022  YOB: 1955  Patient Number: [id number]      REASON FOR FOLLOW-UP:  Giana Yepez is a pleasant 67 y.o.  female who is seen on follow-up for autoimmune hemolytic anemia.  She had a dose of rituximab on 04/28/2022.  Plan for 4 weekly treatments, however she is intolerant.  She is back on prednisone 1 mg/kg since 05/09/2022 through present.  Awaiting evaluation at [place].  She is seen alone.  She is a reliable historian.          Problem List Items Addressed This Visit        Other    Autoimmune hemolytic anemia (HCC) - Primary    Overview     DIAGNOSTIC ABNORMALITIES:  She presented with worsening fatigue and weight loss approximately 10 pounds in the last 2 months.  WBC 18.2, ANC 12.4, hemoglobin 7.6, hematocrit 24.1, , and platelet 348, folate 12.6, B12 at 1988, TSH 1.96, erythropoietin 61.5, sed rate 3, , haptoglobin less than 10, CMP remarkable for AST of 33 and bilirubin 2.3, IgG 1035, and negative BCR ABL on 04/19/2022.  Reticulocyte 25.69% and Rona test was positive on 04/22/2022.  Negative hepatitis B core antibody, negative HIV, negative LIZ panel and blood for flow cytometry 04/22/2022, negative Bcl-2, BCL6 and MYC.  CT neck, chest, abdomen and pelvis on 04/23/2022.9 mm relatively hypodense well-circumscribed asymmetry at the level of the upper left vallecula and base of the tongue on the left. No associated enhancement of the wall or intraluminal air making an infectious process less likely. This measures Hounsfield units of 36 suggesting it may be soft tissue in nature. Direct visualization is recommended.  No pathologically enlarged cervical chain or posterior triangle lymphadenopathy is present. There are some borderline enlarged left supraclavicular  nodes with measurements as above. No additional discrete mass lesions are appreciated. Level of the true and false cords is unremarkable. The thyroid gland is homogeneous in density without evidence of nodularity or mass.  Borderline enlarged mediastinal nodes are present as well as a few left supraclavicular nodes. No additional enlarged nodes are identified.  Right middle lobe atelectasis or scarring is present. There is mild patchy pneumonitis within the superior segment of both lower lobes and apical posterior segment of the left upper lobe suggesting a patchy pneumonitis/bronchopneumonia. Lungs are otherwise clear. No evidence of lobar consolidation. No discrete endobronchial lesion is identified.  The heart is normal in size. The thoracic aorta and great vessels are normal in caliber.  No evidence of intraperitoneal or retroperitoneal lymphadenopathy.  Homogeneous enhancement of the kidneys. No evidence of nephrolithiasis or obstructive uropathy.  Constipation with increased stool throughout the colon. No evidence of mechanical obstruction or free air.  The uterus and adnexa are unremarkable.  Bone marrow biopsy 04/25/2022.  Hypercellular marrow for age 70 to 80% with maturing trilineage hematopoiesis.  Erythroid hyperplasia.  Slight megakaryocyte ptosis.  Stainable iron is present.  No evidence of acute leukemia, lymphoproliferative disorder or plasma cell dyscrasia.  No morphologic evidence of an overt or advanced myeloid neoplasm.  Cytogenetics 46 XX. Flow cytometry showed no evidence for abnormal myeloid maturation or an increased blast population.  No evidence for a limp or proliferative disorder.  Antibody identification, autoantibody LE specificity on 04/26/2022.        PREVIOUS INTERVENTIONS:  1 unit packed RBC on 04/06/2022 and 04/26/2022.  Prednisone 60 mg on 04/22/2022 through 05/02/2022. Resume 05/09/2022 through present.   IVIG on 04/26/2020 04/27/2022.  Folic acid 1 mg p.o. daily from 04/30/2022  "through present.  Rituximab 04/28/2022, intolerant. \"My mouth felt like I ate a mouthful of hot peepers, blisters roof of mouth, nose burning, sneezing like smelt burned peppers, and my eyes were pouring. All within 45 minutes of infusion.  Low blood pressure the next 4 days.\"                Oncology/Hematology History    No history exists.       PAST MEDICAL HISTORY:  ALLERGIES:  Allergies   Allergen Reactions   • Corn-Containing Products Diarrhea   • Eggs Or Egg-Derived Products Diarrhea   • Nuts Shortness Of Breath     peanuts   • Milk-Related Compounds Nausea And Vomiting   • Gabapentin Dizziness     Patient states becomes weak and dizzy if she takes Gabapentin   • Levemir [Insulin Detemir] Itching and Other (See Comments)     Patient states gets a \"hard knot and itching\" at injection site.   • Wheat Rash     CURRENT MEDICATIONS:  Outpatient Encounter Medications as of 6/29/2022   Medication Sig Dispense Refill   • allopurinol (ZYLOPRIM) 100 MG tablet Take 1 tablet by mouth Daily. 30 tablet 1   • Calcium Citrate (CITRACAL PO) Take  by mouth.     • cholecalciferol (VITAMIN D3) 25 MCG (1000 UT) tablet Take 2,000 Units by mouth Daily.     • Continuous Blood Gluc  (Dexcom G5  Kit) device Continuous.     • cyanocobalamin (VITAMIN B-12) 1000 MCG tablet Take  by mouth Daily.     • diphenhydrAMINE (BENADRYL) 25 mg capsule Take 25 mg by mouth Every 6 (Six) Hours As Needed for Itching.     • ferrous sulfate 325 (65 FE) MG tablet Take 325 mg by mouth Daily With Breakfast.     • fluticasone (FLONASE) 50 MCG/ACT nasal spray 2 sprays into the nostril(s) as directed by provider Daily. To control swelling and drainage from allergies 16 g 11   • folic acid (FOLVITE) 1 MG tablet Take 1 tablet by mouth Daily. 30 tablet 2   • Insulin Infusion Pump device Continuous.     • Insulin Lispro (humaLOG) 100 UNIT/ML injection Inject 25-40 Units under the skin into the appropriate area as directed Daily.     • Loratadine 10 " MG capsule Take 10 mg by mouth every night at bedtime. As anti-histamine for allergies     • omeprazole (priLOSEC) 40 MG capsule Take 40 mg by mouth Daily.     • predniSONE (DELTASONE) 20 MG tablet Take 3 tabs (60 mg total dose) daily with food 90 tablet 2   • rosuvastatin (CRESTOR) 20 MG tablet Take 20 mg by mouth Daily.     • vitamin B-12 (CYANOCOBALAMIN) 1000 MCG tablet Take 1,000 mcg by mouth Daily.       No facility-administered encounter medications on file as of 2022.     ADULT ILLNESSES:  Patient Active Problem List   Diagnosis Code   • Gastroesophageal reflux disease K21.9   • Type 1 diabetes mellitus with hyperglycemia (HCC), insulin depdendent E10.65   • Intermittent chest pain R07.9   • Hypercholesterolemia E78.00   • Vitamin D deficiency E55.9   • Vitamin B12 deficiency E53.8   • Diarrhea R19.7   • Abdominal cramping R10.9   • Nausea R11.0   • Collagenous colitis K52.831   • Autoimmune hemolytic anemia (HCC) D59.10   • Insulin pump in place Z96.41   • Hyperbilirubinemia E80.6   • Mediastinal lymphadenopathy R59.0   • Tobacco abuse Z72.0   • Hyponatremia E87.1   • Orthostatic hypotension I95.1     SURGERIES:  Past Surgical History:   Procedure Laterality Date   • ANKLE SURGERY Right    • APPENDECTOMY     • BREAST BIOPSY     •  SECTION      X 2   • CHOLECYSTECTOMY     • COLONOSCOPY      [facility]   • COLONOSCOPY N/A 2017    Procedure: COLONOSCOPY WITH ANESTHESIA;  Surgeon: Ondina Abdul MD;  Location: [facility] ENDOSCOPY;  Service:    • ENDOSCOPY N/A 2017    Procedure: ESOPHAGOGASTRODUODENOSCOPY WITH ANESTHESIA;  Surgeon: Ondina Abdul MD;  Location: [facility] ENDOSCOPY;  Service:    • EYE SURGERY  2019    cataract   • SKIN CANCER EXCISION  2016    FACE   • TONSILLECTOMY       HEALTH MAINTENANCE ITEMS:  Health Maintenance Due   Topic Date Due   • Pneumococcal Vaccine 65+ (1 - PCV) Never done   • TDAP/TD VACCINES (1 - Tdap) Never done   • ZOSTER VACCINE (1 of  2) Never done   • ANNUAL WELLNESS VISIT  Never done   • DIABETIC FOOT EXAM  Never done   • PAP SMEAR  Never done   • DIABETIC EYE EXAM  Never done   • DXA SCAN  11/14/2018   • MAMMOGRAM  11/30/2019   • COVID-19 Vaccine (3 - Booster for Moderna series) 03/02/2022       <no information>  Last Completed Colonoscopy          COLORECTAL CANCER SCREENING (COLONOSCOPY - Every 10 Years) Next due on 3/3/2027    03/03/2017  Surgical Procedure: COLONOSCOPY    03/03/2017  COLONOSCOPY    02/01/2017  Occult Blood X 3, Stool              Immunization History   Administered Date(s) Administered   • COVID-19 (MODERNA) 1st, 2nd, 3rd Dose Only 08/31/2021, 10/02/2021     Last Completed Mammogram     This patient has no relevant Health Maintenance data.            FAMILY HISTORY:  Family History   Problem Relation Age of Onset   • Asthma Mother    • Heart disease Mother    • COPD Mother    • Heart disease Father    • Early death Father         heart attack @ 60   • Heart disease Sister    • Hypertension Sister    • COPD Sister    • Diabetes Sister         Type 2   • Diabetes Sister    • Other Sister    • Hearing loss Sister         born deaf   • Heart disease Brother    • Breast cancer Paternal Aunt    • No Known Problems Son    • No Known Problems Son    • Hypertension Maternal Grandfather    • Stroke Maternal Grandfather    • Diabetes Maternal Grandmother    • Vision loss Maternal Grandmother         from diabetes   • Heart disease Paternal Grandfather    • Hypertension Paternal Grandfather    • Cancer Paternal Aunt    • Hearing loss Brother         wears hearing aids   • Heart disease Brother    • Hypertension Brother    • Heart disease Sister    • Hypertension Sister      SOCIAL HISTORY:  Social History     Socioeconomic History   • Marital status:    Tobacco Use   • Smoking status: Former Smoker     Packs/day: 0.25     Years: 40.00     Pack years: 10.00     Types: Cigarettes     Start date: 2/18/1975     Quit date:  "2022     Years since quittin.1   • Smokeless tobacco: Never Used   • Tobacco comment: quit during two pregnancies but went back to smoking after delivery   Vaping Use   • Vaping Use: Never used   Substance and Sexual Activity   • Alcohol use: Not Currently     Alcohol/week: 12.0 standard drinks     Types: 12 Cans of beer per week     Comment: drink at early 20's   • Drug use: No   • Sexual activity: Not Currently     Partners: Male     Birth control/protection: None     Comment: went through menopause over 10 years go       REVIEW OF SYSTEMS:    Review of Systems   Constitutional: Positive for fatigue. Negative for chills and fever.        \"Feeling better.\"   HENT: Negative for congestion, mouth sores and trouble swallowing.    Eyes: Negative for redness and visual disturbance.   Respiratory: Negative for cough, shortness of breath and wheezing.    Gastrointestinal: Negative for abdominal pain, constipation, diarrhea, nausea and vomiting.   Endocrine: Negative for polydipsia and polyphagia.   Genitourinary: Negative for difficulty urinating, dysuria and flank pain.   Musculoskeletal: Negative for gait problem and joint swelling.   Skin: Positive for pallor.   Allergic/Immunologic: Positive for food allergies.   Neurological: Negative for speech difficulty, weakness and confusion.   Hematological: Negative for adenopathy. Does not bruise/bleed easily.   Psychiatric/Behavioral: Negative for agitation and hallucinations. The patient is not nervous/anxious.          VITAL SIGNS: /60   Pulse 70   Temp 96.5 °F (35.8 °C)   Resp 18   Ht 162.6 cm (64\")   Wt 66.7 kg (147 lb)   SpO2 98%   Breastfeeding No   BMI 25.23 kg/m²  Body surface area is 1.72 meters squared.   Pain Score    22 0803   PainSc: 0-No pain         PHYSICAL EXAMINATION:     Physical Exam  Vitals reviewed.   Constitutional:       General: She is not in acute distress.  HENT:      Head: Normocephalic and atraumatic.   Eyes:      " General: No scleral icterus.  Cardiovascular:      Rate and Rhythm: Normal rate.   Pulmonary:      Effort: No respiratory distress.      Breath sounds: No wheezing or rales.   Abdominal:      General: Bowel sounds are normal.      Palpations: Abdomen is soft.      Tenderness: There is no abdominal tenderness.   Musculoskeletal:         General: No swelling.      Cervical back: Neck supple.   Skin:     General: Skin is warm.      Coloration: Skin is pale.   Neurological:      Mental Status: She is alert and oriented to person, place, and time.   Psychiatric:         Mood and Affect: Mood normal.         Behavior: Behavior normal.         Thought Content: Thought content normal.         Judgment: Judgment normal.         LABS    Lab Results - Last 18 Months   Lab Units 06/27/22  0751 06/20/22  1409 06/13/22  1407 06/06/22  0813 05/31/22  0801 05/23/22  0711 04/28/22  0709 04/27/22  0539 04/26/22  0646 04/19/22  0859 04/04/22  1034   HEMOGLOBIN g/dL 10.5* 9.6* 9.3* 9.8* 9.8* 9.6*   < > 9.4* 6.8*   < > 7.0*   HEMATOCRIT % 31.2* 29.1* 28.1* 29.9* 29.7* 29.9*   < > 29.4* 21.7*   < > 22.7*   MCV fL 100.3* 101.0* 99.6* 101.4* 101.7* 103.8*   < > 106.1* 110.2*   < > 112.9*   WBC 10*3/mm3 18.04* 15.86* 15.54* 19.33* 14.87* 13.73*   < > 15.25* 16.09*   < > 18.99*   RDW % 20.2* 19.0* 19.5* 19.2* 18.9* 19.8*   < > 25.2* 21.7*   < > 22.4*   MPV fL 11.3 11.6 11.1 11.3 11.5 11.6   < > 11.5 11.2   < > 11.2   PLATELETS 10*3/mm3 283 312 351 298 260 274   < > 218 235   < > 329   IMM GRAN % % 3.5* 2.8* 4.3* 3.9* 2.3* 1.5*   < >  --   --    < >  --    NEUTROS ABS 10*3/mm3 14.37* 13.53* 13.52* 15.44* 11.67* 11.56*   < > 10.11* 12.03*   < > 14.29*   LYMPHS ABS 10*3/mm3 1.49 0.94 0.62* 1.30 1.48 0.84   < >  --   --    < >  --    MONOS ABS 10*3/mm3 1.21* 0.81 0.58 1.40* 1.06* 0.87   < >  --   --    < >  --    EOS ABS 10*3/mm3 0.24 0.04 0.04 0.30 0.20 0.13   < > 0.78*  --    < > 0.19   BASOS ABS 10*3/mm3 0.10 0.09 0.11 0.13 0.12 0.12   < >   --   --    < > 0.19   IMMATURE GRANS (ABS) 10*3/mm3 0.63* 0.45* 0.67* 0.76* 0.34* 0.21*   < >  --   --    < >  --    NRBC /100 WBC 0.1 0.0 0.2 0.1 0.0 0.0   < > 3.1* 3.0*   < >  --    NEUTROPHIL % %  --   --   --   --   --   --   --  64.3 70.7  --  74.2   MONOCYTES % %  --   --   --   --   --   --   --  9.2 9.1  --  8.2   BASOPHIL % %  --   --   --   --   --   --   --   --   --   --  1.0   ANISOCYTOSIS   --   --   --   --   --   --   --  Mod/2+ Mod/2+  --  Mod/2+    < > = values in this interval not displayed.       Lab Results - Last 18 Months   Lab Units 06/27/22  0751 06/20/22  1409 06/13/22  1407 06/06/22  0813 05/31/22  0801 05/23/22  0711   GLUCOSE mg/dL 250* 219* 290* 147* 197* 523*   SODIUM mmol/L 139 134* 138 137 138 132*   POTASSIUM mmol/L 3.8 4.0 4.3 4.2 3.8 4.2   CO2 mmol/L 27.0 24.0 24.0 27.0 25.0 25.0   CHLORIDE mmol/L 102 99 103 101 103 97*   ANION GAP mmol/L 10.0 11.0 11.0 9.0 10.0 10.0   CREATININE mg/dL 0.90 0.74 0.82 0.87 0.83 0.91   BUN mg/dL 15 12 12 19 14 13   BUN / CREAT RATIO  16.7 16.2 14.6 21.8 16.9 14.3   CALCIUM mg/dL 8.8 8.9 8.8 8.9 8.4* 8.8   ALK PHOS U/L 63 67 68 70 56 63   TOTAL PROTEIN g/dL 6.0 6.2 6.3 6.8 6.1 6.5   ALT (SGPT) U/L 25 20 20 19 15 19   AST (SGOT) U/L 34* 22 23 25 23 25   BILIRUBIN mg/dL 1.5* 1.5* 1.4* 1.7* 1.3* 1.7*   ALBUMIN g/dL 4.00 4.00 4.30 4.20 4.10 4.30   GLOBULIN gm/dL 2.0 2.2 2.0 2.6 2.0 2.2       Lab Results - Last 18 Months   Lab Units 06/27/22  0751 06/20/22  1409 06/13/22  1407 06/06/22  0813 05/31/22  0801 05/23/22  0711 05/09/22  0738 04/29/22  0536 04/28/22  0709 04/27/22  0539 04/26/22  0646 04/25/22  0630 04/24/22  0819 04/23/22  0743 04/22/22  1322 04/19/22  0859   URIC ACID mg/dL  --   --   --   --   --   --   --  1.9* 1.6* 1.5* 2.1* 3.7 6.4*   < > 5.8*  --    LDH U/L 342* 349* 402* 372* 353* 398*   < > 570* 650*  638* 668* 634* 593* 652*   < > 772* 834*   REFERENCE LAB REPORT   --   --   --   --   --   --   --   --   --   --   --   --   --   --   "See Attached Report SEE ATTACHED REPORT    < > = values in this interval not displayed.       Lab Results - Last 18 Months   Lab Units 04/22/22  1542 04/19/22  0859 04/04/22  1219 04/04/22  1035 03/31/22  1226 08/17/21  0932 01/07/21  1243   IRON mcg/dL  --   --   --  44 129  --   --    TIBC mcg/dL  --   --   --  261* 310  --   --    IRON SATURATION %  --   --   --  17* 42  --   --    FERRITIN ng/mL  --   --   --  311.90* 454.60*  --   --    TSH uIU/mL  --  1.960  --  2.100  --  1.302 1.857   FOLATE ng/mL 13.70 12.60 14.20  --   --   --   --          Gianajoyce Yepez reports a pain score of 0.        ASSESSMENT:  1.  Autoimmune hemolytic anemia.  Treatment status: Post IVIG x2 doses on 04/27/2022.  On prednisone 1 mg/kg from 05/09/2022 through present.  Rituximab 375 mgm2 on 04/28/2022.  Intolerant to rituximab.  2.  Borderline enlarged mediastinal nodes on 04/23/2022.  3.  9 mm hypodense well-circumscribed asymmetry at the level of the upper left vallecula and base of the tongue on the left on 04/23/2022.  4.  LE autoantibody.            PLAN:  1.   Re: Tolerance to prednisone. \"Makes my glucose go high.\" She was seen by Dr. Montes on 06/08/2022 for allergic rhinitis and cough.  She was given Flonase nasal spray.  2.   Re: Second opinion at [place] 07/06/2022.    3.   Re: Heme status.  WBC 18.04, hemoglobin 10.5, .3, and platelet 283.  Reticulocyte count 12.9.  4.   Re: CMP.  Total bilirubin 1.5 from 1.5 from 1.4 from 1.7 from 1.3 from from  1.7 from 1.3 from 1.7 from 1.8 and glucose 250.  \"I use my insulin pump.\"  5.   Re: Haptoglobin< 10 from <10 and LDH 342 from 349 from 402 from 372 from 353 from 398 from 489 from 639 from 570 from 650.   6.  Blood for CBC with differential, CMP, haptoglobin, LDH and reticulocyte count weekly.  7.  eRx prednisone 60 mg p.o. daily taken with food or milk #60 with 2 refills if needed.  Monitor for gastrointestinal irritation or " bleeding.   8.  eRx folic acid 1 mg p.o. daily #90 with 3 refills if needed.  9.  Hold rituximab 375 mg/m2 on 05/09/2022 due to poor tolerance and until seen at [place].  10.  Premed:  Tylenol 500 mg p.o.  Benadryl 25 mg IV  Pepcid 20 mg IV  11.  Continue care per primary care physician.  12.  Plan of care discussed with patient.  Understanding expressed.  Patient agreeable to proceed.  13.  Transfuse 1 unit packed RBC if hemoglobin less than 6.9.  Premed Tylenol 500 mg p.o.  Lasix 20 mg IV push after transfusion.  Monitor for potential transfusion reactions.  14.  Advance Care Planning   ACP discussion was declined by the patient. Patient does not have an advance directive, information provided.  15.  Return to office in 3 weeks with ferritin and iron profile.       I have reviewed the assessment and plan and verified the accuracy of it. No changes to assessment and plan since the information was documented. Filemon Elizalde MD 06/29/22         I spent 31  total minutes, face-to-face, caring for Giana today.  Greater than 50% of this time involved counseling and/or coordination of care as documented within this note regarding the patient's illness(es), pros and cons of various treatment options, instructions and/or risk reduction.           Camila Montes MD  (Gerber Tolbert MD [place])  (Dominick Fernandez MD [place])

## 2022-06-20 ENCOUNTER — LAB (OUTPATIENT)
Dept: LAB | Facility: HOSPITAL | Age: 67
End: 2022-06-20

## 2022-06-20 ENCOUNTER — CLINICAL SUPPORT (OUTPATIENT)
Dept: ONCOLOGY | Facility: HOSPITAL | Age: 67
End: 2022-06-20

## 2022-06-20 VITALS
OXYGEN SATURATION: 99 % | TEMPERATURE: 97.6 F | RESPIRATION RATE: 16 BRPM | BODY MASS INDEX: 25.44 KG/M2 | SYSTOLIC BLOOD PRESSURE: 128 MMHG | DIASTOLIC BLOOD PRESSURE: 64 MMHG | HEIGHT: 64 IN | HEART RATE: 76 BPM | WEIGHT: 149 LBS

## 2022-06-20 DIAGNOSIS — D59.10 AUTOIMMUNE HEMOLYTIC ANEMIA: ICD-10-CM

## 2022-06-20 LAB
ALBUMIN SERPL-MCNC: 4 G/DL (ref 3.5–5.2)
ALBUMIN/GLOB SERPL: 1.8 G/DL
ALP SERPL-CCNC: 67 U/L (ref 39–117)
ALT SERPL W P-5'-P-CCNC: 20 U/L (ref 1–33)
ANION GAP SERPL CALCULATED.3IONS-SCNC: 11 MMOL/L (ref 5–15)
AST SERPL-CCNC: 22 U/L (ref 1–32)
BASOPHILS # BLD AUTO: 0.09 10*3/MM3 (ref 0–0.2)
BASOPHILS NFR BLD AUTO: 0.6 % (ref 0–1.5)
BILIRUB SERPL-MCNC: 1.5 MG/DL (ref 0–1.2)
BUN SERPL-MCNC: 12 MG/DL (ref 8–23)
BUN/CREAT SERPL: 16.2 (ref 7–25)
CALCIUM SPEC-SCNC: 8.9 MG/DL (ref 8.6–10.5)
CHLORIDE SERPL-SCNC: 99 MMOL/L (ref 98–107)
CO2 SERPL-SCNC: 24 MMOL/L (ref 22–29)
CREAT SERPL-MCNC: 0.74 MG/DL (ref 0.57–1)
DEPRECATED RDW RBC AUTO: 68.8 FL (ref 37–54)
EGFRCR SERPLBLD CKD-EPI 2021: 89.4 ML/MIN/1.73
EOSINOPHIL # BLD AUTO: 0.04 10*3/MM3 (ref 0–0.4)
EOSINOPHIL NFR BLD AUTO: 0.3 % (ref 0.3–6.2)
ERYTHROCYTE [DISTWIDTH] IN BLOOD BY AUTOMATED COUNT: 19 % (ref 12.3–15.4)
GLOBULIN UR ELPH-MCNC: 2.2 GM/DL
GLUCOSE SERPL-MCNC: 219 MG/DL (ref 65–99)
HCT VFR BLD AUTO: 29.1 % (ref 34–46.6)
HGB BLD-MCNC: 9.6 G/DL (ref 12–15.9)
IMM GRANULOCYTES # BLD AUTO: 0.45 10*3/MM3 (ref 0–0.05)
IMM GRANULOCYTES NFR BLD AUTO: 2.8 % (ref 0–0.5)
LDH SERPL-CCNC: 349 U/L (ref 135–214)
LYMPHOCYTES # BLD AUTO: 0.94 10*3/MM3 (ref 0.7–3.1)
LYMPHOCYTES NFR BLD AUTO: 5.9 % (ref 19.6–45.3)
MCH RBC QN AUTO: 33.3 PG (ref 26.6–33)
MCHC RBC AUTO-ENTMCNC: 33 G/DL (ref 31.5–35.7)
MCV RBC AUTO: 101 FL (ref 79–97)
MONOCYTES # BLD AUTO: 0.81 10*3/MM3 (ref 0.1–0.9)
MONOCYTES NFR BLD AUTO: 5.1 % (ref 5–12)
NEUTROPHILS NFR BLD AUTO: 13.53 10*3/MM3 (ref 1.7–7)
NEUTROPHILS NFR BLD AUTO: 85.3 % (ref 42.7–76)
NRBC BLD AUTO-RTO: 0 /100 WBC (ref 0–0.2)
PLATELET # BLD AUTO: 312 10*3/MM3 (ref 140–450)
PMV BLD AUTO: 11.6 FL (ref 6–12)
POTASSIUM SERPL-SCNC: 4 MMOL/L (ref 3.5–5.2)
PROT SERPL-MCNC: 6.2 G/DL (ref 6–8.5)
RBC # BLD AUTO: 2.88 10*6/MM3 (ref 3.77–5.28)
RETICS # AUTO: 0.32 10*6/MM3 (ref 0.02–0.13)
RETICS/RBC NFR AUTO: 10.96 % (ref 0.7–1.9)
SODIUM SERPL-SCNC: 134 MMOL/L (ref 136–145)
WBC NRBC COR # BLD: 15.86 10*3/MM3 (ref 3.4–10.8)

## 2022-06-20 PROCEDURE — 83010 ASSAY OF HAPTOGLOBIN QUANT: CPT

## 2022-06-20 PROCEDURE — 85025 COMPLETE CBC W/AUTO DIFF WBC: CPT

## 2022-06-20 PROCEDURE — G0463 HOSPITAL OUTPT CLINIC VISIT: HCPCS

## 2022-06-20 PROCEDURE — 36415 COLL VENOUS BLD VENIPUNCTURE: CPT

## 2022-06-20 PROCEDURE — 83615 LACTATE (LD) (LDH) ENZYME: CPT

## 2022-06-20 PROCEDURE — 80053 COMPREHEN METABOLIC PANEL: CPT

## 2022-06-20 PROCEDURE — 85045 AUTOMATED RETICULOCYTE COUNT: CPT

## 2022-06-20 NOTE — PROGRESS NOTES
Patient does not meet parameters for blood transfusion, HGB 9.6. No complaints from patient ecept for finger and toe joint locking up at times. Patient will address with physician at next apt.

## 2022-06-21 LAB — HAPTOGLOB SERPL-MCNC: <10 MG/DL (ref 30–200)

## 2022-06-27 ENCOUNTER — LAB (OUTPATIENT)
Dept: LAB | Facility: HOSPITAL | Age: 67
End: 2022-06-27

## 2022-06-27 ENCOUNTER — TELEPHONE (OUTPATIENT)
Dept: ONCOLOGY | Facility: CLINIC | Age: 67
End: 2022-06-27

## 2022-06-27 DIAGNOSIS — D59.10 AUTOIMMUNE HEMOLYTIC ANEMIA: ICD-10-CM

## 2022-06-27 DIAGNOSIS — D59.10 AUTOIMMUNE HEMOLYTIC ANEMIA: Primary | ICD-10-CM

## 2022-06-27 LAB
ALBUMIN SERPL-MCNC: 4 G/DL (ref 3.5–5.2)
ALBUMIN/GLOB SERPL: 2 G/DL
ALP SERPL-CCNC: 63 U/L (ref 39–117)
ALT SERPL W P-5'-P-CCNC: 25 U/L (ref 1–33)
ANION GAP SERPL CALCULATED.3IONS-SCNC: 10 MMOL/L (ref 5–15)
AST SERPL-CCNC: 34 U/L (ref 1–32)
BASOPHILS # BLD AUTO: 0.1 10*3/MM3 (ref 0–0.2)
BASOPHILS NFR BLD AUTO: 0.6 % (ref 0–1.5)
BILIRUB SERPL-MCNC: 1.5 MG/DL (ref 0–1.2)
BUN SERPL-MCNC: 15 MG/DL (ref 8–23)
BUN/CREAT SERPL: 16.7 (ref 7–25)
CALCIUM SPEC-SCNC: 8.8 MG/DL (ref 8.6–10.5)
CHLORIDE SERPL-SCNC: 102 MMOL/L (ref 98–107)
CO2 SERPL-SCNC: 27 MMOL/L (ref 22–29)
CREAT SERPL-MCNC: 0.9 MG/DL (ref 0.57–1)
DEPRECATED RDW RBC AUTO: 71.6 FL (ref 37–54)
EGFRCR SERPLBLD CKD-EPI 2021: 70.7 ML/MIN/1.73
EOSINOPHIL # BLD AUTO: 0.24 10*3/MM3 (ref 0–0.4)
EOSINOPHIL NFR BLD AUTO: 1.3 % (ref 0.3–6.2)
ERYTHROCYTE [DISTWIDTH] IN BLOOD BY AUTOMATED COUNT: 20.2 % (ref 12.3–15.4)
GLOBULIN UR ELPH-MCNC: 2 GM/DL
GLUCOSE SERPL-MCNC: 250 MG/DL (ref 65–99)
HAPTOGLOB SERPL-MCNC: <10 MG/DL (ref 30–200)
HCT VFR BLD AUTO: 31.2 % (ref 34–46.6)
HGB BLD-MCNC: 10.5 G/DL (ref 12–15.9)
IMM GRANULOCYTES # BLD AUTO: 0.63 10*3/MM3 (ref 0–0.05)
IMM GRANULOCYTES NFR BLD AUTO: 3.5 % (ref 0–0.5)
LDH SERPL-CCNC: 342 U/L (ref 135–214)
LYMPHOCYTES # BLD AUTO: 1.49 10*3/MM3 (ref 0.7–3.1)
LYMPHOCYTES NFR BLD AUTO: 8.3 % (ref 19.6–45.3)
MCH RBC QN AUTO: 33.8 PG (ref 26.6–33)
MCHC RBC AUTO-ENTMCNC: 33.7 G/DL (ref 31.5–35.7)
MCV RBC AUTO: 100.3 FL (ref 79–97)
MONOCYTES # BLD AUTO: 1.21 10*3/MM3 (ref 0.1–0.9)
MONOCYTES NFR BLD AUTO: 6.7 % (ref 5–12)
NEUTROPHILS NFR BLD AUTO: 14.37 10*3/MM3 (ref 1.7–7)
NEUTROPHILS NFR BLD AUTO: 79.6 % (ref 42.7–76)
NRBC BLD AUTO-RTO: 0.1 /100 WBC (ref 0–0.2)
PLATELET # BLD AUTO: 283 10*3/MM3 (ref 140–450)
PMV BLD AUTO: 11.3 FL (ref 6–12)
POTASSIUM SERPL-SCNC: 3.8 MMOL/L (ref 3.5–5.2)
PROT SERPL-MCNC: 6 G/DL (ref 6–8.5)
RBC # BLD AUTO: 3.11 10*6/MM3 (ref 3.77–5.28)
RETICS # AUTO: 0.4 10*6/MM3 (ref 0.02–0.13)
RETICS/RBC NFR AUTO: 12.98 % (ref 0.7–1.9)
SODIUM SERPL-SCNC: 139 MMOL/L (ref 136–145)
WBC NRBC COR # BLD: 18.04 10*3/MM3 (ref 3.4–10.8)

## 2022-06-27 PROCEDURE — 36415 COLL VENOUS BLD VENIPUNCTURE: CPT

## 2022-06-27 PROCEDURE — 83615 LACTATE (LD) (LDH) ENZYME: CPT

## 2022-06-27 PROCEDURE — 85025 COMPLETE CBC W/AUTO DIFF WBC: CPT

## 2022-06-27 PROCEDURE — 83010 ASSAY OF HAPTOGLOBIN QUANT: CPT

## 2022-06-27 PROCEDURE — 85045 AUTOMATED RETICULOCYTE COUNT: CPT

## 2022-06-27 PROCEDURE — 80053 COMPREHEN METABOLIC PANEL: CPT

## 2022-06-27 NOTE — TELEPHONE ENCOUNTER
Patient notified Glucose is 250, but to continue with her oral steroids as prescribed.   She states she has a insulin pump and is able to make adjustments after she takes the steroids in the morning to get her number back down. She says her lab was drawn early this am.   She will f/u with Dr Elizalde on Wed.

## 2022-06-27 NOTE — TELEPHONE ENCOUNTER
----- Message from Filemon Elizalde MD sent at 6/27/2022  8:32 AM CDT -----  Notify patient.  Glucose 250.  Continue steroid.

## 2022-06-29 ENCOUNTER — APPOINTMENT (OUTPATIENT)
Dept: LAB | Facility: HOSPITAL | Age: 67
End: 2022-06-29

## 2022-06-29 ENCOUNTER — OFFICE VISIT (OUTPATIENT)
Dept: ONCOLOGY | Facility: CLINIC | Age: 67
End: 2022-06-29

## 2022-06-29 VITALS
RESPIRATION RATE: 18 BRPM | OXYGEN SATURATION: 98 % | SYSTOLIC BLOOD PRESSURE: 118 MMHG | BODY MASS INDEX: 25.1 KG/M2 | TEMPERATURE: 96.5 F | DIASTOLIC BLOOD PRESSURE: 60 MMHG | HEART RATE: 70 BPM | WEIGHT: 147 LBS | HEIGHT: 64 IN

## 2022-06-29 DIAGNOSIS — D59.10 AUTOIMMUNE HEMOLYTIC ANEMIA: Primary | ICD-10-CM

## 2022-06-29 DIAGNOSIS — D50.8 IRON DEFICIENCY ANEMIA SECONDARY TO INADEQUATE DIETARY IRON INTAKE: ICD-10-CM

## 2022-06-29 PROCEDURE — 99214 OFFICE O/P EST MOD 30 MIN: CPT | Performed by: INTERNAL MEDICINE

## 2022-06-29 RX ORDER — DIPHENHYDRAMINE HCL 25 MG
25 CAPSULE ORAL EVERY 6 HOURS PRN
COMMUNITY

## 2022-07-05 ENCOUNTER — TELEPHONE (OUTPATIENT)
Dept: ONCOLOGY | Facility: CLINIC | Age: 67
End: 2022-07-05

## 2022-07-05 ENCOUNTER — CLINICAL SUPPORT (OUTPATIENT)
Dept: ONCOLOGY | Facility: HOSPITAL | Age: 67
End: 2022-07-05

## 2022-07-05 ENCOUNTER — LAB (OUTPATIENT)
Dept: LAB | Facility: HOSPITAL | Age: 67
End: 2022-07-05

## 2022-07-05 DIAGNOSIS — D59.10 AUTOIMMUNE HEMOLYTIC ANEMIA: ICD-10-CM

## 2022-07-05 LAB
ALBUMIN SERPL-MCNC: 4 G/DL (ref 3.5–5.2)
ALBUMIN/GLOB SERPL: 1.9 G/DL
ALP SERPL-CCNC: 60 U/L (ref 39–117)
ALT SERPL W P-5'-P-CCNC: 17 U/L (ref 1–33)
ANION GAP SERPL CALCULATED.3IONS-SCNC: 8 MMOL/L (ref 5–15)
AST SERPL-CCNC: 22 U/L (ref 1–32)
BASOPHILS # BLD AUTO: 0.14 10*3/MM3 (ref 0–0.2)
BASOPHILS NFR BLD AUTO: 0.9 % (ref 0–1.5)
BILIRUB SERPL-MCNC: 1.6 MG/DL (ref 0–1.2)
BUN SERPL-MCNC: 16 MG/DL (ref 8–23)
BUN/CREAT SERPL: 17.2 (ref 7–25)
CALCIUM SPEC-SCNC: 8.6 MG/DL (ref 8.6–10.5)
CHLORIDE SERPL-SCNC: 100 MMOL/L (ref 98–107)
CO2 SERPL-SCNC: 29 MMOL/L (ref 22–29)
CREAT SERPL-MCNC: 0.93 MG/DL (ref 0.57–1)
DEPRECATED RDW RBC AUTO: 69.8 FL (ref 37–54)
EGFRCR SERPLBLD CKD-EPI 2021: 67.9 ML/MIN/1.73
EOSINOPHIL # BLD AUTO: 0.22 10*3/MM3 (ref 0–0.4)
EOSINOPHIL NFR BLD AUTO: 1.4 % (ref 0.3–6.2)
ERYTHROCYTE [DISTWIDTH] IN BLOOD BY AUTOMATED COUNT: 19.3 % (ref 12.3–15.4)
GLOBULIN UR ELPH-MCNC: 2.1 GM/DL
GLUCOSE SERPL-MCNC: 205 MG/DL (ref 65–99)
HAPTOGLOB SERPL-MCNC: <10 MG/DL (ref 30–200)
HCT VFR BLD AUTO: 30.8 % (ref 34–46.6)
HGB BLD-MCNC: 10 G/DL (ref 12–15.9)
IMM GRANULOCYTES # BLD AUTO: 0.64 10*3/MM3 (ref 0–0.05)
IMM GRANULOCYTES NFR BLD AUTO: 3.9 % (ref 0–0.5)
LDH SERPL-CCNC: 344 U/L (ref 135–214)
LYMPHOCYTES # BLD AUTO: 1.85 10*3/MM3 (ref 0.7–3.1)
LYMPHOCYTES NFR BLD AUTO: 11.4 % (ref 19.6–45.3)
MCH RBC QN AUTO: 33.1 PG (ref 26.6–33)
MCHC RBC AUTO-ENTMCNC: 32.5 G/DL (ref 31.5–35.7)
MCV RBC AUTO: 102 FL (ref 79–97)
MONOCYTES # BLD AUTO: 1.12 10*3/MM3 (ref 0.1–0.9)
MONOCYTES NFR BLD AUTO: 6.9 % (ref 5–12)
NEUTROPHILS NFR BLD AUTO: 12.28 10*3/MM3 (ref 1.7–7)
NEUTROPHILS NFR BLD AUTO: 75.5 % (ref 42.7–76)
NRBC BLD AUTO-RTO: 0 /100 WBC (ref 0–0.2)
PLATELET # BLD AUTO: 285 10*3/MM3 (ref 140–450)
PMV BLD AUTO: 11.3 FL (ref 6–12)
POTASSIUM SERPL-SCNC: 4.1 MMOL/L (ref 3.5–5.2)
PROT SERPL-MCNC: 6.1 G/DL (ref 6–8.5)
RBC # BLD AUTO: 3.02 10*6/MM3 (ref 3.77–5.28)
RETICS # AUTO: 0.41 10*6/MM3 (ref 0.02–0.13)
RETICS/RBC NFR AUTO: 14.4 % (ref 0.7–1.9)
SODIUM SERPL-SCNC: 137 MMOL/L (ref 136–145)
WBC NRBC COR # BLD: 16.25 10*3/MM3 (ref 3.4–10.8)

## 2022-07-05 PROCEDURE — 83615 LACTATE (LD) (LDH) ENZYME: CPT

## 2022-07-05 PROCEDURE — 83010 ASSAY OF HAPTOGLOBIN QUANT: CPT

## 2022-07-05 PROCEDURE — 85025 COMPLETE CBC W/AUTO DIFF WBC: CPT

## 2022-07-05 PROCEDURE — 36415 COLL VENOUS BLD VENIPUNCTURE: CPT

## 2022-07-05 PROCEDURE — G0463 HOSPITAL OUTPT CLINIC VISIT: HCPCS

## 2022-07-05 PROCEDURE — 85045 AUTOMATED RETICULOCYTE COUNT: CPT

## 2022-07-05 PROCEDURE — 80053 COMPREHEN METABOLIC PANEL: CPT

## 2022-07-05 RX ORDER — ALLOPURINOL 100 MG/1
100 TABLET ORAL DAILY
Qty: 30 TABLET | Refills: 1 | Status: CANCELLED | OUTPATIENT
Start: 2022-07-05

## 2022-07-05 RX ORDER — ALLOPURINOL 100 MG/1
100 TABLET ORAL DAILY
Qty: 30 TABLET | Refills: 1 | Status: SHIPPED | OUTPATIENT
Start: 2022-07-05 | End: 2022-09-14

## 2022-07-05 NOTE — TELEPHONE ENCOUNTER
Patient stopped by the office, she is needing a refill on her allopurinol and asked if it could be sent to [facility] on [address].

## 2022-07-07 NOTE — PROGRESS NOTES
[facility] HEMATOLOGY & ONCOLOGY  [address]  [place] [address] [phone]    Patient Name: Giana Yepez  Encounter Date: 07/20/2022  YOB: 1955  Patient Number: [id number]      REASON FOR FOLLOW-UP:  Giana Yepez is a pleasant 67 y.o.  female who is seen on follow-up for autoimmune hemolytic anemia.  She had a dose of rituximab on 04/28/2022, she was intolerant.  She was taking prednisone 1 mg /kg and decrease to 50 mg daily for 1 week from 07/11/2022 and 40 mg po daily since 07/18/2022.   She is seen alone.  She is a reliable historian.          Problem List Items Addressed This Visit        Other    Autoimmune hemolytic anemia (HCC) - Primary    Overview     DIAGNOSTIC ABNORMALITIES:  She presented with worsening fatigue and weight loss approximately 10 pounds in the last 2 months.  WBC 18.2, ANC 12.4, hemoglobin 7.6, hematocrit 24.1, , and platelet 348, folate 12.6, B12 at 1988, TSH 1.96, erythropoietin 61.5, sed rate 3, , haptoglobin less than 10, CMP remarkable for AST of 33 and bilirubin 2.3, IgG 1035, and negative BCR ABL on 04/19/2022.  Reticulocyte 25.69% and Rona test was positive on 04/22/2022.  Negative hepatitis B core antibody, negative HIV, negative LIZ panel and blood for flow cytometry 04/22/2022, negative Bcl-2, BCL6 and MYC.  CT neck, chest, abdomen and pelvis on 04/23/2022.9 mm relatively hypodense well-circumscribed asymmetry at the level of the upper left vallecula and base of the tongue on the left. No associated enhancement of the wall or intraluminal air making an infectious process less likely. This measures Hounsfield units of 36 suggesting it may be soft tissue in nature. Direct visualization is recommended.  No pathologically enlarged cervical chain or posterior triangle lymphadenopathy is present. There are some borderline enlarged left supraclavicular nodes with  measurements as above. No additional discrete mass lesions are appreciated. Level of the true and false cords is unremarkable. The thyroid gland is homogeneous in density without evidence of nodularity or mass.  Borderline enlarged mediastinal nodes are present as well as a few left supraclavicular nodes. No additional enlarged nodes are identified.  Right middle lobe atelectasis or scarring is present. There is mild patchy pneumonitis within the superior segment of both lower lobes and apical posterior segment of the left upper lobe suggesting a patchy pneumonitis/bronchopneumonia. Lungs are otherwise clear. No evidence of lobar consolidation. No discrete endobronchial lesion is identified.  The heart is normal in size. The thoracic aorta and great vessels are normal in caliber.  No evidence of intraperitoneal or retroperitoneal lymphadenopathy.  Homogeneous enhancement of the kidneys. No evidence of nephrolithiasis or obstructive uropathy.  Constipation with increased stool throughout the colon. No evidence of mechanical obstruction or free air.  The uterus and adnexa are unremarkable.  Bone marrow biopsy 04/25/2022.  Hypercellular marrow for age 70 to 80% with maturing trilineage hematopoiesis.  Erythroid hyperplasia.  Slight megakaryocyte ptosis.  Stainable iron is present.  No evidence of acute leukemia, lymphoproliferative disorder or plasma cell dyscrasia.  No morphologic evidence of an overt or advanced myeloid neoplasm.  Cytogenetics 46 XX. Flow cytometry showed no evidence for abnormal myeloid maturation or an increased blast population.  No evidence for a limp or proliferative disorder.  Antibody identification, autoantibody LE specificity on 04/26/2022.        PREVIOUS INTERVENTIONS:  1 unit packed RBC on 04/06/2022 and 04/26/2022.  Prednisone 60 mg on 04/22/2022 through 05/02/2022. Resume 05/09/2022 through 07/10/2022. 50 mg 07/11/20222 through 07/17/2022. 40 mg 07/18/2022 through present.  IVIG on  "04/26/2020 04/27/2022.  Folic acid 1 mg p.o. daily from 04/30/2022 through present.  Rituximab 04/28/2022, intolerant. \"My mouth felt like I ate a mouthful of hot peepers, blisters roof of mouth, nose burning, sneezing like smelt burned peppers, and my eyes were pouring. All within 45 minutes of infusion.  Low blood pressure the next 4 days.\"                Oncology/Hematology History    No history exists.       PAST MEDICAL HISTORY:  ALLERGIES:  Allergies   Allergen Reactions   • Corn-Containing Products Diarrhea   • Eggs Or Egg-Derived Products Diarrhea   • Nuts Shortness Of Breath     peanuts   • Milk-Related Compounds Nausea And Vomiting   • Gabapentin Dizziness     Patient states becomes weak and dizzy if she takes Gabapentin   • Levemir [Insulin Detemir] Itching and Other (See Comments)     Patient states gets a \"hard knot and itching\" at injection site.   • Wheat Rash     CURRENT MEDICATIONS:  Outpatient Encounter Medications as of 7/20/2022   Medication Sig Dispense Refill   • allopurinol (ZYLOPRIM) 100 MG tablet Take 1 tablet by mouth Daily. 30 tablet 1   • Calcium Citrate (CITRACAL PO) Take  by mouth.     • cholecalciferol (VITAMIN D3) 25 MCG (1000 UT) tablet Take 2,000 Units by mouth Daily.     • Continuous Blood Gluc  (Dexcom G5  Kit) device Continuous.     • cyanocobalamin (VITAMIN B-12) 1000 MCG tablet Take  by mouth Daily.     • diphenhydrAMINE (BENADRYL) 25 mg capsule Take 25 mg by mouth Every 6 (Six) Hours As Needed for Itching.     • ferrous sulfate 325 (65 FE) MG tablet Take 325 mg by mouth Daily With Breakfast.     • fluticasone (FLONASE) 50 MCG/ACT nasal spray 2 sprays into the nostril(s) as directed by provider Daily. To control swelling and drainage from allergies 16 g 11   • folic acid (FOLVITE) 1 MG tablet Take 1 tablet by mouth Daily. 30 tablet 2   • Insulin Infusion Pump device Continuous.     • Insulin Lispro (humaLOG) 100 UNIT/ML injection Inject 25-40 Units under the " skin into the appropriate area as directed Daily.     • Loratadine 10 MG capsule Take 10 mg by mouth every night at bedtime. As anti-histamine for allergies     • omeprazole (priLOSEC) 40 MG capsule Take 40 mg by mouth Daily.     • predniSONE (DELTASONE) 20 MG tablet Take 3 tabs (60 mg total dose) daily with food (Patient taking differently: Take 2 tabs (40 mg total dose) daily with food) 90 tablet 2   • rosuvastatin (CRESTOR) 20 MG tablet Take 20 mg by mouth Daily.     • vitamin B-12 (CYANOCOBALAMIN) 1000 MCG tablet Take 1,000 mcg by mouth Daily.       No facility-administered encounter medications on file as of 2022.     ADULT ILLNESSES:  Patient Active Problem List   Diagnosis Code   • Gastroesophageal reflux disease K21.9   • Type 1 diabetes mellitus with hyperglycemia (HCC), insulin depdendent E10.65   • Intermittent chest pain R07.9   • Hypercholesterolemia E78.00   • Vitamin D deficiency E55.9   • Vitamin B12 deficiency E53.8   • Diarrhea R19.7   • Abdominal cramping R10.9   • Nausea R11.0   • Collagenous colitis K52.831   • Autoimmune hemolytic anemia (HCC) D59.10   • Insulin pump in place Z96.41   • Hyperbilirubinemia E80.6   • Mediastinal lymphadenopathy R59.0   • Tobacco abuse Z72.0   • Hyponatremia E87.1   • Orthostatic hypotension I95.1     SURGERIES:  Past Surgical History:   Procedure Laterality Date   • ANKLE SURGERY Right    • APPENDECTOMY     • BREAST BIOPSY     •  SECTION      X 2   • CHOLECYSTECTOMY     • COLONOSCOPY      [facility]   • COLONOSCOPY N/A 2017    Procedure: COLONOSCOPY WITH ANESTHESIA;  Surgeon: Ondina Abdul MD;  Location: [facility] ENDOSCOPY;  Service:    • ENDOSCOPY N/A 2017    Procedure: ESOPHAGOGASTRODUODENOSCOPY WITH ANESTHESIA;  Surgeon: Ondina Abdul MD;  Location: [facility] ENDOSCOPY;  Service:    • EYE SURGERY  2019    cataract   • SKIN CANCER EXCISION  2016    FACE   • TONSILLECTOMY       HEALTH MAINTENANCE ITEMS:  Health  Maintenance Due   Topic Date Due   • Pneumococcal Vaccine 65+ (1 - PCV) Never done   • TDAP/TD VACCINES (1 - Tdap) Never done   • ZOSTER VACCINE (1 of 2) Never done   • ANNUAL WELLNESS VISIT  Never done   • DIABETIC FOOT EXAM  Never done   • PAP SMEAR  Never done   • DIABETIC EYE EXAM  Never done   • DXA SCAN  11/14/2018   • MAMMOGRAM  11/30/2019   • COVID-19 Vaccine (3 - Booster for Moderna series) 03/02/2022       <no information>  Last Completed Colonoscopy          COLORECTAL CANCER SCREENING (COLONOSCOPY - Every 10 Years) Next due on 3/3/2027    03/03/2017  Surgical Procedure: COLONOSCOPY    03/03/2017  COLONOSCOPY    02/01/2017  Occult Blood X 3, Stool              Immunization History   Administered Date(s) Administered   • COVID-19 (MODERNA) 1st, 2nd, 3rd Dose Only 08/31/2021, 10/02/2021     Last Completed Mammogram     This patient has no relevant Health Maintenance data.            FAMILY HISTORY:  Family History   Problem Relation Age of Onset   • Asthma Mother    • Heart disease Mother    • COPD Mother    • Heart disease Father    • Early death Father         heart attack @ 60   • Heart disease Sister    • Hypertension Sister    • COPD Sister    • Diabetes Sister         Type 2   • Diabetes Sister    • Other Sister    • Hearing loss Sister         born deaf   • Heart disease Brother    • Breast cancer Paternal Aunt    • No Known Problems Son    • No Known Problems Son    • Hypertension Maternal Grandfather    • Stroke Maternal Grandfather    • Diabetes Maternal Grandmother    • Vision loss Maternal Grandmother         from diabetes   • Heart disease Paternal Grandfather    • Hypertension Paternal Grandfather    • Cancer Paternal Aunt    • Hearing loss Brother         wears hearing aids   • Heart disease Brother    • Hypertension Brother    • Heart disease Sister    • Hypertension Sister      SOCIAL HISTORY:  Social History     Socioeconomic History   • Marital status:    Tobacco Use   • Smoking  "status: Former Smoker     Packs/day: 0.25     Years: 40.00     Pack years: 10.00     Types: Cigarettes     Start date: 1975     Quit date: 2022     Years since quittin.2   • Smokeless tobacco: Never Used   • Tobacco comment: quit during two pregnancies but went back to smoking after delivery   Vaping Use   • Vaping Use: Never used   Substance and Sexual Activity   • Alcohol use: Not Currently     Alcohol/week: 12.0 standard drinks     Types: 12 Cans of beer per week     Comment: drink at early 20's   • Drug use: No   • Sexual activity: Not Currently     Partners: Male     Birth control/protection: None     Comment: went through menopause over 10 years go       REVIEW OF SYSTEMS:    Review of Systems   Constitutional: Positive for fatigue. Negative for chills and fever.        \"I feel better.\"   HENT: Negative for congestion, mouth sores and trouble swallowing.    Eyes: Negative for discharge and redness.   Respiratory: Negative for shortness of breath and wheezing.    Cardiovascular: Negative for chest pain and palpitations.   Gastrointestinal: Negative for abdominal pain, nausea and vomiting.   Endocrine: Negative for polydipsia and polyphagia.   Genitourinary: Negative for difficulty urinating, dysuria and flank pain.   Musculoskeletal: Negative for gait problem and neck stiffness.   Skin: Positive for pallor.   Allergic/Immunologic: Positive for food allergies.   Neurological: Negative for speech difficulty, weakness and confusion.        Periods of dizziness.    Hematological: Negative for adenopathy. Does not bruise/bleed easily.   Psychiatric/Behavioral: Negative for agitation, dysphoric mood and hallucinations.         VITAL SIGNS: /66   Pulse 86   Temp 97.5 °F (36.4 °C)   Resp 18   Ht 162.6 cm (64\")   Wt 66.5 kg (146 lb 9.6 oz)   SpO2 98%   Breastfeeding No   BMI 25.16 kg/m²  Body surface area is 1.71 meters squared.   Pain Score    22 0917   PainSc: 2  Comment: slight " headache           PHYSICAL EXAMINATION:     Physical Exam  Vitals reviewed.   Constitutional:       General: She is not in acute distress.  HENT:      Head: Normocephalic and atraumatic.   Eyes:      General: No scleral icterus.  Cardiovascular:      Rate and Rhythm: Normal rate.   Pulmonary:      Effort: No respiratory distress.      Breath sounds: No wheezing or rales.   Abdominal:      General: Bowel sounds are normal.      Palpations: Abdomen is soft.      Tenderness: There is no abdominal tenderness.   Musculoskeletal:         General: No swelling.      Cervical back: Neck supple.   Skin:     General: Skin is warm.      Coloration: Skin is pale.   Neurological:      Mental Status: She is alert and oriented to person, place, and time.   Psychiatric:         Mood and Affect: Mood normal.         Behavior: Behavior normal.         Thought Content: Thought content normal.         Judgment: Judgment normal.         LABS    Lab Results - Last 18 Months   Lab Units 07/18/22  0802 07/11/22  0747 07/05/22  0714 06/27/22  0751 06/20/22  1409 06/13/22  1407 06/06/22  0813 04/28/22  0709 04/27/22  0539 04/26/22  0646 04/19/22  0859 04/04/22  1034   HEMOGLOBIN g/dL 11.1* 10.6* 10.0* 10.5* 9.6* 9.3* 9.8*   < > 9.4* 6.8*   < > 7.0*   HEMATOCRIT % 33.2* 32.5* 30.8* 31.2* 29.1* 28.1* 29.9*   < > 29.4* 21.7*   < > 22.7*   MCV fL 101.5* 102.2* 102.0* 100.3* 101.0* 99.6* 101.4*   < > 106.1* 110.2*   < > 112.9*   WBC 10*3/mm3 17.82* 16.47* 16.25* 18.04* 15.86* 15.54* 19.33*   < > 15.25* 16.09*   < > 18.99*   RDW % 19.1* 19.4* 19.3* 20.2* 19.0* 19.5* 19.2*   < > 25.2* 21.7*   < > 22.4*   MPV fL 11.2 11.2 11.3 11.3 11.6 11.1 11.3   < > 11.5 11.2   < > 11.2   PLATELETS 10*3/mm3 245 307 285 283 312 351 298   < > 218 235   < > 329   IMM GRAN % %  --  4.6* 3.9* 3.5* 2.8* 4.3* 3.9*   < >  --   --    < >  --    NEUTROS ABS 10*3/mm3 12.47* 10.96* 12.28* 14.37* 13.53* 13.52* 15.44*   < > 10.11* 12.03*   < > 14.29*   LYMPHS ABS 10*3/mm3  --   3.14* 1.85 1.49 0.94 0.62* 1.30   < >  --   --    < >  --    MONOS ABS 10*3/mm3  --  1.16* 1.12* 1.21* 0.81 0.58 1.40*   < >  --   --    < >  --    EOS ABS 10*3/mm3 0.36 0.29 0.22 0.24 0.04 0.04 0.30   < > 0.78*  --    < > 0.19   BASOS ABS 10*3/mm3  --  0.16 0.14 0.10 0.09 0.11 0.13   < >  --   --    < > 0.19   IMMATURE GRANS (ABS) 10*3/mm3  --  0.76* 0.64* 0.63* 0.45* 0.67* 0.76*   < >  --   --    < >  --    NRBC /100 WBC  --  0.0 0.0 0.1 0.0 0.2 0.1   < > 3.1* 3.0*   < >  --    NEUTROPHIL % % 70.0  --   --   --   --   --   --   --  64.3 70.7  --  74.2   MONOCYTES % % 8.0  --   --   --   --   --   --   --  9.2 9.1  --  8.2   BASOPHIL % %  --   --   --   --   --   --   --   --   --   --   --  1.0   ANISOCYTOSIS  Large/3+  --   --   --   --   --   --   --  Mod/2+ Mod/2+  --  Mod/2+    < > = values in this interval not displayed.       Lab Results - Last 18 Months   Lab Units 07/18/22  0802 07/11/22  0747 07/05/22  0714 06/27/22  0751 06/20/22  1409 06/13/22  1407   GLUCOSE mg/dL 233* 147* 205* 250* 219* 290*   SODIUM mmol/L 135* 138 137 139 134* 138   POTASSIUM mmol/L 3.5 3.5 4.1 3.8 4.0 4.3   CO2 mmol/L 26.0 28.0 29.0 27.0 24.0 24.0   CHLORIDE mmol/L 99 102 100 102 99 103   ANION GAP mmol/L 10.0 8.0 8.0 10.0 11.0 11.0   CREATININE mg/dL 0.95 0.79 0.93 0.90 0.74 0.82   BUN mg/dL 13 14 16 15 12 12   BUN / CREAT RATIO  13.7 17.7 17.2 16.7 16.2 14.6   CALCIUM mg/dL 8.7 8.6 8.6 8.8 8.9 8.8   ALK PHOS U/L 62 55 60 63 67 68   TOTAL PROTEIN g/dL 6.3 6.1 6.1 6.0 6.2 6.3   ALT (SGPT) U/L 17 16 17 25 20 20   AST (SGOT) U/L 24 22 22 34* 22 23   BILIRUBIN mg/dL 1.6* 1.9* 1.6* 1.5* 1.5* 1.4*   ALBUMIN g/dL 4.00 4.00 4.00 4.00 4.00 4.30   GLOBULIN gm/dL 2.3 2.1 2.1 2.0 2.2 2.0       Lab Results - Last 18 Months   Lab Units 07/18/22  0802 07/11/22  0747 07/05/22  0714 06/27/22  0751 06/20/22  1409 06/13/22  1407 05/09/22  0738 04/29/22  0536 04/28/22  0709 04/27/22  0539 04/26/22  0646 04/25/22  0630 04/24/22  0819 04/23/22  0743  "04/22/22  1322 04/19/22  0859   URIC ACID mg/dL  --   --   --   --   --   --   --  1.9* 1.6* 1.5* 2.1* 3.7 6.4*   < > 5.8*  --    LDH U/L 339* 375* 344* 342* 349* 402*   < > 570* 650*  638* 668* 634* 593* 652*   < > 772* 834*   REFERENCE LAB REPORT   --   --   --   --   --   --   --   --   --   --   --   --   --   --  See Attached Report SEE ATTACHED REPORT    < > = values in this interval not displayed.       Lab Results - Last 18 Months   Lab Units 07/11/22  0747 04/22/22  1542 04/19/22  0859 04/04/22  1219 04/04/22  1035 03/31/22  1226 08/17/21  0932   IRON mcg/dL 125  --   --   --  44 129  --    TIBC mcg/dL 295*  --   --   --  261* 310  --    IRON SATURATION % 42  --   --   --  17* 42  --    FERRITIN ng/mL 327.30*  --   --   --  311.90* 454.60*  --    TSH uIU/mL  --   --  1.960  --  2.100  --  1.302   FOLATE ng/mL  --  13.70 12.60 14.20  --   --   --          Giana Yepez reports a pain score of 2.  Given her pain assessment as noted, treatment options were discussed and the following options were decided upon as a follow-up plan to address the patient's pain: continuation of current treatment plan for pain.      ASSESSMENT:  1.  Autoimmune hemolytic anemia.  Treatment status: Post IVIG x2 doses on 04/27/2022.  On prednisone 1 mg/kg from 05/09/2022 through 07/10/2022. 50 mg 07/11/20222 through 07/17/2022. 40 mg 07/18/2022 through present.   Rituximab 375 mgm2 on 04/28/2022.  Hypersensitivity reaction to rituximab. Will rechallenge.   2.  Borderline enlarged mediastinal nodes on 04/23/2022.  3.  9 mm hypodense well-circumscribed asymmetry at the level of the upper left vallecula and base of the tongue on the left on 04/23/2022.  4.  LE autoantibody.            PLAN:  1.   Re: Tolerance to prednisone. \"It's okay.\" she had seen Dr. Montes on 06/08/2022 for cough.  Diagnosed with allergic rhinitis and given Flonase.  2.   Re: Second opinion at [facility] 07/06/2022.  She had seen Dr. Tolbert " "07/06/2022.  Dr. Tolbert called 07/11/2022.  Start tapering prednisone 10 mg/week.  Once at 20 mg, may taper 5 mg/week or 10 mg/week if her counts holds.  Patient will be referred to [facility] to desensitize her for rituximab.   \"I can't wait to see the immunologist. Let's go ahead with Rituxan.   Consider danazol, Cytoxan or azathioprine if she fails steroid.  Other option is splenectomy.  Further recommendations pending.  3.   Re: Heme status.  WBC 17.8, hemoglobin 11.1, MCV 101.5, and platelet 245.  Reticulocyte count 12.3.  4.   Re: CMP.  Total bilirubin 1.6 from 1.5 from 1.5 from 1.4 from 1.7 from 1.3 from from  1.7 from 1.3 from 1.7 from 1.8 and glucose 233.    5.   Re: Haptoglobin < 10 and LDH 339.   6.  Blood for CBC with differential, CMP, haptoglobin, LDH and reticulocyte count weekly.  7.  eRx prednisone 40 mg p.o. daily taken with food or milk #60 with 2 refills if needed.  Monitor for gastrointestinal complications specially bleeding.   8.  eRx folic acid 1 mg p.o. daily #90 with 3 refills if needed.  9.  Rituximab 375 mg/m2 weekly times 4 as slow as possible. Monitor for recurrent hypersensitivity reactions or anaphylaxis.   10.  Premed:  Tylenol 500 mg p.o.  Benadryl 50 mg IV  Pepcid 20 mg IV  Methylprednisolone 125 mg IV.   11.  Continue care per primary care physician.  12.  Plan of care discussed with patient.  Understanding expressed.  Patient agreeable to proceed.  13.  Transfuse 1 unit packed RBC if hemoglobin less than 6.9.  Premed Tylenol 500 mg p.o.  Lasix 20 mg IV push after transfusion.  Monitor for transfusion reactions.  14.  Advance Care Planning   ACP discussion was declined by the patient. Patient does not have an advance directive, information provided.  15.  Return to office in 08/01/2022 to assess tolerance to Rituxan.        I have reviewed the assessment and plan and verified the accuracy of it. No changes to assessment and plan since the " information was documented. Filemon Elizalde MD 07/20/22        I spent 41 total minutes, face-to-face, caring for Giana today.  Greater than 50% of this time involved counseling and/or coordination of care as documented within this note regarding the patient's illness(es), pros and cons of various treatment options, instructions and/or risk reduction.             Camila Montes MD  (Gerber Tolbert MD [facility])  (Dominick Fernandez MD [facility])

## 2022-07-11 ENCOUNTER — TELEPHONE (OUTPATIENT)
Dept: ONCOLOGY | Facility: CLINIC | Age: 67
End: 2022-07-11

## 2022-07-11 ENCOUNTER — LAB (OUTPATIENT)
Dept: LAB | Facility: HOSPITAL | Age: 67
End: 2022-07-11

## 2022-07-11 ENCOUNTER — CLINICAL SUPPORT (OUTPATIENT)
Dept: ONCOLOGY | Facility: HOSPITAL | Age: 67
End: 2022-07-11

## 2022-07-11 VITALS
TEMPERATURE: 97.8 F | OXYGEN SATURATION: 100 % | RESPIRATION RATE: 20 BRPM | HEIGHT: 64 IN | DIASTOLIC BLOOD PRESSURE: 72 MMHG | SYSTOLIC BLOOD PRESSURE: 151 MMHG | HEART RATE: 60 BPM | BODY MASS INDEX: 25.78 KG/M2 | WEIGHT: 151 LBS

## 2022-07-11 DIAGNOSIS — D50.8 IRON DEFICIENCY ANEMIA SECONDARY TO INADEQUATE DIETARY IRON INTAKE: ICD-10-CM

## 2022-07-11 LAB
ALBUMIN SERPL-MCNC: 4 G/DL (ref 3.5–5.2)
ALBUMIN/GLOB SERPL: 1.9 G/DL
ALP SERPL-CCNC: 55 U/L (ref 39–117)
ALT SERPL W P-5'-P-CCNC: 16 U/L (ref 1–33)
ANION GAP SERPL CALCULATED.3IONS-SCNC: 8 MMOL/L (ref 5–15)
AST SERPL-CCNC: 22 U/L (ref 1–32)
BASOPHILS # BLD AUTO: 0.16 10*3/MM3 (ref 0–0.2)
BASOPHILS NFR BLD AUTO: 1 % (ref 0–1.5)
BILIRUB SERPL-MCNC: 1.9 MG/DL (ref 0–1.2)
BUN SERPL-MCNC: 14 MG/DL (ref 8–23)
BUN/CREAT SERPL: 17.7 (ref 7–25)
CALCIUM SPEC-SCNC: 8.6 MG/DL (ref 8.6–10.5)
CHLORIDE SERPL-SCNC: 102 MMOL/L (ref 98–107)
CO2 SERPL-SCNC: 28 MMOL/L (ref 22–29)
CREAT SERPL-MCNC: 0.79 MG/DL (ref 0.57–1)
DEPRECATED RDW RBC AUTO: 70.3 FL (ref 37–54)
EGFRCR SERPLBLD CKD-EPI 2021: 82.6 ML/MIN/1.73
EOSINOPHIL # BLD AUTO: 0.29 10*3/MM3 (ref 0–0.4)
EOSINOPHIL NFR BLD AUTO: 1.8 % (ref 0.3–6.2)
ERYTHROCYTE [DISTWIDTH] IN BLOOD BY AUTOMATED COUNT: 19.4 % (ref 12.3–15.4)
FERRITIN SERPL-MCNC: 327.3 NG/ML (ref 13–150)
GLOBULIN UR ELPH-MCNC: 2.1 GM/DL
GLUCOSE SERPL-MCNC: 147 MG/DL (ref 65–99)
HAPTOGLOB SERPL-MCNC: <10 MG/DL (ref 30–200)
HCT VFR BLD AUTO: 32.5 % (ref 34–46.6)
HGB BLD-MCNC: 10.6 G/DL (ref 12–15.9)
IMM GRANULOCYTES # BLD AUTO: 0.76 10*3/MM3 (ref 0–0.05)
IMM GRANULOCYTES NFR BLD AUTO: 4.6 % (ref 0–0.5)
IRON 24H UR-MRATE: 125 MCG/DL (ref 37–145)
IRON SATN MFR SERPL: 42 % (ref 20–50)
LDH SERPL-CCNC: 375 U/L (ref 135–214)
LYMPHOCYTES # BLD AUTO: 3.14 10*3/MM3 (ref 0.7–3.1)
LYMPHOCYTES NFR BLD AUTO: 19.1 % (ref 19.6–45.3)
MCH RBC QN AUTO: 33.3 PG (ref 26.6–33)
MCHC RBC AUTO-ENTMCNC: 32.6 G/DL (ref 31.5–35.7)
MCV RBC AUTO: 102.2 FL (ref 79–97)
MONOCYTES # BLD AUTO: 1.16 10*3/MM3 (ref 0.1–0.9)
MONOCYTES NFR BLD AUTO: 7 % (ref 5–12)
NEUTROPHILS NFR BLD AUTO: 10.96 10*3/MM3 (ref 1.7–7)
NEUTROPHILS NFR BLD AUTO: 66.5 % (ref 42.7–76)
NRBC BLD AUTO-RTO: 0 /100 WBC (ref 0–0.2)
PLATELET # BLD AUTO: 307 10*3/MM3 (ref 140–450)
PMV BLD AUTO: 11.2 FL (ref 6–12)
POTASSIUM SERPL-SCNC: 3.5 MMOL/L (ref 3.5–5.2)
PROT SERPL-MCNC: 6.1 G/DL (ref 6–8.5)
RBC # BLD AUTO: 3.18 10*6/MM3 (ref 3.77–5.28)
RETICS # AUTO: 0.41 10*6/MM3 (ref 0.02–0.13)
RETICS/RBC NFR AUTO: 12.88 % (ref 0.7–1.9)
SODIUM SERPL-SCNC: 138 MMOL/L (ref 136–145)
TIBC SERPL-MCNC: 295 MCG/DL (ref 298–536)
TRANSFERRIN SERPL-MCNC: 198 MG/DL (ref 200–360)
WBC NRBC COR # BLD: 16.47 10*3/MM3 (ref 3.4–10.8)

## 2022-07-11 PROCEDURE — G0463 HOSPITAL OUTPT CLINIC VISIT: HCPCS

## 2022-07-11 PROCEDURE — 84466 ASSAY OF TRANSFERRIN: CPT

## 2022-07-11 PROCEDURE — 83540 ASSAY OF IRON: CPT

## 2022-07-11 PROCEDURE — 85045 AUTOMATED RETICULOCYTE COUNT: CPT | Performed by: INTERNAL MEDICINE

## 2022-07-11 PROCEDURE — 83010 ASSAY OF HAPTOGLOBIN QUANT: CPT | Performed by: INTERNAL MEDICINE

## 2022-07-11 PROCEDURE — 83615 LACTATE (LD) (LDH) ENZYME: CPT | Performed by: INTERNAL MEDICINE

## 2022-07-11 PROCEDURE — 36415 COLL VENOUS BLD VENIPUNCTURE: CPT

## 2022-07-11 PROCEDURE — 80053 COMPREHEN METABOLIC PANEL: CPT | Performed by: INTERNAL MEDICINE

## 2022-07-11 PROCEDURE — 85025 COMPLETE CBC W/AUTO DIFF WBC: CPT | Performed by: INTERNAL MEDICINE

## 2022-07-11 PROCEDURE — 82728 ASSAY OF FERRITIN: CPT

## 2022-07-11 NOTE — TELEPHONE ENCOUNTER
Dr. Tolbert called.  Start tapering prednisone 10 mg/week.  Once at 20 mg, may taper 5 mg/week or 10 mg/week if her counts holds.  Patient will be referred to [facility] to desensitize her for rituximab.  Considered danazol or azathioprine if she fails steroid.  Other option is splenectomy.  Further recommendations pending.

## 2022-07-12 NOTE — TELEPHONE ENCOUNTER
Called and spoke with patient.  She understands to decrease her prednisone dose down to 50 mg daily x 1 week.  Repeat labs next week and see how counts are and see what next dose decrease will be.

## 2022-07-18 ENCOUNTER — LAB (OUTPATIENT)
Dept: LAB | Facility: HOSPITAL | Age: 67
End: 2022-07-18

## 2022-07-18 ENCOUNTER — TELEPHONE (OUTPATIENT)
Dept: ONCOLOGY | Facility: CLINIC | Age: 67
End: 2022-07-18

## 2022-07-18 ENCOUNTER — CLINICAL SUPPORT (OUTPATIENT)
Dept: ONCOLOGY | Facility: HOSPITAL | Age: 67
End: 2022-07-18

## 2022-07-18 VITALS
DIASTOLIC BLOOD PRESSURE: 75 MMHG | HEART RATE: 78 BPM | HEIGHT: 64 IN | BODY MASS INDEX: 25.44 KG/M2 | SYSTOLIC BLOOD PRESSURE: 143 MMHG | OXYGEN SATURATION: 100 % | TEMPERATURE: 96.9 F | WEIGHT: 149 LBS | RESPIRATION RATE: 17 BRPM

## 2022-07-18 DIAGNOSIS — D59.10 AUTOIMMUNE HEMOLYTIC ANEMIA: ICD-10-CM

## 2022-07-18 LAB
ALBUMIN SERPL-MCNC: 4 G/DL (ref 3.5–5.2)
ALBUMIN/GLOB SERPL: 1.7 G/DL
ALP SERPL-CCNC: 62 U/L (ref 39–117)
ALT SERPL W P-5'-P-CCNC: 17 U/L (ref 1–33)
ANION GAP SERPL CALCULATED.3IONS-SCNC: 10 MMOL/L (ref 5–15)
ANISOCYTOSIS BLD QL: ABNORMAL
AST SERPL-CCNC: 24 U/L (ref 1–32)
BILIRUB SERPL-MCNC: 1.6 MG/DL (ref 0–1.2)
BUN SERPL-MCNC: 13 MG/DL (ref 8–23)
BUN/CREAT SERPL: 13.7 (ref 7–25)
CALCIUM SPEC-SCNC: 8.7 MG/DL (ref 8.6–10.5)
CHLORIDE SERPL-SCNC: 99 MMOL/L (ref 98–107)
CO2 SERPL-SCNC: 26 MMOL/L (ref 22–29)
CREAT SERPL-MCNC: 0.95 MG/DL (ref 0.57–1)
DEPRECATED RDW RBC AUTO: 69.2 FL (ref 37–54)
EGFRCR SERPLBLD CKD-EPI 2021: 66.2 ML/MIN/1.73
EOSINOPHIL # BLD MANUAL: 0.36 10*3/MM3 (ref 0–0.4)
EOSINOPHIL NFR BLD MANUAL: 2 % (ref 0.3–6.2)
ERYTHROCYTE [DISTWIDTH] IN BLOOD BY AUTOMATED COUNT: 19.1 % (ref 12.3–15.4)
GLOBULIN UR ELPH-MCNC: 2.3 GM/DL
GLUCOSE SERPL-MCNC: 233 MG/DL (ref 65–99)
HAPTOGLOB SERPL-MCNC: <10 MG/DL (ref 30–200)
HCT VFR BLD AUTO: 33.2 % (ref 34–46.6)
HGB BLD-MCNC: 11.1 G/DL (ref 12–15.9)
LDH SERPL-CCNC: 339 U/L (ref 135–214)
LYMPHOCYTES # BLD MANUAL: 3.21 10*3/MM3 (ref 0.7–3.1)
LYMPHOCYTES NFR BLD MANUAL: 8 % (ref 5–12)
MCH RBC QN AUTO: 33.9 PG (ref 26.6–33)
MCHC RBC AUTO-ENTMCNC: 33.4 G/DL (ref 31.5–35.7)
MCV RBC AUTO: 101.5 FL (ref 79–97)
METAMYELOCYTES NFR BLD MANUAL: 1 % (ref 0–0)
MONOCYTES # BLD: 1.43 10*3/MM3 (ref 0.1–0.9)
MYELOCYTES NFR BLD MANUAL: 1 % (ref 0–0)
NEUTROPHILS # BLD AUTO: 12.47 10*3/MM3 (ref 1.7–7)
NEUTROPHILS NFR BLD MANUAL: 70 % (ref 42.7–76)
PLAT MORPH BLD: NORMAL
PLATELET # BLD AUTO: 245 10*3/MM3 (ref 140–450)
PMV BLD AUTO: 11.2 FL (ref 6–12)
POIKILOCYTOSIS BLD QL SMEAR: ABNORMAL
POLYCHROMASIA BLD QL SMEAR: ABNORMAL
POTASSIUM SERPL-SCNC: 3.5 MMOL/L (ref 3.5–5.2)
PROT SERPL-MCNC: 6.3 G/DL (ref 6–8.5)
RBC # BLD AUTO: 3.27 10*6/MM3 (ref 3.77–5.28)
RETICS # AUTO: 0.4 10*6/MM3 (ref 0.02–0.13)
RETICS/RBC NFR AUTO: 12.3 % (ref 0.7–1.9)
SODIUM SERPL-SCNC: 135 MMOL/L (ref 136–145)
SPHEROCYTES BLD QL SMEAR: ABNORMAL
VARIANT LYMPHS NFR BLD MANUAL: 18 % (ref 19.6–45.3)
WBC MORPH BLD: NORMAL
WBC NRBC COR # BLD: 17.82 10*3/MM3 (ref 3.4–10.8)

## 2022-07-18 PROCEDURE — 85025 COMPLETE CBC W/AUTO DIFF WBC: CPT

## 2022-07-18 PROCEDURE — 36415 COLL VENOUS BLD VENIPUNCTURE: CPT

## 2022-07-18 PROCEDURE — 85045 AUTOMATED RETICULOCYTE COUNT: CPT

## 2022-07-18 PROCEDURE — 83010 ASSAY OF HAPTOGLOBIN QUANT: CPT

## 2022-07-18 PROCEDURE — 83615 LACTATE (LD) (LDH) ENZYME: CPT

## 2022-07-18 PROCEDURE — G0463 HOSPITAL OUTPT CLINIC VISIT: HCPCS

## 2022-07-18 PROCEDURE — 80053 COMPREHEN METABOLIC PANEL: CPT

## 2022-07-18 PROCEDURE — 85007 BL SMEAR W/DIFF WBC COUNT: CPT

## 2022-07-18 NOTE — PROGRESS NOTES
[id number] Dr. Elizalde wants patient to decrease prednisone to 40 mg po daily this week. Patient aware and also that her glucose was elevated at 233. Verbalized understanding.

## 2022-07-18 NOTE — TELEPHONE ENCOUNTER
Called and spoke with patient Giana Yepez, she was informed Dr Elizalde has reviewed today's lab results    Glucose: 233  Reviewed abnormalities of CBC with patient, she v/u  1. Patient states her glucose is always elevated in the am due to use of oral steroids  2. Patient informed to decrease her Prednisone to 40 mg qd this week  (patient is tapering Prednisone dosing)  Patient v/u of all information.

## 2022-07-18 NOTE — TELEPHONE ENCOUNTER
----- Message from Filemon Elizalde MD sent at 7/18/2022  8:44 AM CDT -----  Notify patient, glucose 233.

## 2022-07-20 ENCOUNTER — OFFICE VISIT (OUTPATIENT)
Dept: ONCOLOGY | Facility: CLINIC | Age: 67
End: 2022-07-20

## 2022-07-20 VITALS
DIASTOLIC BLOOD PRESSURE: 66 MMHG | OXYGEN SATURATION: 98 % | HEART RATE: 86 BPM | WEIGHT: 146.6 LBS | TEMPERATURE: 97.5 F | BODY MASS INDEX: 25.03 KG/M2 | RESPIRATION RATE: 18 BRPM | HEIGHT: 64 IN | SYSTOLIC BLOOD PRESSURE: 128 MMHG

## 2022-07-20 DIAGNOSIS — D50.8 IRON DEFICIENCY ANEMIA SECONDARY TO INADEQUATE DIETARY IRON INTAKE: ICD-10-CM

## 2022-07-20 DIAGNOSIS — D59.10 AUTOIMMUNE HEMOLYTIC ANEMIA: Primary | ICD-10-CM

## 2022-07-20 PROCEDURE — 99215 OFFICE O/P EST HI 40 MIN: CPT | Performed by: INTERNAL MEDICINE

## 2022-07-20 RX ORDER — MEPERIDINE HYDROCHLORIDE 50 MG/ML
25 INJECTION INTRAMUSCULAR; INTRAVENOUS; SUBCUTANEOUS
Status: CANCELLED | OUTPATIENT
Start: 2022-07-20

## 2022-07-20 RX ORDER — ACETAMINOPHEN 325 MG/1
650 TABLET ORAL ONCE
Status: CANCELLED | OUTPATIENT
Start: 2022-07-20

## 2022-07-20 RX ORDER — SODIUM CHLORIDE 9 MG/ML
250 INJECTION, SOLUTION INTRAVENOUS ONCE
Status: CANCELLED | OUTPATIENT
Start: 2022-07-20

## 2022-07-20 RX ORDER — FAMOTIDINE 10 MG/ML
20 INJECTION, SOLUTION INTRAVENOUS AS NEEDED
Status: CANCELLED | OUTPATIENT
Start: 2022-07-20

## 2022-07-20 RX ORDER — DIPHENHYDRAMINE HYDROCHLORIDE 50 MG/ML
50 INJECTION INTRAMUSCULAR; INTRAVENOUS AS NEEDED
Status: CANCELLED | OUTPATIENT
Start: 2022-07-20

## 2022-07-25 ENCOUNTER — APPOINTMENT (OUTPATIENT)
Dept: CT IMAGING | Facility: HOSPITAL | Age: 67
End: 2022-07-25

## 2022-07-25 ENCOUNTER — DOCUMENTATION (OUTPATIENT)
Dept: RADIATION ONCOLOGY | Facility: HOSPITAL | Age: 67
End: 2022-07-25

## 2022-07-25 ENCOUNTER — LAB (OUTPATIENT)
Dept: LAB | Facility: HOSPITAL | Age: 67
End: 2022-07-25

## 2022-07-25 ENCOUNTER — APPOINTMENT (OUTPATIENT)
Dept: LAB | Facility: HOSPITAL | Age: 67
End: 2022-07-25

## 2022-07-25 ENCOUNTER — INFUSION (OUTPATIENT)
Dept: ONCOLOGY | Facility: HOSPITAL | Age: 67
End: 2022-07-25

## 2022-07-25 VITALS
BODY MASS INDEX: 26.8 KG/M2 | TEMPERATURE: 97.6 F | DIASTOLIC BLOOD PRESSURE: 44 MMHG | SYSTOLIC BLOOD PRESSURE: 111 MMHG | OXYGEN SATURATION: 98 % | HEART RATE: 75 BPM | WEIGHT: 157 LBS | HEIGHT: 64 IN | RESPIRATION RATE: 18 BRPM

## 2022-07-25 DIAGNOSIS — D59.10 AUTOIMMUNE HEMOLYTIC ANEMIA: Primary | ICD-10-CM

## 2022-07-25 DIAGNOSIS — D59.10 AUTOIMMUNE HEMOLYTIC ANEMIA: ICD-10-CM

## 2022-07-25 LAB
ALBUMIN SERPL-MCNC: 3.9 G/DL (ref 3.5–5.2)
ALBUMIN/GLOB SERPL: 1.8 G/DL
ALP SERPL-CCNC: 56 U/L (ref 39–117)
ALT SERPL W P-5'-P-CCNC: 19 U/L (ref 1–33)
ANION GAP SERPL CALCULATED.3IONS-SCNC: 8 MMOL/L (ref 5–15)
AST SERPL-CCNC: 28 U/L (ref 1–32)
BASOPHILS # BLD AUTO: 0.1 10*3/MM3 (ref 0–0.2)
BASOPHILS NFR BLD AUTO: 0.6 % (ref 0–1.5)
BILIRUB SERPL-MCNC: 1.6 MG/DL (ref 0–1.2)
BUN SERPL-MCNC: 14 MG/DL (ref 8–23)
BUN/CREAT SERPL: 17.7 (ref 7–25)
CALCIUM SPEC-SCNC: 8.3 MG/DL (ref 8.6–10.5)
CHLORIDE SERPL-SCNC: 99 MMOL/L (ref 98–107)
CO2 SERPL-SCNC: 28 MMOL/L (ref 22–29)
CREAT SERPL-MCNC: 0.79 MG/DL (ref 0.57–1)
DEPRECATED RDW RBC AUTO: 68.3 FL (ref 37–54)
EGFRCR SERPLBLD CKD-EPI 2021: 82.6 ML/MIN/1.73
EOSINOPHIL # BLD AUTO: 0.21 10*3/MM3 (ref 0–0.4)
EOSINOPHIL NFR BLD AUTO: 1.2 % (ref 0.3–6.2)
ERYTHROCYTE [DISTWIDTH] IN BLOOD BY AUTOMATED COUNT: 18.6 % (ref 12.3–15.4)
GLOBULIN UR ELPH-MCNC: 2.2 GM/DL
GLUCOSE SERPL-MCNC: 137 MG/DL (ref 65–99)
HAPTOGLOB SERPL-MCNC: <10 MG/DL (ref 30–200)
HCT VFR BLD AUTO: 30.5 % (ref 34–46.6)
HGB BLD-MCNC: 10.1 G/DL (ref 12–15.9)
IMM GRANULOCYTES # BLD AUTO: 0.49 10*3/MM3 (ref 0–0.05)
IMM GRANULOCYTES NFR BLD AUTO: 2.9 % (ref 0–0.5)
LDH SERPL-CCNC: 334 U/L (ref 135–214)
LYMPHOCYTES # BLD AUTO: 2.61 10*3/MM3 (ref 0.7–3.1)
LYMPHOCYTES NFR BLD AUTO: 15.2 % (ref 19.6–45.3)
MCH RBC QN AUTO: 33.9 PG (ref 26.6–33)
MCHC RBC AUTO-ENTMCNC: 33.1 G/DL (ref 31.5–35.7)
MCV RBC AUTO: 102.3 FL (ref 79–97)
MONOCYTES # BLD AUTO: 1.44 10*3/MM3 (ref 0.1–0.9)
MONOCYTES NFR BLD AUTO: 8.4 % (ref 5–12)
NEUTROPHILS NFR BLD AUTO: 12.34 10*3/MM3 (ref 1.7–7)
NEUTROPHILS NFR BLD AUTO: 71.7 % (ref 42.7–76)
NRBC BLD AUTO-RTO: 0 /100 WBC (ref 0–0.2)
PLATELET # BLD AUTO: 273 10*3/MM3 (ref 140–450)
PMV BLD AUTO: 11.1 FL (ref 6–12)
POTASSIUM SERPL-SCNC: 3.5 MMOL/L (ref 3.5–5.2)
PROT SERPL-MCNC: 6.1 G/DL (ref 6–8.5)
RBC # BLD AUTO: 2.98 10*6/MM3 (ref 3.77–5.28)
RETICS # AUTO: 0.29 10*6/MM3 (ref 0.02–0.13)
RETICS/RBC NFR AUTO: 10.34 % (ref 0.7–1.9)
SODIUM SERPL-SCNC: 135 MMOL/L (ref 136–145)
WBC NRBC COR # BLD: 17.19 10*3/MM3 (ref 3.4–10.8)

## 2022-07-25 PROCEDURE — 83615 LACTATE (LD) (LDH) ENZYME: CPT

## 2022-07-25 PROCEDURE — 25010000002 DIPHENHYDRAMINE PER 50 MG: Performed by: INTERNAL MEDICINE

## 2022-07-25 PROCEDURE — 96367 TX/PROPH/DG ADDL SEQ IV INF: CPT

## 2022-07-25 PROCEDURE — 85025 COMPLETE CBC W/AUTO DIFF WBC: CPT

## 2022-07-25 PROCEDURE — 80053 COMPREHEN METABOLIC PANEL: CPT

## 2022-07-25 PROCEDURE — 25010000002 RITUXIMAB 10 MG/ML SOLUTION 10 ML VIAL: Performed by: INTERNAL MEDICINE

## 2022-07-25 PROCEDURE — 25010000002 HYDROCORTISONE SODIUM SUCCINATE 100 MG RECONSTITUTED SOLUTION: Performed by: INTERNAL MEDICINE

## 2022-07-25 PROCEDURE — 96375 TX/PRO/DX INJ NEW DRUG ADDON: CPT

## 2022-07-25 PROCEDURE — 96415 CHEMO IV INFUSION ADDL HR: CPT

## 2022-07-25 PROCEDURE — 96413 CHEMO IV INFUSION 1 HR: CPT

## 2022-07-25 PROCEDURE — 25010000002 RITUXIMAB 10 MG/ML SOLUTION 50 ML VIAL: Performed by: INTERNAL MEDICINE

## 2022-07-25 PROCEDURE — 85045 AUTOMATED RETICULOCYTE COUNT: CPT

## 2022-07-25 PROCEDURE — 36415 COLL VENOUS BLD VENIPUNCTURE: CPT

## 2022-07-25 PROCEDURE — 83010 ASSAY OF HAPTOGLOBIN QUANT: CPT

## 2022-07-25 RX ORDER — ONDANSETRON HYDROCHLORIDE 8 MG/1
8 TABLET, FILM COATED ORAL EVERY 8 HOURS PRN
Qty: 60 TABLET | Refills: 3 | Status: SHIPPED | OUTPATIENT
Start: 2022-07-25

## 2022-07-25 RX ORDER — DIPHENHYDRAMINE HYDROCHLORIDE 50 MG/ML
50 INJECTION INTRAMUSCULAR; INTRAVENOUS AS NEEDED
Status: DISCONTINUED | OUTPATIENT
Start: 2022-07-25 | End: 2022-07-25 | Stop reason: HOSPADM

## 2022-07-25 RX ORDER — SODIUM CHLORIDE 9 MG/ML
250 INJECTION, SOLUTION INTRAVENOUS ONCE
Status: COMPLETED | OUTPATIENT
Start: 2022-07-25 | End: 2022-07-25

## 2022-07-25 RX ORDER — MEPERIDINE HYDROCHLORIDE 50 MG/ML
25 INJECTION INTRAMUSCULAR; INTRAVENOUS; SUBCUTANEOUS
Status: DISCONTINUED | OUTPATIENT
Start: 2022-07-25 | End: 2022-07-25 | Stop reason: HOSPADM

## 2022-07-25 RX ORDER — SODIUM CHLORIDE 9 MG/ML
250 INJECTION, SOLUTION INTRAVENOUS ONCE
Status: CANCELLED | OUTPATIENT
Start: 2022-08-01

## 2022-07-25 RX ORDER — ACETAMINOPHEN 325 MG/1
650 TABLET ORAL ONCE
Status: COMPLETED | OUTPATIENT
Start: 2022-07-25 | End: 2022-07-25

## 2022-07-25 RX ORDER — FAMOTIDINE 10 MG/ML
20 INJECTION, SOLUTION INTRAVENOUS AS NEEDED
Status: DISCONTINUED | OUTPATIENT
Start: 2022-07-25 | End: 2022-07-25 | Stop reason: HOSPADM

## 2022-07-25 RX ORDER — ACETAMINOPHEN 325 MG/1
650 TABLET ORAL ONCE
Status: CANCELLED | OUTPATIENT
Start: 2022-08-01

## 2022-07-25 RX ORDER — DIPHENHYDRAMINE HYDROCHLORIDE 50 MG/ML
50 INJECTION INTRAMUSCULAR; INTRAVENOUS AS NEEDED
Status: CANCELLED | OUTPATIENT
Start: 2022-08-01

## 2022-07-25 RX ORDER — FAMOTIDINE 10 MG/ML
20 INJECTION, SOLUTION INTRAVENOUS AS NEEDED
Status: CANCELLED | OUTPATIENT
Start: 2022-08-01

## 2022-07-25 RX ORDER — MEPERIDINE HYDROCHLORIDE 50 MG/ML
25 INJECTION INTRAMUSCULAR; INTRAVENOUS; SUBCUTANEOUS
Status: CANCELLED | OUTPATIENT
Start: 2022-08-01

## 2022-07-25 RX ADMIN — DIPHENHYDRAMINE HYDROCHLORIDE 50 MG: 50 INJECTION, SOLUTION INTRAMUSCULAR; INTRAVENOUS at 09:23

## 2022-07-25 RX ADMIN — SODIUM CHLORIDE 250 ML: 9 INJECTION, SOLUTION INTRAVENOUS at 09:18

## 2022-07-25 RX ADMIN — ACETAMINOPHEN 650 MG: 325 TABLET, FILM COATED ORAL at 09:18

## 2022-07-25 RX ADMIN — RITUXIMAB 660 MG: 10 INJECTION, SOLUTION INTRAVENOUS at 09:43

## 2022-07-25 RX ADMIN — HYDROCORTISONE SODIUM SUCCINATE 100 MG: 100 INJECTION, POWDER, FOR SOLUTION INTRAMUSCULAR; INTRAVENOUS at 09:19

## 2022-07-25 NOTE — PROGRESS NOTES
0840sent a message regarding pts labs,9lb wt gain since last here on 7/20 and has 2 plus edema to gary lower legs, no breathing issues. No cp.   0855 message back ok for tx.

## 2022-07-25 NOTE — TELEPHONE ENCOUNTER
Contacted patient Giana Yepez, she was informed per DR Elizalde instructions to  her Prednisone to 30 mg QD for this week, patient v/u of information.

## 2022-07-25 NOTE — PROGRESS NOTES
JOSIAS met with Mrs. Yepez due to her distress score of 9. She is here for an infusion for autoimmune hemolytic anemia. JOSIAS introduced self and explained role and source of support. When speaking to Mrs. Yepez she states she is feeling good and is not distressed. She explained she filled the form out incorrectly and her distress number should be a two. She lives with her spouse. She has a strong support system which includes her  and sister-in-law. She is a member at [facility]. She does not have any transportation or financial concerns. She does not take any medication for anxiety or depression and she does not see a counselor. She states she is ready to start treatment. She did have a reaction to her medication in the past but states this does not make her nervous. She typically sleeps 4-6 hours a night. She denies needing any assistance at this time. JOSIAS encouraged her to call if assistance is needed at a later date.

## 2022-08-01 ENCOUNTER — INFUSION (OUTPATIENT)
Dept: ONCOLOGY | Facility: HOSPITAL | Age: 67
End: 2022-08-01

## 2022-08-01 ENCOUNTER — OFFICE VISIT (OUTPATIENT)
Dept: ONCOLOGY | Facility: CLINIC | Age: 67
End: 2022-08-01

## 2022-08-01 ENCOUNTER — LAB (OUTPATIENT)
Dept: LAB | Facility: HOSPITAL | Age: 67
End: 2022-08-01

## 2022-08-01 ENCOUNTER — TELEPHONE (OUTPATIENT)
Dept: ONCOLOGY | Facility: CLINIC | Age: 67
End: 2022-08-01

## 2022-08-01 VITALS
HEART RATE: 80 BPM | HEIGHT: 64 IN | TEMPERATURE: 97.3 F | OXYGEN SATURATION: 97 % | BODY MASS INDEX: 28 KG/M2 | DIASTOLIC BLOOD PRESSURE: 60 MMHG | WEIGHT: 164 LBS | RESPIRATION RATE: 18 BRPM | SYSTOLIC BLOOD PRESSURE: 132 MMHG

## 2022-08-01 VITALS
RESPIRATION RATE: 16 BRPM | HEART RATE: 61 BPM | HEIGHT: 64 IN | DIASTOLIC BLOOD PRESSURE: 67 MMHG | OXYGEN SATURATION: 98 % | TEMPERATURE: 96.9 F | BODY MASS INDEX: 28 KG/M2 | WEIGHT: 164 LBS | SYSTOLIC BLOOD PRESSURE: 113 MMHG

## 2022-08-01 DIAGNOSIS — D59.10 AUTOIMMUNE HEMOLYTIC ANEMIA: ICD-10-CM

## 2022-08-01 DIAGNOSIS — D59.10 AUTOIMMUNE HEMOLYTIC ANEMIA: Primary | ICD-10-CM

## 2022-08-01 DIAGNOSIS — D50.8 IRON DEFICIENCY ANEMIA SECONDARY TO INADEQUATE DIETARY IRON INTAKE: Primary | ICD-10-CM

## 2022-08-01 LAB
ALBUMIN SERPL-MCNC: 3.7 G/DL (ref 3.5–5.2)
ALBUMIN/GLOB SERPL: 1.8 G/DL
ALP SERPL-CCNC: 75 U/L (ref 39–117)
ALT SERPL W P-5'-P-CCNC: 43 U/L (ref 1–33)
ANION GAP SERPL CALCULATED.3IONS-SCNC: 9 MMOL/L (ref 5–15)
AST SERPL-CCNC: 58 U/L (ref 1–32)
BASOPHILS # BLD AUTO: 0.12 10*3/MM3 (ref 0–0.2)
BASOPHILS NFR BLD AUTO: 0.8 % (ref 0–1.5)
BILIRUB SERPL-MCNC: 2.2 MG/DL (ref 0–1.2)
BUN SERPL-MCNC: 14 MG/DL (ref 8–23)
BUN/CREAT SERPL: 18.9 (ref 7–25)
CALCIUM SPEC-SCNC: 8.5 MG/DL (ref 8.6–10.5)
CHLORIDE SERPL-SCNC: 97 MMOL/L (ref 98–107)
CO2 SERPL-SCNC: 29 MMOL/L (ref 22–29)
CREAT SERPL-MCNC: 0.74 MG/DL (ref 0.57–1)
DEPRECATED RDW RBC AUTO: 68.5 FL (ref 37–54)
EGFRCR SERPLBLD CKD-EPI 2021: 89.4 ML/MIN/1.73
EOSINOPHIL # BLD AUTO: 0.21 10*3/MM3 (ref 0–0.4)
EOSINOPHIL NFR BLD AUTO: 1.4 % (ref 0.3–6.2)
ERYTHROCYTE [DISTWIDTH] IN BLOOD BY AUTOMATED COUNT: 18.5 % (ref 12.3–15.4)
GLOBULIN UR ELPH-MCNC: 2.1 GM/DL
GLUCOSE SERPL-MCNC: 204 MG/DL (ref 65–99)
HAPTOGLOB SERPL-MCNC: <10 MG/DL (ref 30–200)
HCT VFR BLD AUTO: 30.2 % (ref 34–46.6)
HGB BLD-MCNC: 9.8 G/DL (ref 12–15.9)
HOLD SPECIMEN: NORMAL
IMM GRANULOCYTES # BLD AUTO: 0.39 10*3/MM3 (ref 0–0.05)
IMM GRANULOCYTES NFR BLD AUTO: 2.5 % (ref 0–0.5)
LDH SERPL-CCNC: 343 U/L (ref 135–214)
LYMPHOCYTES # BLD AUTO: 2.03 10*3/MM3 (ref 0.7–3.1)
LYMPHOCYTES NFR BLD AUTO: 13.3 % (ref 19.6–45.3)
MCH RBC QN AUTO: 33.4 PG (ref 26.6–33)
MCHC RBC AUTO-ENTMCNC: 32.5 G/DL (ref 31.5–35.7)
MCV RBC AUTO: 103.1 FL (ref 79–97)
MONOCYTES # BLD AUTO: 1.1 10*3/MM3 (ref 0.1–0.9)
MONOCYTES NFR BLD AUTO: 7.2 % (ref 5–12)
NEUTROPHILS NFR BLD AUTO: 11.47 10*3/MM3 (ref 1.7–7)
NEUTROPHILS NFR BLD AUTO: 74.8 % (ref 42.7–76)
NRBC BLD AUTO-RTO: 0.1 /100 WBC (ref 0–0.2)
PLATELET # BLD AUTO: 271 10*3/MM3 (ref 140–450)
PMV BLD AUTO: 10.7 FL (ref 6–12)
POTASSIUM SERPL-SCNC: 3.8 MMOL/L (ref 3.5–5.2)
PROT SERPL-MCNC: 5.8 G/DL (ref 6–8.5)
RBC # BLD AUTO: 2.93 10*6/MM3 (ref 3.77–5.28)
RETICS # AUTO: 0.22 10*6/MM3 (ref 0.02–0.13)
RETICS/RBC NFR AUTO: 7.93 % (ref 0.7–1.9)
SODIUM SERPL-SCNC: 135 MMOL/L (ref 136–145)
WBC NRBC COR # BLD: 15.32 10*3/MM3 (ref 3.4–10.8)

## 2022-08-01 PROCEDURE — 25010000002 HYDROCORTISONE SODIUM SUCCINATE 100 MG RECONSTITUTED SOLUTION: Performed by: INTERNAL MEDICINE

## 2022-08-01 PROCEDURE — 96367 TX/PROPH/DG ADDL SEQ IV INF: CPT

## 2022-08-01 PROCEDURE — 25010000002 DIPHENHYDRAMINE PER 50 MG: Performed by: INTERNAL MEDICINE

## 2022-08-01 PROCEDURE — 96375 TX/PRO/DX INJ NEW DRUG ADDON: CPT

## 2022-08-01 PROCEDURE — 83010 ASSAY OF HAPTOGLOBIN QUANT: CPT

## 2022-08-01 PROCEDURE — 85025 COMPLETE CBC W/AUTO DIFF WBC: CPT

## 2022-08-01 PROCEDURE — 99214 OFFICE O/P EST MOD 30 MIN: CPT | Performed by: INTERNAL MEDICINE

## 2022-08-01 PROCEDURE — 25010000002 RITUXIMAB 10 MG/ML SOLUTION 50 ML VIAL: Performed by: INTERNAL MEDICINE

## 2022-08-01 PROCEDURE — 36415 COLL VENOUS BLD VENIPUNCTURE: CPT

## 2022-08-01 PROCEDURE — 83615 LACTATE (LD) (LDH) ENZYME: CPT

## 2022-08-01 PROCEDURE — 96415 CHEMO IV INFUSION ADDL HR: CPT

## 2022-08-01 PROCEDURE — 96413 CHEMO IV INFUSION 1 HR: CPT

## 2022-08-01 PROCEDURE — 80053 COMPREHEN METABOLIC PANEL: CPT

## 2022-08-01 PROCEDURE — 85045 AUTOMATED RETICULOCYTE COUNT: CPT

## 2022-08-01 PROCEDURE — 25010000002 RITUXIMAB 10 MG/ML SOLUTION 10 ML VIAL: Performed by: INTERNAL MEDICINE

## 2022-08-01 RX ORDER — ACETAMINOPHEN 325 MG/1
650 TABLET ORAL ONCE
Status: CANCELLED | OUTPATIENT
Start: 2022-08-08

## 2022-08-01 RX ORDER — SODIUM CHLORIDE 9 MG/ML
250 INJECTION, SOLUTION INTRAVENOUS ONCE
Status: CANCELLED | OUTPATIENT
Start: 2022-08-08

## 2022-08-01 RX ORDER — SODIUM CHLORIDE 9 MG/ML
250 INJECTION, SOLUTION INTRAVENOUS ONCE
Status: COMPLETED | OUTPATIENT
Start: 2022-08-01 | End: 2022-08-01

## 2022-08-01 RX ORDER — DIPHENHYDRAMINE HYDROCHLORIDE 50 MG/ML
50 INJECTION INTRAMUSCULAR; INTRAVENOUS AS NEEDED
Status: DISCONTINUED | OUTPATIENT
Start: 2022-08-01 | End: 2022-08-01 | Stop reason: HOSPADM

## 2022-08-01 RX ORDER — ACETAMINOPHEN 325 MG/1
650 TABLET ORAL ONCE
Status: COMPLETED | OUTPATIENT
Start: 2022-08-01 | End: 2022-08-01

## 2022-08-01 RX ORDER — MEPERIDINE HYDROCHLORIDE 50 MG/ML
25 INJECTION INTRAMUSCULAR; INTRAVENOUS; SUBCUTANEOUS
Status: DISCONTINUED | OUTPATIENT
Start: 2022-08-01 | End: 2022-08-01 | Stop reason: HOSPADM

## 2022-08-01 RX ORDER — FAMOTIDINE 10 MG/ML
20 INJECTION, SOLUTION INTRAVENOUS AS NEEDED
Status: DISCONTINUED | OUTPATIENT
Start: 2022-08-01 | End: 2022-08-01 | Stop reason: HOSPADM

## 2022-08-01 RX ORDER — MEPERIDINE HYDROCHLORIDE 50 MG/ML
25 INJECTION INTRAMUSCULAR; INTRAVENOUS; SUBCUTANEOUS
Status: CANCELLED | OUTPATIENT
Start: 2022-08-08

## 2022-08-01 RX ORDER — DIPHENHYDRAMINE HYDROCHLORIDE 50 MG/ML
50 INJECTION INTRAMUSCULAR; INTRAVENOUS AS NEEDED
Status: CANCELLED | OUTPATIENT
Start: 2022-08-08

## 2022-08-01 RX ORDER — FAMOTIDINE 10 MG/ML
20 INJECTION, SOLUTION INTRAVENOUS AS NEEDED
Status: CANCELLED | OUTPATIENT
Start: 2022-08-08

## 2022-08-01 RX ADMIN — ACETAMINOPHEN 650 MG: 325 TABLET, FILM COATED ORAL at 08:49

## 2022-08-01 RX ADMIN — RITUXIMAB 680 MG: 10 INJECTION, SOLUTION INTRAVENOUS at 09:12

## 2022-08-01 RX ADMIN — HYDROCORTISONE SODIUM SUCCINATE 100 MG: 100 INJECTION, POWDER, FOR SOLUTION INTRAMUSCULAR; INTRAVENOUS at 08:44

## 2022-08-01 RX ADMIN — FAMOTIDINE 20 MG: 10 INJECTION, SOLUTION INTRAVENOUS at 09:49

## 2022-08-01 RX ADMIN — SODIUM CHLORIDE 250 ML: 9 INJECTION, SOLUTION INTRAVENOUS at 08:30

## 2022-08-01 RX ADMIN — DIPHENHYDRAMINE HYDROCHLORIDE 50 MG: 50 INJECTION, SOLUTION INTRAMUSCULAR; INTRAVENOUS at 08:48

## 2022-08-01 NOTE — TELEPHONE ENCOUNTER
Notified patient Giana Yepez, she was informed to continue with her Omeprazole as directed and to try Maalox to help coat her stomach to reduce the indigestion discomfort she was experiencing. Patient v/u.  Patient also reminded to take her steroids with food and not on a empty stomach.  Patient v/u of all information.

## 2022-08-01 NOTE — PROGRESS NOTES
Patient stated she had heartburn before arriving and asking for Pepcid.  Pecid is on her MAR as PRN.  Will administer. STEPHANIE Allen    10:44:  The patient denies heartburn. STEPHANIE Allen

## 2022-08-01 NOTE — PROGRESS NOTES
Chief Complaint  Enlarged nodes in right lung    Subjective    History of Present Illness {CC  Problem List  Visit Diagnosis   Encounters  Notes  Medications  Labs  Result Review Imaging  Media     Giana Yepez presents to [facility] for:    Ms. Yepez is here for follow up and management of mediastinal lymphadenopathy. She had a follow up CT chest today. She is feeling fatigued and lightheaded. She denies any pulmonary symptoms. She does smoke approximately 6 cigarettes a day. She is undergoing treatment for autoimmune hemolytic anemia and has received IVIG, prednisone and 2 doses of rituximab.        Prior to Admission medications    Medication Sig Start Date End Date Taking? Authorizing Provider   allopurinol (ZYLOPRIM) 100 MG tablet Take 1 tablet by mouth Daily. 7/5/22   Alexia Juarez APRN   Calcium Citrate (CITRACAL PO) Take  by mouth.    Alonzo Martinez MD   cholecalciferol (VITAMIN D3) 25 MCG (1000 UT) tablet Take 2,000 Units by mouth Daily.    Alonzo Martinez MD   Continuous Blood Gluc  (Dexcom G5  Kit) device Continuous.    Alonzo Martinez MD   cyanocobalamin (VITAMIN B-12) 1000 MCG tablet Take  by mouth Daily.    Alonzo Martinez MD   diphenhydrAMINE (BENADRYL) 25 mg capsule Take 25 mg by mouth Every 6 (Six) Hours As Needed for Itching.    Alonzo Martinez MD   ferrous sulfate 325 (65 FE) MG tablet Take 325 mg by mouth Daily With Breakfast.    Alonzo Martinez MD   fluticasone (FLONASE) 50 MCG/ACT nasal spray 2 sprays into the nostril(s) as directed by provider Daily. To control swelling and drainage from allergies 6/8/22   Camila Montes MD   folic acid (FOLVITE) 1 MG tablet Take 1 tablet by mouth Daily. 4/30/22   Leonides Shi MD   Insulin Infusion Pump device Continuous.    Alonzo Martinez MD   Insulin Lispro (humaLOG) 100 UNIT/ML injection Inject 25-40 Units under the skin  "into the appropriate area as directed Daily. 2/28/22 3/1/23  Alonzo Martinez MD   Loratadine 10 MG capsule Take 10 mg by mouth every night at bedtime. As anti-histamine for allergies    Alonzo Martinez MD   omeprazole (priLOSEC) 40 MG capsule Take 40 mg by mouth Daily. 9/15/15   Alonzo Martinez MD   ondansetron (ZOFRAN) 8 MG tablet Take 1 tablet by mouth Every 8 (Eight) Hours As Needed for Nausea or Vomiting. 7/25/22   Filemon Elizalde MD   predniSONE (DELTASONE) 20 MG tablet Take 3 tabs (60 mg total dose) daily with food  Patient taking differently: Take 2 tabs (40 mg total dose) daily with food 5/9/22   Filemon Elizalde MD   rosuvastatin (CRESTOR) 20 MG tablet Take 20 mg by mouth Daily.    Alonzo Martinez MD   vitamin B-12 (CYANOCOBALAMIN) 1000 MCG tablet Take 1,000 mcg by mouth Daily.    Alonzo Martinez MD       Social History     Socioeconomic History   • Marital status:    Tobacco Use   • Smoking status: Current Every Day Smoker     Packs/day: 0.25     Years: 40.00     Pack years: 10.00     Types: Cigarettes     Start date: 2/18/1975   • Smokeless tobacco: Never Used   • Tobacco comment: quit during two pregnancies but went back to smoking after delivery   Vaping Use   • Vaping Use: Never used   Substance and Sexual Activity   • Alcohol use: Not Currently     Alcohol/week: 12.0 standard drinks     Types: 12 Cans of beer per week     Comment: drink at early 20's   • Drug use: No   • Sexual activity: Not Currently     Partners: Male     Birth control/protection: None     Comment: went through menopause over 10 years go       Objective   Vital Signs:   /62   Pulse 74   Ht 162.6 cm (64\")   Wt 69.9 kg (154 lb)   SpO2 100% Comment: RA  BMI 26.43 kg/m²     Physical Exam  Constitutional:       General: She is not in acute distress.     Interventions: Face mask in place.   HENT:      Head: Normocephalic.      Nose: Nose normal.      Mouth/Throat:      Mouth: Mucous membranes " are moist.   Eyes:      General: No scleral icterus.  Cardiovascular:      Rate and Rhythm: Normal rate.   Pulmonary:      Effort: No respiratory distress.   Abdominal:      General: There is no distension.   Neurological:      Mental Status: She is alert and oriented to person, place, and time.   Psychiatric:         Mood and Affect: Mood normal.         Behavior: Behavior is cooperative.        Result Review :{ Labs  Result Review  Imaging  Med Tab  Media :          No results found for this or any previous visit.              CT Chest With Contrast Diagnostic (08/05/2022 12:52)      My interpretation of imaging:  Mild dependent atelectasis, stable mediastinal lymph node, unchanged, stable supraclavicular nodes    Assessment and Plan {CC Problem List  Visit Diagnosis  ROS  Review (Popup)  Health Maintenance  Quality  BestPractice  Medications  SmartSets  SnapShot Encounters  Media      Diagnoses and all orders for this visit:    1. Mediastinal lymphadenopathy (Primary)  -     CT Chest With Contrast; Future  -     Basic Metabolic Panel; Future    2. Tobacco abuse        BMI is >= 25 and <30. (Overweight) The following options were offered after discussion;: weight loss educational material (shared in after visit summary)      She is stable from a pulmonary standpoint. We reviewed CT imaging together. Smoking cessation education provided. Repeat Ct in 6 months. Follow up in 6 months, call in the interim with issues.     Jessica Mckenna, APRN  8/5/2022  15:36 CDT    Follow Up {Instructions Charge Capture  Follow-up Communications   Return in about 6 months (around 2/5/2023) for spirometry and dlco .    Patient was given instructions and counseling regarding her condition or for health maintenance advice. Please see specific information pulled into the AVS if appropriate.

## 2022-08-01 NOTE — TELEPHONE ENCOUNTER
----- Message from Filemon Elizalde MD sent at 8/1/2022  8:01 AM CDT -----  Reduce prednisone to 20 mg p.o. daily.

## 2022-08-01 NOTE — TELEPHONE ENCOUNTER
Notified patient Giana Yepez to reduce her steroid Prednisone to 20 mg daily this week 8/1/22  Patient v/u of these instructions.     Patient states that she has been experiencing severe acid reflux, she informed her Nurse while @ TXT and was given Pepcid which did relieve some of her symptoms but since she has arrived @ home the acid reflux and discomfort has been persistent. She notes the pain is located lower left rib area and radiates into her mid abdomen area.   She reports that she did take all of her steroids this am without any food.   Currently take Omeprazole 20 mg daily

## 2022-08-05 ENCOUNTER — HOSPITAL ENCOUNTER (OUTPATIENT)
Dept: CT IMAGING | Facility: HOSPITAL | Age: 67
Discharge: HOME | End: 2022-08-05
Admitting: NURSE PRACTITIONER

## 2022-08-05 ENCOUNTER — OFFICE VISIT (OUTPATIENT)
Dept: PULMONOLOGY | Facility: CLINIC | Age: 67
End: 2022-08-05

## 2022-08-05 VITALS
OXYGEN SATURATION: 100 % | SYSTOLIC BLOOD PRESSURE: 124 MMHG | BODY MASS INDEX: 26.29 KG/M2 | HEIGHT: 64 IN | HEART RATE: 74 BPM | DIASTOLIC BLOOD PRESSURE: 62 MMHG | WEIGHT: 154 LBS

## 2022-08-05 DIAGNOSIS — R59.0 MEDIASTINAL LYMPHADENOPATHY: Primary | ICD-10-CM

## 2022-08-05 DIAGNOSIS — Z72.0 TOBACCO ABUSE: Chronic | ICD-10-CM

## 2022-08-05 DIAGNOSIS — R59.0 MEDIASTINAL LYMPHADENOPATHY: ICD-10-CM

## 2022-08-05 LAB — CREAT BLDA-MCNC: 0.9 MG/DL (ref 0.6–1.3)

## 2022-08-05 PROCEDURE — 99213 OFFICE O/P EST LOW 20 MIN: CPT | Performed by: NURSE PRACTITIONER

## 2022-08-05 PROCEDURE — 25010000002 IOPAMIDOL 61 % SOLUTION: Performed by: NURSE PRACTITIONER

## 2022-08-05 PROCEDURE — 71260 CT THORAX DX C+: CPT

## 2022-08-05 PROCEDURE — 82565 ASSAY OF CREATININE: CPT

## 2022-08-05 RX ADMIN — IOPAMIDOL 100 ML: 612 INJECTION, SOLUTION INTRAVENOUS at 12:48

## 2022-08-08 ENCOUNTER — INFUSION (OUTPATIENT)
Dept: ONCOLOGY | Facility: HOSPITAL | Age: 67
End: 2022-08-08

## 2022-08-08 ENCOUNTER — LAB (OUTPATIENT)
Dept: LAB | Facility: HOSPITAL | Age: 67
End: 2022-08-08

## 2022-08-08 VITALS
WEIGHT: 155 LBS | DIASTOLIC BLOOD PRESSURE: 55 MMHG | SYSTOLIC BLOOD PRESSURE: 121 MMHG | TEMPERATURE: 96.9 F | OXYGEN SATURATION: 100 % | HEART RATE: 63 BPM | HEIGHT: 64 IN | RESPIRATION RATE: 18 BRPM | BODY MASS INDEX: 26.46 KG/M2

## 2022-08-08 DIAGNOSIS — M79.89 LEG SWELLING: Primary | ICD-10-CM

## 2022-08-08 DIAGNOSIS — D59.10 AUTOIMMUNE HEMOLYTIC ANEMIA: Primary | ICD-10-CM

## 2022-08-08 DIAGNOSIS — D50.8 IRON DEFICIENCY ANEMIA SECONDARY TO INADEQUATE DIETARY IRON INTAKE: ICD-10-CM

## 2022-08-08 DIAGNOSIS — D59.10 AUTOIMMUNE HEMOLYTIC ANEMIA: ICD-10-CM

## 2022-08-08 LAB
ALBUMIN SERPL-MCNC: 4.1 G/DL (ref 3.5–5.2)
ALBUMIN/GLOB SERPL: 2.1 G/DL
ALP SERPL-CCNC: 60 U/L (ref 39–117)
ALT SERPL W P-5'-P-CCNC: 21 U/L (ref 1–33)
ANION GAP SERPL CALCULATED.3IONS-SCNC: 7 MMOL/L (ref 5–15)
AST SERPL-CCNC: 23 U/L (ref 1–32)
BASOPHILS # BLD AUTO: 0.13 10*3/MM3 (ref 0–0.2)
BASOPHILS NFR BLD AUTO: 0.8 % (ref 0–1.5)
BILIRUB SERPL-MCNC: 1.7 MG/DL (ref 0–1.2)
BUN SERPL-MCNC: 16 MG/DL (ref 8–23)
BUN/CREAT SERPL: 18.6 (ref 7–25)
CALCIUM SPEC-SCNC: 9.2 MG/DL (ref 8.6–10.5)
CHLORIDE SERPL-SCNC: 101 MMOL/L (ref 98–107)
CO2 SERPL-SCNC: 30 MMOL/L (ref 22–29)
CREAT SERPL-MCNC: 0.86 MG/DL (ref 0.57–1)
DEPRECATED RDW RBC AUTO: 63.9 FL (ref 37–54)
EGFRCR SERPLBLD CKD-EPI 2021: 74.6 ML/MIN/1.73
EOSINOPHIL # BLD AUTO: 0.25 10*3/MM3 (ref 0–0.4)
EOSINOPHIL NFR BLD AUTO: 1.6 % (ref 0.3–6.2)
ERYTHROCYTE [DISTWIDTH] IN BLOOD BY AUTOMATED COUNT: 17.9 % (ref 12.3–15.4)
FERRITIN SERPL-MCNC: 330.1 NG/ML (ref 13–150)
GLOBULIN UR ELPH-MCNC: 2 GM/DL
GLUCOSE SERPL-MCNC: 282 MG/DL (ref 65–99)
HAPTOGLOB SERPL-MCNC: <10 MG/DL (ref 30–200)
HCT VFR BLD AUTO: 30.9 % (ref 34–46.6)
HGB BLD-MCNC: 10.4 G/DL (ref 12–15.9)
IMM GRANULOCYTES # BLD AUTO: 0.38 10*3/MM3 (ref 0–0.05)
IMM GRANULOCYTES NFR BLD AUTO: 2.5 % (ref 0–0.5)
IRON 24H UR-MRATE: 82 MCG/DL (ref 37–145)
IRON SATN MFR SERPL: 27 % (ref 20–50)
LDH SERPL-CCNC: 337 U/L (ref 135–214)
LYMPHOCYTES # BLD AUTO: 1.89 10*3/MM3 (ref 0.7–3.1)
LYMPHOCYTES NFR BLD AUTO: 12.3 % (ref 19.6–45.3)
MCH RBC QN AUTO: 34 PG (ref 26.6–33)
MCHC RBC AUTO-ENTMCNC: 33.7 G/DL (ref 31.5–35.7)
MCV RBC AUTO: 101 FL (ref 79–97)
MONOCYTES # BLD AUTO: 1.31 10*3/MM3 (ref 0.1–0.9)
MONOCYTES NFR BLD AUTO: 8.5 % (ref 5–12)
NEUTROPHILS NFR BLD AUTO: 11.37 10*3/MM3 (ref 1.7–7)
NEUTROPHILS NFR BLD AUTO: 74.3 % (ref 42.7–76)
NRBC BLD AUTO-RTO: 0 /100 WBC (ref 0–0.2)
PLATELET # BLD AUTO: 315 10*3/MM3 (ref 140–450)
PMV BLD AUTO: 11.1 FL (ref 6–12)
POTASSIUM SERPL-SCNC: 3.9 MMOL/L (ref 3.5–5.2)
PROT SERPL-MCNC: 6.1 G/DL (ref 6–8.5)
RBC # BLD AUTO: 3.06 10*6/MM3 (ref 3.77–5.28)
RETICS # AUTO: 0.31 10*6/MM3 (ref 0.02–0.13)
RETICS/RBC NFR AUTO: 10.14 % (ref 0.7–1.9)
SODIUM SERPL-SCNC: 138 MMOL/L (ref 136–145)
TIBC SERPL-MCNC: 301 MCG/DL (ref 298–536)
TRANSFERRIN SERPL-MCNC: 202 MG/DL (ref 200–360)
WBC NRBC COR # BLD: 15.33 10*3/MM3 (ref 3.4–10.8)

## 2022-08-08 PROCEDURE — 85045 AUTOMATED RETICULOCYTE COUNT: CPT

## 2022-08-08 PROCEDURE — 25010000002 HYDROCORTISONE SODIUM SUCCINATE 100 MG RECONSTITUTED SOLUTION: Performed by: INTERNAL MEDICINE

## 2022-08-08 PROCEDURE — 84466 ASSAY OF TRANSFERRIN: CPT

## 2022-08-08 PROCEDURE — 83010 ASSAY OF HAPTOGLOBIN QUANT: CPT

## 2022-08-08 PROCEDURE — 25010000002 RITUXIMAB 10 MG/ML SOLUTION 10 ML VIAL: Performed by: INTERNAL MEDICINE

## 2022-08-08 PROCEDURE — 85025 COMPLETE CBC W/AUTO DIFF WBC: CPT

## 2022-08-08 PROCEDURE — 96375 TX/PRO/DX INJ NEW DRUG ADDON: CPT

## 2022-08-08 PROCEDURE — 80053 COMPREHEN METABOLIC PANEL: CPT

## 2022-08-08 PROCEDURE — 25010000002 DIPHENHYDRAMINE PER 50 MG: Performed by: INTERNAL MEDICINE

## 2022-08-08 PROCEDURE — 83540 ASSAY OF IRON: CPT

## 2022-08-08 PROCEDURE — 96415 CHEMO IV INFUSION ADDL HR: CPT

## 2022-08-08 PROCEDURE — 96367 TX/PROPH/DG ADDL SEQ IV INF: CPT

## 2022-08-08 PROCEDURE — 36415 COLL VENOUS BLD VENIPUNCTURE: CPT

## 2022-08-08 PROCEDURE — 82728 ASSAY OF FERRITIN: CPT

## 2022-08-08 PROCEDURE — 83615 LACTATE (LD) (LDH) ENZYME: CPT

## 2022-08-08 PROCEDURE — 96413 CHEMO IV INFUSION 1 HR: CPT

## 2022-08-08 PROCEDURE — 25010000002 RITUXIMAB 10 MG/ML SOLUTION 50 ML VIAL: Performed by: INTERNAL MEDICINE

## 2022-08-08 RX ORDER — SODIUM CHLORIDE 9 MG/ML
250 INJECTION, SOLUTION INTRAVENOUS ONCE
Status: CANCELLED | OUTPATIENT
Start: 2022-08-15

## 2022-08-08 RX ORDER — MEPERIDINE HYDROCHLORIDE 50 MG/ML
25 INJECTION INTRAMUSCULAR; INTRAVENOUS; SUBCUTANEOUS
Status: DISCONTINUED | OUTPATIENT
Start: 2022-08-08 | End: 2022-08-08 | Stop reason: HOSPADM

## 2022-08-08 RX ORDER — DIPHENHYDRAMINE HYDROCHLORIDE 50 MG/ML
50 INJECTION INTRAMUSCULAR; INTRAVENOUS AS NEEDED
Status: DISCONTINUED | OUTPATIENT
Start: 2022-08-08 | End: 2022-08-08 | Stop reason: HOSPADM

## 2022-08-08 RX ORDER — ACETAMINOPHEN 325 MG/1
650 TABLET ORAL ONCE
Status: CANCELLED | OUTPATIENT
Start: 2022-08-15

## 2022-08-08 RX ORDER — FAMOTIDINE 10 MG/ML
20 INJECTION, SOLUTION INTRAVENOUS AS NEEDED
Status: CANCELLED | OUTPATIENT
Start: 2022-08-15

## 2022-08-08 RX ORDER — ACETAMINOPHEN 325 MG/1
650 TABLET ORAL ONCE
Status: COMPLETED | OUTPATIENT
Start: 2022-08-08 | End: 2022-08-08

## 2022-08-08 RX ORDER — SODIUM CHLORIDE 9 MG/ML
250 INJECTION, SOLUTION INTRAVENOUS ONCE
Status: COMPLETED | OUTPATIENT
Start: 2022-08-08 | End: 2022-08-08

## 2022-08-08 RX ORDER — DIPHENHYDRAMINE HYDROCHLORIDE 50 MG/ML
50 INJECTION INTRAMUSCULAR; INTRAVENOUS AS NEEDED
Status: CANCELLED | OUTPATIENT
Start: 2022-08-15

## 2022-08-08 RX ORDER — FAMOTIDINE 10 MG/ML
20 INJECTION, SOLUTION INTRAVENOUS AS NEEDED
Status: DISCONTINUED | OUTPATIENT
Start: 2022-08-08 | End: 2022-08-08 | Stop reason: HOSPADM

## 2022-08-08 RX ORDER — MEPERIDINE HYDROCHLORIDE 50 MG/ML
25 INJECTION INTRAMUSCULAR; INTRAVENOUS; SUBCUTANEOUS
Status: CANCELLED | OUTPATIENT
Start: 2022-08-15

## 2022-08-08 RX ADMIN — FAMOTIDINE 20 MG: 10 INJECTION INTRAVENOUS at 10:47

## 2022-08-08 RX ADMIN — ACETAMINOPHEN 650 MG: 325 TABLET, FILM COATED ORAL at 10:42

## 2022-08-08 RX ADMIN — SODIUM CHLORIDE 250 ML: 9 INJECTION, SOLUTION INTRAVENOUS at 10:30

## 2022-08-08 RX ADMIN — HYDROCORTISONE SODIUM SUCCINATE 100 MG: 100 INJECTION, POWDER, FOR SOLUTION INTRAMUSCULAR; INTRAVENOUS at 10:43

## 2022-08-08 RX ADMIN — RITUXIMAB 660 MG: 10 INJECTION, SOLUTION INTRAVENOUS at 11:11

## 2022-08-08 RX ADMIN — DIPHENHYDRAMINE HYDROCHLORIDE 50 MG: 50 INJECTION, SOLUTION INTRAMUSCULAR; INTRAVENOUS at 10:49

## 2022-08-08 NOTE — PROGRESS NOTES
0910 Called chemistry regarding CMP, unable to locate blood. To call back.    0915  Chemistry returned call tube was sent out for hatoglobin needs to be redrawn, Geena in outpatient lab aware states ben extra tube. Lab notified found tube to put on machine, patient aware.    0945  lab stable but patient still having a lot of swelling in legs and feet having trouble walking. Also very dizzy especially when walking or turning her head? ok for treatment    1020 OK for treatment per Dr. Elizalde have patient decrease prednisone to 15 mg daily, patient aware. Follow up with PCP regarding dizziness.

## 2022-08-09 ENCOUNTER — HOSPITAL ENCOUNTER (OUTPATIENT)
Dept: ULTRASOUND IMAGING | Facility: HOSPITAL | Age: 67
Discharge: HOME | End: 2022-08-09
Admitting: INTERNAL MEDICINE

## 2022-08-09 DIAGNOSIS — M79.89 LEG SWELLING: ICD-10-CM

## 2022-08-09 PROCEDURE — 93970 EXTREMITY STUDY: CPT

## 2022-08-09 RX ORDER — FOLIC ACID 1 MG/1
1 TABLET ORAL DAILY
Qty: 30 TABLET | Refills: 2 | Status: CANCELLED | OUTPATIENT
Start: 2022-08-09

## 2022-08-09 NOTE — TELEPHONE ENCOUNTER
Caller: CHRISTOPHER    Relationship: SELF    Best call back number: [phone]    Requested Prescriptions:   Requested Prescriptions     Pending Prescriptions Disp Refills   • folic acid (FOLVITE) 1 MG tablet 30 tablet 2     Sig: Take 1 tablet by mouth Daily.        Pharmacy where request should be sent:      [facility] [address] [place] [address] AT  60 - [phone] [facility] [phone]   [phone]    Does the patient have less than a 3 day supply:  [x] Yes  [] No    Roz Thompson   08/09/22 10:13 CDT

## 2022-08-10 DIAGNOSIS — E53.8 LOW FOLATE: Primary | ICD-10-CM

## 2022-08-10 RX ORDER — FOLIC ACID 1 MG/1
1 TABLET ORAL DAILY
Qty: 30 TABLET | Refills: 2 | Status: SHIPPED | OUTPATIENT
Start: 2022-08-10 | End: 2022-12-22

## 2022-08-11 ENCOUNTER — PATIENT ROUNDING (BHMG ONLY) (OUTPATIENT)
Dept: PULMONOLOGY | Facility: CLINIC | Age: 67
End: 2022-08-11

## 2022-08-11 ENCOUNTER — OFFICE VISIT (OUTPATIENT)
Dept: FAMILY MEDICINE CLINIC | Facility: CLINIC | Age: 67
End: 2022-08-11

## 2022-08-11 VITALS
TEMPERATURE: 97.2 F | OXYGEN SATURATION: 98 % | SYSTOLIC BLOOD PRESSURE: 111 MMHG | HEART RATE: 72 BPM | BODY MASS INDEX: 26.02 KG/M2 | WEIGHT: 152.4 LBS | HEIGHT: 64 IN | DIASTOLIC BLOOD PRESSURE: 65 MMHG

## 2022-08-11 DIAGNOSIS — R60.9 PERIPHERAL EDEMA: Primary | ICD-10-CM

## 2022-08-11 DIAGNOSIS — M18.11 OSTEOARTHRITIS OF CARPOMETACARPAL (CMC) JOINT OF RIGHT THUMB, UNSPECIFIED OSTEOARTHRITIS TYPE: ICD-10-CM

## 2022-08-11 DIAGNOSIS — M25.541 PAIN IN THUMB JOINT WITH MOVEMENT OF RIGHT HAND: ICD-10-CM

## 2022-08-11 PROCEDURE — 99214 OFFICE O/P EST MOD 30 MIN: CPT | Performed by: FAMILY MEDICINE

## 2022-08-11 RX ORDER — FUROSEMIDE 20 MG/1
20 TABLET ORAL DAILY
Qty: 30 TABLET | Refills: 0 | Status: SHIPPED | OUTPATIENT
Start: 2022-08-11

## 2022-08-11 NOTE — PROGRESS NOTES
August 11, 2022    Hello, may I speak with Giana Yepez?    My name is Miracle Shi    I am  with [facility]  [address]  [facility] [address] [phone].    Before we get started may I verify your date of birth? 1955    I am calling to officially welcome you to our practice and ask about your recent visit. Is this a good time to talk? LVM    Tell me about your visit with us. What things went well?         We're always looking for ways to make our patients' experiences even better. Do you have recommendations on ways we may improve?      Overall were you satisfied with your first visit to our practice?        I appreciate you taking the time to speak with me today. Is there anything else I can do for you?       Thank you, and have a great day.

## 2022-08-11 NOTE — PROGRESS NOTES
Chief Complaint  Anemia (Follow up) Swelling.    Subjective        History of Present Illness  Giana Yepez is a 67 y.o. female who presents to [facility]   L leg and ankle and foot more swollen than the R leg which is just swollen in the leg area.  Tense,  No pitting.  Stable hematlogy levels.  High BG she thinks is related to her tx.  She has one tx of ritux scheduled for next week as 4th tx to complete series.   She is undergoing treatment for autoimmune hemolytic anemia and has received IVIG, prednisone and 2 doses of rituximab.   She has had mediastinal lymphadenopathy.    [place] told her it might be late Sept until they see if tx works.  She has anxiety about this.  Just wants to feel better, get back to normal functioning.    Currently on 15 mg prednisone.  Feels bad all the time, at 1 PM she has no energy. She is dragging herself through the day.  Sleeps 5-6 hours at night.      Fills her insulin pump up every three days.  Has had BG in the 300's.  BG was 180 this AM. Denies low BG/ No 50-60 or hypoglycemia.  Early AM when not on pump interacting with the DexCom she will have to add more insulin acutely herself, o/w DexCom helps.    Result Review :   The following data was reviewed by: Camila Montes MD on 08/11/2022:    Lab on 08/08/2022   Component Date Value Ref Range Status   • Glucose 08/08/2022 282 (A) 65 - 99 mg/dL Final   • BUN 08/08/2022 16  8 - 23 mg/dL Final   • Creatinine 08/08/2022 0.86  0.57 - 1.00 mg/dL Final   • Sodium 08/08/2022 138  136 - 145 mmol/L Final   • Potassium 08/08/2022 3.9  3.5 - 5.2 mmol/L Final   • Chloride 08/08/2022 101  98 - 107 mmol/L Final   • CO2 08/08/2022 30.0 (A) 22.0 - 29.0 mmol/L Final   • Calcium 08/08/2022 9.2  8.6 - 10.5 mg/dL Final   • Total Protein 08/08/2022 6.1  6.0 - 8.5 g/dL Final   • Albumin 08/08/2022 4.10  3.50 - 5.20 g/dL Final   • ALT (SGPT) 08/08/2022 21  1 - 33 U/L Final   • AST (SGOT) 08/08/2022 23  1 - 32 U/L  Final   • Alkaline Phosphatase 08/08/2022 60  39 - 117 U/L Final   • Total Bilirubin 08/08/2022 1.7 (A) 0.0 - 1.2 mg/dL Final   • Globulin 08/08/2022 2.0  gm/dL Final   • A/G Ratio 08/08/2022 2.1  g/dL Final   • BUN/Creatinine Ratio 08/08/2022 18.6  7.0 - 25.0 Final   • Anion Gap 08/08/2022 7.0  5.0 - 15.0 mmol/L Final   • eGFR 08/08/2022 74.6  >60.0 mL/min/1.73 Final    National Kidney Foundation and American Society of Nephrology (ASN) Task Force recommended calculation based on the Chronic Kidney Disease Epidemiology Collaboration (CKD-EPI) equation refit without adjustment for race.   • Haptoglobin 08/08/2022 <10 (A) 30 - 200 mg/dL Final   • LDH 08/08/2022 337 (A) 135 - 214 U/L Final   • Reticulocyte % 08/08/2022 10.14 (A) 0.70 - 1.90 % Final   • Reticulocyte Absolute 08/08/2022 0.3103 (A) 0.0200 - 0.1300 10*6/mm3 Final   • Iron 08/08/2022 82  37 - 145 mcg/dL Final   • Iron Saturation 08/08/2022 27  20 - 50 % Final   • Transferrin 08/08/2022 202  200 - 360 mg/dL Final   • TIBC 08/08/2022 301  298 - 536 mcg/dL Final   • Ferritin 08/08/2022 330.10 (A) 13.00 - 150.00 ng/mL Final   • WBC 08/08/2022 15.33 (A) 3.40 - 10.80 10*3/mm3 Final   • RBC 08/08/2022 3.06 (A) 3.77 - 5.28 10*6/mm3 Final   • Hemoglobin 08/08/2022 10.4 (A) 12.0 - 15.9 g/dL Final   • Hematocrit 08/08/2022 30.9 (A) 34.0 - 46.6 % Final   • MCV 08/08/2022 101.0 (A) 79.0 - 97.0 fL Final   • MCH 08/08/2022 34.0 (A) 26.6 - 33.0 pg Final   • MCHC 08/08/2022 33.7  31.5 - 35.7 g/dL Final   • RDW 08/08/2022 17.9 (A) 12.3 - 15.4 % Final   • RDW-SD 08/08/2022 63.9 (A) 37.0 - 54.0 fl Final   • MPV 08/08/2022 11.1  6.0 - 12.0 fL Final   • Platelets 08/08/2022 315  140 - 450 10*3/mm3 Final   • Neutrophil % 08/08/2022 74.3  42.7 - 76.0 % Final   • Lymphocyte % 08/08/2022 12.3 (A) 19.6 - 45.3 % Final   • Monocyte % 08/08/2022 8.5  5.0 - 12.0 % Final   • Eosinophil % 08/08/2022 1.6  0.3 - 6.2 % Final   • Basophil % 08/08/2022 0.8  0.0 - 1.5 % Final   • Immature Grans  % 08/08/2022 2.5 (A) 0.0 - 0.5 % Final   • Neutrophils, Absolute 08/08/2022 11.37 (A) 1.70 - 7.00 10*3/mm3 Final   • Lymphocytes, Absolute 08/08/2022 1.89  0.70 - 3.10 10*3/mm3 Final   • Monocytes, Absolute 08/08/2022 1.31 (A) 0.10 - 0.90 10*3/mm3 Final   • Eosinophils, Absolute 08/08/2022 0.25  0.00 - 0.40 10*3/mm3 Final   • Basophils, Absolute 08/08/2022 0.13  0.00 - 0.20 10*3/mm3 Final   • Immature Grans, Absolute 08/08/2022 0.38 (A) 0.00 - 0.05 10*3/mm3 Final   • nRBC 08/08/2022 0.0  0.0 - 0.2 /100 WBC Final   Hospital Outpatient Visit on 08/05/2022   Component Date Value Ref Range Status   • Creatinine 08/05/2022 0.90  0.60 - 1.30 mg/dL Final    Serial Number: [id number]:  [id number]   Lab on 08/01/2022   Component Date Value Ref Range Status   • Glucose 08/01/2022 204 (A) 65 - 99 mg/dL Final   • BUN 08/01/2022 14  8 - 23 mg/dL Final   • Creatinine 08/01/2022 0.74  0.57 - 1.00 mg/dL Final   • Sodium 08/01/2022 135 (A) 136 - 145 mmol/L Final   • Potassium 08/01/2022 3.8  3.5 - 5.2 mmol/L Final   • Chloride 08/01/2022 97 (A) 98 - 107 mmol/L Final   • CO2 08/01/2022 29.0  22.0 - 29.0 mmol/L Final   • Calcium 08/01/2022 8.5 (A) 8.6 - 10.5 mg/dL Final   • Total Protein 08/01/2022 5.8 (A) 6.0 - 8.5 g/dL Final   • Albumin 08/01/2022 3.70  3.50 - 5.20 g/dL Final   • ALT (SGPT) 08/01/2022 43 (A) 1 - 33 U/L Final   • AST (SGOT) 08/01/2022 58 (A) 1 - 32 U/L Final   • Alkaline Phosphatase 08/01/2022 75  39 - 117 U/L Final   • Total Bilirubin 08/01/2022 2.2 (A) 0.0 - 1.2 mg/dL Final   • Globulin 08/01/2022 2.1  gm/dL Final   • A/G Ratio 08/01/2022 1.8  g/dL Final   • BUN/Creatinine Ratio 08/01/2022 18.9  7.0 - 25.0 Final   • Anion Gap 08/01/2022 9.0  5.0 - 15.0 mmol/L Final   • eGFR 08/01/2022 89.4  >60.0 mL/min/1.73 Final    National Kidney Foundation and American Society of Nephrology (ASN) Task Force recommended calculation based on the Chronic Kidney Disease Epidemiology Collaboration (CKD-EPI) equation refit without  adjustment for race.   • Haptoglobin 08/01/2022 <10 (A) 30 - 200 mg/dL Final   • LDH 08/01/2022 343 (A) 135 - 214 U/L Final   • Reticulocyte % 08/01/2022 7.93 (A) 0.70 - 1.90 % Corrected    Corrected result. Previous result was 11.38 % on 8/1/2022 at 0801 CDT.   • Reticulocyte Absolute 08/01/2022 0.2220 (A) 0.0200 - 0.1300 10*6/mm3 Corrected    Corrected result. Previous result was 0.3334 10*6/mm3 on 8/1/2022 at 0801 CDT.   • WBC 08/01/2022 15.32 (A) 3.40 - 10.80 10*3/mm3 Final   • RBC 08/01/2022 2.93 (A) 3.77 - 5.28 10*6/mm3 Final   • Hemoglobin 08/01/2022 9.8 (A) 12.0 - 15.9 g/dL Final   • Hematocrit 08/01/2022 30.2 (A) 34.0 - 46.6 % Final   • MCV 08/01/2022 103.1 (A) 79.0 - 97.0 fL Final   • MCH 08/01/2022 33.4 (A) 26.6 - 33.0 pg Final   • MCHC 08/01/2022 32.5  31.5 - 35.7 g/dL Final   • RDW 08/01/2022 18.5 (A) 12.3 - 15.4 % Final   • RDW-SD 08/01/2022 68.5 (A) 37.0 - 54.0 fl Final   • MPV 08/01/2022 10.7  6.0 - 12.0 fL Final   • Platelets 08/01/2022 271  140 - 450 10*3/mm3 Final   • Neutrophil % 08/01/2022 74.8  42.7 - 76.0 % Final   • Lymphocyte % 08/01/2022 13.3 (A) 19.6 - 45.3 % Final   • Monocyte % 08/01/2022 7.2  5.0 - 12.0 % Final   • Eosinophil % 08/01/2022 1.4  0.3 - 6.2 % Final   • Basophil % 08/01/2022 0.8  0.0 - 1.5 % Final   • Immature Grans % 08/01/2022 2.5 (A) 0.0 - 0.5 % Final   • Neutrophils, Absolute 08/01/2022 11.47 (A) 1.70 - 7.00 10*3/mm3 Final   • Lymphocytes, Absolute 08/01/2022 2.03  0.70 - 3.10 10*3/mm3 Final   • Monocytes, Absolute 08/01/2022 1.10 (A) 0.10 - 0.90 10*3/mm3 Final   • Eosinophils, Absolute 08/01/2022 0.21  0.00 - 0.40 10*3/mm3 Final   • Basophils, Absolute 08/01/2022 0.12  0.00 - 0.20 10*3/mm3 Final   • Immature Grans, Absolute 08/01/2022 0.39 (A) 0.00 - 0.05 10*3/mm3 Final   • nRBC 08/01/2022 0.1  0.0 - 0.2 /100 WBC Final   • Extra Tube 08/01/2022 Hold for add-ons.   Final    Auto resulted.        Component   Ref Range & Units 3 mo ago   [place] 2 yr ago    [place]   Hemoglobin A1C   4.80 - 5.60 % 4.30 Low   9.6 R, CM     Sees [place] specialist every 3 months.     Dominick Fernandez MD, ASHA    [address] [place] [address]    [phone] (Work)    [phone] (Fax)                    Past Medical History:   Diagnosis Date   • Allergic to grasses 4/20/89   • Arthritis    • Asthma    • Cancer (HCC)     skin basal cell   • Diabetes mellitus (HCC)    • Hypercholesterolemia 12/22/2016   • Intermittent chest pain 12/22/2016   • Osteopenia 2015    osteopenia   • PONV (postoperative nausea and vomiting)    • Scoliosis 2018   • Spinal headache    • Vitamin B12 deficiency 12/22/2016   • Vitamin D deficiency 12/22/2016   • Warm autoimmune hemolytic anemia (HCC)        Outpatient Medications Prior to Visit   Medication Sig Dispense Refill   • allopurinol (ZYLOPRIM) 100 MG tablet Take 1 tablet by mouth Daily. 30 tablet 1   • Calcium Citrate (CITRACAL PO) Take  by mouth.     • cholecalciferol (VITAMIN D3) 25 MCG (1000 UT) tablet Take 2,000 Units by mouth Daily.     • Continuous Blood Gluc  (Dexcom G5  Kit) device Continuous.     • diphenhydrAMINE (BENADRYL) 25 mg capsule Take 25 mg by mouth Every 6 (Six) Hours As Needed for Itching.     • ferrous sulfate 325 (65 FE) MG tablet Take 325 mg by mouth Daily With Breakfast.     • fluticasone (FLONASE) 50 MCG/ACT nasal spray 2 sprays into the nostril(s) as directed by provider Daily. To control swelling and drainage from allergies 16 g 11   • folic acid (FOLVITE) 1 MG tablet Take 1 tablet by mouth Daily. 30 tablet 2   • Insulin Infusion Pump device Continuous.     • Insulin Lispro (humaLOG) 100 UNIT/ML injection Inject 25-40 Units under the skin into the appropriate area as directed Daily.     • Loratadine 10 MG capsule Take 10 mg by mouth every night at bedtime. As anti-histamine for allergies     • omeprazole (priLOSEC) 40 MG capsule Take 40 mg by mouth Daily.     •  "ondansetron (ZOFRAN) 8 MG tablet Take 1 tablet by mouth Every 8 (Eight) Hours As Needed for Nausea or Vomiting. 60 tablet 3   • predniSONE (DELTASONE) 20 MG tablet Take 3 tabs (60 mg total dose) daily with food (Patient taking differently: Take 2 tabs (40 mg total dose) daily with food) 90 tablet 2   • rosuvastatin (CRESTOR) 20 MG tablet Take 20 mg by mouth Daily.     • vitamin B-12 (CYANOCOBALAMIN) 1000 MCG tablet Take 1,000 mcg by mouth Daily.     • cyanocobalamin (VITAMIN B-12) 1000 MCG tablet Take  by mouth Daily.       No facility-administered medications prior to visit.        Objective   Vital Signs:   /65 (BP Location: Left arm, Patient Position: Sitting, Cuff Size: Adult)   Pulse 72   Temp 97.2 °F (36.2 °C) (Temporal)   Ht 162.6 cm (64\")   Wt 69.1 kg (152 lb 6.4 oz)   SpO2 98%   BMI 26.16 kg/m²       Physical Exam  Vitals reviewed.   Neck:      Thyroid: No thyroid mass, thyromegaly or thyroid tenderness.   Cardiovascular:      Rate and Rhythm: Normal rate and regular rhythm.   Pulmonary:      Effort: Pulmonary effort is normal.      Breath sounds: Normal breath sounds.   Abdominal:      General: Bowel sounds are normal. There is no distension.      Palpations: Abdomen is soft.   Musculoskeletal:        Hands:         Legs:    Lymphadenopathy:      Cervical: No cervical adenopathy.   Skin:     Coloration: Skin is pale.   Neurological:      Mental Status: She is alert and oriented to person, place, and time.   Psychiatric:         Mood and Affect: Mood is anxious.         Speech: Speech normal.         Behavior: Behavior is not agitated. Behavior is cooperative.         Cognition and Memory: Cognition and memory normal.             Assessment and Plan    Diagnoses and all orders for this visit:    1. Peripheral edema (Primary)  -     furosemide (Lasix) 20 MG tablet; Take 1 tablet by mouth Daily. For relief of leg swelling  Dispense: 30 tablet; Refill: 0    2. Pain in thumb joint with movement of " right hand  -     Rheumatoid Factor; Future  -     XR Hand 3+ View Right; Future  -     XR Wrist 3+ View Right; Future    3. Osteoarthritis of carpometacarpal (CMC) joint of right thumb, unspecified osteoarthritis type  -     Rheumatoid Factor; Future  -     XR Hand 3+ View Right; Future  -     XR Wrist 3+ View Right; Future    Will do trial of brief course of diuretic to see if she can get some relief from the uncomfortable swelling.  Explained that anemia may be cause of swelling which wouldn't be corrected with lasix.  Use Lasix for 3 days max, track AM weights and eat K-rich food daily.  See if swelling improves off steroids and ritux regimen too.    Will work up the hand pain with x-rays.  She wants to know if it could be RA, will check lab.        Follow Up   Return in about 5 months (around 1/25/2023) for Annual physical.    Patient was given instructions and counseling regarding her condition or for health maintenance advice.        Please see specific information/handouts pulled into the AVS if appropriate.       Camila Montes M.D.  [facility]

## 2022-08-15 ENCOUNTER — INFUSION (OUTPATIENT)
Dept: ONCOLOGY | Facility: HOSPITAL | Age: 67
End: 2022-08-15

## 2022-08-15 ENCOUNTER — LAB (OUTPATIENT)
Dept: LAB | Facility: HOSPITAL | Age: 67
End: 2022-08-15

## 2022-08-15 VITALS
HEIGHT: 64 IN | OXYGEN SATURATION: 100 % | BODY MASS INDEX: 26.12 KG/M2 | TEMPERATURE: 97.7 F | HEART RATE: 88 BPM | SYSTOLIC BLOOD PRESSURE: 103 MMHG | WEIGHT: 153 LBS | DIASTOLIC BLOOD PRESSURE: 51 MMHG | RESPIRATION RATE: 18 BRPM

## 2022-08-15 DIAGNOSIS — D59.10 AUTOIMMUNE HEMOLYTIC ANEMIA: ICD-10-CM

## 2022-08-15 DIAGNOSIS — M25.541 PAIN IN THUMB JOINT WITH MOVEMENT OF RIGHT HAND: ICD-10-CM

## 2022-08-15 DIAGNOSIS — D50.8 IRON DEFICIENCY ANEMIA SECONDARY TO INADEQUATE DIETARY IRON INTAKE: ICD-10-CM

## 2022-08-15 DIAGNOSIS — M18.11 OSTEOARTHRITIS OF CARPOMETACARPAL (CMC) JOINT OF RIGHT THUMB, UNSPECIFIED OSTEOARTHRITIS TYPE: ICD-10-CM

## 2022-08-15 DIAGNOSIS — D59.10 AUTOIMMUNE HEMOLYTIC ANEMIA: Primary | ICD-10-CM

## 2022-08-15 LAB
ALBUMIN SERPL-MCNC: 4.2 G/DL (ref 3.5–5.2)
ALBUMIN/GLOB SERPL: 1.7 G/DL
ALP SERPL-CCNC: 72 U/L (ref 39–117)
ALT SERPL W P-5'-P-CCNC: 17 U/L (ref 1–33)
ANION GAP SERPL CALCULATED.3IONS-SCNC: 9 MMOL/L (ref 5–15)
ANISOCYTOSIS BLD QL: ABNORMAL
AST SERPL-CCNC: 26 U/L (ref 1–32)
BASOPHILS # BLD MANUAL: 0.14 10*3/MM3 (ref 0–0.2)
BASOPHILS NFR BLD MANUAL: 1 % (ref 0–1.5)
BILIRUB SERPL-MCNC: 2 MG/DL (ref 0–1.2)
BUN SERPL-MCNC: 20 MG/DL (ref 8–23)
BUN/CREAT SERPL: 27 (ref 7–25)
CALCIUM SPEC-SCNC: 8.2 MG/DL (ref 8.6–10.5)
CHLORIDE SERPL-SCNC: 100 MMOL/L (ref 98–107)
CHROMATIN AB SERPL-ACNC: <10 IU/ML (ref 0–14)
CO2 SERPL-SCNC: 27 MMOL/L (ref 22–29)
CREAT SERPL-MCNC: 0.74 MG/DL (ref 0.57–1)
DEPRECATED RDW RBC AUTO: 63 FL (ref 37–54)
EGFRCR SERPLBLD CKD-EPI 2021: 89.4 ML/MIN/1.73
ERYTHROCYTE [DISTWIDTH] IN BLOOD BY AUTOMATED COUNT: 17.9 % (ref 12.3–15.4)
GIANT PLATELETS: ABNORMAL
GLOBULIN UR ELPH-MCNC: 2.5 GM/DL
GLUCOSE SERPL-MCNC: 227 MG/DL (ref 65–99)
HCT VFR BLD AUTO: 33.6 % (ref 34–46.6)
HGB BLD-MCNC: 11.5 G/DL (ref 12–15.9)
LDH SERPL-CCNC: 379 U/L (ref 135–214)
LYMPHOCYTES # BLD MANUAL: 3.3 10*3/MM3 (ref 0.7–3.1)
LYMPHOCYTES NFR BLD MANUAL: 9 % (ref 5–12)
MACROCYTES BLD QL SMEAR: ABNORMAL
MCH RBC QN AUTO: 33.8 PG (ref 26.6–33)
MCHC RBC AUTO-ENTMCNC: 34.2 G/DL (ref 31.5–35.7)
MCV RBC AUTO: 98.8 FL (ref 79–97)
METAMYELOCYTES NFR BLD MANUAL: 1 % (ref 0–0)
MONOCYTES # BLD: 1.29 10*3/MM3 (ref 0.1–0.9)
MYELOCYTES NFR BLD MANUAL: 3 % (ref 0–0)
NEUTROPHILS # BLD AUTO: 9.03 10*3/MM3 (ref 1.7–7)
NEUTROPHILS NFR BLD MANUAL: 59 % (ref 42.7–76)
NEUTS BAND NFR BLD MANUAL: 4 % (ref 0–5)
PLATELET # BLD AUTO: 351 10*3/MM3 (ref 140–450)
PMV BLD AUTO: 11 FL (ref 6–12)
POIKILOCYTOSIS BLD QL SMEAR: ABNORMAL
POLYCHROMASIA BLD QL SMEAR: ABNORMAL
POTASSIUM SERPL-SCNC: 3.9 MMOL/L (ref 3.5–5.2)
PROT SERPL-MCNC: 6.7 G/DL (ref 6–8.5)
RBC # BLD AUTO: 3.4 10*6/MM3 (ref 3.77–5.28)
RETICS # AUTO: 0.3 10*6/MM3 (ref 0.02–0.13)
RETICS/RBC NFR AUTO: 9.08 % (ref 0.7–1.9)
SODIUM SERPL-SCNC: 136 MMOL/L (ref 136–145)
VARIANT LYMPHS NFR BLD MANUAL: 18 % (ref 19.6–45.3)
VARIANT LYMPHS NFR BLD MANUAL: 5 % (ref 0–5)
WBC MORPH BLD: NORMAL
WBC NRBC COR # BLD: 14.33 10*3/MM3 (ref 3.4–10.8)

## 2022-08-15 PROCEDURE — 85025 COMPLETE CBC W/AUTO DIFF WBC: CPT

## 2022-08-15 PROCEDURE — 96367 TX/PROPH/DG ADDL SEQ IV INF: CPT

## 2022-08-15 PROCEDURE — 86431 RHEUMATOID FACTOR QUANT: CPT

## 2022-08-15 PROCEDURE — 80053 COMPREHEN METABOLIC PANEL: CPT

## 2022-08-15 PROCEDURE — 25010000002 HYDROCORTISONE SODIUM SUCCINATE 100 MG RECONSTITUTED SOLUTION: Performed by: INTERNAL MEDICINE

## 2022-08-15 PROCEDURE — 96415 CHEMO IV INFUSION ADDL HR: CPT

## 2022-08-15 PROCEDURE — 96413 CHEMO IV INFUSION 1 HR: CPT

## 2022-08-15 PROCEDURE — 85007 BL SMEAR W/DIFF WBC COUNT: CPT

## 2022-08-15 PROCEDURE — 83615 LACTATE (LD) (LDH) ENZYME: CPT

## 2022-08-15 PROCEDURE — 83010 ASSAY OF HAPTOGLOBIN QUANT: CPT

## 2022-08-15 PROCEDURE — 25010000002 DIPHENHYDRAMINE PER 50 MG: Performed by: INTERNAL MEDICINE

## 2022-08-15 PROCEDURE — 36415 COLL VENOUS BLD VENIPUNCTURE: CPT

## 2022-08-15 PROCEDURE — 25010000002 RITUXIMAB 500 MG/50ML SOLUTION 50 ML VIAL: Performed by: INTERNAL MEDICINE

## 2022-08-15 PROCEDURE — 25010000002 RITUXIMAB 100 MG/10ML SOLUTION 10 ML VIAL: Performed by: INTERNAL MEDICINE

## 2022-08-15 PROCEDURE — 96375 TX/PRO/DX INJ NEW DRUG ADDON: CPT

## 2022-08-15 PROCEDURE — 85045 AUTOMATED RETICULOCYTE COUNT: CPT

## 2022-08-15 RX ORDER — ACETAMINOPHEN 325 MG/1
650 TABLET ORAL ONCE
Status: CANCELLED | OUTPATIENT
Start: 2022-08-15

## 2022-08-15 RX ORDER — DIPHENHYDRAMINE HYDROCHLORIDE 50 MG/ML
50 INJECTION INTRAMUSCULAR; INTRAVENOUS AS NEEDED
Status: DISCONTINUED | OUTPATIENT
Start: 2022-08-15 | End: 2022-08-15 | Stop reason: HOSPADM

## 2022-08-15 RX ORDER — MEPERIDINE HYDROCHLORIDE 50 MG/ML
25 INJECTION INTRAMUSCULAR; INTRAVENOUS; SUBCUTANEOUS
Status: CANCELLED | OUTPATIENT
Start: 2022-08-15

## 2022-08-15 RX ORDER — MEPERIDINE HYDROCHLORIDE 50 MG/ML
25 INJECTION INTRAMUSCULAR; INTRAVENOUS; SUBCUTANEOUS
Status: DISCONTINUED | OUTPATIENT
Start: 2022-08-15 | End: 2022-08-15 | Stop reason: HOSPADM

## 2022-08-15 RX ORDER — FAMOTIDINE 10 MG/ML
20 INJECTION, SOLUTION INTRAVENOUS AS NEEDED
Status: DISCONTINUED | OUTPATIENT
Start: 2022-08-15 | End: 2022-08-15 | Stop reason: HOSPADM

## 2022-08-15 RX ORDER — FAMOTIDINE 10 MG/ML
20 INJECTION, SOLUTION INTRAVENOUS AS NEEDED
Status: CANCELLED | OUTPATIENT
Start: 2022-08-15

## 2022-08-15 RX ORDER — SODIUM CHLORIDE 9 MG/ML
250 INJECTION, SOLUTION INTRAVENOUS ONCE
Status: CANCELLED | OUTPATIENT
Start: 2022-08-15

## 2022-08-15 RX ORDER — DIPHENHYDRAMINE HYDROCHLORIDE 50 MG/ML
50 INJECTION INTRAMUSCULAR; INTRAVENOUS AS NEEDED
Status: CANCELLED | OUTPATIENT
Start: 2022-08-15

## 2022-08-15 RX ORDER — SODIUM CHLORIDE 9 MG/ML
250 INJECTION, SOLUTION INTRAVENOUS ONCE
Status: COMPLETED | OUTPATIENT
Start: 2022-08-15 | End: 2022-08-15

## 2022-08-15 RX ORDER — ACETAMINOPHEN 325 MG/1
650 TABLET ORAL ONCE
Status: COMPLETED | OUTPATIENT
Start: 2022-08-15 | End: 2022-08-15

## 2022-08-15 RX ADMIN — DIPHENHYDRAMINE HYDROCHLORIDE 50 MG: 50 INJECTION, SOLUTION INTRAMUSCULAR; INTRAVENOUS at 08:53

## 2022-08-15 RX ADMIN — RITUXIMAB 660 MG: 10 INJECTION, SOLUTION INTRAVENOUS at 09:15

## 2022-08-15 RX ADMIN — ACETAMINOPHEN 650 MG: 325 TABLET, FILM COATED ORAL at 08:49

## 2022-08-15 RX ADMIN — HYDROCORTISONE SODIUM SUCCINATE 100 MG: 100 INJECTION, POWDER, FOR SOLUTION INTRAMUSCULAR; INTRAVENOUS at 08:50

## 2022-08-15 RX ADMIN — SODIUM CHLORIDE 250 ML: 9 INJECTION, SOLUTION INTRAVENOUS at 08:49

## 2022-08-15 NOTE — PROGRESS NOTES
[facility] HEMATOLOGY & ONCOLOGY  [address]  [facility] [address] [phone]    Patient Name: Giana Yepez  Encounter Date: 08/24/2022  YOB: 1955  Patient Number: [id number]      REASON FOR FOLLOW-UP: Giana Yepez is a pleasant 67 y.o.  female who is seen on follow-up for autoimmune hemolytic anemia.  She is seen 1.5 weeks post for weekly doses of rituximab rechallenge. She had a dose of rituximab on 04/28/2022, she was intolerant.  She was taking prednisone 1 mg /kg and decrease to 50 mg daily for 1 week from 07/11/2022 and 40 mg po daily from 07/18/2022 for 1 week and 30 mg p.o. daily from 07/25/2022, 20 mg daily from 08/01/2022 through 08/07/2022, 15 mg from 08/08/2022 through 08/14/2022, 10 mg from 08/15/2022 through 08/21/2022, and 5 mg from 08/22/2022 through present. Last day 08/28/2022.  She is seen alone.  History is accurate.        Problem List Items Addressed This Visit        Other    Autoimmune hemolytic anemia (HCC) - Primary    Overview     DIAGNOSTIC ABNORMALITIES:  She presented with worsening fatigue and weight loss approximately 10 pounds in the last 2 months.  WBC 18.2, ANC 12.4, hemoglobin 7.6, hematocrit 24.1, , and platelet 348, folate 12.6, B12 at 1988, TSH 1.96, erythropoietin 61.5, sed rate 3, , haptoglobin less than 10, CMP remarkable for AST of 33 and bilirubin 2.3, IgG 1035, and negative BCR ABL on 04/19/2022.  Reticulocyte 25.69% and Rona test was positive on 04/22/2022.  Negative hepatitis B core antibody, negative HIV, negative LIZ panel and blood for flow cytometry 04/22/2022, negative Bcl-2, BCL6 and MYC.  CT neck, chest, abdomen and pelvis on 04/23/2022.9 mm relatively hypodense well-circumscribed asymmetry at the level of the upper left vallecula and base of the tongue on the left. No associated enhancement of the wall or intraluminal air making an infectious  process less likely. This measures Hounsfield units of 36 suggesting it may be soft tissue in nature. Direct visualization is recommended.  No pathologically enlarged cervical chain or posterior triangle lymphadenopathy is present. There are some borderline enlarged left supraclavicular nodes with measurements as above. No additional discrete mass lesions are appreciated. Level of the true and false cords is unremarkable. The thyroid gland is homogeneous in density without evidence of nodularity or mass.  Borderline enlarged mediastinal nodes are present as well as a few left supraclavicular nodes. No additional enlarged nodes are identified.  Right middle lobe atelectasis or scarring is present. There is mild patchy pneumonitis within the superior segment of both lower lobes and apical posterior segment of the left upper lobe suggesting a patchy pneumonitis/bronchopneumonia. Lungs are otherwise clear. No evidence of lobar consolidation. No discrete endobronchial lesion is identified.  The heart is normal in size. The thoracic aorta and great vessels are normal in caliber.  No evidence of intraperitoneal or retroperitoneal lymphadenopathy.  Homogeneous enhancement of the kidneys. No evidence of nephrolithiasis or obstructive uropathy.  Constipation with increased stool throughout the colon. No evidence of mechanical obstruction or free air.  The uterus and adnexa are unremarkable.  Bone marrow biopsy 04/25/2022.  Hypercellular marrow for age 70 to 80% with maturing trilineage hematopoiesis.  Erythroid hyperplasia.  Slight megakaryocyte ptosis.  Stainable iron is present.  No evidence of acute leukemia, lymphoproliferative disorder or plasma cell dyscrasia.  No morphologic evidence of an overt or advanced myeloid neoplasm.  Cytogenetics 46 XX. Flow cytometry showed no evidence for abnormal myeloid maturation or an increased blast population.  No evidence for a limp or proliferative disorder.  Antibody identification,  "autoantibody LE specificity on 04/26/2022.  Second opinion at [facility] 07/06/2022.  She had seen Dr. Tolbert 07/06/2022.  Dr. Tolbert called 07/11/2022.  Start tapering prednisone 10 mg/week.  Once at 20 mg, may taper 5 mg/week or 10 mg/week if her counts holds.  Patient will be referred to [facility] to desensitize her for rituximab.   \"I can't wait to see the immunologist. Let's go ahead with Rituxan.   Consider danazol, Cytoxan or azathioprine if she fails steroid.  Other option is splenectomy.          PREVIOUS INTERVENTIONS:  1 unit packed RBC on 04/06/2022 and 04/26/2022.  Prednisone 60 mg on 04/22/2022 through 05/02/2022. Resume 05/09/2022 through 07/10/2022. 50 mg 07/11/20222 through 07/17/2022. 40 mg 07/18/2022 through 07/24/2022, 30 mg 07/25/2022 through 07/31/2022, and 20 mg 08/01/2022 through 08/07/2022, 15 mg from 08/08/2022 through 08/14/2022, 10 mg from 08/15/2022 through 08/21/2022, and 5 mg from 08/22/2022 through present.   IVIG on 04/26/2020 04/27/2022.  Folic acid 1 mg p.o. daily from 04/30/2022 through present.  Rituximab 04/28/2022, intolerant. \"My mouth felt like I ate a mouthful of hot peepers, blisters roof of mouth, nose burning, sneezing like smelt burned peppers, and my eyes were pouring. All within 45 minutes of infusion.  Low blood pressure the next 4 days.\"   Rituxan rechallenge 07/25/2022 through 08/15/2022, 4 cycles.             Oncology/Hematology History    No history exists.       PAST MEDICAL HISTORY:  ALLERGIES:  Allergies   Allergen Reactions   • Corn-Containing Products Diarrhea   • Eggs Or Egg-Derived Products Diarrhea   • Nuts Shortness Of Breath     peanuts   • Milk-Related Compounds Nausea And Vomiting   • Gabapentin Dizziness     Patient states becomes weak and dizzy if she takes Gabapentin   • Levemir [Insulin Detemir] Itching and Other (See Comments)     Patient states gets a \"hard knot and itching\" at injection site.   • Wheat Rash     CURRENT " MEDICATIONS:  Outpatient Encounter Medications as of 2022   Medication Sig Dispense Refill   • allopurinol (ZYLOPRIM) 100 MG tablet Take 1 tablet by mouth Daily. 30 tablet 1   • Calcium Citrate (CITRACAL PO) Take  by mouth.     • cholecalciferol (VITAMIN D3) 25 MCG (1000 UT) tablet Take 2,000 Units by mouth Daily.     • Continuous Blood Gluc  (Dexcom G5  Kit) device Continuous.     • diphenhydrAMINE (BENADRYL) 25 mg capsule Take 25 mg by mouth Every 6 (Six) Hours As Needed for Itching.     • ferrous sulfate 325 (65 FE) MG tablet Take 325 mg by mouth Daily With Breakfast.     • fluticasone (FLONASE) 50 MCG/ACT nasal spray 2 sprays into the nostril(s) as directed by provider Daily. To control swelling and drainage from allergies 16 g 11   • folic acid (FOLVITE) 1 MG tablet Take 1 tablet by mouth Daily. 30 tablet 2   • furosemide (Lasix) 20 MG tablet Take 1 tablet by mouth Daily. For relief of leg swelling 30 tablet 0   • Insulin Infusion Pump device Continuous.     • Insulin Lispro (humaLOG) 100 UNIT/ML injection Inject 25-40 Units under the skin into the appropriate area as directed Daily.     • Loratadine 10 MG capsule Take 10 mg by mouth every night at bedtime. As anti-histamine for allergies     • omeprazole (priLOSEC) 40 MG capsule Take 40 mg by mouth Daily.     • ondansetron (ZOFRAN) 8 MG tablet Take 1 tablet by mouth Every 8 (Eight) Hours As Needed for Nausea or Vomiting. 60 tablet 3   • predniSONE (DELTASONE) 20 MG tablet Take 3 tabs (60 mg total dose) daily with food (Patient taking differently: Take 2 tabs (40 mg total dose) daily with food) 90 tablet 2   • rosuvastatin (CRESTOR) 20 MG tablet Take 20 mg by mouth Daily.     • vitamin B-12 (CYANOCOBALAMIN) 1000 MCG tablet Take 1,000 mcg by mouth Daily.     • [] acetaminophen (TYLENOL) tablet 650 mg      • [] diphenhydrAMINE (BENADRYL) IVPB 50 mg      • [] hydrocortisone sodium succinate (Solu-CORTEF) injection 100 mg       • [] riTUXimab (RITUXAN) 660 mg in sodium chloride 0.9 % 316 mL IVPB      • [] sodium chloride 0.9 % infusion 250 mL      • [DISCONTINUED] diphenhydrAMINE (BENADRYL) injection 50 mg      • [DISCONTINUED] famotidine (PEPCID) injection 20 mg      • [DISCONTINUED] meperidine (DEMEROL) injection 25 mg        No facility-administered encounter medications on file as of 2022.     ADULT ILLNESSES:  Patient Active Problem List   Diagnosis Code   • Gastroesophageal reflux disease K21.9   • Type 1 diabetes mellitus with hyperglycemia (HCC), insulin depdendent E10.65   • Intermittent chest pain R07.9   • Hypercholesterolemia E78.00   • Vitamin D deficiency E55.9   • Vitamin B12 deficiency E53.8   • Diarrhea R19.7   • Abdominal cramping R10.9   • Nausea R11.0   • Collagenous colitis K52.831   • Autoimmune hemolytic anemia (HCC) D59.10   • Insulin pump in place Z96.41   • Hyperbilirubinemia E80.6   • Mediastinal lymphadenopathy R59.0   • Tobacco abuse Z72.0   • Hyponatremia E87.1   • Orthostatic hypotension I95.1     SURGERIES:  Past Surgical History:   Procedure Laterality Date   • ANKLE SURGERY Right    • APPENDECTOMY     • BREAST BIOPSY     •  SECTION      X 2   • CHOLECYSTECTOMY     • COLONOSCOPY      [facility]   • COLONOSCOPY N/A 2017    Procedure: COLONOSCOPY WITH ANESTHESIA;  Surgeon: Ondina Abdul MD;  Location: [facility] ENDOSCOPY;  Service:    • ENDOSCOPY N/A 2017    Procedure: ESOPHAGOGASTRODUODENOSCOPY WITH ANESTHESIA;  Surgeon: Ondina Abdul MD;  Location: [facility] ENDOSCOPY;  Service:    • EYE SURGERY  2019    cataract   • SKIN CANCER EXCISION  2016    FACE   • TONSILLECTOMY       HEALTH MAINTENANCE ITEMS:  Health Maintenance Due   Topic Date Due   • Pneumococcal Vaccine 65+ (1 - PCV) Never done   • TDAP/TD VACCINES (1 - Tdap) Never done   • ZOSTER VACCINE (1 of 2) Never done   • ANNUAL WELLNESS VISIT  Never done   • DIABETIC FOOT EXAM  Never done   •  PAP SMEAR  Never done   • DIABETIC EYE EXAM  Never done   • DXA SCAN  11/14/2018   • MAMMOGRAM  11/30/2019   • COVID-19 Vaccine (3 - Booster for Moderna series) 03/02/2022   • LIPID PANEL  08/17/2022   • URINE MICROALBUMIN  08/17/2022       <no information>  Last Completed Colonoscopy          COLORECTAL CANCER SCREENING (COLONOSCOPY - Every 10 Years) Next due on 3/3/2027    03/03/2017  Surgical Procedure: COLONOSCOPY    03/03/2017  COLONOSCOPY    02/01/2017  Occult Blood X 3, Stool              Immunization History   Administered Date(s) Administered   • COVID-19 (MODERNA) 1st, 2nd, 3rd Dose Only 08/31/2021, 10/02/2021     Last Completed Mammogram     This patient has no relevant Health Maintenance data.            FAMILY HISTORY:  Family History   Problem Relation Age of Onset   • Asthma Mother    • Heart disease Mother    • COPD Mother    • Heart disease Father    • Early death Father         heart attack @ 60   • Heart disease Sister    • Hypertension Sister    • COPD Sister    • Diabetes Sister         Type 2   • Diabetes Sister    • Other Sister    • Hearing loss Sister         born deaf   • Heart disease Brother    • Breast cancer Paternal Aunt    • No Known Problems Son    • No Known Problems Son    • Hypertension Maternal Grandfather    • Stroke Maternal Grandfather    • Diabetes Maternal Grandmother    • Vision loss Maternal Grandmother         from diabetes   • Heart disease Paternal Grandfather    • Hypertension Paternal Grandfather    • Cancer Paternal Aunt    • Hearing loss Brother         wears hearing aids   • Heart disease Brother    • Hypertension Brother    • Heart disease Sister    • Hypertension Sister      SOCIAL HISTORY:  Social History     Socioeconomic History   • Marital status:    Tobacco Use   • Smoking status: Current Every Day Smoker     Packs/day: 0.25     Years: 40.00     Pack years: 10.00     Types: Cigarettes     Start date: 2/18/1975   • Smokeless tobacco: Never Used   •  "Tobacco comment: quit during two pregnancies but went back to smoking after delivery   Vaping Use   • Vaping Use: Never used   Substance and Sexual Activity   • Alcohol use: Not Currently     Alcohol/week: 12.0 standard drinks     Types: 12 Cans of beer per week     Comment: drink at early 20's   • Drug use: No   • Sexual activity: Not Currently     Partners: Male     Birth control/protection: None     Comment: went through menopause over 10 years go       REVIEW OF SYSTEMS:    Review of Systems   Constitutional: Positive for fatigue. Negative for chills and fever.        \"I feel tired.'   HENT: Negative for congestion, mouth sores and trouble swallowing.    Eyes: Negative for blurred vision and redness.   Respiratory: Negative for cough, shortness of breath and wheezing.    Cardiovascular: Positive for leg swelling. Negative for chest pain.   Gastrointestinal: Negative for abdominal pain, nausea and vomiting.   Endocrine: Negative for polydipsia and polyphagia.   Genitourinary: Negative for difficulty urinating, dysuria and flank pain.   Musculoskeletal: Negative for gait problem and joint swelling.   Skin: Positive for pallor.   Allergic/Immunologic: Positive for food allergies.   Neurological: Negative for speech difficulty, weakness and confusion.   Hematological: Negative for adenopathy. Does not bruise/bleed easily.   Psychiatric/Behavioral: Negative for agitation and hallucinations. The patient is not nervous/anxious.          VITAL SIGNS: /64   Pulse 74   Temp 97.6 °F (36.4 °C)   Resp 18   Ht 162.6 cm (64\")   Wt 69 kg (152 lb 1.6 oz)   SpO2 96%   Breastfeeding No   BMI 26.11 kg/m²  Body surface area is 1.74 meters squared. Lost 12 pounds.   Pain Score    08/24/22 1426   PainSc: 0-No pain           PHYSICAL EXAMINATION:     Physical Exam  Vitals reviewed.   Constitutional:       General: She is not in acute distress.  HENT:      Head: Normocephalic and atraumatic.   Eyes:      General: No " scleral icterus.  Cardiovascular:      Rate and Rhythm: Normal rate.   Pulmonary:      Effort: No respiratory distress.      Breath sounds: No wheezing or rales.   Abdominal:      General: Bowel sounds are normal.      Palpations: Abdomen is soft.      Tenderness: There is no abdominal tenderness.   Musculoskeletal:         General: Swelling present.      Cervical back: Neck supple.   Skin:     General: Skin is warm.      Coloration: Skin is pale.   Neurological:      Mental Status: She is alert and oriented to person, place, and time.   Psychiatric:         Mood and Affect: Mood normal.         Behavior: Behavior normal.         Thought Content: Thought content normal.         Judgment: Judgment normal.         LABS    Lab Results - Last 18 Months   Lab Units 08/24/22  1334 08/15/22  0727 08/08/22  0713 08/01/22  0731 07/25/22  0749 07/18/22  0802 07/11/22  0747 07/05/22  0714 04/28/22  0709 04/27/22  0539 04/26/22  0646 04/19/22  0859 04/04/22  1034   HEMOGLOBIN g/dL 9.6* 11.5* 10.4* 9.8* 10.1* 11.1* 10.6* 10.0*   < > 9.4* 6.8*   < > 7.0*   HEMATOCRIT % 28.0* 33.6* 30.9* 30.2* 30.5* 33.2* 32.5* 30.8*   < > 29.4* 21.7*   < > 22.7*   MCV fL 97.6* 98.8* 101.0* 103.1* 102.3* 101.5* 102.2* 102.0*   < > 106.1* 110.2*   < > 112.9*   WBC 10*3/mm3 10.91* 14.33* 15.33* 15.32* 17.19* 17.82* 16.47* 16.25*   < > 15.25* 16.09*   < > 18.99*   RDW % 17.2* 17.9* 17.9* 18.5* 18.6* 19.1* 19.4* 19.3*   < > 25.2* 21.7*   < > 22.4*   MPV fL 10.9 11.0 11.1 10.7 11.1 11.2 11.2 11.3   < > 11.5 11.2   < > 11.2   PLATELETS 10*3/mm3 312 351 315 271 273 245 307 285   < > 218 235   < > 329   IMM GRAN % % 1.2*  --  2.5* 2.5* 2.9*  --  4.6* 3.9*   < >  --   --    < >  --    NEUTROS ABS 10*3/mm3 7.91* 9.03* 11.37* 11.47* 12.34* 12.47* 10.96* 12.28*   < > 10.11* 12.03*   < > 14.29*   LYMPHS ABS 10*3/mm3 1.74  --  1.89 2.03 2.61  --  3.14* 1.85   < >  --   --    < >  --    MONOS ABS 10*3/mm3 0.82  --  1.31* 1.10* 1.44*  --  1.16* 1.12*   < >  --    --    < >  --    EOS ABS 10*3/mm3 0.23  --  0.25 0.21 0.21 0.36 0.29 0.22   < > 0.78*  --    < > 0.19   BASOS ABS 10*3/mm3 0.08 0.14 0.13 0.12 0.10  --  0.16 0.14   < >  --   --    < > 0.19   IMMATURE GRANS (ABS) 10*3/mm3 0.13*  --  0.38* 0.39* 0.49*  --  0.76* 0.64*   < >  --   --    < >  --    NRBC /100 WBC 0.0  --  0.0 0.1 0.0  --  0.0 0.0   < > 3.1* 3.0*   < >  --    NEUTROPHIL % %  --  59.0  --   --   --  70.0  --   --   --  64.3 70.7  --  74.2   MONOCYTES % %  --  9.0  --   --   --  8.0  --   --   --  9.2 9.1  --  8.2   BASOPHIL % %  --  1.0  --   --   --   --   --   --   --   --   --   --  1.0   ATYP LYMPH % %  --  5.0  --   --   --   --   --   --   --   --   --   --   --    ANISOCYTOSIS   --  Mod/2+  --   --   --  Large/3+  --   --   --  Mod/2+ Mod/2+  --  Mod/2+   GIANT PLT   --  Slight/1+  --   --   --   --   --   --   --   --   --   --   --     < > = values in this interval not displayed.       Lab Results - Last 18 Months   Lab Units 08/24/22  1334 08/15/22  0727 08/08/22  0713 08/05/22  1239 08/01/22  0731 07/25/22  0749 07/18/22  0802   GLUCOSE mg/dL 265* 227* 282*  --  204* 137* 233*   SODIUM mmol/L 139 136 138  --  135* 135* 135*   POTASSIUM mmol/L 3.9 3.9 3.9  --  3.8 3.5 3.5   CO2 mmol/L 26.0 27.0 30.0*  --  29.0 28.0 26.0   CHLORIDE mmol/L 103 100 101  --  97* 99 99   ANION GAP mmol/L 10.0 9.0 7.0  --  9.0 8.0 10.0   CREATININE mg/dL 0.75 0.74 0.86 0.90 0.74 0.79 0.95   BUN mg/dL 13 20 16  --  14 14 13   BUN / CREAT RATIO  17.3 27.0* 18.6  --  18.9 17.7 13.7   CALCIUM mg/dL 9.2 8.2* 9.2  --  8.5* 8.3* 8.7   ALK PHOS U/L 62 72 60  --  75 56 62   TOTAL PROTEIN g/dL 6.1 6.7 6.1  --  5.8* 6.1 6.3   ALT (SGPT) U/L 16 17 21  --  43* 19 17   AST (SGOT) U/L 26 26 23  --  58* 28 24   BILIRUBIN mg/dL 1.3* 2.0* 1.7*  --  2.2* 1.6* 1.6*   ALBUMIN g/dL 4.10 4.20 4.10  --  3.70 3.90 4.00   GLOBULIN gm/dL 2.0 2.5 2.0  --  2.1 2.2 2.3       Lab Results - Last 18 Months   Lab Units 08/24/22  1334 08/15/22  0706  08/08/22  0713 08/01/22  0731 07/25/22  0749 07/18/22  0802 05/09/22  0738 04/29/22  0536 04/28/22  0709 04/27/22  0539 04/26/22  0646 04/25/22  0630 04/24/22  0819 04/23/22  0743 04/22/22  1322 04/19/22  0859   URIC ACID mg/dL  --   --   --   --   --   --   --  1.9* 1.6* 1.5* 2.1* 3.7 6.4*   < > 5.8*  --    LDH U/L 299* 379* 337* 343* 334* 339*   < > 570* 650*  638* 668* 634* 593* 652*   < > 772* 834*   REFERENCE LAB REPORT   --   --   --   --   --   --   --   --   --   --   --   --   --   --  See Attached Report SEE ATTACHED REPORT    < > = values in this interval not displayed.       Lab Results - Last 18 Months   Lab Units 08/08/22  0713 07/11/22  0747 04/22/22  1542 04/19/22  0859 04/04/22  1219 04/04/22  1035 03/31/22  1226 08/17/21  0932   IRON mcg/dL 82 125  --   --   --  44 129  --    TIBC mcg/dL 301 295*  --   --   --  261* 310  --    IRON SATURATION % 27 42  --   --   --  17* 42  --    FERRITIN ng/mL 330.10* 327.30*  --   --   --  311.90* 454.60*  --    TSH uIU/mL  --   --   --  1.960  --  2.100  --  1.302   FOLATE ng/mL  --   --  13.70 12.60 14.20  --   --   --          Giana Yepez reports a pain score of 0.        ASSESSMENT:  1.  Autoimmune hemolytic anemia.  Treatment status: Post IVIG x2 doses on 04/27/2022.  On prednisone 1 mg/kg from 05/09/2022  through 07/10/2022. 50 mg 07/11/20222 through 07/17/2022. 40 mg 07/18/2022 for 1 week, 30 mg p.o. daily from 07/25/2022, 20 mg daily from 08/01/2022 through 08/07/2022, 15 mg from 08/08/2022 through 08/14/2022, 10 mg from 08/15/2022 through 08/21/2022, and 5 mg from 08/22/2022 through present.   Rituximab 375 mgm2 on 04/28/2022.  Hypersensitivity reaction to rituximab.   Rituximab rechallenge on 07/25/2022 through 08/15/2022, 4 cycles  2.  Borderline enlarged mediastinal nodes on 04/23/2022. Followed by pulmonary.  3.  9 mm hypodense well-circumscribed asymmetry at the level of the upper left vallecula and base of the tongue on the left on  "04/23/2022.  4.  LE autoantibody.            PLAN:  1.   Re: Tolerance to prednisone. \"I don't feel a whole lot different.\"   Prednisone 5 mg p.o. for 1 week and discontinue.   2.   Re: Completion of rituximab rechallenge.  3.   Re: Heme status.  WBC 10.9, hemoglobin 9.6, hematocrit 28, MCV 97.6, and platelet 312.  Reticulocyte count 9.5.  4.   Re: CMP.  Total bilirubin 1.3 from 2 and glucose 265.  ALT 16 and AST 26, normal.  5.   Re: Haptoglobin is pending and LDH  299 from 379 from 337 from 343 from 334 from 339 from 375.   6.   Re:  CT chest 08/05/2022. Stable chest CT with no new or acute abnormality.  6.  Blood for CBC with differential, CMP, haptoglobin, LDH and reticulocyte count weekly.  7.  eRx prednisone 5 mg p.o. daily taken with food or milk #60 with 2 refills if needed.  Monitor for gastrointestinal  effects like bleeding complications.  Last day 08/28/2022.   8.  eRx folic acid 1 mg p.o. daily #90 with 3 refills if needed.  9.  Discontinue Rituxan, post 4 weekly doses.    10.  Stable for close observation.  11.  Continue care per primary care physician.  12.  Plan of care discussed with patient.  Understanding expressed.  Patient agreeable to proceed.  13.  Transfuse 1 unit packed RBC if hemoglobin less than 6.9.  Premed Tylenol 500 mg p.o.  Lasix 20 mg IV push after transfusion.  Monitor for transfusion reactions.  14.  Advance Care Planning   ACP discussion was declined by the patient. Patient does not have an advance directive, information provided.  15.  Return to office in 3 weeks with preoffice CT soft tissue neck to assess 9 mm hypodense well-circumscribed asymmetry at the level of the upper left vallecula and base of the tongue on the left.        I have reviewed the assessment and plan and verified the accuracy of it. No changes to assessment and plan since the information was documented. Filemon Elizalde MD 08/24/22           I spent 32 total minutes, " face-to-face, caring for Giana today.  Greater than 50% of this time involved counseling and/or coordination of care as documented within this note regarding the patient's illness(es), pros and cons of various treatment options, instructions and/or risk reduction.             (Artem Mace MD)   Camila Montes MD  (Gerber Tolbert MD [facility])  (Dominick Fernandez MD [facility])

## 2022-08-16 LAB — HAPTOGLOB SERPL-MCNC: <10 MG/DL (ref 30–200)

## 2022-08-22 ENCOUNTER — TELEPHONE (OUTPATIENT)
Dept: FAMILY MEDICINE CLINIC | Facility: CLINIC | Age: 67
End: 2022-08-22

## 2022-08-22 NOTE — TELEPHONE ENCOUNTER
Patient notified that order is in chart and report to main entrance of hospital to be pointed in right direction.

## 2022-08-22 NOTE — TELEPHONE ENCOUNTER
Caller: Giana Yepez    Relationship to patient: Self    Best call back number: [phone]     Additional notes: PATIENT REQUESTING CALLBACK TO SCHEDULE X-RAY ON RIGHT THUMB/WRIST.

## 2022-08-24 ENCOUNTER — LAB (OUTPATIENT)
Dept: LAB | Facility: HOSPITAL | Age: 67
End: 2022-08-24

## 2022-08-24 ENCOUNTER — OFFICE VISIT (OUTPATIENT)
Dept: ONCOLOGY | Facility: CLINIC | Age: 67
End: 2022-08-24

## 2022-08-24 VITALS
WEIGHT: 152.1 LBS | BODY MASS INDEX: 25.97 KG/M2 | SYSTOLIC BLOOD PRESSURE: 126 MMHG | RESPIRATION RATE: 18 BRPM | TEMPERATURE: 97.6 F | HEIGHT: 64 IN | DIASTOLIC BLOOD PRESSURE: 64 MMHG | OXYGEN SATURATION: 96 % | HEART RATE: 74 BPM

## 2022-08-24 DIAGNOSIS — D59.10 AUTOIMMUNE HEMOLYTIC ANEMIA: Primary | ICD-10-CM

## 2022-08-24 LAB
ALBUMIN SERPL-MCNC: 4.1 G/DL (ref 3.5–5.2)
ALBUMIN/GLOB SERPL: 2.1 G/DL
ALP SERPL-CCNC: 62 U/L (ref 39–117)
ALT SERPL W P-5'-P-CCNC: 16 U/L (ref 1–33)
ANION GAP SERPL CALCULATED.3IONS-SCNC: 10 MMOL/L (ref 5–15)
AST SERPL-CCNC: 26 U/L (ref 1–32)
BASOPHILS # BLD AUTO: 0.08 10*3/MM3 (ref 0–0.2)
BASOPHILS NFR BLD AUTO: 0.7 % (ref 0–1.5)
BILIRUB SERPL-MCNC: 1.3 MG/DL (ref 0–1.2)
BUN SERPL-MCNC: 13 MG/DL (ref 8–23)
BUN/CREAT SERPL: 17.3 (ref 7–25)
CALCIUM SPEC-SCNC: 9.2 MG/DL (ref 8.6–10.5)
CHLORIDE SERPL-SCNC: 103 MMOL/L (ref 98–107)
CO2 SERPL-SCNC: 26 MMOL/L (ref 22–29)
CREAT SERPL-MCNC: 0.75 MG/DL (ref 0.57–1)
DEPRECATED RDW RBC AUTO: 60 FL (ref 37–54)
EGFRCR SERPLBLD CKD-EPI 2021: 87.9 ML/MIN/1.73
EOSINOPHIL # BLD AUTO: 0.23 10*3/MM3 (ref 0–0.4)
EOSINOPHIL NFR BLD AUTO: 2.1 % (ref 0.3–6.2)
ERYTHROCYTE [DISTWIDTH] IN BLOOD BY AUTOMATED COUNT: 17.2 % (ref 12.3–15.4)
GLOBULIN UR ELPH-MCNC: 2 GM/DL
GLUCOSE SERPL-MCNC: 265 MG/DL (ref 65–99)
HCT VFR BLD AUTO: 28 % (ref 34–46.6)
HGB BLD-MCNC: 9.6 G/DL (ref 12–15.9)
HOLD SPECIMEN: NORMAL
IMM GRANULOCYTES # BLD AUTO: 0.13 10*3/MM3 (ref 0–0.05)
IMM GRANULOCYTES NFR BLD AUTO: 1.2 % (ref 0–0.5)
LDH SERPL-CCNC: 299 U/L (ref 135–214)
LYMPHOCYTES # BLD AUTO: 1.74 10*3/MM3 (ref 0.7–3.1)
LYMPHOCYTES NFR BLD AUTO: 15.9 % (ref 19.6–45.3)
MCH RBC QN AUTO: 33.4 PG (ref 26.6–33)
MCHC RBC AUTO-ENTMCNC: 34.3 G/DL (ref 31.5–35.7)
MCV RBC AUTO: 97.6 FL (ref 79–97)
MONOCYTES # BLD AUTO: 0.82 10*3/MM3 (ref 0.1–0.9)
MONOCYTES NFR BLD AUTO: 7.5 % (ref 5–12)
NEUTROPHILS NFR BLD AUTO: 7.91 10*3/MM3 (ref 1.7–7)
NEUTROPHILS NFR BLD AUTO: 72.6 % (ref 42.7–76)
NRBC BLD AUTO-RTO: 0 /100 WBC (ref 0–0.2)
PLATELET # BLD AUTO: 312 10*3/MM3 (ref 140–450)
PMV BLD AUTO: 10.9 FL (ref 6–12)
POTASSIUM SERPL-SCNC: 3.9 MMOL/L (ref 3.5–5.2)
PROT SERPL-MCNC: 6.1 G/DL (ref 6–8.5)
RBC # BLD AUTO: 2.87 10*6/MM3 (ref 3.77–5.28)
RETICS # AUTO: 0.27 10*6/MM3 (ref 0.02–0.13)
RETICS/RBC NFR AUTO: 9.55 % (ref 0.7–1.9)
SODIUM SERPL-SCNC: 139 MMOL/L (ref 136–145)
WBC NRBC COR # BLD: 10.91 10*3/MM3 (ref 3.4–10.8)

## 2022-08-24 PROCEDURE — 85045 AUTOMATED RETICULOCYTE COUNT: CPT

## 2022-08-24 PROCEDURE — 83615 LACTATE (LD) (LDH) ENZYME: CPT

## 2022-08-24 PROCEDURE — 80053 COMPREHEN METABOLIC PANEL: CPT

## 2022-08-24 PROCEDURE — 83010 ASSAY OF HAPTOGLOBIN QUANT: CPT

## 2022-08-24 PROCEDURE — 99214 OFFICE O/P EST MOD 30 MIN: CPT | Performed by: INTERNAL MEDICINE

## 2022-08-24 PROCEDURE — 85025 COMPLETE CBC W/AUTO DIFF WBC: CPT

## 2022-08-24 PROCEDURE — 36415 COLL VENOUS BLD VENIPUNCTURE: CPT

## 2022-08-25 LAB — HAPTOGLOB SERPL-MCNC: <10 MG/DL (ref 30–200)

## 2022-08-29 ENCOUNTER — HOSPITAL ENCOUNTER (OUTPATIENT)
Dept: GENERAL RADIOLOGY | Facility: HOSPITAL | Age: 67
Discharge: HOME | End: 2022-08-29
Admitting: FAMILY MEDICINE

## 2022-08-29 DIAGNOSIS — M25.541 PAIN IN THUMB JOINT WITH MOVEMENT OF RIGHT HAND: ICD-10-CM

## 2022-08-29 DIAGNOSIS — M18.11 OSTEOARTHRITIS OF CARPOMETACARPAL (CMC) JOINT OF RIGHT THUMB, UNSPECIFIED OSTEOARTHRITIS TYPE: ICD-10-CM

## 2022-08-29 PROCEDURE — 73110 X-RAY EXAM OF WRIST: CPT

## 2022-08-29 PROCEDURE — 73130 X-RAY EXAM OF HAND: CPT

## 2022-08-31 ENCOUNTER — LAB (OUTPATIENT)
Dept: LAB | Facility: HOSPITAL | Age: 67
End: 2022-08-31

## 2022-08-31 ENCOUNTER — TELEPHONE (OUTPATIENT)
Dept: ONCOLOGY | Facility: CLINIC | Age: 67
End: 2022-08-31

## 2022-08-31 DIAGNOSIS — D59.10 AUTOIMMUNE HEMOLYTIC ANEMIA: ICD-10-CM

## 2022-08-31 DIAGNOSIS — D50.8 IRON DEFICIENCY ANEMIA SECONDARY TO INADEQUATE DIETARY IRON INTAKE: ICD-10-CM

## 2022-08-31 LAB
ALBUMIN SERPL-MCNC: 4.1 G/DL (ref 3.5–5.2)
ALBUMIN/GLOB SERPL: 2 G/DL
ALP SERPL-CCNC: 63 U/L (ref 39–117)
ALT SERPL W P-5'-P-CCNC: 12 U/L (ref 1–33)
ANION GAP SERPL CALCULATED.3IONS-SCNC: 7 MMOL/L (ref 5–15)
AST SERPL-CCNC: 21 U/L (ref 1–32)
BASOPHILS # BLD AUTO: 0.09 10*3/MM3 (ref 0–0.2)
BASOPHILS NFR BLD AUTO: 0.9 % (ref 0–1.5)
BILIRUB SERPL-MCNC: 1.3 MG/DL (ref 0–1.2)
BUN SERPL-MCNC: 17 MG/DL (ref 8–23)
BUN/CREAT SERPL: 20.7 (ref 7–25)
CALCIUM SPEC-SCNC: 8.9 MG/DL (ref 8.6–10.5)
CHLORIDE SERPL-SCNC: 99 MMOL/L (ref 98–107)
CO2 SERPL-SCNC: 28 MMOL/L (ref 22–29)
CREAT SERPL-MCNC: 0.82 MG/DL (ref 0.57–1)
DEPRECATED RDW RBC AUTO: 56.9 FL (ref 37–54)
EGFRCR SERPLBLD CKD-EPI 2021: 79 ML/MIN/1.73
EOSINOPHIL # BLD AUTO: 0.28 10*3/MM3 (ref 0–0.4)
EOSINOPHIL NFR BLD AUTO: 2.8 % (ref 0.3–6.2)
ERYTHROCYTE [DISTWIDTH] IN BLOOD BY AUTOMATED COUNT: 16.7 % (ref 12.3–15.4)
GLOBULIN UR ELPH-MCNC: 2.1 GM/DL
GLUCOSE SERPL-MCNC: 284 MG/DL (ref 65–99)
HAPTOGLOB SERPL-MCNC: <10 MG/DL (ref 30–200)
HCT VFR BLD AUTO: 27.3 % (ref 34–46.6)
HGB BLD-MCNC: 9.6 G/DL (ref 12–15.9)
IMM GRANULOCYTES # BLD AUTO: 0.19 10*3/MM3 (ref 0–0.05)
IMM GRANULOCYTES NFR BLD AUTO: 1.9 % (ref 0–0.5)
LDH SERPL-CCNC: 279 U/L (ref 135–214)
LYMPHOCYTES # BLD AUTO: 2.3 10*3/MM3 (ref 0.7–3.1)
LYMPHOCYTES NFR BLD AUTO: 23.1 % (ref 19.6–45.3)
MCH RBC QN AUTO: 33.6 PG (ref 26.6–33)
MCHC RBC AUTO-ENTMCNC: 35.2 G/DL (ref 31.5–35.7)
MCV RBC AUTO: 95.5 FL (ref 79–97)
MONOCYTES # BLD AUTO: 1.14 10*3/MM3 (ref 0.1–0.9)
MONOCYTES NFR BLD AUTO: 11.4 % (ref 5–12)
NEUTROPHILS NFR BLD AUTO: 5.97 10*3/MM3 (ref 1.7–7)
NEUTROPHILS NFR BLD AUTO: 59.9 % (ref 42.7–76)
NRBC BLD AUTO-RTO: 0 /100 WBC (ref 0–0.2)
PLATELET # BLD AUTO: 292 10*3/MM3 (ref 140–450)
PMV BLD AUTO: 11.5 FL (ref 6–12)
POTASSIUM SERPL-SCNC: 3.9 MMOL/L (ref 3.5–5.2)
PROT SERPL-MCNC: 6.2 G/DL (ref 6–8.5)
RBC # BLD AUTO: 2.86 10*6/MM3 (ref 3.77–5.28)
RETICS # AUTO: 0.28 10*6/MM3 (ref 0.02–0.13)
RETICS/RBC NFR AUTO: 9.94 % (ref 0.7–1.9)
SODIUM SERPL-SCNC: 134 MMOL/L (ref 136–145)
WBC NRBC COR # BLD: 9.97 10*3/MM3 (ref 3.4–10.8)

## 2022-08-31 PROCEDURE — 36415 COLL VENOUS BLD VENIPUNCTURE: CPT

## 2022-08-31 PROCEDURE — 85045 AUTOMATED RETICULOCYTE COUNT: CPT

## 2022-08-31 PROCEDURE — 80053 COMPREHEN METABOLIC PANEL: CPT

## 2022-08-31 PROCEDURE — 83010 ASSAY OF HAPTOGLOBIN QUANT: CPT

## 2022-08-31 PROCEDURE — 83615 LACTATE (LD) (LDH) ENZYME: CPT

## 2022-08-31 PROCEDURE — 85025 COMPLETE CBC W/AUTO DIFF WBC: CPT

## 2022-08-31 NOTE — TELEPHONE ENCOUNTER
----- Message from Filemon Elizalde MD sent at 8/31/2022  1:04 PM CDT -----  Last day of tapering prednisone 08/20/2022.  Post Rituxan x4 weekly doses.  Stable heme status.

## 2022-08-31 NOTE — TELEPHONE ENCOUNTER
Contacted patient Giana Yepez, she was informed Dr Elizalde has reviewed her lab results and notes:  Glucose: 284  Elevated, patient has completed Steroid tapering, but had eaten prior to lab draw.  Manpreet: is stable  LDH: Stable at this time  Patient v/u of this information and was encouraged to call if she has any questions or concerns.

## 2022-08-31 NOTE — TELEPHONE ENCOUNTER
----- Message from Filemon Elizalde MD sent at 8/31/2022  1:38 PM CDT -----  Notify patient.  Glucose 284.      Stable bilirubin and improving LDH.

## 2022-09-03 NOTE — PROGRESS NOTES
MGW ONC [facility] HEMATOLOGY & ONCOLOGY [address]  [facility] [address] [phone]    Patient Name: Giana Yepez  Encounter Date: 09/15/2022  YOB: 1955  Patient Number: [id number]      REASON FOR FOLLOW-UP: Giana Yepez is a pleasant 67 y.o.  female who is seen on follow-up for autoimmune hemolytic anemia.  She is seen 1 month post for weekly doses of rituximab rechallenge. She had a dose of rituximab on 04/28/2022, she was intolerant.  She was taking prednisone 1 mg /kg and decrease to 50 mg daily for 1 week from 07/11/2022 and 40 mg po daily from 07/18/2022 for 1 week and 30 mg p.o. daily from 07/25/2022, 20 mg daily from 08/01/2022 through 08/07/2022, 15 mg from 08/08/2022 through 08/14/2022, 10 mg from 08/15/2022 through 08/21/2022, and 5 mg from 08/22/2022 through 08/28/2022.  She is seen alone.  History is obtained from the patient.  History is accurate.      Problem List Items Addressed This Visit        Other    Autoimmune hemolytic anemia (HCC) - Primary    Overview     DIAGNOSTIC ABNORMALITIES:  She presented with worsening fatigue and weight loss approximately 10 pounds in the last 2 months.  WBC 18.2, ANC 12.4, hemoglobin 7.6, hematocrit 24.1, , and platelet 348, folate 12.6, B12 at 1988, TSH 1.96, erythropoietin 61.5, sed rate 3, , haptoglobin less than 10, CMP remarkable for AST of 33 and bilirubin 2.3, IgG 1035, and negative BCR ABL on 04/19/2022.  Reticulocyte 25.69% and Rona test was positive on 04/22/2022.  Negative hepatitis B core antibody, negative HIV, negative LIZ panel and blood for flow cytometry 04/22/2022, negative Bcl-2, BCL6 and MYC.  CT neck, chest, abdomen and pelvis on 04/23/2022.9 mm relatively hypodense well-circumscribed asymmetry at the level of the upper left vallecula and base of the tongue on the left. No associated enhancement of the wall or intraluminal air  making an infectious process less likely. This measures Hounsfield units of 36 suggesting it may be soft tissue in nature. Direct visualization is recommended.  No pathologically enlarged cervical chain or posterior triangle lymphadenopathy is present. There are some borderline enlarged left supraclavicular nodes with measurements as above. No additional discrete mass lesions are appreciated. Level of the true and false cords is unremarkable. The thyroid gland is homogeneous in density without evidence of nodularity or mass.  Borderline enlarged mediastinal nodes are present as well as a few left supraclavicular nodes. No additional enlarged nodes are identified.  Right middle lobe atelectasis or scarring is present. There is mild patchy pneumonitis within the superior segment of both lower lobes and apical posterior segment of the left upper lobe suggesting a patchy pneumonitis/bronchopneumonia. Lungs are otherwise clear. No evidence of lobar consolidation. No discrete endobronchial lesion is identified.  The heart is normal in size. The thoracic aorta and great vessels are normal in caliber.  No evidence of intraperitoneal or retroperitoneal lymphadenopathy.  Homogeneous enhancement of the kidneys. No evidence of nephrolithiasis or obstructive uropathy.  Constipation with increased stool throughout the colon. No evidence of mechanical obstruction or free air.  The uterus and adnexa are unremarkable.  Bone marrow biopsy 04/25/2022.  Hypercellular marrow for age 70 to 80% with maturing trilineage hematopoiesis.  Erythroid hyperplasia.  Slight megakaryocyte ptosis.  Stainable iron is present.  No evidence of acute leukemia, lymphoproliferative disorder or plasma cell dyscrasia.  No morphologic evidence of an overt or advanced myeloid neoplasm.  Cytogenetics 46 XX. Flow cytometry showed no evidence for abnormal myeloid maturation or an increased blast population.  No evidence for a limp or proliferative  "disorder.  Antibody identification, autoantibody LE specificity on 04/26/2022.  Second opinion at [facility] 07/06/2022.  She had seen Dr. Tolbert 07/06/2022.  Dr. Tolbert called 07/11/2022.  Start tapering prednisone 10 mg/week.  Once at 20 mg, may taper 5 mg/week or 10 mg/week if her counts holds.  Patient will be referred to [facility] to desensitize her for rituximab.   \"I can't wait to see the immunologist. Let's go ahead with Rituxan.   Consider danazol, Cytoxan or azathioprine if she fails steroid.  Other option is splenectomy.   CT chest 08/05/2022. Stable chest CT with no new or acute abnormality.       PREVIOUS INTERVENTIONS:  1 unit packed RBC on 04/06/2022 and 04/26/2022.  Prednisone 60 mg on 04/22/2022 through 05/02/2022. Resume 05/09/2022 through 07/10/2022. 50 mg 07/11/20222 through 07/17/2022. 40 mg 07/18/2022 through 07/24/2022, 30 mg 07/25/2022 through 07/31/2022, and 20 mg 08/01/2022 through 08/07/2022, 15 mg from 08/08/2022 through 08/14/2022, 10 mg from 08/15/2022 through 08/21/2022, and 5 mg from 08/22/2022 through 08/28/2022.   IVIG on 04/26/2020 04/27/2022.  Folic acid 1 mg p.o. daily from 04/30/2022 through present.  Rituximab 04/28/2022, intolerant. \"My mouth felt like I ate a mouthful of hot peepers, blisters roof of mouth, nose burning, sneezing like smelt burned peppers, and my eyes were pouring. All within 45 minutes of infusion.  Low blood pressure the next 4 days.\"   Rituxan rechallenge 07/25/2022 through 08/15/2022, 4 cycles.             Oncology/Hematology History    No history exists.       PAST MEDICAL HISTORY:  ALLERGIES:  Allergies   Allergen Reactions   • Corn-Containing Products Diarrhea   • Eggs Or Egg-Derived Products Diarrhea   • Nuts Shortness Of Breath     peanuts   • Milk-Related Compounds Nausea And Vomiting   • Gabapentin Dizziness     Patient states becomes weak and dizzy if she takes Gabapentin   • Levemir [Insulin Detemir] Itching and Other (See Comments) " "    Patient states gets a \"hard knot and itching\" at injection site.   • Wheat Rash     CURRENT MEDICATIONS:  Outpatient Encounter Medications as of 9/15/2022   Medication Sig Dispense Refill   • allopurinol (ZYLOPRIM) 100 MG tablet TAKE ONE TABLET BY MOUTH DAILY 30 tablet 1   • Calcium Citrate (CITRACAL PO) Take  by mouth.     • cholecalciferol (VITAMIN D3) 25 MCG (1000 UT) tablet Take 2,000 Units by mouth Daily.     • Continuous Blood Gluc  (Dexcom G5  Kit) device Continuous.     • diphenhydrAMINE (BENADRYL) 25 mg capsule Take 25 mg by mouth Every 6 (Six) Hours As Needed for Itching.     • ferrous sulfate 325 (65 FE) MG tablet Take 325 mg by mouth Daily With Breakfast.     • fluticasone (FLONASE) 50 MCG/ACT nasal spray 2 sprays into the nostril(s) as directed by provider Daily. To control swelling and drainage from allergies 16 g 11   • folic acid (FOLVITE) 1 MG tablet Take 1 tablet by mouth Daily. 30 tablet 2   • furosemide (Lasix) 20 MG tablet Take 1 tablet by mouth Daily. For relief of leg swelling 30 tablet 0   • Insulin Infusion Pump device Continuous.     • Insulin Lispro (humaLOG) 100 UNIT/ML injection Inject 25-40 Units under the skin into the appropriate area as directed Daily.     • Loratadine 10 MG capsule Take 10 mg by mouth every night at bedtime. As anti-histamine for allergies     • omeprazole (priLOSEC) 40 MG capsule Take 40 mg by mouth Daily.     • ondansetron (ZOFRAN) 8 MG tablet Take 1 tablet by mouth Every 8 (Eight) Hours As Needed for Nausea or Vomiting. 60 tablet 3   • rosuvastatin (CRESTOR) 20 MG tablet Take 20 mg by mouth Daily.     • vitamin B-12 (CYANOCOBALAMIN) 1000 MCG tablet Take 1,000 mcg by mouth Daily.     • [DISCONTINUED] allopurinol (ZYLOPRIM) 100 MG tablet Take 1 tablet by mouth Daily. 30 tablet 1   • [DISCONTINUED] predniSONE (DELTASONE) 20 MG tablet Take 3 tabs (60 mg total dose) daily with food (Patient taking differently: Take 2 tabs (40 mg total dose) daily " with food) 90 tablet 2     No facility-administered encounter medications on file as of 9/15/2022.     ADULT ILLNESSES:  Patient Active Problem List   Diagnosis Code   • Gastroesophageal reflux disease K21.9   • Type 1 diabetes mellitus with hyperglycemia (HCC), insulin depdendent E10.65   • Intermittent chest pain R07.9   • Hypercholesterolemia E78.00   • Vitamin D deficiency E55.9   • Vitamin B12 deficiency E53.8   • Diarrhea R19.7   • Abdominal cramping R10.9   • Nausea R11.0   • Collagenous colitis K52.831   • Autoimmune hemolytic anemia (HCC) D59.10   • Insulin pump in place Z96.41   • Hyperbilirubinemia E80.6   • Mediastinal lymphadenopathy R59.0   • Tobacco abuse Z72.0   • Hyponatremia E87.1   • Orthostatic hypotension I95.1     SURGERIES:  Past Surgical History:   Procedure Laterality Date   • ANKLE SURGERY Right    • APPENDECTOMY     • BREAST BIOPSY     •  SECTION      X 2   • CHOLECYSTECTOMY     • COLONOSCOPY      [facility]   • COLONOSCOPY N/A 2017    Procedure: COLONOSCOPY WITH ANESTHESIA;  Surgeon: Ondina Abdul MD;  Location: [facility] ENDOSCOPY;  Service:    • ENDOSCOPY N/A 2017    Procedure: ESOPHAGOGASTRODUODENOSCOPY WITH ANESTHESIA;  Surgeon: Ondina Abdul MD;  Location: [facility] ENDOSCOPY;  Service:    • EYE SURGERY  2019    cataract   • SKIN CANCER EXCISION  2016    FACE   • TONSILLECTOMY       HEALTH MAINTENANCE ITEMS:  Health Maintenance Due   Topic Date Due   • Pneumococcal Vaccine 65+ (1 - PCV) Never done   • TDAP/TD VACCINES (1 - Tdap) Never done   • ZOSTER VACCINE (1 of 2) Never done   • ANNUAL WELLNESS VISIT  Never done   • DIABETIC FOOT EXAM  Never done   • PAP SMEAR  Never done   • DIABETIC EYE EXAM  Never done   • DXA SCAN  2018   • MAMMOGRAM  2019   • COVID-19 Vaccine (3 - Booster for Moderna series) 2022   • LIPID PANEL  2022   • URINE MICROALBUMIN  2022       <no information>  Last Completed Colonoscopy           COLORECTAL CANCER SCREENING (COLONOSCOPY - Every 10 Years) Next due on 3/3/2027    03/03/2017  Surgical Procedure: COLONOSCOPY    03/03/2017  COLONOSCOPY    02/01/2017  Occult Blood X 3, Stool              Immunization History   Administered Date(s) Administered   • COVID-19 (MODERNA) 1st, 2nd, 3rd Dose Only 08/31/2021, 10/02/2021     Last Completed Mammogram     This patient has no relevant Health Maintenance data.            FAMILY HISTORY:  Family History   Problem Relation Age of Onset   • Asthma Mother    • Heart disease Mother    • COPD Mother    • Heart disease Father    • Early death Father         heart attack @ 60   • Heart disease Sister    • Hypertension Sister    • COPD Sister    • Diabetes Sister         Type 2   • Diabetes Sister    • Other Sister    • Hearing loss Sister         born deaf   • Heart disease Brother    • Breast cancer Paternal Aunt    • No Known Problems Son    • No Known Problems Son    • Hypertension Maternal Grandfather    • Stroke Maternal Grandfather    • Diabetes Maternal Grandmother    • Vision loss Maternal Grandmother         from diabetes   • Heart disease Paternal Grandfather    • Hypertension Paternal Grandfather    • Cancer Paternal Aunt    • Hearing loss Brother         wears hearing aids   • Heart disease Brother    • Hypertension Brother    • Heart disease Sister    • Hypertension Sister      SOCIAL HISTORY:  Social History     Socioeconomic History   • Marital status:    Tobacco Use   • Smoking status: Current Every Day Smoker     Packs/day: 0.25     Years: 40.00     Pack years: 10.00     Types: Cigarettes     Start date: 2/18/1975   • Smokeless tobacco: Never Used   • Tobacco comment: quit during two pregnancies but went back to smoking after delivery   Vaping Use   • Vaping Use: Never used   Substance and Sexual Activity   • Alcohol use: Not Currently     Alcohol/week: 12.0 standard drinks     Types: 12 Cans of beer per week     Comment: drink at early 20's  "  • Drug use: No   • Sexual activity: Not Currently     Partners: Male     Birth control/protection: None     Comment: went through menopause over 10 years go       REVIEW OF SYSTEMS:    Review of Systems   Constitutional: Positive for fatigue. Negative for chills and fever.        \"I am tired.  Dizzy and unstable if I move fast.  I have to move slow.\"   HENT: Negative for congestion, mouth sores and trouble swallowing.    Eyes: Negative for redness and visual disturbance.   Respiratory: Negative for cough, shortness of breath and wheezing.    Cardiovascular: Negative for chest pain and leg swelling.   Gastrointestinal: Negative for abdominal pain, nausea and vomiting.   Endocrine: Negative for polydipsia and polyphagia.   Genitourinary: Negative for difficulty urinating, dysuria and flank pain.   Musculoskeletal: Negative for gait problem and myalgias.   Skin: Positive for pallor.   Allergic/Immunologic: Negative for food allergies.   Neurological: Negative for speech difficulty, weakness and confusion.   Hematological: Negative for adenopathy. Does not bruise/bleed easily.   Psychiatric/Behavioral: Negative for agitation and hallucinations. The patient is not nervous/anxious.          VITAL SIGNS: /66   Pulse 72   Temp 97.9 °F (36.6 °C)   Resp 18   Ht 162.6 cm (64\")   Wt 67.8 kg (149 lb 8 oz)   SpO2 99%   Breastfeeding No   BMI 25.66 kg/m²  Body surface area is 1.73 meters squared. Lost 3 pounds.   Pain Score    09/15/22 1335   PainSc: 0-No pain       PHYSICAL EXAMINATION:     Physical Exam  Vitals reviewed.   Constitutional:       General: She is not in acute distress.  HENT:      Head: Normocephalic and atraumatic.   Eyes:      General: No scleral icterus.  Cardiovascular:      Rate and Rhythm: Normal rate.   Pulmonary:      Effort: No respiratory distress.      Breath sounds: No wheezing or rales.   Abdominal:      General: Bowel sounds are normal.      Palpations: Abdomen is soft.      " Tenderness: There is no abdominal tenderness.   Musculoskeletal:      Cervical back: Neck supple.      Comments: Chronic 2 + bilateral leg edema. No calf tenderness.    Skin:     General: Skin is warm.      Coloration: Skin is pale.   Neurological:      Mental Status: She is alert and oriented to person, place, and time.   Psychiatric:         Mood and Affect: Mood normal.         Behavior: Behavior normal.         Thought Content: Thought content normal.         Judgment: Judgment normal.         LABS    Lab Results - Last 18 Months   Lab Units 09/15/22  1309 09/07/22  1251 08/31/22  1249 08/24/22  1334 08/15/22  0727 08/08/22  0713 08/01/22  0731 07/25/22  0749 07/18/22  0802 04/28/22  0709 04/27/22  0539 04/26/22  0646 04/19/22  0859 04/04/22  1034   HEMOGLOBIN g/dL 10.0* 10.5* 9.6* 9.6* 11.5* 10.4* 9.8*   < > 11.1*   < > 9.4* 6.8*   < > 7.0*   HEMATOCRIT % 29.7* 30.1* 27.3* 28.0* 33.6* 30.9* 30.2*   < > 33.2*   < > 29.4* 21.7*   < > 22.7*   MCV fL 95.8 94.7 95.5 97.6* 98.8* 101.0* 103.1*   < > 101.5*   < > 106.1* 110.2*   < > 112.9*   WBC 10*3/mm3 11.63* 13.08* 9.97 10.91* 14.33* 15.33* 15.32*   < > 17.82*   < > 15.25* 16.09*   < > 18.99*   RDW % 16.4* 16.4* 16.7* 17.2* 17.9* 17.9* 18.5*   < > 19.1*   < > 25.2* 21.7*   < > 22.4*   MPV fL 11.5 10.9 11.5 10.9 11.0 11.1 10.7   < > 11.2   < > 11.5 11.2   < > 11.2   PLATELETS 10*3/mm3 314 297 292 312 351 315 271   < > 245   < > 218 235   < > 329   IMM GRAN % % 0.9* 1.1* 1.9* 1.2*  --  2.5* 2.5*   < >  --    < >  --   --    < >  --    NEUTROS ABS 10*3/mm3 7.47* 7.94* 5.97 7.91* 9.03* 11.37* 11.47*   < > 12.47*   < > 10.11* 12.03*   < > 14.29*   LYMPHS ABS 10*3/mm3 2.34 3.31* 2.30 1.74  --  1.89 2.03   < >  --    < >  --   --    < >  --    MONOS ABS 10*3/mm3 1.21* 1.22* 1.14* 0.82  --  1.31* 1.10*   < >  --    < >  --   --    < >  --    EOS ABS 10*3/mm3 0.40 0.36 0.28 0.23  --  0.25 0.21   < > 0.36   < > 0.78*  --    < > 0.19   BASOS ABS 10*3/mm3 0.11 0.10 0.09 0.08  0.14 0.13 0.12   < >  --    < >  --   --    < > 0.19   IMMATURE GRANS (ABS) 10*3/mm3 0.10* 0.15* 0.19* 0.13*  --  0.38* 0.39*   < >  --    < >  --   --    < >  --    NRBC /100 WBC 0.0 0.0 0.0 0.0  --  0.0 0.1   < >  --    < > 3.1* 3.0*   < >  --    NEUTROPHIL % %  --   --   --   --  59.0  --   --   --  70.0  --  64.3 70.7  --  74.2   MONOCYTES % %  --   --   --   --  9.0  --   --   --  8.0  --  9.2 9.1  --  8.2   BASOPHIL % %  --   --   --   --  1.0  --   --   --   --   --   --   --   --  1.0   ATYP LYMPH % %  --   --   --   --  5.0  --   --   --   --   --   --   --   --   --    ANISOCYTOSIS   --   --   --   --  Mod/2+  --   --   --  Large/3+  --  Mod/2+ Mod/2+  --  Mod/2+   GIANT PLT   --   --   --   --  Slight/1+  --   --   --   --   --   --   --   --   --     < > = values in this interval not displayed.       Lab Results - Last 18 Months   Lab Units 09/15/22  1309 09/07/22  1325 09/07/22  1251 08/31/22  1249 08/24/22  1334 08/15/22  0727 08/08/22  0713   GLUCOSE mg/dL 223*  --  84 284* 265* 227* 282*   SODIUM mmol/L 137  --  138 134* 139 136 138   POTASSIUM mmol/L 4.1  --  3.6 3.9 3.9 3.9 3.9   CO2 mmol/L 26.0  --  29.0 28.0 26.0 27.0 30.0*   CHLORIDE mmol/L 101  --  102 99 103 100 101   ANION GAP mmol/L 10.0  --  7.0 7.0 10.0 9.0 7.0   CREATININE mg/dL 0.83 1.00 0.71 0.82 0.75 0.74 0.86   BUN mg/dL 13  --  14 17 13 20 16   BUN / CREAT RATIO  15.7  --  19.7 20.7 17.3 27.0* 18.6   CALCIUM mg/dL 9.1  --  9.4 8.9 9.2 8.2* 9.2   ALK PHOS U/L 64  --  62 63 62 72 60   TOTAL PROTEIN g/dL 6.4  --  6.7 6.2 6.1 6.7 6.1   ALT (SGPT) U/L 9  --  10 12 16 17 21   AST (SGOT) U/L 16  --  20 21 26 26 23   BILIRUBIN mg/dL 1.2  --  1.2 1.3* 1.3* 2.0* 1.7*   ALBUMIN g/dL 4.10  --  4.20 4.10 4.10 4.20 4.10   GLOBULIN gm/dL 2.3  --  2.5 2.1 2.0 2.5 2.0       Lab Results - Last 18 Months   Lab Units 09/15/22  1309 09/07/22  1251 08/31/22  1249 08/24/22  1334 08/15/22  0727 08/08/22  0713 05/09/22  0738 04/29/22  0536 04/28/22  0709  04/27/22  0539 04/26/22  0646 04/25/22  0630 04/24/22  0819 04/23/22  0743 04/22/22  1322 04/19/22  0859   URIC ACID mg/dL  --   --   --   --   --   --   --  1.9* 1.6* 1.5* 2.1* 3.7 6.4*   < > 5.8*  --    LDH U/L 251* 290* 279* 299* 379* 337*   < > 570* 650*  638* 668* 634* 593* 652*   < > 772* 834*   REFERENCE LAB REPORT   --   --   --   --   --   --   --   --   --   --   --   --   --   --  See Attached Report SEE ATTACHED REPORT    < > = values in this interval not displayed.       Lab Results - Last 18 Months   Lab Units 08/08/22  0713 07/11/22  0747 04/22/22  1542 04/19/22  0859 04/04/22  1219 04/04/22  1035 03/31/22  1226 08/17/21  0932   IRON mcg/dL 82 125  --   --   --  44 129  --    TIBC mcg/dL 301 295*  --   --   --  261* 310  --    IRON SATURATION % 27 42  --   --   --  17* 42  --    FERRITIN ng/mL 330.10* 327.30*  --   --   --  311.90* 454.60*  --    TSH uIU/mL  --   --   --  1.960  --  2.100  --  1.302   FOLATE ng/mL  --   --  13.70 12.60 14.20  --   --   --          Giana Yepez reports a pain score of 0.        ASSESSMENT:  1.  Autoimmune hemolytic anemia.  Treatment status: Post IVIG x2 doses on 04/27/2022.  On prednisone 1 mg/kg from 05/09/2022  through 07/10/2022. 50 mg 07/11/20222 through 07/17/2022. 40 mg 07/18/2022 for 1 week, 30 mg p.o. daily from 07/25/2022, 20 mg daily from 08/01/2022 through 08/07/2022, 15 mg from 08/08/2022 through 08/14/2022, 10 mg from 08/15/2022 through 08/21/2022, and 5 mg from 08/22/2022 through present.   Rituximab 375 mgm2 on 04/28/2022.  Hypersensitivity reaction to rituximab.   Rituximab rechallenge on 07/25/2022 through 08/15/2022, 4 cycles  2.  Borderline enlarged mediastinal nodes on 04/23/2022. Followed by pulmonary.  3.  9 mm hypodense well-circumscribed asymmetry at the level of the upper left vallecula and base of the tongue on the left on 04/23/2022. Smaller on CT 09/07/2022.   4.  LE autoantibody.            PLAN:  1.   Re: Completion of  "prednisone. \"I am done. No problems\"      2.   Re: Reticulocyte count 8.74 from 8.5 from 9.9 from 9.5 from 9.08 from 10.1.  3.   Re: Heme status.  WBC 11.6, hemoglobin 10, hematocrit 29.7, MCV 95.8, and platelet 314.    4.   Re: CMP.  Total bilirubin 1.2 from 1.2 from 1.3 from 1.3 from 2 and glucose 223.  5.   Re: Haptoglobin is pending from <10 and LDH 251 from 290 from 279 from 299 from 379 from 337 from 343 from 334 from 339 from 375.   6.   Re: CT neck report 09/07/2022. Persistence of a probable cystic lesion in the vallecula on the left  side. Hounsfield unit measurement is 19 today. It may be slightly smaller in size. It now measures about 7 x 9 mm.  No lymphadenopathy in the neck.  Slitlike ventricles, small subarachnoid spaces, partially empty appearance of the sella turcica and small transverse sinuses can be seen in patients with idiopathic intracranial hypertension. Repeat CT in 3 months.   7.  Blood for CBC with differential, CMP, haptoglobin, LDH and reticulocyte count every 2 weeks.  8.  eRx folic acid 1 mg p.o. daily #90 with 3 refills if needed.  9.   Follow-up at [place] as needed.  10.  Stable for close observation, hemolytic anemia.  11.  Continue care per primary care physician.  12.  Plan of care discussed with patient.  Understanding expressed.  Patient agreeable to proceed.  13.  Transfuse 1 unit packed RBC if hemoglobin less than 6.9.  Premed Tylenol 500 mg p.o.  Lasix 20 mg IV push after transfusion.  Monitor for transfusion reactions.  14.  Advance Care Planning   ACP discussion was declined by the patient. Patient does not have an advance directive, information provided.  15.  Return to office in 4 weeks.  Repeat CT neck 3 months.        I have reviewed the assessment and plan and verified the accuracy of it. No changes to assessment and plan since the information was documented. Filemon Elizalde MD 09/15/22          I spent 31 total minutes, " face-to-face, caring for Giana today.  Greater than 50% of this time involved counseling and/or coordination of care as documented within this note regarding the patient's illness(es), pros and cons of various treatment options, instructions and/or risk reduction.                (Artem Mace MD)   Camila Montes MD  (Gerber Tolbert MD [place])  (Dominick Fernandez MD [place])

## 2022-09-07 ENCOUNTER — LAB (OUTPATIENT)
Dept: LAB | Facility: HOSPITAL | Age: 67
End: 2022-09-07

## 2022-09-07 ENCOUNTER — HOSPITAL ENCOUNTER (OUTPATIENT)
Dept: CT IMAGING | Facility: HOSPITAL | Age: 67
Discharge: HOME | End: 2022-09-07
Admitting: INTERNAL MEDICINE

## 2022-09-07 DIAGNOSIS — D59.10 AUTOIMMUNE HEMOLYTIC ANEMIA: ICD-10-CM

## 2022-09-07 LAB
ALBUMIN SERPL-MCNC: 4.2 G/DL (ref 3.5–5.2)
ALBUMIN/GLOB SERPL: 1.7 G/DL
ALP SERPL-CCNC: 62 U/L (ref 39–117)
ALT SERPL W P-5'-P-CCNC: 10 U/L (ref 1–33)
ANION GAP SERPL CALCULATED.3IONS-SCNC: 7 MMOL/L (ref 5–15)
AST SERPL-CCNC: 20 U/L (ref 1–32)
BASOPHILS # BLD AUTO: 0.1 10*3/MM3 (ref 0–0.2)
BASOPHILS NFR BLD AUTO: 0.8 % (ref 0–1.5)
BILIRUB SERPL-MCNC: 1.2 MG/DL (ref 0–1.2)
BUN SERPL-MCNC: 14 MG/DL (ref 8–23)
BUN/CREAT SERPL: 19.7 (ref 7–25)
CALCIUM SPEC-SCNC: 9.4 MG/DL (ref 8.6–10.5)
CHLORIDE SERPL-SCNC: 102 MMOL/L (ref 98–107)
CO2 SERPL-SCNC: 29 MMOL/L (ref 22–29)
CREAT BLDA-MCNC: 1 MG/DL (ref 0.6–1.3)
CREAT SERPL-MCNC: 0.71 MG/DL (ref 0.57–1)
DEPRECATED RDW RBC AUTO: 56.4 FL (ref 37–54)
EGFRCR SERPLBLD CKD-EPI 2021: 93.9 ML/MIN/1.73
EOSINOPHIL # BLD AUTO: 0.36 10*3/MM3 (ref 0–0.4)
EOSINOPHIL NFR BLD AUTO: 2.8 % (ref 0.3–6.2)
ERYTHROCYTE [DISTWIDTH] IN BLOOD BY AUTOMATED COUNT: 16.4 % (ref 12.3–15.4)
GLOBULIN UR ELPH-MCNC: 2.5 GM/DL
GLUCOSE SERPL-MCNC: 84 MG/DL (ref 65–99)
HAPTOGLOB SERPL-MCNC: <10 MG/DL (ref 30–200)
HCT VFR BLD AUTO: 30.1 % (ref 34–46.6)
HGB BLD-MCNC: 10.5 G/DL (ref 12–15.9)
IMM GRANULOCYTES # BLD AUTO: 0.15 10*3/MM3 (ref 0–0.05)
IMM GRANULOCYTES NFR BLD AUTO: 1.1 % (ref 0–0.5)
LDH SERPL-CCNC: 290 U/L (ref 135–214)
LYMPHOCYTES # BLD AUTO: 3.31 10*3/MM3 (ref 0.7–3.1)
LYMPHOCYTES NFR BLD AUTO: 25.3 % (ref 19.6–45.3)
MCH RBC QN AUTO: 33 PG (ref 26.6–33)
MCHC RBC AUTO-ENTMCNC: 34.9 G/DL (ref 31.5–35.7)
MCV RBC AUTO: 94.7 FL (ref 79–97)
MONOCYTES # BLD AUTO: 1.22 10*3/MM3 (ref 0.1–0.9)
MONOCYTES NFR BLD AUTO: 9.3 % (ref 5–12)
NEUTROPHILS NFR BLD AUTO: 60.7 % (ref 42.7–76)
NEUTROPHILS NFR BLD AUTO: 7.94 10*3/MM3 (ref 1.7–7)
NRBC BLD AUTO-RTO: 0 /100 WBC (ref 0–0.2)
PLATELET # BLD AUTO: 297 10*3/MM3 (ref 140–450)
PMV BLD AUTO: 10.9 FL (ref 6–12)
POTASSIUM SERPL-SCNC: 3.6 MMOL/L (ref 3.5–5.2)
PROT SERPL-MCNC: 6.7 G/DL (ref 6–8.5)
RBC # BLD AUTO: 3.18 10*6/MM3 (ref 3.77–5.28)
RETICS # AUTO: 0.25 10*6/MM3 (ref 0.02–0.13)
RETICS/RBC NFR AUTO: 8.57 % (ref 0.7–1.9)
SODIUM SERPL-SCNC: 138 MMOL/L (ref 136–145)
WBC NRBC COR # BLD: 13.08 10*3/MM3 (ref 3.4–10.8)

## 2022-09-07 PROCEDURE — 25010000002 IOPAMIDOL 61 % SOLUTION: Performed by: INTERNAL MEDICINE

## 2022-09-07 PROCEDURE — 36415 COLL VENOUS BLD VENIPUNCTURE: CPT

## 2022-09-07 PROCEDURE — 82565 ASSAY OF CREATININE: CPT

## 2022-09-07 PROCEDURE — 85045 AUTOMATED RETICULOCYTE COUNT: CPT

## 2022-09-07 PROCEDURE — 83615 LACTATE (LD) (LDH) ENZYME: CPT

## 2022-09-07 PROCEDURE — 70491 CT SOFT TISSUE NECK W/DYE: CPT

## 2022-09-07 PROCEDURE — 80053 COMPREHEN METABOLIC PANEL: CPT

## 2022-09-07 PROCEDURE — 85025 COMPLETE CBC W/AUTO DIFF WBC: CPT

## 2022-09-07 PROCEDURE — 83010 ASSAY OF HAPTOGLOBIN QUANT: CPT

## 2022-09-07 RX ADMIN — IOPAMIDOL 100 ML: 612 INJECTION, SOLUTION INTRAVENOUS at 13:10

## 2022-09-14 DIAGNOSIS — M79.89 LEG SWELLING: Primary | ICD-10-CM

## 2022-09-14 RX ORDER — ALLOPURINOL 100 MG/1
TABLET ORAL
Qty: 30 TABLET | Refills: 1 | Status: SHIPPED | OUTPATIENT
Start: 2022-09-14 | End: 2022-11-23

## 2022-09-15 ENCOUNTER — LAB (OUTPATIENT)
Dept: LAB | Facility: HOSPITAL | Age: 67
End: 2022-09-15

## 2022-09-15 ENCOUNTER — OFFICE VISIT (OUTPATIENT)
Dept: ONCOLOGY | Facility: CLINIC | Age: 67
End: 2022-09-15

## 2022-09-15 VITALS
RESPIRATION RATE: 18 BRPM | OXYGEN SATURATION: 99 % | HEART RATE: 72 BPM | BODY MASS INDEX: 25.52 KG/M2 | DIASTOLIC BLOOD PRESSURE: 66 MMHG | WEIGHT: 149.5 LBS | HEIGHT: 64 IN | TEMPERATURE: 97.9 F | SYSTOLIC BLOOD PRESSURE: 126 MMHG

## 2022-09-15 DIAGNOSIS — D59.10 AUTOIMMUNE HEMOLYTIC ANEMIA: Primary | ICD-10-CM

## 2022-09-15 LAB
ALBUMIN SERPL-MCNC: 4.1 G/DL (ref 3.5–5.2)
ALBUMIN/GLOB SERPL: 1.8 G/DL
ALP SERPL-CCNC: 64 U/L (ref 39–117)
ALT SERPL W P-5'-P-CCNC: 9 U/L (ref 1–33)
ANION GAP SERPL CALCULATED.3IONS-SCNC: 10 MMOL/L (ref 5–15)
AST SERPL-CCNC: 16 U/L (ref 1–32)
BASOPHILS # BLD AUTO: 0.11 10*3/MM3 (ref 0–0.2)
BASOPHILS NFR BLD AUTO: 0.9 % (ref 0–1.5)
BILIRUB SERPL-MCNC: 1.2 MG/DL (ref 0–1.2)
BUN SERPL-MCNC: 13 MG/DL (ref 8–23)
BUN/CREAT SERPL: 15.7 (ref 7–25)
CALCIUM SPEC-SCNC: 9.1 MG/DL (ref 8.6–10.5)
CHLORIDE SERPL-SCNC: 101 MMOL/L (ref 98–107)
CO2 SERPL-SCNC: 26 MMOL/L (ref 22–29)
CREAT SERPL-MCNC: 0.83 MG/DL (ref 0.57–1)
DEPRECATED RDW RBC AUTO: 56.4 FL (ref 37–54)
EGFRCR SERPLBLD CKD-EPI 2021: 77.9 ML/MIN/1.73
EOSINOPHIL # BLD AUTO: 0.4 10*3/MM3 (ref 0–0.4)
EOSINOPHIL NFR BLD AUTO: 3.4 % (ref 0.3–6.2)
ERYTHROCYTE [DISTWIDTH] IN BLOOD BY AUTOMATED COUNT: 16.4 % (ref 12.3–15.4)
GLOBULIN UR ELPH-MCNC: 2.3 GM/DL
GLUCOSE SERPL-MCNC: 223 MG/DL (ref 65–99)
HAPTOGLOB SERPL-MCNC: <10 MG/DL (ref 30–200)
HCT VFR BLD AUTO: 29.7 % (ref 34–46.6)
HGB BLD-MCNC: 10 G/DL (ref 12–15.9)
HOLD SPECIMEN: NORMAL
IMM GRANULOCYTES # BLD AUTO: 0.1 10*3/MM3 (ref 0–0.05)
IMM GRANULOCYTES NFR BLD AUTO: 0.9 % (ref 0–0.5)
LDH SERPL-CCNC: 251 U/L (ref 135–214)
LYMPHOCYTES # BLD AUTO: 2.34 10*3/MM3 (ref 0.7–3.1)
LYMPHOCYTES NFR BLD AUTO: 20.1 % (ref 19.6–45.3)
MCH RBC QN AUTO: 32.3 PG (ref 26.6–33)
MCHC RBC AUTO-ENTMCNC: 33.7 G/DL (ref 31.5–35.7)
MCV RBC AUTO: 95.8 FL (ref 79–97)
MONOCYTES # BLD AUTO: 1.21 10*3/MM3 (ref 0.1–0.9)
MONOCYTES NFR BLD AUTO: 10.4 % (ref 5–12)
NEUTROPHILS NFR BLD AUTO: 64.3 % (ref 42.7–76)
NEUTROPHILS NFR BLD AUTO: 7.47 10*3/MM3 (ref 1.7–7)
NRBC BLD AUTO-RTO: 0 /100 WBC (ref 0–0.2)
PLATELET # BLD AUTO: 314 10*3/MM3 (ref 140–450)
PMV BLD AUTO: 11.5 FL (ref 6–12)
POTASSIUM SERPL-SCNC: 4.1 MMOL/L (ref 3.5–5.2)
PROT SERPL-MCNC: 6.4 G/DL (ref 6–8.5)
RBC # BLD AUTO: 3.1 10*6/MM3 (ref 3.77–5.28)
RETICS # AUTO: 0.27 10*6/MM3 (ref 0.02–0.13)
RETICS/RBC NFR AUTO: 8.74 % (ref 0.7–1.9)
SODIUM SERPL-SCNC: 137 MMOL/L (ref 136–145)
WBC NRBC COR # BLD: 11.63 10*3/MM3 (ref 3.4–10.8)

## 2022-09-15 PROCEDURE — 83615 LACTATE (LD) (LDH) ENZYME: CPT

## 2022-09-15 PROCEDURE — 80053 COMPREHEN METABOLIC PANEL: CPT

## 2022-09-15 PROCEDURE — 85025 COMPLETE CBC W/AUTO DIFF WBC: CPT

## 2022-09-15 PROCEDURE — 36415 COLL VENOUS BLD VENIPUNCTURE: CPT

## 2022-09-15 PROCEDURE — 99214 OFFICE O/P EST MOD 30 MIN: CPT | Performed by: INTERNAL MEDICINE

## 2022-09-15 PROCEDURE — 83010 ASSAY OF HAPTOGLOBIN QUANT: CPT

## 2022-09-15 PROCEDURE — 85045 AUTOMATED RETICULOCYTE COUNT: CPT

## 2022-09-28 DIAGNOSIS — D50.8 IRON DEFICIENCY ANEMIA SECONDARY TO INADEQUATE DIETARY IRON INTAKE: ICD-10-CM

## 2022-09-28 DIAGNOSIS — D59.10 AUTOIMMUNE HEMOLYTIC ANEMIA: Primary | ICD-10-CM

## 2022-09-28 DIAGNOSIS — R71.8 OTHER ABNORMALITY OF RED BLOOD CELLS: ICD-10-CM

## 2022-09-29 ENCOUNTER — LAB (OUTPATIENT)
Dept: LAB | Facility: HOSPITAL | Age: 67
End: 2022-09-29

## 2022-09-29 DIAGNOSIS — D59.10 AUTOIMMUNE HEMOLYTIC ANEMIA: ICD-10-CM

## 2022-09-29 LAB
ALBUMIN SERPL-MCNC: 4.1 G/DL (ref 3.5–5.2)
ALBUMIN/GLOB SERPL: 1.9 G/DL
ALP SERPL-CCNC: 60 U/L (ref 39–117)
ALT SERPL W P-5'-P-CCNC: 10 U/L (ref 1–33)
ANION GAP SERPL CALCULATED.3IONS-SCNC: 9 MMOL/L (ref 5–15)
AST SERPL-CCNC: 18 U/L (ref 1–32)
BASOPHILS # BLD AUTO: 0.09 10*3/MM3 (ref 0–0.2)
BASOPHILS NFR BLD AUTO: 0.7 % (ref 0–1.5)
BILIRUB SERPL-MCNC: 1.3 MG/DL (ref 0–1.2)
BUN SERPL-MCNC: 13 MG/DL (ref 8–23)
BUN/CREAT SERPL: 19.7 (ref 7–25)
CALCIUM SPEC-SCNC: 8.8 MG/DL (ref 8.6–10.5)
CHLORIDE SERPL-SCNC: 102 MMOL/L (ref 98–107)
CO2 SERPL-SCNC: 26 MMOL/L (ref 22–29)
CREAT SERPL-MCNC: 0.66 MG/DL (ref 0.57–1)
DEPRECATED RDW RBC AUTO: 59.1 FL (ref 37–54)
EGFRCR SERPLBLD CKD-EPI 2021: 96.9 ML/MIN/1.73
EOSINOPHIL # BLD AUTO: 0.47 10*3/MM3 (ref 0–0.4)
EOSINOPHIL NFR BLD AUTO: 3.6 % (ref 0.3–6.2)
ERYTHROCYTE [DISTWIDTH] IN BLOOD BY AUTOMATED COUNT: 17.3 % (ref 12.3–15.4)
GLOBULIN UR ELPH-MCNC: 2.2 GM/DL
GLUCOSE SERPL-MCNC: 132 MG/DL (ref 65–99)
HCT VFR BLD AUTO: 30.7 % (ref 34–46.6)
HGB BLD-MCNC: 10.5 G/DL (ref 12–15.9)
IMM GRANULOCYTES # BLD AUTO: 0.16 10*3/MM3 (ref 0–0.05)
IMM GRANULOCYTES NFR BLD AUTO: 1.2 % (ref 0–0.5)
LDH SERPL-CCNC: 246 U/L (ref 135–214)
LYMPHOCYTES # BLD AUTO: 2.65 10*3/MM3 (ref 0.7–3.1)
LYMPHOCYTES NFR BLD AUTO: 20.2 % (ref 19.6–45.3)
MCH RBC QN AUTO: 32.6 PG (ref 26.6–33)
MCHC RBC AUTO-ENTMCNC: 34.2 G/DL (ref 31.5–35.7)
MCV RBC AUTO: 95.3 FL (ref 79–97)
MONOCYTES # BLD AUTO: 1.02 10*3/MM3 (ref 0.1–0.9)
MONOCYTES NFR BLD AUTO: 7.8 % (ref 5–12)
NEUTROPHILS NFR BLD AUTO: 66.5 % (ref 42.7–76)
NEUTROPHILS NFR BLD AUTO: 8.72 10*3/MM3 (ref 1.7–7)
NRBC BLD AUTO-RTO: 0 /100 WBC (ref 0–0.2)
PLATELET # BLD AUTO: 293 10*3/MM3 (ref 140–450)
PMV BLD AUTO: 11.4 FL (ref 6–12)
POTASSIUM SERPL-SCNC: 3.9 MMOL/L (ref 3.5–5.2)
PROT SERPL-MCNC: 6.3 G/DL (ref 6–8.5)
RBC # BLD AUTO: 3.22 10*6/MM3 (ref 3.77–5.28)
RETICS # AUTO: 0.27 10*6/MM3 (ref 0.02–0.13)
RETICS/RBC NFR AUTO: 8.38 % (ref 0.7–1.9)
SODIUM SERPL-SCNC: 137 MMOL/L (ref 136–145)
WBC NRBC COR # BLD: 13.11 10*3/MM3 (ref 3.4–10.8)

## 2022-09-29 PROCEDURE — 83010 ASSAY OF HAPTOGLOBIN QUANT: CPT

## 2022-09-29 PROCEDURE — 85045 AUTOMATED RETICULOCYTE COUNT: CPT

## 2022-09-29 PROCEDURE — 80053 COMPREHEN METABOLIC PANEL: CPT

## 2022-09-29 PROCEDURE — 36415 COLL VENOUS BLD VENIPUNCTURE: CPT

## 2022-09-29 PROCEDURE — 85025 COMPLETE CBC W/AUTO DIFF WBC: CPT

## 2022-09-29 PROCEDURE — 83615 LACTATE (LD) (LDH) ENZYME: CPT

## 2022-09-30 LAB — HAPTOGLOB SERPL-MCNC: <10 MG/DL (ref 30–200)

## 2022-10-02 NOTE — PROGRESS NOTES
"[facility] ONC [facility] HEMATOLOGY & ONCOLOGY [address]  [facility] [address] [phone]    Patient Name: Giana Yepez  Encounter Date: 10/13/2022  YOB: 1955  Patient Number: [id number]      REASON FOR FOLLOW-UP: Giana Yepez is a pleasant 67 y.o.  female who is seen on follow-up for autoimmune hemolytic anemia.  She is seen 2 months post four weekly doses of rituximab rechallenge. She had a dose of rituximab on 04/28/2022, she was intolerant.  She was taking prednisone 1 mg /kg and decrease to 50 mg daily for 1 week from 07/11/2022 and 40 mg po daily from 07/18/2022 for 1 week and 30 mg p.o. daily from 07/25/2022, 20 mg daily from 08/01/2022 through 08/07/2022, 15 mg from 08/08/2022 through 08/14/2022, 10 mg from 08/15/2022 through 08/21/2022, and 5 mg from 08/22/2022 through 08/28/2022.  She is seen alone.  History is obtained from the patient.  She is a reliable historian.        Problem List Items Addressed This Visit    None    Oncology/Hematology History    No history exists.       PAST MEDICAL HISTORY:  ALLERGIES:  Allergies   Allergen Reactions   • Corn-Containing Products Diarrhea   • Eggs Or Egg-Derived Products Diarrhea   • Nuts Shortness Of Breath     peanuts   • Milk-Related Compounds Nausea And Vomiting   • Gabapentin Dizziness     Patient states becomes weak and dizzy if she takes Gabapentin   • Levemir [Insulin Detemir] Itching and Other (See Comments)     Patient states gets a \"hard knot and itching\" at injection site.   • Wheat Rash     CURRENT MEDICATIONS:  Outpatient Encounter Medications as of 10/13/2022   Medication Sig Dispense Refill   • allopurinol (ZYLOPRIM) 100 MG tablet TAKE ONE TABLET BY MOUTH DAILY 30 tablet 1   • Calcium Citrate (CITRACAL PO) Take  by mouth.     • cholecalciferol (VITAMIN D3) 25 MCG (1000 UT) tablet Take 2,000 Units by mouth Daily.     • Continuous Blood Gluc  " (Dexcom G5  Kit) device Continuous.     • diphenhydrAMINE (BENADRYL) 25 mg capsule Take 25 mg by mouth Every 6 (Six) Hours As Needed for Itching.     • ferrous sulfate 325 (65 FE) MG tablet Take 325 mg by mouth Daily With Breakfast.     • fluticasone (FLONASE) 50 MCG/ACT nasal spray 2 sprays into the nostril(s) as directed by provider Daily. To control swelling and drainage from allergies 16 g 11   • folic acid (FOLVITE) 1 MG tablet Take 1 tablet by mouth Daily. 30 tablet 2   • furosemide (Lasix) 20 MG tablet Take 1 tablet by mouth Daily. For relief of leg swelling 30 tablet 0   • Insulin Infusion Pump device Continuous.     • Insulin Lispro (humaLOG) 100 UNIT/ML injection Inject 25-40 Units under the skin into the appropriate area as directed Daily.     • Loratadine 10 MG capsule Take 10 mg by mouth every night at bedtime. As anti-histamine for allergies     • omeprazole (priLOSEC) 40 MG capsule Take 40 mg by mouth Daily.     • ondansetron (ZOFRAN) 8 MG tablet Take 1 tablet by mouth Every 8 (Eight) Hours As Needed for Nausea or Vomiting. 60 tablet 3   • rosuvastatin (CRESTOR) 20 MG tablet Take 20 mg by mouth Daily.     • vitamin B-12 (CYANOCOBALAMIN) 1000 MCG tablet Take 1,000 mcg by mouth Daily.       No facility-administered encounter medications on file as of 10/13/2022.     ADULT ILLNESSES:  Patient Active Problem List   Diagnosis Code   • Gastroesophageal reflux disease K21.9   • Type 1 diabetes mellitus with hyperglycemia (HCC), insulin depdendent E10.65   • Intermittent chest pain R07.9   • Hypercholesterolemia E78.00   • Vitamin D deficiency E55.9   • Vitamin B12 deficiency E53.8   • Diarrhea R19.7   • Abdominal cramping R10.9   • Nausea R11.0   • Collagenous colitis K52.831   • Autoimmune hemolytic anemia (HCC) D59.10   • Insulin pump in place Z96.41   • Hyperbilirubinemia E80.6   • Mediastinal lymphadenopathy R59.0   • Tobacco abuse Z72.0   • Hyponatremia E87.1   • Orthostatic hypotension I95.1      SURGERIES:  Past Surgical History:   Procedure Laterality Date   • ANKLE SURGERY Right    • APPENDECTOMY     • BREAST BIOPSY     •  SECTION      X 2   • CHOLECYSTECTOMY     • COLONOSCOPY      [facility]   • COLONOSCOPY N/A 2017    Procedure: COLONOSCOPY WITH ANESTHESIA;  Surgeon: Ondina Abdul MD;  Location: [facility] ENDOSCOPY;  Service:    • ENDOSCOPY N/A 2017    Procedure: ESOPHAGOGASTRODUODENOSCOPY WITH ANESTHESIA;  Surgeon: Ondina Abdul MD;  Location: [facility] ENDOSCOPY;  Service:    • EYE SURGERY  2019    cataract   • SKIN CANCER EXCISION  2016    FACE   • TONSILLECTOMY       HEALTH MAINTENANCE ITEMS:  Health Maintenance Due   Topic Date Due   • Pneumococcal Vaccine 65+ (1 - PCV) Never done   • TDAP/TD VACCINES (1 - Tdap) Never done   • ZOSTER VACCINE (1 of 2) Never done   • ANNUAL WELLNESS VISIT  Never done   • DIABETIC FOOT EXAM  Never done   • PAP SMEAR  Never done   • DIABETIC EYE EXAM  Never done   • DXA SCAN  2018   • MAMMOGRAM  2019   • COVID-19 Vaccine (3 - Booster for Moderna series) 2021   • INFLUENZA VACCINE  2022   • LIPID PANEL  2022   • URINE MICROALBUMIN  2022       <no information>  Last Completed Colonoscopy          COLORECTAL CANCER SCREENING (COLONOSCOPY - Every 10 Years) Next due on 3/3/2027    2017  Surgical Procedure: COLONOSCOPY    2017  COLONOSCOPY    2017  Occult Blood X 3, Stool              Immunization History   Administered Date(s) Administered   • COVID-19 (MODERNA) 1st, 2nd, 3rd Dose Only 2021, 10/02/2021     Last Completed Mammogram     This patient has no relevant Health Maintenance data.            FAMILY HISTORY:  Family History   Problem Relation Age of Onset   • Asthma Mother    • Heart disease Mother    • COPD Mother    • Heart disease Father    • Early death Father         heart attack @ 60   • Heart disease Sister    • Hypertension Sister    • COPD Sister    •  "Diabetes Sister         Type 2   • Diabetes Sister    • Other Sister    • Hearing loss Sister         born deaf   • Heart disease Brother    • Breast cancer Paternal Aunt    • No Known Problems Son    • No Known Problems Son    • Hypertension Maternal Grandfather    • Stroke Maternal Grandfather    • Diabetes Maternal Grandmother    • Vision loss Maternal Grandmother         from diabetes   • Heart disease Paternal Grandfather    • Hypertension Paternal Grandfather    • Cancer Paternal Aunt    • Hearing loss Brother         wears hearing aids   • Heart disease Brother    • Hypertension Brother    • Heart disease Sister    • Hypertension Sister      SOCIAL HISTORY:  Social History     Socioeconomic History   • Marital status:    Tobacco Use   • Smoking status: Every Day     Packs/day: 0.25     Years: 40.00     Pack years: 10.00     Types: Cigarettes     Start date: 2/18/1975   • Smokeless tobacco: Never   • Tobacco comments:     quit during two pregnancies but went back to smoking after delivery   Vaping Use   • Vaping Use: Never used   Substance and Sexual Activity   • Alcohol use: Not Currently     Alcohol/week: 12.0 standard drinks     Types: 12 Cans of beer per week     Comment: drink at early 20's   • Drug use: No   • Sexual activity: Not Currently     Partners: Male     Birth control/protection: None     Comment: went through menopause over 10 years go       REVIEW OF SYSTEMS:    Review of Systems   Constitutional: Positive for fatigue. Negative for fever and unexpected weight loss.        \"Feeling fatigue.\"   HENT: Negative for congestion, mouth sores and trouble swallowing.    Eyes: Negative for redness and visual disturbance.   Respiratory: Negative for cough, shortness of breath and wheezing.    Cardiovascular: Negative for chest pain and palpitations.   Gastrointestinal: Negative for blood in stool and nausea.   Endocrine: Negative for polydipsia and polyphagia.   Genitourinary: Negative for " "difficulty urinating, dysuria and flank pain.   Musculoskeletal: Negative for gait problem and myalgias.   Skin: Positive for pallor.   Allergic/Immunologic: Negative for food allergies.   Neurological: Negative for speech difficulty, weakness and confusion.   Hematological: Negative for adenopathy. Does not bruise/bleed easily.   Psychiatric/Behavioral: Negative for agitation and hallucinations. The patient is not nervous/anxious.          VITAL SIGNS: /72   Pulse 75   Temp 97.1 °F (36.2 °C) (Temporal)   Resp 18   Ht 162.6 cm (64.02\")   Wt 67.2 kg (148 lb 3.2 oz)   SpO2 97%   BMI 25.43 kg/m²  Body surface area is 1.72 meters squared.   Pain Score    10/13/22 1408   PainSc: 0-No pain           PHYSICAL EXAMINATION:     Physical Exam  Vitals reviewed.   Constitutional:       General: She is not in acute distress.  HENT:      Head: Normocephalic and atraumatic.   Eyes:      General: No scleral icterus.  Cardiovascular:      Rate and Rhythm: Normal rate.   Pulmonary:      Effort: No respiratory distress.      Breath sounds: No wheezing or rales.   Abdominal:      General: Bowel sounds are normal.      Palpations: Abdomen is soft.      Tenderness: There is no abdominal tenderness.   Musculoskeletal:         General: No swelling.      Cervical back: Neck supple.   Skin:     General: Skin is warm.      Coloration: Skin is pale.   Neurological:      Mental Status: She is alert and oriented to person, place, and time.   Psychiatric:         Mood and Affect: Mood normal.         Behavior: Behavior normal.         Thought Content: Thought content normal.         Judgment: Judgment normal.         LABS    Lab Results - Last 18 Months   Lab Units 10/13/22  1336 09/29/22  1347 09/15/22  1309 09/07/22  1251 08/31/22  1249 08/24/22  1334 08/15/22  0727 07/25/22  0749 07/18/22  0802 04/28/22  0709 04/27/22  0539 04/26/22  0646 04/19/22  0859 04/04/22  1034   HEMOGLOBIN g/dL 10.7* 10.5* 10.0* 10.5* 9.6* 9.6* 11.5*   < > " 11.1*   < > 9.4* 6.8*   < > 7.0*   HEMATOCRIT % 31.8* 30.7* 29.7* 30.1* 27.3* 28.0* 33.6*   < > 33.2*   < > 29.4* 21.7*   < > 22.7*   MCV fL 95.2 95.3 95.8 94.7 95.5 97.6* 98.8*   < > 101.5*   < > 106.1* 110.2*   < > 112.9*   WBC 10*3/mm3 14.37* 13.11* 11.63* 13.08* 9.97 10.91* 14.33*   < > 17.82*   < > 15.25* 16.09*   < > 18.99*   RDW % 16.6* 17.3* 16.4* 16.4* 16.7* 17.2* 17.9*   < > 19.1*   < > 25.2* 21.7*   < > 22.4*   MPV fL 11.5 11.4 11.5 10.9 11.5 10.9 11.0   < > 11.2   < > 11.5 11.2   < > 11.2   PLATELETS 10*3/mm3 298 293 314 297 292 312 351   < > 245   < > 218 235   < > 329   IMM GRAN % % 0.9* 1.2* 0.9* 1.1* 1.9* 1.2*  --    < >  --    < >  --   --    < >  --    NEUTROS ABS 10*3/mm3 9.67* 8.72* 7.47* 7.94* 5.97 7.91* 9.03*   < > 12.47*   < > 10.11* 12.03*   < > 14.29*   LYMPHS ABS 10*3/mm3 2.63 2.65 2.34 3.31* 2.30 1.74  --    < >  --    < >  --   --    < >  --    MONOS ABS 10*3/mm3 1.34* 1.02* 1.21* 1.22* 1.14* 0.82  --    < >  --    < >  --   --    < >  --    EOS ABS 10*3/mm3 0.51* 0.47* 0.40 0.36 0.28 0.23  --    < > 0.36   < > 0.78*  --    < > 0.19   BASOS ABS 10*3/mm3 0.09 0.09 0.11 0.10 0.09 0.08 0.14   < >  --    < >  --   --    < > 0.19   IMMATURE GRANS (ABS) 10*3/mm3 0.13* 0.16* 0.10* 0.15* 0.19* 0.13*  --    < >  --    < >  --   --    < >  --    NRBC /100 WBC 0.0 0.0 0.0 0.0 0.0 0.0  --    < >  --    < > 3.1* 3.0*   < >  --    NEUTROPHIL % %  --   --   --   --   --   --  59.0  --  70.0  --  64.3 70.7  --  74.2   MONOCYTES % %  --   --   --   --   --   --  9.0  --  8.0  --  9.2 9.1  --  8.2   BASOPHIL % %  --   --   --   --   --   --  1.0  --   --   --   --   --   --  1.0   ATYP LYMPH % %  --   --   --   --   --   --  5.0  --   --   --   --   --   --   --    ANISOCYTOSIS   --   --   --   --   --   --  Mod/2+  --  Large/3+  --  Mod/2+ Mod/2+  --  Mod/2+   GIANT PLT   --   --   --   --   --   --  Slight/1+  --   --   --   --   --   --   --     < > = values in this interval not displayed.       Lab  Results - Last 18 Months   Lab Units 10/13/22  1336 09/29/22  1347 09/15/22  1309 09/07/22  1325 09/07/22  1251 08/31/22  1249 08/24/22  1334   GLUCOSE mg/dL 138* 132* 223*  --  84 284* 265*   SODIUM mmol/L 139 137 137  --  138 134* 139   POTASSIUM mmol/L 3.9 3.9 4.1  --  3.6 3.9 3.9   CO2 mmol/L 27.0 26.0 26.0  --  29.0 28.0 26.0   CHLORIDE mmol/L 103 102 101  --  102 99 103   ANION GAP mmol/L 9.0 9.0 10.0  --  7.0 7.0 10.0   CREATININE mg/dL 0.69 0.66 0.83 1.00 0.71 0.82 0.75   BUN mg/dL 14 13 13  --  14 17 13   BUN / CREAT RATIO  20.3 19.7 15.7  --  19.7 20.7 17.3   CALCIUM mg/dL 9.0 8.8 9.1  --  9.4 8.9 9.2   ALK PHOS U/L 69 60 64  --  62 63 62   TOTAL PROTEIN g/dL 6.7 6.3 6.4  --  6.7 6.2 6.1   ALT (SGPT) U/L 8 10 9  --  10 12 16   AST (SGOT) U/L 16 18 16  --  20 21 26   BILIRUBIN mg/dL 1.0 1.3* 1.2  --  1.2 1.3* 1.3*   ALBUMIN g/dL 4.00 4.10 4.10  --  4.20 4.10 4.10   GLOBULIN gm/dL 2.7 2.2 2.3  --  2.5 2.1 2.0       Lab Results - Last 18 Months   Lab Units 10/13/22  1336 09/29/22  1347 09/15/22  1309 09/07/22  1251 08/31/22  1249 08/24/22  1334 05/09/22  0738 04/29/22  0536 04/28/22  0709 04/27/22  0539 04/26/22  0646 04/25/22  0630 04/24/22  0819 04/23/22  0743 04/22/22  1322 04/19/22  0859   URIC ACID mg/dL  --   --   --   --   --   --   --  1.9* 1.6* 1.5* 2.1* 3.7 6.4*   < > 5.8*  --    LDH U/L 230* 246* 251* 290* 279* 299*   < > 570* 650*  638* 668* 634* 593* 652*   < > 772* 834*   REFERENCE LAB REPORT   --   --   --   --   --   --   --   --   --   --   --   --   --   --  See Attached Report SEE ATTACHED REPORT    < > = values in this interval not displayed.       Lab Results - Last 18 Months   Lab Units 10/13/22  1336 08/08/22  0713 07/11/22  0747 04/22/22  1542 04/19/22  0859 04/04/22  1219 04/04/22  1035 03/31/22  1226 08/17/21  0932   IRON mcg/dL 41 82 125  --   --   --  44 129  --    TIBC mcg/dL 283* 301 295*  --   --   --  261* 310  --    IRON SATURATION % 14* 27 42  --   --   --  17* 42  --   "  FERRITIN ng/mL 488.40* 330.10* 327.30*  --   --   --  311.90* 454.60*  --    TSH uIU/mL  --   --   --   --  1.960  --  2.100  --  1.302   FOLATE ng/mL  --   --   --  13.70 12.60 14.20  --   --   --          Giana Yepez reports a pain score of 0.           ASSESSMENT:  1.  Autoimmune hemolytic anemia.  Treatment status: Post IVIG x2 doses on 04/27/2022.  On prednisone 1 mg/kg from 05/09/2022  through 07/10/2022. 50 mg 07/11/20222 through 07/17/2022. 40 mg 07/18/2022 for 1 week, 30 mg p.o. daily from 07/25/2022, 20 mg daily from 08/01/2022 through 08/07/2022, 15 mg from 08/08/2022 through 08/14/2022, 10 mg from 08/15/2022 through 08/21/2022, and 5 mg from 08/22/2022 through present.   Rituximab 375 mgm2 on 04/28/2022.  Hypersensitivity reaction to rituximab.  Rituximab rechallenge on 07/25/2022 through 08/15/2022, 4 cycles.  Oral iron stopped 10?13/2022. \"Been taking iron for a long time.\"  2.  Borderline enlarged mediastinal nodes on 04/23/2022. Followed by pulmonary.  3.  9 mm hypodense well-circumscribed asymmetry at the level of the upper left vallecula and base of the tongue on the left on 04/23/2022. Smaller on CT 09/07/2022.   4.  Performance status of 1.  5.  No response to oral iron.   6.  LE autoantibody.            PLAN:  1.   Re: Interval CBC 09/29/2022.  WBC 13.1, hemoglobin 10.5, hematocrit 30.7, MCV 95.3 and platelet 293.  2.   Re: Reticulocyte count 7.15.  3.   Re: Heme status.  WBC 14.3, hemoglobin 10.7, hematocrit 31.8, MCV 95.2, and platelet 298. Ferritin 488.4 and saturation 14% despite 2 pills of oral iron daily.      4.   Re: CMP.  Total bilirubin 1 from 1.3 from 1.2 from 1.2 from 1.3 from 1.3 from 2 and glucose 223.  5.   Re: Haptoglobin is pending from < 10 from <10 and LDH 230 from 246.   6.   Re: Continue folic acid.   7.  Blood for CBC with differential, CMP, haptoglobin, LDH and reticulocyte count every 2 weeks.  8.  eRx folic acid 1 mg p.o. " daily #90 with 3 refills if needed.  9.   Follow-up at [place] as needed.  10.  Stable for close observation, hemolytic anemia.  11.  Continue care per primary care physician.  12.  Plan of care discussed with patient.  Understanding expressed.  Patient agreeable to proceed.  13.  Transfuse 1 unit packed RBC if hemoglobin less than 6.9.  Premed Tylenol 500 mg p.o.  Lasix 20 mg IV push after transfusion.  Observe for for transfusion reactions.  14.  Advance Care Planning   ACP discussion was declined by the patient. Patient does not have an advance directive, information provided.  15.  Injectafer 750 mg one dose 10/21/2022 at 13:00.  Premed Tylenol 500 mg p.o.. Observe for anaphylaxis or infusion reactions.   16.  Return to office in 4 weeks with ferritin and iron apnel.  Repeat CT neck 12/2022.         I have reviewed the assessment and plan and verified the accuracy of it. No changes to assessment and plan since the information was documented. Filemon Elizalde MD 10/13/22       I spent 33 total minutes, face-to-face, caring for Giana today.  Greater than 50% of this time involved counseling and/or coordination of care as documented within this note regarding the patient's illness(es), pros and cons of various treatment options, instructions and/or risk reduction.              (Artem Mace MD)   Camila Montes MD  (Gerber Tolbert MD [place])  (Dominick Fernandez MD [place])

## 2022-10-13 ENCOUNTER — OFFICE VISIT (OUTPATIENT)
Dept: ONCOLOGY | Facility: CLINIC | Age: 67
End: 2022-10-13

## 2022-10-13 ENCOUNTER — LAB (OUTPATIENT)
Dept: LAB | Facility: HOSPITAL | Age: 67
End: 2022-10-13

## 2022-10-13 VITALS
SYSTOLIC BLOOD PRESSURE: 118 MMHG | OXYGEN SATURATION: 97 % | BODY MASS INDEX: 25.3 KG/M2 | HEART RATE: 75 BPM | RESPIRATION RATE: 18 BRPM | DIASTOLIC BLOOD PRESSURE: 72 MMHG | WEIGHT: 148.2 LBS | TEMPERATURE: 97.1 F | HEIGHT: 64 IN

## 2022-10-13 DIAGNOSIS — R71.8 OTHER ABNORMALITY OF RED BLOOD CELLS: ICD-10-CM

## 2022-10-13 DIAGNOSIS — D50.8 IRON DEFICIENCY ANEMIA SECONDARY TO INADEQUATE DIETARY IRON INTAKE: ICD-10-CM

## 2022-10-13 DIAGNOSIS — D59.10 AUTOIMMUNE HEMOLYTIC ANEMIA: ICD-10-CM

## 2022-10-13 DIAGNOSIS — D59.10 AUTOIMMUNE HEMOLYTIC ANEMIA: Primary | ICD-10-CM

## 2022-10-13 PROBLEM — D50.9 IRON DEFICIENCY ANEMIA: Status: ACTIVE | Noted: 2022-10-13

## 2022-10-13 LAB
ALBUMIN SERPL-MCNC: 4 G/DL (ref 3.5–5.2)
ALBUMIN/GLOB SERPL: 1.5 G/DL
ALP SERPL-CCNC: 69 U/L (ref 39–117)
ALT SERPL W P-5'-P-CCNC: 8 U/L (ref 1–33)
ANION GAP SERPL CALCULATED.3IONS-SCNC: 9 MMOL/L (ref 5–15)
AST SERPL-CCNC: 16 U/L (ref 1–32)
BASOPHILS # BLD AUTO: 0.09 10*3/MM3 (ref 0–0.2)
BASOPHILS NFR BLD AUTO: 0.6 % (ref 0–1.5)
BILIRUB SERPL-MCNC: 1 MG/DL (ref 0–1.2)
BUN SERPL-MCNC: 14 MG/DL (ref 8–23)
BUN/CREAT SERPL: 20.3 (ref 7–25)
CALCIUM SPEC-SCNC: 9 MG/DL (ref 8.6–10.5)
CHLORIDE SERPL-SCNC: 103 MMOL/L (ref 98–107)
CO2 SERPL-SCNC: 27 MMOL/L (ref 22–29)
CREAT SERPL-MCNC: 0.69 MG/DL (ref 0.57–1)
DEPRECATED RDW RBC AUTO: 56.5 FL (ref 37–54)
EGFRCR SERPLBLD CKD-EPI 2021: 95.9 ML/MIN/1.73
EOSINOPHIL # BLD AUTO: 0.51 10*3/MM3 (ref 0–0.4)
EOSINOPHIL NFR BLD AUTO: 3.5 % (ref 0.3–6.2)
ERYTHROCYTE [DISTWIDTH] IN BLOOD BY AUTOMATED COUNT: 16.6 % (ref 12.3–15.4)
FERRITIN SERPL-MCNC: 488.4 NG/ML (ref 13–150)
GLOBULIN UR ELPH-MCNC: 2.7 GM/DL
GLUCOSE SERPL-MCNC: 138 MG/DL (ref 65–99)
HCT VFR BLD AUTO: 31.8 % (ref 34–46.6)
HGB BLD-MCNC: 10.7 G/DL (ref 12–15.9)
IMM GRANULOCYTES # BLD AUTO: 0.13 10*3/MM3 (ref 0–0.05)
IMM GRANULOCYTES NFR BLD AUTO: 0.9 % (ref 0–0.5)
IRON 24H UR-MRATE: 41 MCG/DL (ref 37–145)
IRON SATN MFR SERPL: 14 % (ref 20–50)
LDH SERPL-CCNC: 230 U/L (ref 135–214)
LYMPHOCYTES # BLD AUTO: 2.63 10*3/MM3 (ref 0.7–3.1)
LYMPHOCYTES NFR BLD AUTO: 18.3 % (ref 19.6–45.3)
MCH RBC QN AUTO: 32 PG (ref 26.6–33)
MCHC RBC AUTO-ENTMCNC: 33.6 G/DL (ref 31.5–35.7)
MCV RBC AUTO: 95.2 FL (ref 79–97)
MONOCYTES # BLD AUTO: 1.34 10*3/MM3 (ref 0.1–0.9)
MONOCYTES NFR BLD AUTO: 9.3 % (ref 5–12)
NEUTROPHILS NFR BLD AUTO: 67.4 % (ref 42.7–76)
NEUTROPHILS NFR BLD AUTO: 9.67 10*3/MM3 (ref 1.7–7)
NRBC BLD AUTO-RTO: 0 /100 WBC (ref 0–0.2)
PLATELET # BLD AUTO: 298 10*3/MM3 (ref 140–450)
PMV BLD AUTO: 11.5 FL (ref 6–12)
POTASSIUM SERPL-SCNC: 3.9 MMOL/L (ref 3.5–5.2)
PROT SERPL-MCNC: 6.7 G/DL (ref 6–8.5)
RBC # BLD AUTO: 3.34 10*6/MM3 (ref 3.77–5.28)
RETICS # AUTO: 0.22 10*6/MM3 (ref 0.02–0.13)
RETICS/RBC NFR AUTO: 7.15 % (ref 0.7–1.9)
SODIUM SERPL-SCNC: 139 MMOL/L (ref 136–145)
TIBC SERPL-MCNC: 283 MCG/DL (ref 298–536)
TRANSFERRIN SERPL-MCNC: 190 MG/DL (ref 200–360)
WBC NRBC COR # BLD: 14.37 10*3/MM3 (ref 3.4–10.8)

## 2022-10-13 PROCEDURE — 83540 ASSAY OF IRON: CPT

## 2022-10-13 PROCEDURE — 82728 ASSAY OF FERRITIN: CPT

## 2022-10-13 PROCEDURE — 85025 COMPLETE CBC W/AUTO DIFF WBC: CPT

## 2022-10-13 PROCEDURE — 85045 AUTOMATED RETICULOCYTE COUNT: CPT

## 2022-10-13 PROCEDURE — 84466 ASSAY OF TRANSFERRIN: CPT

## 2022-10-13 PROCEDURE — 83615 LACTATE (LD) (LDH) ENZYME: CPT

## 2022-10-13 PROCEDURE — 99214 OFFICE O/P EST MOD 30 MIN: CPT | Performed by: INTERNAL MEDICINE

## 2022-10-13 PROCEDURE — 83010 ASSAY OF HAPTOGLOBIN QUANT: CPT

## 2022-10-13 PROCEDURE — 36415 COLL VENOUS BLD VENIPUNCTURE: CPT

## 2022-10-13 PROCEDURE — 80053 COMPREHEN METABOLIC PANEL: CPT

## 2022-10-13 RX ORDER — FAMOTIDINE 10 MG/ML
20 INJECTION, SOLUTION INTRAVENOUS AS NEEDED
Status: CANCELLED | OUTPATIENT
Start: 2022-10-21

## 2022-10-13 RX ORDER — DIPHENHYDRAMINE HYDROCHLORIDE 50 MG/ML
50 INJECTION INTRAMUSCULAR; INTRAVENOUS AS NEEDED
Status: CANCELLED | OUTPATIENT
Start: 2022-10-21

## 2022-10-13 RX ORDER — SODIUM CHLORIDE 9 MG/ML
250 INJECTION, SOLUTION INTRAVENOUS ONCE
Status: CANCELLED | OUTPATIENT
Start: 2022-10-21

## 2022-10-13 RX ORDER — ACETAMINOPHEN 325 MG/1
650 TABLET ORAL ONCE
Status: CANCELLED | OUTPATIENT
Start: 2022-10-21

## 2022-10-14 LAB — HAPTOGLOB SERPL-MCNC: <10 MG/DL (ref 30–200)

## 2022-10-21 ENCOUNTER — INFUSION (OUTPATIENT)
Dept: ONCOLOGY | Facility: HOSPITAL | Age: 67
End: 2022-10-21

## 2022-10-21 VITALS
SYSTOLIC BLOOD PRESSURE: 121 MMHG | TEMPERATURE: 97.7 F | DIASTOLIC BLOOD PRESSURE: 66 MMHG | HEART RATE: 65 BPM | OXYGEN SATURATION: 100 % | BODY MASS INDEX: 25.68 KG/M2 | HEIGHT: 64 IN | RESPIRATION RATE: 18 BRPM | WEIGHT: 150.4 LBS

## 2022-10-21 DIAGNOSIS — D50.9 IRON DEFICIENCY ANEMIA, UNSPECIFIED IRON DEFICIENCY ANEMIA TYPE: Primary | ICD-10-CM

## 2022-10-21 PROCEDURE — 25010000002 FERRIC CARBOXYMALTOSE 750 MG/15ML SOLUTION 15 ML VIAL: Performed by: INTERNAL MEDICINE

## 2022-10-21 PROCEDURE — 96374 THER/PROPH/DIAG INJ IV PUSH: CPT

## 2022-10-21 PROCEDURE — 96365 THER/PROPH/DIAG IV INF INIT: CPT

## 2022-10-21 RX ORDER — DIPHENHYDRAMINE HYDROCHLORIDE 50 MG/ML
50 INJECTION INTRAMUSCULAR; INTRAVENOUS AS NEEDED
Status: DISCONTINUED | OUTPATIENT
Start: 2022-10-21 | End: 2022-10-21 | Stop reason: HOSPADM

## 2022-10-21 RX ORDER — SODIUM CHLORIDE 9 MG/ML
250 INJECTION, SOLUTION INTRAVENOUS ONCE
Status: COMPLETED | OUTPATIENT
Start: 2022-10-21 | End: 2022-10-21

## 2022-10-21 RX ORDER — FAMOTIDINE 10 MG/ML
20 INJECTION, SOLUTION INTRAVENOUS AS NEEDED
Status: DISCONTINUED | OUTPATIENT
Start: 2022-10-21 | End: 2022-10-21 | Stop reason: HOSPADM

## 2022-10-21 RX ORDER — ACETAMINOPHEN 325 MG/1
650 TABLET ORAL ONCE
Status: DISCONTINUED | OUTPATIENT
Start: 2022-10-21 | End: 2022-10-21 | Stop reason: HOSPADM

## 2022-10-21 RX ADMIN — SODIUM CHLORIDE 250 ML: 9 INJECTION, SOLUTION INTRAVENOUS at 13:09

## 2022-10-21 RX ADMIN — FERRIC CARBOXYMALTOSE INJECTION 750 MG: 50 INJECTION, SOLUTION INTRAVENOUS at 13:20

## 2022-10-27 ENCOUNTER — LAB (OUTPATIENT)
Dept: LAB | Facility: HOSPITAL | Age: 67
End: 2022-10-27

## 2022-10-27 DIAGNOSIS — D59.10 AUTOIMMUNE HEMOLYTIC ANEMIA: ICD-10-CM

## 2022-10-27 LAB
ALBUMIN SERPL-MCNC: 3.8 G/DL (ref 3.5–5.2)
ALBUMIN/GLOB SERPL: 1.4 G/DL
ALP SERPL-CCNC: 66 U/L (ref 39–117)
ALT SERPL W P-5'-P-CCNC: 12 U/L (ref 1–33)
ANION GAP SERPL CALCULATED.3IONS-SCNC: 7 MMOL/L (ref 5–15)
ANISOCYTOSIS BLD QL: ABNORMAL
AST SERPL-CCNC: 18 U/L (ref 1–32)
BILIRUB SERPL-MCNC: 0.9 MG/DL (ref 0–1.2)
BUN SERPL-MCNC: 12 MG/DL (ref 8–23)
BUN/CREAT SERPL: 20.3 (ref 7–25)
CALCIUM SPEC-SCNC: 8.9 MG/DL (ref 8.6–10.5)
CHLORIDE SERPL-SCNC: 101 MMOL/L (ref 98–107)
CO2 SERPL-SCNC: 27 MMOL/L (ref 22–29)
CREAT SERPL-MCNC: 0.59 MG/DL (ref 0.57–1)
DEPRECATED RDW RBC AUTO: 57.6 FL (ref 37–54)
EGFRCR SERPLBLD CKD-EPI 2021: 99.5 ML/MIN/1.73
EOSINOPHIL # BLD MANUAL: 0.51 10*3/MM3 (ref 0–0.4)
EOSINOPHIL NFR BLD MANUAL: 4 % (ref 0.3–6.2)
ERYTHROCYTE [DISTWIDTH] IN BLOOD BY AUTOMATED COUNT: 16.8 % (ref 12.3–15.4)
GIANT PLATELETS: ABNORMAL
GLOBULIN UR ELPH-MCNC: 2.7 GM/DL
GLUCOSE SERPL-MCNC: 198 MG/DL (ref 65–99)
HCT VFR BLD AUTO: 30.9 % (ref 34–46.6)
HGB BLD-MCNC: 10.3 G/DL (ref 12–15.9)
HYPOCHROMIA BLD QL: ABNORMAL
LDH SERPL-CCNC: 214 U/L (ref 135–214)
LYMPHOCYTES # BLD MANUAL: 1.41 10*3/MM3 (ref 0.7–3.1)
LYMPHOCYTES NFR BLD MANUAL: 6 % (ref 5–12)
MCH RBC QN AUTO: 31.4 PG (ref 26.6–33)
MCHC RBC AUTO-ENTMCNC: 33.3 G/DL (ref 31.5–35.7)
MCV RBC AUTO: 94.2 FL (ref 79–97)
MONOCYTES # BLD: 0.77 10*3/MM3 (ref 0.1–0.9)
NEUTROPHILS # BLD AUTO: 10.12 10*3/MM3 (ref 1.7–7)
NEUTROPHILS NFR BLD MANUAL: 77 % (ref 42.7–76)
NEUTS BAND NFR BLD MANUAL: 2 % (ref 0–5)
PLATELET # BLD AUTO: 314 10*3/MM3 (ref 140–450)
PMV BLD AUTO: 11.4 FL (ref 6–12)
POLYCHROMASIA BLD QL SMEAR: ABNORMAL
POTASSIUM SERPL-SCNC: 3.9 MMOL/L (ref 3.5–5.2)
PROT SERPL-MCNC: 6.5 G/DL (ref 6–8.5)
RBC # BLD AUTO: 3.28 10*6/MM3 (ref 3.77–5.28)
RETICS # AUTO: 0.21 10*6/MM3 (ref 0.02–0.13)
RETICS/RBC NFR AUTO: 6.28 % (ref 0.7–1.9)
SODIUM SERPL-SCNC: 135 MMOL/L (ref 136–145)
SPHEROCYTES BLD QL SMEAR: ABNORMAL
VARIANT LYMPHS NFR BLD MANUAL: 2 % (ref 0–5)
VARIANT LYMPHS NFR BLD MANUAL: 9 % (ref 19.6–45.3)
WBC MORPH BLD: NORMAL
WBC NRBC COR # BLD: 12.81 10*3/MM3 (ref 3.4–10.8)

## 2022-10-27 PROCEDURE — 85045 AUTOMATED RETICULOCYTE COUNT: CPT

## 2022-10-27 PROCEDURE — 36415 COLL VENOUS BLD VENIPUNCTURE: CPT

## 2022-10-27 PROCEDURE — 83615 LACTATE (LD) (LDH) ENZYME: CPT

## 2022-10-27 PROCEDURE — 83010 ASSAY OF HAPTOGLOBIN QUANT: CPT

## 2022-10-27 PROCEDURE — 85007 BL SMEAR W/DIFF WBC COUNT: CPT

## 2022-10-27 PROCEDURE — 80053 COMPREHEN METABOLIC PANEL: CPT

## 2022-10-27 PROCEDURE — 85025 COMPLETE CBC W/AUTO DIFF WBC: CPT

## 2022-10-28 LAB — HAPTOGLOB SERPL-MCNC: <10 MG/DL (ref 30–200)

## 2022-11-10 ENCOUNTER — OFFICE VISIT (OUTPATIENT)
Dept: ONCOLOGY | Facility: CLINIC | Age: 67
End: 2022-11-10

## 2022-11-10 ENCOUNTER — TELEPHONE (OUTPATIENT)
Dept: ONCOLOGY | Facility: CLINIC | Age: 67
End: 2022-11-10

## 2022-11-10 ENCOUNTER — LAB (OUTPATIENT)
Dept: LAB | Facility: HOSPITAL | Age: 67
End: 2022-11-10

## 2022-11-10 VITALS
TEMPERATURE: 97.2 F | DIASTOLIC BLOOD PRESSURE: 78 MMHG | BODY MASS INDEX: 25.37 KG/M2 | RESPIRATION RATE: 18 BRPM | SYSTOLIC BLOOD PRESSURE: 112 MMHG | WEIGHT: 148.6 LBS | OXYGEN SATURATION: 97 % | HEIGHT: 64 IN | HEART RATE: 72 BPM

## 2022-11-10 DIAGNOSIS — D59.10 AUTOIMMUNE HEMOLYTIC ANEMIA: Primary | ICD-10-CM

## 2022-11-10 DIAGNOSIS — D50.8 IRON DEFICIENCY ANEMIA SECONDARY TO INADEQUATE DIETARY IRON INTAKE: ICD-10-CM

## 2022-11-10 DIAGNOSIS — J38.7 MASS OF VALLECULA: ICD-10-CM

## 2022-11-10 LAB
ALBUMIN SERPL-MCNC: 4.2 G/DL (ref 3.5–5.2)
ALBUMIN/GLOB SERPL: 1.9 G/DL
ALP SERPL-CCNC: 60 U/L (ref 39–117)
ALT SERPL W P-5'-P-CCNC: 10 U/L (ref 1–33)
ANION GAP SERPL CALCULATED.3IONS-SCNC: 9 MMOL/L (ref 5–15)
AST SERPL-CCNC: 15 U/L (ref 1–32)
BASOPHILS # BLD AUTO: 0.09 10*3/MM3 (ref 0–0.2)
BASOPHILS NFR BLD AUTO: 0.9 % (ref 0–1.5)
BILIRUB SERPL-MCNC: 0.9 MG/DL (ref 0–1.2)
BUN SERPL-MCNC: 12 MG/DL (ref 8–23)
BUN/CREAT SERPL: 17.1 (ref 7–25)
CALCIUM SPEC-SCNC: 9.1 MG/DL (ref 8.6–10.5)
CHLORIDE SERPL-SCNC: 102 MMOL/L (ref 98–107)
CO2 SERPL-SCNC: 26 MMOL/L (ref 22–29)
CREAT SERPL-MCNC: 0.7 MG/DL (ref 0.57–1)
DEPRECATED RDW RBC AUTO: 53.9 FL (ref 37–54)
EGFRCR SERPLBLD CKD-EPI 2021: 95.5 ML/MIN/1.73
EOSINOPHIL # BLD AUTO: 0.42 10*3/MM3 (ref 0–0.4)
EOSINOPHIL NFR BLD AUTO: 4.1 % (ref 0.3–6.2)
ERYTHROCYTE [DISTWIDTH] IN BLOOD BY AUTOMATED COUNT: 16.3 % (ref 12.3–15.4)
FERRITIN SERPL-MCNC: 867.6 NG/ML (ref 13–150)
GLOBULIN UR ELPH-MCNC: 2.2 GM/DL
GLUCOSE SERPL-MCNC: 215 MG/DL (ref 65–99)
HCT VFR BLD AUTO: 30.6 % (ref 34–46.6)
HGB BLD-MCNC: 10.9 G/DL (ref 12–15.9)
HOLD SPECIMEN: NORMAL
IMM GRANULOCYTES # BLD AUTO: 0.04 10*3/MM3 (ref 0–0.05)
IMM GRANULOCYTES NFR BLD AUTO: 0.4 % (ref 0–0.5)
IRON 24H UR-MRATE: 45 MCG/DL (ref 37–145)
IRON SATN MFR SERPL: 17 % (ref 20–50)
LDH SERPL-CCNC: 194 U/L (ref 135–214)
LYMPHOCYTES # BLD AUTO: 2.27 10*3/MM3 (ref 0.7–3.1)
LYMPHOCYTES NFR BLD AUTO: 21.9 % (ref 19.6–45.3)
MCH RBC QN AUTO: 32.5 PG (ref 26.6–33)
MCHC RBC AUTO-ENTMCNC: 35.6 G/DL (ref 31.5–35.7)
MCV RBC AUTO: 91.3 FL (ref 79–97)
MONOCYTES # BLD AUTO: 0.86 10*3/MM3 (ref 0.1–0.9)
MONOCYTES NFR BLD AUTO: 8.3 % (ref 5–12)
NEUTROPHILS NFR BLD AUTO: 6.67 10*3/MM3 (ref 1.7–7)
NEUTROPHILS NFR BLD AUTO: 64.4 % (ref 42.7–76)
NRBC BLD AUTO-RTO: 0 /100 WBC (ref 0–0.2)
PLATELET # BLD AUTO: 248 10*3/MM3 (ref 140–450)
PMV BLD AUTO: 11.6 FL (ref 6–12)
POTASSIUM SERPL-SCNC: 4 MMOL/L (ref 3.5–5.2)
PROT SERPL-MCNC: 6.4 G/DL (ref 6–8.5)
RBC # BLD AUTO: 3.35 10*6/MM3 (ref 3.77–5.28)
RETICS # AUTO: 0.19 10*6/MM3 (ref 0.02–0.13)
RETICS/RBC NFR AUTO: 5.73 % (ref 0.7–1.9)
SODIUM SERPL-SCNC: 137 MMOL/L (ref 136–145)
TIBC SERPL-MCNC: 258 MCG/DL (ref 298–536)
TRANSFERRIN SERPL-MCNC: 173 MG/DL (ref 200–360)
WBC NRBC COR # BLD: 10.35 10*3/MM3 (ref 3.4–10.8)

## 2022-11-10 PROCEDURE — 83540 ASSAY OF IRON: CPT

## 2022-11-10 PROCEDURE — 80053 COMPREHEN METABOLIC PANEL: CPT

## 2022-11-10 PROCEDURE — 83010 ASSAY OF HAPTOGLOBIN QUANT: CPT

## 2022-11-10 PROCEDURE — 85045 AUTOMATED RETICULOCYTE COUNT: CPT

## 2022-11-10 PROCEDURE — 83615 LACTATE (LD) (LDH) ENZYME: CPT

## 2022-11-10 PROCEDURE — 36415 COLL VENOUS BLD VENIPUNCTURE: CPT

## 2022-11-10 PROCEDURE — 82728 ASSAY OF FERRITIN: CPT

## 2022-11-10 PROCEDURE — 99214 OFFICE O/P EST MOD 30 MIN: CPT | Performed by: INTERNAL MEDICINE

## 2022-11-10 PROCEDURE — 85025 COMPLETE CBC W/AUTO DIFF WBC: CPT

## 2022-11-10 PROCEDURE — 84466 ASSAY OF TRANSFERRIN: CPT

## 2022-11-10 NOTE — TELEPHONE ENCOUNTER
----- Message from Filemon Elizalde MD sent at 11/10/2022  2:35 PM CST -----  Venofer 200 mg IV 1 dose.  Premed Tylenol 500 mg p.o.  Low saturation 17%.

## 2022-11-11 ENCOUNTER — TELEPHONE (OUTPATIENT)
Dept: ONCOLOGY | Facility: CLINIC | Age: 67
End: 2022-11-11

## 2022-11-11 LAB — HAPTOGLOB SERPL-MCNC: <10 MG/DL (ref 30–200)

## 2022-11-11 RX ORDER — SODIUM CHLORIDE 9 MG/ML
250 INJECTION, SOLUTION INTRAVENOUS ONCE
Status: CANCELLED | OUTPATIENT
Start: 2022-11-14

## 2022-11-11 RX ORDER — ACETAMINOPHEN 325 MG/1
650 TABLET ORAL ONCE
Status: CANCELLED | OUTPATIENT
Start: 2022-11-14

## 2022-11-11 RX ORDER — DIPHENHYDRAMINE HYDROCHLORIDE 50 MG/ML
50 INJECTION INTRAMUSCULAR; INTRAVENOUS AS NEEDED
Status: CANCELLED | OUTPATIENT
Start: 2022-11-14

## 2022-11-11 RX ORDER — FAMOTIDINE 10 MG/ML
20 INJECTION, SOLUTION INTRAVENOUS AS NEEDED
Status: CANCELLED | OUTPATIENT
Start: 2022-11-14

## 2022-11-11 NOTE — TELEPHONE ENCOUNTER
Caller: Giana Yepez    Relationship: Self    Best call back number: [phone]    What is the best time to reach you: ANYTIME    Who are you requesting to speak with (clinical staff, provider,  specific staff member): JOSE    What was the call regarding: PT CALLING BACK TO SET UP IRON INFUSION CALL TO MARK    Do you require a callback: YES

## 2022-11-14 ENCOUNTER — INFUSION (OUTPATIENT)
Dept: ONCOLOGY | Facility: HOSPITAL | Age: 67
End: 2022-11-14

## 2022-11-14 VITALS
SYSTOLIC BLOOD PRESSURE: 104 MMHG | TEMPERATURE: 98.5 F | WEIGHT: 153 LBS | HEIGHT: 64 IN | BODY MASS INDEX: 26.12 KG/M2 | HEART RATE: 68 BPM | OXYGEN SATURATION: 98 % | RESPIRATION RATE: 18 BRPM | DIASTOLIC BLOOD PRESSURE: 53 MMHG

## 2022-11-14 DIAGNOSIS — D50.9 IRON DEFICIENCY ANEMIA, UNSPECIFIED IRON DEFICIENCY ANEMIA TYPE: Primary | ICD-10-CM

## 2022-11-14 PROCEDURE — 25010000002 IRON SUCROSE PER 1 MG: Performed by: INTERNAL MEDICINE

## 2022-11-14 PROCEDURE — 96365 THER/PROPH/DIAG IV INF INIT: CPT

## 2022-11-14 RX ORDER — DIPHENHYDRAMINE HYDROCHLORIDE 50 MG/ML
50 INJECTION INTRAMUSCULAR; INTRAVENOUS AS NEEDED
Status: DISCONTINUED | OUTPATIENT
Start: 2022-11-14 | End: 2022-11-14 | Stop reason: HOSPADM

## 2022-11-14 RX ORDER — ACETAMINOPHEN 325 MG/1
650 TABLET ORAL ONCE
Status: COMPLETED | OUTPATIENT
Start: 2022-11-14 | End: 2022-11-14

## 2022-11-14 RX ORDER — SODIUM CHLORIDE 9 MG/ML
250 INJECTION, SOLUTION INTRAVENOUS ONCE
Status: COMPLETED | OUTPATIENT
Start: 2022-11-14 | End: 2022-11-14

## 2022-11-14 RX ORDER — FAMOTIDINE 10 MG/ML
20 INJECTION, SOLUTION INTRAVENOUS AS NEEDED
Status: DISCONTINUED | OUTPATIENT
Start: 2022-11-14 | End: 2022-11-14 | Stop reason: HOSPADM

## 2022-11-14 RX ADMIN — ACETAMINOPHEN 650 MG: 325 TABLET, FILM COATED ORAL at 13:29

## 2022-11-14 RX ADMIN — IRON SUCROSE 200 MG: 20 INJECTION, SOLUTION INTRAVENOUS at 13:29

## 2022-11-14 RX ADMIN — SODIUM CHLORIDE 250 ML: 9 INJECTION, SOLUTION INTRAVENOUS at 13:29

## 2022-11-22 DIAGNOSIS — M79.89 LEG SWELLING: ICD-10-CM

## 2022-11-23 ENCOUNTER — LAB (OUTPATIENT)
Dept: LAB | Facility: HOSPITAL | Age: 67
End: 2022-11-23

## 2022-11-23 DIAGNOSIS — D59.10 AUTOIMMUNE HEMOLYTIC ANEMIA: ICD-10-CM

## 2022-11-23 LAB
ALBUMIN SERPL-MCNC: 4.2 G/DL (ref 3.5–5.2)
ALBUMIN/GLOB SERPL: 1.9 G/DL
ALP SERPL-CCNC: 67 U/L (ref 39–117)
ALT SERPL W P-5'-P-CCNC: 12 U/L (ref 1–33)
ANION GAP SERPL CALCULATED.3IONS-SCNC: 9 MMOL/L (ref 5–15)
ANISOCYTOSIS BLD QL: ABNORMAL
AST SERPL-CCNC: 18 U/L (ref 1–32)
BASOPHILS # BLD MANUAL: 0.11 10*3/MM3 (ref 0–0.2)
BASOPHILS NFR BLD MANUAL: 1 % (ref 0–1.5)
BILIRUB SERPL-MCNC: 0.8 MG/DL (ref 0–1.2)
BUN SERPL-MCNC: 16 MG/DL (ref 8–23)
BUN/CREAT SERPL: 19.5 (ref 7–25)
CALCIUM SPEC-SCNC: 9 MG/DL (ref 8.6–10.5)
CHLORIDE SERPL-SCNC: 103 MMOL/L (ref 98–107)
CO2 SERPL-SCNC: 26 MMOL/L (ref 22–29)
CREAT SERPL-MCNC: 0.82 MG/DL (ref 0.57–1)
DEPRECATED RDW RBC AUTO: 55.8 FL (ref 37–54)
EGFRCR SERPLBLD CKD-EPI 2021: 79 ML/MIN/1.73
EOSINOPHIL # BLD MANUAL: 0.45 10*3/MM3 (ref 0–0.4)
EOSINOPHIL NFR BLD MANUAL: 4 % (ref 0.3–6.2)
ERYTHROCYTE [DISTWIDTH] IN BLOOD BY AUTOMATED COUNT: 16.4 % (ref 12.3–15.4)
GLOBULIN UR ELPH-MCNC: 2.2 GM/DL
GLUCOSE SERPL-MCNC: 135 MG/DL (ref 65–99)
HCT VFR BLD AUTO: 30.8 % (ref 34–46.6)
HGB BLD-MCNC: 10.4 G/DL (ref 12–15.9)
LDH SERPL-CCNC: 198 U/L (ref 135–214)
LYMPHOCYTES # BLD MANUAL: 2.95 10*3/MM3 (ref 0.7–3.1)
LYMPHOCYTES NFR BLD MANUAL: 3 % (ref 5–12)
MCH RBC QN AUTO: 31.9 PG (ref 26.6–33)
MCHC RBC AUTO-ENTMCNC: 33.8 G/DL (ref 31.5–35.7)
MCV RBC AUTO: 94.5 FL (ref 79–97)
MICROCYTES BLD QL: ABNORMAL
MONOCYTES # BLD: 0.34 10*3/MM3 (ref 0.1–0.9)
NEUTROPHILS # BLD AUTO: 7.49 10*3/MM3 (ref 1.7–7)
NEUTROPHILS NFR BLD MANUAL: 62 % (ref 42.7–76)
NEUTS BAND NFR BLD MANUAL: 4 % (ref 0–5)
PLAT MORPH BLD: NORMAL
PLATELET # BLD AUTO: 275 10*3/MM3 (ref 140–450)
PMV BLD AUTO: 11.6 FL (ref 6–12)
POTASSIUM SERPL-SCNC: 4.2 MMOL/L (ref 3.5–5.2)
PROT SERPL-MCNC: 6.4 G/DL (ref 6–8.5)
RBC # BLD AUTO: 3.26 10*6/MM3 (ref 3.77–5.28)
RETICS # AUTO: 0.14 10*6/MM3 (ref 0.02–0.13)
RETICS/RBC NFR AUTO: 4.71 % (ref 0.7–1.9)
SODIUM SERPL-SCNC: 138 MMOL/L (ref 136–145)
VARIANT LYMPHS NFR BLD MANUAL: 26 % (ref 19.6–45.3)
WBC MORPH BLD: NORMAL
WBC NRBC COR # BLD: 11.35 10*3/MM3 (ref 3.4–10.8)

## 2022-11-23 PROCEDURE — 85045 AUTOMATED RETICULOCYTE COUNT: CPT

## 2022-11-23 PROCEDURE — 83615 LACTATE (LD) (LDH) ENZYME: CPT

## 2022-11-23 PROCEDURE — 85025 COMPLETE CBC W/AUTO DIFF WBC: CPT

## 2022-11-23 PROCEDURE — 36415 COLL VENOUS BLD VENIPUNCTURE: CPT

## 2022-11-23 PROCEDURE — 83010 ASSAY OF HAPTOGLOBIN QUANT: CPT

## 2022-11-23 PROCEDURE — 80053 COMPREHEN METABOLIC PANEL: CPT

## 2022-11-23 PROCEDURE — 85007 BL SMEAR W/DIFF WBC COUNT: CPT

## 2022-11-23 RX ORDER — ALLOPURINOL 100 MG/1
TABLET ORAL
Qty: 30 TABLET | Refills: 1 | Status: SHIPPED | OUTPATIENT
Start: 2022-11-23 | End: 2023-02-13

## 2022-11-24 LAB — HAPTOGLOB SERPL-MCNC: <10 MG/DL (ref 30–200)

## 2022-12-06 ENCOUNTER — HOSPITAL ENCOUNTER (OUTPATIENT)
Dept: CT IMAGING | Facility: HOSPITAL | Age: 67
Discharge: HOME | End: 2022-12-06
Admitting: INTERNAL MEDICINE

## 2022-12-06 DIAGNOSIS — J38.7 MASS OF VALLECULA: ICD-10-CM

## 2022-12-06 PROCEDURE — 25010000002 IOPAMIDOL 61 % SOLUTION: Performed by: INTERNAL MEDICINE

## 2022-12-06 PROCEDURE — 70491 CT SOFT TISSUE NECK W/DYE: CPT

## 2022-12-06 RX ADMIN — IOPAMIDOL 100 ML: 612 INJECTION, SOLUTION INTRAVENOUS at 12:20

## 2022-12-08 ENCOUNTER — TELEPHONE (OUTPATIENT)
Dept: ONCOLOGY | Facility: CLINIC | Age: 67
End: 2022-12-08

## 2022-12-08 ENCOUNTER — OFFICE VISIT (OUTPATIENT)
Dept: ONCOLOGY | Facility: CLINIC | Age: 67
End: 2022-12-08

## 2022-12-08 ENCOUNTER — LAB (OUTPATIENT)
Dept: LAB | Facility: HOSPITAL | Age: 67
End: 2022-12-08

## 2022-12-08 VITALS
RESPIRATION RATE: 18 BRPM | HEIGHT: 64 IN | HEART RATE: 64 BPM | BODY MASS INDEX: 25.61 KG/M2 | OXYGEN SATURATION: 98 % | DIASTOLIC BLOOD PRESSURE: 64 MMHG | TEMPERATURE: 97.7 F | WEIGHT: 150 LBS | SYSTOLIC BLOOD PRESSURE: 136 MMHG

## 2022-12-08 DIAGNOSIS — D59.10 AUTOIMMUNE HEMOLYTIC ANEMIA: Primary | ICD-10-CM

## 2022-12-08 DIAGNOSIS — D59.10 AUTOIMMUNE HEMOLYTIC ANEMIA: ICD-10-CM

## 2022-12-08 DIAGNOSIS — D50.8 IRON DEFICIENCY ANEMIA SECONDARY TO INADEQUATE DIETARY IRON INTAKE: ICD-10-CM

## 2022-12-08 DIAGNOSIS — D50.8 IRON DEFICIENCY ANEMIA SECONDARY TO INADEQUATE DIETARY IRON INTAKE: Primary | ICD-10-CM

## 2022-12-08 LAB
ALBUMIN SERPL-MCNC: 4.1 G/DL (ref 3.5–5.2)
ALBUMIN/GLOB SERPL: 1.6 G/DL
ALP SERPL-CCNC: 61 U/L (ref 39–117)
ALT SERPL W P-5'-P-CCNC: 12 U/L (ref 1–33)
ANION GAP SERPL CALCULATED.3IONS-SCNC: 8 MMOL/L (ref 5–15)
ANISOCYTOSIS BLD QL: NORMAL
AST SERPL-CCNC: 17 U/L (ref 1–32)
BASOPHILS # BLD MANUAL: 0.1 10*3/MM3 (ref 0–0.2)
BASOPHILS NFR BLD MANUAL: 1 % (ref 0–1.5)
BILIRUB SERPL-MCNC: 0.8 MG/DL (ref 0–1.2)
BUN SERPL-MCNC: 13 MG/DL (ref 8–23)
BUN/CREAT SERPL: 21 (ref 7–25)
CALCIUM SPEC-SCNC: 8.8 MG/DL (ref 8.6–10.5)
CHLORIDE SERPL-SCNC: 103 MMOL/L (ref 98–107)
CO2 SERPL-SCNC: 28 MMOL/L (ref 22–29)
CREAT SERPL-MCNC: 0.62 MG/DL (ref 0.57–1)
DEPRECATED RDW RBC AUTO: 55.1 FL (ref 37–54)
EGFRCR SERPLBLD CKD-EPI 2021: 98.4 ML/MIN/1.73
EOSINOPHIL # BLD MANUAL: 0.29 10*3/MM3 (ref 0–0.4)
EOSINOPHIL NFR BLD MANUAL: 3 % (ref 0.3–6.2)
ERYTHROCYTE [DISTWIDTH] IN BLOOD BY AUTOMATED COUNT: 16 % (ref 12.3–15.4)
FERRITIN SERPL-MCNC: 1056 NG/ML (ref 13–150)
GIANT PLATELETS: NORMAL
GLOBULIN UR ELPH-MCNC: 2.5 GM/DL
GLUCOSE SERPL-MCNC: 176 MG/DL (ref 65–99)
HCT VFR BLD AUTO: 32.7 % (ref 34–46.6)
HGB BLD-MCNC: 10.9 G/DL (ref 12–15.9)
HOLD SPECIMEN: NORMAL
IRON 24H UR-MRATE: 77 MCG/DL (ref 37–145)
IRON SATN MFR SERPL: 28 % (ref 20–50)
LDH SERPL-CCNC: 214 U/L (ref 135–214)
LYMPHOCYTES # BLD MANUAL: 2.61 10*3/MM3 (ref 0.7–3.1)
LYMPHOCYTES NFR BLD MANUAL: 5.1 % (ref 5–12)
MCH RBC QN AUTO: 31.7 PG (ref 26.6–33)
MCHC RBC AUTO-ENTMCNC: 33.3 G/DL (ref 31.5–35.7)
MCV RBC AUTO: 95.1 FL (ref 79–97)
MONOCYTES # BLD: 0.49 10*3/MM3 (ref 0.1–0.9)
NEUTROPHILS # BLD AUTO: 6.08 10*3/MM3 (ref 1.7–7)
NEUTROPHILS NFR BLD MANUAL: 63.6 % (ref 42.7–76)
PLATELET # BLD AUTO: 268 10*3/MM3 (ref 140–450)
PMV BLD AUTO: 11.5 FL (ref 6–12)
POIKILOCYTOSIS BLD QL SMEAR: NORMAL
POTASSIUM SERPL-SCNC: 3.8 MMOL/L (ref 3.5–5.2)
PROT SERPL-MCNC: 6.6 G/DL (ref 6–8.5)
RBC # BLD AUTO: 3.44 10*6/MM3 (ref 3.77–5.28)
RETICS # AUTO: 0.18 10*6/MM3 (ref 0.02–0.13)
RETICS/RBC NFR AUTO: 5.28 % (ref 0.7–1.9)
SODIUM SERPL-SCNC: 139 MMOL/L (ref 136–145)
STOMATOCYTES BLD QL SMEAR: NORMAL
TIBC SERPL-MCNC: 276 MCG/DL (ref 298–536)
TRANSFERRIN SERPL-MCNC: 185 MG/DL (ref 200–360)
VARIANT LYMPHS NFR BLD MANUAL: 27.3 % (ref 19.6–45.3)
WBC MORPH BLD: NORMAL
WBC NRBC COR # BLD: 9.56 10*3/MM3 (ref 3.4–10.8)

## 2022-12-08 PROCEDURE — 85045 AUTOMATED RETICULOCYTE COUNT: CPT

## 2022-12-08 PROCEDURE — 36415 COLL VENOUS BLD VENIPUNCTURE: CPT

## 2022-12-08 PROCEDURE — 83615 LACTATE (LD) (LDH) ENZYME: CPT

## 2022-12-08 PROCEDURE — 83010 ASSAY OF HAPTOGLOBIN QUANT: CPT

## 2022-12-08 PROCEDURE — 84466 ASSAY OF TRANSFERRIN: CPT

## 2022-12-08 PROCEDURE — 83540 ASSAY OF IRON: CPT

## 2022-12-08 PROCEDURE — 85025 COMPLETE CBC W/AUTO DIFF WBC: CPT

## 2022-12-08 PROCEDURE — 82728 ASSAY OF FERRITIN: CPT

## 2022-12-08 PROCEDURE — 99214 OFFICE O/P EST MOD 30 MIN: CPT | Performed by: INTERNAL MEDICINE

## 2022-12-08 PROCEDURE — 80053 COMPREHEN METABOLIC PANEL: CPT

## 2022-12-08 PROCEDURE — 85007 BL SMEAR W/DIFF WBC COUNT: CPT

## 2022-12-08 NOTE — TELEPHONE ENCOUNTER
Left message with appt date for Jhonatan with Dr Tolbert:  Wed Jan 11, 2023 @1:30 pm.  Office note and recent labs will be faxed:  [phone]

## 2022-12-09 LAB — HAPTOGLOB SERPL-MCNC: <10 MG/DL (ref 30–200)

## 2022-12-13 ENCOUNTER — TELEPHONE (OUTPATIENT)
Dept: OTOLARYNGOLOGY | Facility: CLINIC | Age: 67
End: 2022-12-13

## 2022-12-13 NOTE — TELEPHONE ENCOUNTER
Called to notify the pt of her appt with Otolaryngology (Diomedes Moses MD) 12/15/2022 at 2:45 PM. The pt answered and was given the information.

## 2022-12-14 NOTE — PROGRESS NOTES
YOB: 1955  Location: [facility]  Location Address: [address] [place] [address], [facility], [address] [place] [address]  Location Phone: [phone]    Chief Complaint   Patient presents with   • Adenopathy   • Dizziness       History of Present Illness  Giana Yepez is a 67 y.o. female.  Giana Yepez is here for evaluation of ENT complaints. The patient has had problems with hoarseness and cough. Patient also has allergies, nasal congestion, nasal drainage, postnasal drip, sore throat and fluid of ears.  The symptoms are not localized to a particular location. The patient has had moderate symptoms. The symptoms have been present for the last several months There have been no identified factors that aggravate the symptoms. There have been no factors that have improved the symptoms. 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 Patient has mediastinal lymphadenopathy. She has had recent CT indicating asymmetrical prominence of the left vallecula containing a central 7 mm cystic focus.     She denies sore throat or pain or difficulty swallowing.    Study Result    Narrative & Impression   CT SOFT TISSUE NECK W CONTRAST- 2022 12:01 PM CST     HISTORY: asymmetry base of tongue; J38.7-Other diseases of larynx; mass  of vallecula     COMPARISON: 2022     DOSE LENGTH PRODUCT: 163 mGy cm. Automated exposure control was also  utilized to decrease patient radiation dose.     TECHNIQUE: Axial images the neck are obtained following IV contrast.  Sagittal coronal images reformatted.     FINDINGS:  There is an asymmetrical prominence of the left vallecula  with a similar central 7 mm fluid attenuated focus which abuts the left  base of tongue. Given the persistence of the finding seen, direct  visualization and biopsy might be considered if not already performed.  There is a stable cluster of lymph nodes of the left supraclavicular  region, measuring 8 mm or less in short axis. No  increasing  lymphadenopathy identified within the neck.      The parotid and submitted in the salivary glands are unremarkable.  Parapharyngeal spaces are preserved. No tonsillar or peritonsillar fluid  collection or mass identified. The vocal cords are unremarkable. No  thyroid nodule.     Calcified granuloma right upper lobe. No suspicious apical pulmonary  nodules. The extracranial carotid and vertebral arteries are patent. No  abnormal enhancement seen within the base of the brain. The ventricles  remain slitlike.     IMPRESSION:  1. Stable asymmetrical prominence of the left vallecula containing a  central 7 mm cystic focus. Given the persistence of the finding, direct  visualization and biopsy should be considered if not already performed.  No significant interval change since the previous 09/07/2022 exam.  2. Similar nonspecific cluster of left supraclavicular lymph nodes  measuring less than 1 cm in short axis. No increasing lymphadenopathy  identified within the neck.  3. Slitlike ventricles again noted within the visible brain as seen on  the prior study. No abnormal enhancement seen within the base of the  brain.  This report was finalized on 12/06/2022 18:12 by Dr. Jeimy Childers MD.                Past Medical History:   Diagnosis Date   • Allergic to grasses 4/20/89   • Arthritis    • Asthma    • Cancer (HCC)     skin basal cell   • Dental disease 02/1/22    My teeth started getting loose and one broke off   • Diabetes mellitus (HCC)    • Dizziness 02/01/22    Cant bend over or look up long or turn fast   • GERD (gastroesophageal reflux disease)    • Hypercholesterolemia 12/22/2016   • Intermittent chest pain 12/22/2016   • Osteopenia 2015    osteopenia   • PONV (postoperative nausea and vomiting)    • Scoliosis 2018   • Spinal headache    • Tinnitus    • Vitamin B12 deficiency 12/22/2016   • Vitamin D deficiency 12/22/2016   • Warm autoimmune hemolytic anemia (HCC)        Past Surgical History:    Procedure Laterality Date   • ADENOIDECTOMY     • ANKLE SURGERY Right    • APPENDECTOMY     • BREAST BIOPSY     •  SECTION      X 2   • CHOLECYSTECTOMY     • COLONOSCOPY      [facility]   • COLONOSCOPY N/A 2017    Procedure: COLONOSCOPY WITH ANESTHESIA;  Surgeon: Ondina Abdul MD;  Location: [facility] ENDOSCOPY;  Service:    • ENDOSCOPY N/A 2017    Procedure: ESOPHAGOGASTRODUODENOSCOPY WITH ANESTHESIA;  Surgeon: Ondina Abdul MD;  Location: [facility] ENDOSCOPY;  Service:    • EYE SURGERY  2019    cataract   • SKIN CANCER EXCISION      FACE   • TONSILLECTOMY         Outpatient Medications Marked as Taking for the 12/15/22 encounter (Office Visit) with Diomedes Moses MD   Medication Sig Dispense Refill   • allopurinol (ZYLOPRIM) 100 MG tablet TAKE ONE TABLET BY MOUTH DAILY 30 tablet 1   • Calcium Citrate (CITRACAL PO) Take  by mouth.     • cholecalciferol (VITAMIN D3) 25 MCG (1000 UT) tablet Take 2,000 Units by mouth Daily.     • Continuous Blood Gluc  (Dexcom G5  Kit) device Continuous.     • fluticasone (FLONASE) 50 MCG/ACT nasal spray 2 sprays into the nostril(s) as directed by provider Daily. To control swelling and drainage from allergies 16 g 11   • folic acid (FOLVITE) 1 MG tablet Take 1 tablet by mouth Daily. 30 tablet 2   • furosemide (Lasix) 20 MG tablet Take 1 tablet by mouth Daily. For relief of leg swelling 30 tablet 0   • Insulin Infusion Pump device Continuous.     • Insulin Lispro (humaLOG) 100 UNIT/ML injection Inject 25-40 Units under the skin into the appropriate area as directed Daily.     • Loratadine 10 MG capsule Take 10 mg by mouth every night at bedtime. As anti-histamine for allergies     • omeprazole (priLOSEC) 40 MG capsule Take 40 mg by mouth Daily.     • ondansetron (ZOFRAN) 8 MG tablet Take 1 tablet by mouth Every 8 (Eight) Hours As Needed for Nausea or Vomiting. 60 tablet 3   • rosuvastatin (CRESTOR) 20 MG tablet  Take 20 mg by mouth Daily.     • vitamin B-12 (CYANOCOBALAMIN) 1000 MCG tablet Take 1,000 mcg by mouth Daily.         Corn-containing products, Eggs or egg-derived products, Nuts, Milk-related compounds, Gabapentin, Levemir [insulin detemir], and Wheat    Family History   Problem Relation Age of Onset   • Asthma Mother    • Heart disease Mother    • COPD Mother    • Heart disease Father    • Early death Father         heart attack @ 60   • Heart disease Sister    • Hypertension Sister    • COPD Sister    • Diabetes Sister         Type 2   • Diabetes Sister    • Other Sister    • Hearing loss Sister         born deaf   • Heart disease Brother    • Breast cancer Paternal Aunt    • No Known Problems Son    • No Known Problems Son    • Hypertension Maternal Grandfather    • Stroke Maternal Grandfather    • Diabetes Maternal Grandmother         Type 2   • Vision loss Maternal Grandmother         from diabetes   • Heart disease Paternal Grandfather    • Hypertension Paternal Grandfather    • Cancer Paternal Aunt    • Hearing loss Brother         wears hearing aids   • Heart disease Brother    • Hypertension Brother    • Heart disease Sister    • Hypertension Sister        Social History     Socioeconomic History   • Marital status:    Tobacco Use   • Smoking status: Every Day     Packs/day: 0.25     Years: 40.00     Pack years: 10.00     Types: Cigarettes     Start date: 2/18/1975   • Smokeless tobacco: Never   • Tobacco comments:     quit during two pregnancies but went back to smoking after delivery   Vaping Use   • Vaping Use: Never used   Substance and Sexual Activity   • Alcohol use: Not Currently     Alcohol/week: 12.0 standard drinks     Types: 12 Cans of beer per week     Comment: drink at early 20's   • Drug use: No   • Sexual activity: Not Currently     Partners: Male     Birth control/protection: None     Comment: went through menopause over 10 years go       Review of Systems   Constitutional: Negative.     HENT: Positive for congestion, postnasal drip, rhinorrhea and voice change.         Ear pressure   Eyes: Negative.    Respiratory: Positive for cough.    Gastrointestinal: Negative.    Endocrine: Negative.    Genitourinary: Negative.    Musculoskeletal: Negative.    Skin: Negative.    Allergic/Immunologic: Positive for environmental allergies.   Neurological: Negative.    Hematological: Negative.    Psychiatric/Behavioral: Negative.        Vitals:    12/15/22 1509   BP: 110/60   Pulse: 114   Resp: 16   Temp: 96.8 °F (36 °C)       Body mass index is 25.58 kg/m².    Objective     Physical Exam  CONSTITUTIONAL: well nourished, well-developed, alert, oriented, in no acute distress     COMMUNICATION AND VOICE: able to communicate normally, normal voice quality    HEAD: normocephalic, no lesions, atraumatic, no tenderness, no masses     FACE: appearance normal, no lesions, no tenderness, no deformities, facial motion symmetric    EYES: ocular motility normal, eyelids normal, orbits normal, no proptosis, conjunctiva normal , pupils equal, round     EARS:  Hearing: hearing to conversational voice intact bilaterally   External Ears: normal bilaterally, no lesions    NOSE:  External Nose: external nasal structure normal, no tenderness on palpation, no nasal discharge, no lesions, no evidence of trauma, nostrils patent     ORAL:  Lips: upper and lower lips without lesion   OC/OP: Clear without mass or lesion including visualization as well as bimanual palpation at the base of the tongue    LARYNX:  See endoscopy    NECK:  Inspection and Palpation: neck appearance normal, no masses or tenderness    CHEST/RESPIRATORY: normal respiratory effort     CARDIOVASCULAR: no cyanosis or edema     NEUROLOGICAL/PSYCHIATRIC: oriented to time, place and person, mood normal, affect appropriate, CN II-XII intact grossly    Assessment & Plan   Diagnoses and all orders for this visit:    1. Gastroesophageal reflux disease without esophagitis  (Primary)      * Surgery not found *  No orders of the defined types were placed in this encounter.    Return in about 3 months (around 3/15/2023).       Patient Instructions   General reflux precautions were elucidated recommended  No significant change in CT noted with previous visualization  No evidence of mass or lesion by visualization palpation today    Would not recommend biopsy at this time  Follow-up in 3 to 4 months    Continue PPI

## 2022-12-15 ENCOUNTER — OFFICE VISIT (OUTPATIENT)
Dept: OTOLARYNGOLOGY | Facility: CLINIC | Age: 67
End: 2022-12-15

## 2022-12-15 VITALS
HEIGHT: 64 IN | RESPIRATION RATE: 16 BRPM | BODY MASS INDEX: 25.44 KG/M2 | TEMPERATURE: 96.8 F | WEIGHT: 149 LBS | DIASTOLIC BLOOD PRESSURE: 60 MMHG | SYSTOLIC BLOOD PRESSURE: 110 MMHG | HEART RATE: 114 BPM

## 2022-12-15 DIAGNOSIS — K21.9 GASTROESOPHAGEAL REFLUX DISEASE WITHOUT ESOPHAGITIS: Primary | ICD-10-CM

## 2022-12-15 PROCEDURE — 31575 DIAGNOSTIC LARYNGOSCOPY: CPT | Performed by: OTOLARYNGOLOGY

## 2022-12-15 NOTE — PATIENT INSTRUCTIONS
General reflux precautions were elucidated recommended  No significant change in CT noted with previous visualization  No evidence of mass or lesion by visualization palpation today    Would not recommend biopsy at this time  Follow-up in 3 to 4 months    Continue PPI

## 2022-12-15 NOTE — PROGRESS NOTES
OPERATIVE NOTE:  Giana Yepez    DATE OF PROCEDURE: 12/15/2022    PROCEDURE:   Flexible Fiberoptic Laryngoscopy    ANESTHESIA:  None    REASON FOR PROCEDURE:  Procedure was recommended for suspicious clinical behavior  Risks, benefits and alternatives were discussed.      DETAILS of OPERATION:  The patient was seated in the exam chair.  A flexible fiberoptic laryngoscopy was performed through the oral cavity.  The scope was introduced into the oral cavity and directed to the level of the glottis, examining the structures of the oropharynx, base of tongue, vallecula, supraglottic larynx, glottic larynx, and hypopharynx.      FINDINGS:  Mucosal surfaces:   The mucosal surfaces demonstrated normal mucosa surfaces with mild inflammation    Base of tongue:  The base of tongue was found to have mild asymmetric lymphoid hyperplasia at the base of the tongue which partially fills the vallecula left greater than right.  But no mass or lesion is noted.    Epiglottis:  The epiglottis was found to have no mass or lesion.    Aryepiglottic fold:  The AE folds were found to have no mass or lesion.    False Vocal Fold:  The false cords were found to have no mass or lesion.    True Vocal Cord:  The true vocal cords were found to have no mass or lesion. Both true vocal cords adduct and abduct normally    Arytenoid:   The arytenoids were found to have no mass or lesion.    Hypopharynx:  The hypopharynx was found to have no mass or lesion.    The patient tolerated procedure well.

## 2022-12-21 DIAGNOSIS — E53.8 LOW FOLATE: ICD-10-CM

## 2022-12-21 NOTE — PROGRESS NOTES
MGW ONC [facility] HEMATOLOGY & ONCOLOGY [place] [id number] [facility] [address]  [facility] [address] [phone]    Patient Name: Giana Yepez  Encounter Date: 01/05/2023  YOB: 1955  Patient Number: [id number]      REASON FOR FOLLOW-UP: Giana Yepez is a pleasant 67 y.o.  female who is seen on follow-up for autoimmune hemolytic anemia.  She is seen 4 months post four weekly doses of rituximab rechallenge. She had a dose of rituximab on 04/28/2022 and was intolerant.  She was treated with prednisone 1 mg /kg and decrease to 50 mg daily for 1 week from 07/11/2022 and 40 mg po daily from 07/18/2022 for 1 week and 30 mg p.o. daily from 07/25/2022, 20 mg daily from 08/01/2022 through 08/07/2022, 15 mg from 08/08/2022 through 08/14/2022, 10 mg from 08/15/2022 through 08/21/2022, and 5 mg from 08/22/2022 through 08/28/2022.  She is seen alone.  History is obtained from the patient.   History is considered to be relaible.           Problem List Items Addressed This Visit    None    Oncology/Hematology History    No history exists.       PAST MEDICAL HISTORY:  ALLERGIES:  Allergies   Allergen Reactions   • Corn-Containing Products Diarrhea   • Eggs Or Egg-Derived Products Diarrhea   • Nuts Shortness Of Breath     peanuts   • Milk-Related Compounds Nausea And Vomiting   • Gabapentin Dizziness     Patient states becomes weak and dizzy if she takes Gabapentin   • Levemir [Insulin Detemir] Itching and Other (See Comments)     Patient states gets a \"hard knot and itching\" at injection site.   • Wheat Rash     CURRENT MEDICATIONS:  Outpatient Encounter Medications as of 1/5/2023   Medication Sig Dispense Refill   • allopurinol (ZYLOPRIM) 100 MG tablet TAKE ONE TABLET BY MOUTH DAILY 30 tablet 1   • Calcium Citrate (CITRACAL PO) Take  by mouth.     • cholecalciferol (VITAMIN D3) 25 MCG (1000 UT) tablet Take 2,000 Units by mouth Daily.     • Continuous Blood  Gluc  (Dexcom G5  Kit) device Continuous.     • diphenhydrAMINE (BENADRYL) 25 mg capsule Take 25 mg by mouth Every 6 (Six) Hours As Needed for Itching.     • fluticasone (FLONASE) 50 MCG/ACT nasal spray 2 sprays into the nostril(s) as directed by provider Daily. To control swelling and drainage from allergies 16 g 11   • folic acid (FOLVITE) 1 MG tablet TAKE ONE TABLET BY MOUTH DAILY 30 tablet 2   • furosemide (Lasix) 20 MG tablet Take 1 tablet by mouth Daily. For relief of leg swelling 30 tablet 0   • Insulin Infusion Pump device Continuous.     • Insulin Lispro (humaLOG) 100 UNIT/ML injection Inject 25-40 Units under the skin into the appropriate area as directed Daily.     • Loratadine 10 MG capsule Take 10 mg by mouth every night at bedtime. As anti-histamine for allergies     • omeprazole (priLOSEC) 40 MG capsule Take 40 mg by mouth Daily.     • ondansetron (ZOFRAN) 8 MG tablet Take 1 tablet by mouth Every 8 (Eight) Hours As Needed for Nausea or Vomiting. 60 tablet 3   • rosuvastatin (CRESTOR) 20 MG tablet Take 20 mg by mouth Daily.     • vitamin B-12 (CYANOCOBALAMIN) 1000 MCG tablet Take 1,000 mcg by mouth Daily.     • [DISCONTINUED] folic acid (FOLVITE) 1 MG tablet Take 1 tablet by mouth Daily. 30 tablet 2     No facility-administered encounter medications on file as of 1/5/2023.     ADULT ILLNESSES:  Patient Active Problem List   Diagnosis Code   • Gastroesophageal reflux disease K21.9   • Type 1 diabetes mellitus with hyperglycemia (HCC), insulin depdendent E10.65   • Intermittent chest pain R07.9   • Hypercholesterolemia E78.00   • Vitamin D deficiency E55.9   • Vitamin B12 deficiency E53.8   • Diarrhea R19.7   • Abdominal cramping R10.9   • Nausea R11.0   • Collagenous colitis K52.831   • Autoimmune hemolytic anemia (HCC) D59.10   • Insulin pump in place Z96.41   • Hyperbilirubinemia E80.6   • Mediastinal lymphadenopathy R59.0   • Tobacco abuse Z72.0   • Hyponatremia E87.1   • Orthostatic  hypotension I95.1   • Iron deficiency anemia D50.9     SURGERIES:  Past Surgical History:   Procedure Laterality Date   • ADENOIDECTOMY  1974   • ANKLE SURGERY Right    • APPENDECTOMY     • BREAST BIOPSY     •  SECTION      X 2   • CHOLECYSTECTOMY     • COLONOSCOPY      [facility]   • COLONOSCOPY N/A 2017    Procedure: COLONOSCOPY WITH ANESTHESIA;  Surgeon: Ondina Abdul MD;  Location: [facility] ENDOSCOPY;  Service:    • ENDOSCOPY N/A 2017    Procedure: ESOPHAGOGASTRODUODENOSCOPY WITH ANESTHESIA;  Surgeon: Ondina Abdul MD;  Location: [facility] ENDOSCOPY;  Service:    • EYE SURGERY  2019    cataract   • SKIN CANCER EXCISION  2016    FACE   • TONSILLECTOMY       HEALTH MAINTENANCE ITEMS:  Health Maintenance Due   Topic Date Due   • Pneumococcal Vaccine 65+ (1 - PCV) Never done   • TDAP/TD VACCINES (1 - Tdap) Never done   • ZOSTER VACCINE (1 of 2) Never done   • DIABETIC FOOT EXAM  Never done   • PAP SMEAR  Never done   • COVID-19 Vaccine (3 - Booster for Moderna series) 2021   • DIABETIC EYE EXAM  2022   • INFLUENZA VACCINE  2022   • URINE MICROALBUMIN  2022   • ANNUAL WELLNESS VISIT  2022       <no information>  Last Completed Colonoscopy          COLORECTAL CANCER SCREENING (COLONOSCOPY - Every 10 Years) Next due on 3/3/2027    2017  Surgical Procedure: COLONOSCOPY    2017  COLONOSCOPY    2017  Occult Blood X 3, Stool              Immunization History   Administered Date(s) Administered   • COVID-19 (MODERNA) 1st, 2nd, 3rd Dose Only 2021, 10/02/2021     Last Completed Mammogram          MAMMOGRAM (Every 2 Years) Next due on 10/20/2023    10/20/2021  Outside Claim: [facility] MAMMOGRAM SCREENING BILAT DIGITAL W CAD,CHG SCREENING DIGITAL BREAST TOMOSYNTHESIS BI    2017  Mammo screening digital tomosynthesis bilateral w CAD    2016  Mammo screening digital tomosynthesis bilateral w CAD                  FAMILY  HISTORY:  Family History   Problem Relation Age of Onset   • Asthma Mother    • Heart disease Mother    • COPD Mother    • Heart disease Father    • Early death Father         heart attack @ 60   • Heart disease Sister    • Hypertension Sister    • COPD Sister    • Diabetes Sister         Type 2   • Diabetes Sister    • Other Sister    • Hearing loss Sister         born deaf   • Heart disease Brother    • Breast cancer Paternal Aunt    • No Known Problems Son    • No Known Problems Son    • Hypertension Maternal Grandfather    • Stroke Maternal Grandfather    • Diabetes Maternal Grandmother         Type 2   • Vision loss Maternal Grandmother         from diabetes   • Heart disease Paternal Grandfather    • Hypertension Paternal Grandfather    • Cancer Paternal Aunt    • Hearing loss Brother         wears hearing aids   • Heart disease Brother    • Hypertension Brother    • Heart disease Sister    • Hypertension Sister      SOCIAL HISTORY:  Social History     Socioeconomic History   • Marital status:    Tobacco Use   • Smoking status: Every Day     Packs/day: 0.25     Years: 40.00     Pack years: 10.00     Types: Cigarettes     Start date: 2/18/1975   • Smokeless tobacco: Never   • Tobacco comments:     quit during two pregnancies but went back to smoking after delivery   Vaping Use   • Vaping Use: Never used   Substance and Sexual Activity   • Alcohol use: Not Currently     Alcohol/week: 12.0 standard drinks     Types: 12 Cans of beer per week     Comment: drink at early 20's   • Drug use: No   • Sexual activity: Not Currently     Partners: Male     Birth control/protection: None     Comment: went through menopause over 10 years go       REVIEW OF SYSTEMS:    Review of Systems   Constitutional: Negative for chills and fever.        \"Energy not as bad.\"   HENT: Positive for congestion. Negative for mouth sores and trouble swallowing.         \"I got steroid pack and amoxicillin.   Eyes: Negative for redness and  visual disturbance.   Respiratory: Negative for cough, shortness of breath and wheezing.    Cardiovascular: Negative for chest pain and palpitations.   Gastrointestinal: Negative for abdominal pain, nausea and vomiting.   Endocrine: Negative for polydipsia and polyphagia.   Genitourinary: Negative for difficulty urinating, dysuria and flank pain.   Musculoskeletal: Negative for gait problem and myalgias.   Skin: Positive for pallor.   Allergic/Immunologic: Negative for food allergies.   Neurological: Negative for dizziness, speech difficulty, weakness and confusion.   Hematological: Negative for adenopathy. Does not bruise/bleed easily.   Psychiatric/Behavioral: Negative for agitation and hallucinations. The patient is not nervous/anxious.          VITAL SIGNS: /74   Pulse 80   Temp 98 °F (36.7 °C)   Resp 18   Ht 162.6 cm (64\")   Wt 68 kg (150 lb)   SpO2 98%   Breastfeeding No   BMI 25.75 kg/m²  Body surface area is 1.73 meters squared.   Pain Score    01/05/23 1405   PainSc: 0-No pain           PHYSICAL EXAMINATION:     Physical Exam  Vitals reviewed.   Constitutional:       General: She is not in acute distress.  HENT:      Head: Normocephalic and atraumatic.   Eyes:      General: No scleral icterus.  Cardiovascular:      Rate and Rhythm: Normal rate.   Pulmonary:      Effort: No respiratory distress.      Breath sounds: No wheezing or rales.   Abdominal:      General: Bowel sounds are normal.      Palpations: Abdomen is soft.      Tenderness: There is no abdominal tenderness.   Musculoskeletal:         General: No swelling.      Cervical back: Neck supple.   Skin:     General: Skin is warm.      Coloration: Skin is pale.   Neurological:      Mental Status: She is alert and oriented to person, place, and time.   Psychiatric:         Mood and Affect: Mood normal.         Behavior: Behavior normal.         Thought Content: Thought content normal.         Judgment: Judgment normal.         LABS    Lab  Results - Last 18 Months   Lab Units 12/22/22  1129 12/08/22  1349 11/23/22  1350 11/10/22  1333 10/27/22  1351 10/13/22  1336 09/29/22  1347 09/15/22  1309 09/07/22  1251 08/24/22  1334 08/15/22  0727 07/25/22  0749 07/18/22  0802 04/28/22  0709 04/27/22  0539 04/19/22  0859 04/04/22  1034   HEMOGLOBIN g/dL 10.6* 10.9* 10.4* 10.9* 10.3* 10.7* 10.5* 10.0* 10.5*   < > 11.5*   < > 11.1*   < > 9.4*   < > 7.0*   HEMATOCRIT % 31.6* 32.7* 30.8* 30.6* 30.9* 31.8* 30.7* 29.7* 30.1*   < > 33.6*   < > 33.2*   < > 29.4*   < > 22.7*   MCV fL 90.8 95.1 94.5 91.3 94.2 95.2 95.3 95.8 94.7   < > 98.8*   < > 101.5*   < > 106.1*   < > 112.9*   WBC 10*3/mm3 16.14* 9.56 11.35* 10.35 12.81* 14.37* 13.11* 11.63* 13.08*   < > 14.33*   < > 17.82*   < > 15.25*   < > 18.99*   RDW % 15.1 16.0* 16.4* 16.3* 16.8* 16.6* 17.3* 16.4* 16.4*   < > 17.9*   < > 19.1*   < > 25.2*   < > 22.4*   MPV fL 11.1 11.5 11.6 11.6 11.4 11.5 11.4 11.5 10.9   < > 11.0   < > 11.2   < > 11.5   < > 11.2   PLATELETS 10*3/mm3 414 268 275 248 314 298 293 314 297   < > 351   < > 245   < > 218   < > 329   IMM GRAN % % 1.1*  --   --  0.4  --  0.9* 1.2* 0.9* 1.1*   < >  --    < >  --    < >  --    < >  --    NEUTROS ABS 10*3/mm3 10.50* 6.08 7.49* 6.67 10.12* 9.67* 8.72* 7.47* 7.94*   < > 9.03*   < > 12.47*   < > 10.11*   < > 14.29*   LYMPHS ABS 10*3/mm3 2.74  --   --  2.27  --  2.63 2.65 2.34 3.31*   < >  --    < >  --    < >  --    < >  --    MONOS ABS 10*3/mm3 2.08*  --   --  0.86  --  1.34* 1.02* 1.21* 1.22*   < >  --    < >  --    < >  --    < >  --    EOS ABS 10*3/mm3 0.53* 0.29 0.45* 0.42* 0.51* 0.51* 0.47* 0.40 0.36   < >  --    < > 0.36   < > 0.78*   < > 0.19   BASOS ABS 10*3/mm3 0.11 0.10 0.11 0.09  --  0.09 0.09 0.11 0.10   < > 0.14   < >  --    < >  --    < > 0.19   IMMATURE GRANS (ABS) 10*3/mm3 0.18*  --   --  0.04  --  0.13* 0.16* 0.10* 0.15*   < >  --    < >  --    < >  --    < >  --    NRBC /100 WBC 0.0  --   --  0.0  --  0.0 0.0 0.0 0.0   < >  --    < >  --     < > 3.1*   < >  --    NEUTROPHIL % %  --  63.6 62.0  --  77.0*  --   --   --   --   --  59.0  --  70.0  --  64.3   < > 74.2   MONOCYTES % %  --  5.1 3.0*  --  6.0  --   --   --   --   --  9.0  --  8.0  --  9.2   < > 8.2   BASOPHIL % %  --  1.0 1.0  --   --   --   --   --   --   --  1.0  --   --   --   --   --  1.0   ATYP LYMPH % %  --   --   --   --  2.0  --   --   --   --   --  5.0  --   --   --   --   --   --    ANISOCYTOSIS   --  Slight/1+ Slight/1+  --  Slight/1+  --   --   --   --   --  Mod/2+  --  Large/3+  --  Mod/2+   < > Mod/2+   GIANT PLT   --  Slight/1+  --   --  Slight/1+  --   --   --   --   --  Slight/1+  --   --   --   --   --   --     < > = values in this interval not displayed.       Lab Results - Last 18 Months   Lab Units 12/22/22  1129 12/08/22  1349 11/23/22  1350 11/10/22  1333 10/27/22  1351 10/13/22  1336   GLUCOSE mg/dL 113* 176* 135* 215* 198* 138*   SODIUM mmol/L 137 139 138 137 135* 139   POTASSIUM mmol/L 3.7 3.8 4.2 4.0 3.9 3.9   CO2 mmol/L 27.0 28.0 26.0 26.0 27.0 27.0   CHLORIDE mmol/L 101 103 103 102 101 103   ANION GAP mmol/L 9.0 8.0 9.0 9.0 7.0 9.0   CREATININE mg/dL 0.70 0.62 0.82 0.70 0.59 0.69   BUN mg/dL 12 13 16 12 12 14   BUN / CREAT RATIO  17.1 21.0 19.5 17.1 20.3 20.3   CALCIUM mg/dL 8.9 8.8 9.0 9.1 8.9 9.0   ALK PHOS U/L 87 61 67 60 66 69   TOTAL PROTEIN g/dL 6.9 6.6 6.4 6.4 6.5 6.7   ALT (SGPT) U/L 9 12 12 10 12 8   AST (SGOT) U/L 17 17 18 15 18 16   BILIRUBIN mg/dL 0.8 0.8 0.8 0.9 0.9 1.0   ALBUMIN g/dL 3.60 4.10 4.20 4.20 3.80 4.00   GLOBULIN gm/dL 3.3 2.5 2.2 2.2 2.7 2.7       Lab Results - Last 18 Months   Lab Units 12/22/22  1129 12/08/22  1349 11/23/22  1350 11/10/22  1333 10/27/22  1351 10/13/22  1336 05/09/22  0738 04/29/22  0536 04/28/22  0709 04/27/22  0539 04/26/22  0646 04/25/22  0630 04/24/22  0819 04/23/22  0743 04/22/22  1322 04/19/22  0859   URIC ACID mg/dL  --   --   --   --   --   --   --  1.9* 1.6* 1.5* 2.1* 3.7 6.4*   < > 5.8*  --    LDH U/L 197 214  198 194 214 230*   < > 570* 650*  638* 668* 634* 593* 652*   < > 772* 834*   REFERENCE LAB REPORT   --   --   --   --   --   --   --   --   --   --   --   --   --   --  See Attached Report SEE ATTACHED REPORT    < > = values in this interval not displayed.       Lab Results - Last 18 Months   Lab Units 12/08/22  1349 11/10/22  1333 10/13/22  1336 08/08/22  0713 07/11/22  0747 04/22/22  1542 04/19/22  0859 04/04/22  1219 04/04/22  1035 03/31/22  1226 08/17/21  0932   IRON mcg/dL 77 45 41 82 125  --   --   --  44   < >  --    TIBC mcg/dL 276* 258* 283* 301 295*  --   --   --  261*   < >  --    IRON SATURATION % 28 17* 14* 27 42  --   --   --  17*   < >  --    FERRITIN ng/mL 1,056.00* 867.60* 488.40* 330.10* 327.30*  --   --   --  311.90*   < >  --    TSH uIU/mL  --   --   --   --   --   --  1.960  --  2.100  --  1.302   FOLATE ng/mL  --   --   --   --   --  13.70 12.60 14.20  --   --   --     < > = values in this interval not displayed.         Giana Yepez reports a pain score of 0.           ASSESSMENT:  1.  Autoimmune hemolytic anemia.  Treatment status: Post IVIG x2 doses on 04/27/2022.  On prednisone 1 mg/kg from 05/09/2022  through 07/10/2022. 50 mg 07/11/20222 through 07/17/2022. 40 mg 07/18/2022 for 1 week, 30 mg p.o. daily from 07/25/2022, 20 mg daily from 08/01/2022 through 08/07/2022, 15 mg from 08/08/2022 through 08/14/2022, 10 mg from 08/15/2022 through 08/21/2022, and 5 mg from 08/22/2022 through present.   Rituximab 375 mgm2 on 04/28/2022.  Hypersensitivity reaction to rituximab.  Rituximab rechallenge on 07/25/2022 through 08/15/2022, 4 cycles.  Oral iron stopped 10?13/2022. \"I have been taking iron for a long time.\"  Venofer 200 mg on 11/14/2022  2.  Borderline enlarged mediastinal nodes on 04/23/2022. Followed by pulmonary.  3.  9 mm hypodense well-circumscribed asymmetry at the level of the upper left vallecula and base of the tongue on the left on 04/23/2022. Smaller on CT 09/07/2022. Followed by  Dr. Diomedes Moses.   4.  Performance status of 1.  5.  Unresponsive to oral iron.  IV iron as needed.  Post Injectafer 10/21/2022.  6.  LE autoantibody.          PLAN:  1.   Re:  Note from Dr. Diomedes Moses 12/15/2022. No mass lesion. Follow up 03/15/2023. The base of tongue was found to have mild asymmetric lymphoid hyperplasia at the base of the tongue which partially fills the vallecula left greater than right.  But no mass or lesion is noted.  2.   Re: Reticulocyte count 6.55.  3.   Re: Heme status.  WBC 14.2, hemoglobin 11.2 from 10.6, hematocrit 33, MCV 89.7, and platelet 542. She had steroid pack.  Ferritin 1309 from 1056  from 867 from 488.4 and saturation 22 from 28 from 17 from 14.     4.   Re: CMP.  Total bilirubin 0.5 from 0.8 and glucose 106.  5.   Re: Haptoglobin is pending from 85 from < 10 from <10 < 10 and  from 197 from 214 from 198 from 194 from 214 from 230 from 246.   6.   Re: Continue folic acid.   7.  Blood for CBC with differential, CMP, haptoglobin, LDH and reticulocyte count every 2 weeks.  8.  eRx folic acid 1 mg p.o. daily #90 with 3 refills if needed.  9.   Follow-up with Dr. Tolbert 01/11/2023 at [place].  10.  Continue care per primary care physician.  11.  Plan of care discussed with patient.  Understanding expressed.  Patient agreeable to proceed.  12.  Transfuse 1 unit packed RBC if hemoglobin less than 6.9.  Premed Tylenol 500 mg p.o.  Lasix 20 mg IV push after transfusion.  Observe for for transfusion reactions.  13.  Advance Care Planning   ACP discussion was declined by the patient. Patient does not have an advance directive, information provided.   14.  Return to office in 4 weeks with ferritin and iron panel.           I have reviewed the assessment and plan and verified the accuracy of it. No changes to assessment and plan since the information was documented. Filemon Elizalde MD 01/05/23         I spent 31 total minutes,  face-to-face, caring for Giana today.  Greater than 50% of this time involved counseling and/or coordination of care as documented within this note regarding the patient's illness(es), pros and cons of various treatment options, instructions and/or risk reduction.                   (Artem Mace MD)   (Gerber Tolbert MD [facility])  (Dominick Fernandez MD [facility]

## 2022-12-22 ENCOUNTER — LAB (OUTPATIENT)
Dept: LAB | Facility: HOSPITAL | Age: 67
End: 2022-12-22

## 2022-12-22 DIAGNOSIS — D50.8 IRON DEFICIENCY ANEMIA SECONDARY TO INADEQUATE DIETARY IRON INTAKE: ICD-10-CM

## 2022-12-22 DIAGNOSIS — D59.10 AUTOIMMUNE HEMOLYTIC ANEMIA: ICD-10-CM

## 2022-12-22 LAB
ALBUMIN SERPL-MCNC: 3.6 G/DL (ref 3.5–5.2)
ALBUMIN/GLOB SERPL: 1.1 G/DL
ALP SERPL-CCNC: 87 U/L (ref 39–117)
ALT SERPL W P-5'-P-CCNC: 9 U/L (ref 1–33)
ANION GAP SERPL CALCULATED.3IONS-SCNC: 9 MMOL/L (ref 5–15)
AST SERPL-CCNC: 17 U/L (ref 1–32)
BASOPHILS # BLD AUTO: 0.11 10*3/MM3 (ref 0–0.2)
BASOPHILS NFR BLD AUTO: 0.7 % (ref 0–1.5)
BILIRUB SERPL-MCNC: 0.8 MG/DL (ref 0–1.2)
BUN SERPL-MCNC: 12 MG/DL (ref 8–23)
BUN/CREAT SERPL: 17.1 (ref 7–25)
CALCIUM SPEC-SCNC: 8.9 MG/DL (ref 8.6–10.5)
CHLORIDE SERPL-SCNC: 101 MMOL/L (ref 98–107)
CO2 SERPL-SCNC: 27 MMOL/L (ref 22–29)
CREAT SERPL-MCNC: 0.7 MG/DL (ref 0.57–1)
DEPRECATED RDW RBC AUTO: 49.5 FL (ref 37–54)
EGFRCR SERPLBLD CKD-EPI 2021: 95.5 ML/MIN/1.73
EOSINOPHIL # BLD AUTO: 0.53 10*3/MM3 (ref 0–0.4)
EOSINOPHIL NFR BLD AUTO: 3.3 % (ref 0.3–6.2)
ERYTHROCYTE [DISTWIDTH] IN BLOOD BY AUTOMATED COUNT: 15.1 % (ref 12.3–15.4)
GLOBULIN UR ELPH-MCNC: 3.3 GM/DL
GLUCOSE SERPL-MCNC: 113 MG/DL (ref 65–99)
HAPTOGLOB SERPL-MCNC: 85 MG/DL (ref 30–200)
HCT VFR BLD AUTO: 31.6 % (ref 34–46.6)
HGB BLD-MCNC: 10.6 G/DL (ref 12–15.9)
IMM GRANULOCYTES # BLD AUTO: 0.18 10*3/MM3 (ref 0–0.05)
IMM GRANULOCYTES NFR BLD AUTO: 1.1 % (ref 0–0.5)
LDH SERPL-CCNC: 197 U/L (ref 135–214)
LYMPHOCYTES # BLD AUTO: 2.74 10*3/MM3 (ref 0.7–3.1)
LYMPHOCYTES NFR BLD AUTO: 17 % (ref 19.6–45.3)
MCH RBC QN AUTO: 30.5 PG (ref 26.6–33)
MCHC RBC AUTO-ENTMCNC: 33.5 G/DL (ref 31.5–35.7)
MCV RBC AUTO: 90.8 FL (ref 79–97)
MONOCYTES # BLD AUTO: 2.08 10*3/MM3 (ref 0.1–0.9)
MONOCYTES NFR BLD AUTO: 12.9 % (ref 5–12)
NEUTROPHILS NFR BLD AUTO: 10.5 10*3/MM3 (ref 1.7–7)
NEUTROPHILS NFR BLD AUTO: 65 % (ref 42.7–76)
NRBC BLD AUTO-RTO: 0 /100 WBC (ref 0–0.2)
PLATELET # BLD AUTO: 414 10*3/MM3 (ref 140–450)
PMV BLD AUTO: 11.1 FL (ref 6–12)
POTASSIUM SERPL-SCNC: 3.7 MMOL/L (ref 3.5–5.2)
PROT SERPL-MCNC: 6.9 G/DL (ref 6–8.5)
RBC # BLD AUTO: 3.48 10*6/MM3 (ref 3.77–5.28)
RETICS # AUTO: 0.12 10*6/MM3 (ref 0.02–0.13)
RETICS/RBC NFR AUTO: 3.49 % (ref 0.7–1.9)
SODIUM SERPL-SCNC: 137 MMOL/L (ref 136–145)
WBC NRBC COR # BLD: 16.14 10*3/MM3 (ref 3.4–10.8)

## 2022-12-22 PROCEDURE — 85045 AUTOMATED RETICULOCYTE COUNT: CPT

## 2022-12-22 PROCEDURE — 85025 COMPLETE CBC W/AUTO DIFF WBC: CPT

## 2022-12-22 PROCEDURE — 80053 COMPREHEN METABOLIC PANEL: CPT

## 2022-12-22 PROCEDURE — 83010 ASSAY OF HAPTOGLOBIN QUANT: CPT

## 2022-12-22 PROCEDURE — 36415 COLL VENOUS BLD VENIPUNCTURE: CPT

## 2022-12-22 PROCEDURE — 83615 LACTATE (LD) (LDH) ENZYME: CPT

## 2022-12-22 RX ORDER — FOLIC ACID 1 MG/1
TABLET ORAL
Qty: 30 TABLET | Refills: 2 | Status: SHIPPED | OUTPATIENT
Start: 2022-12-22 | End: 2023-02-01 | Stop reason: SDUPTHER

## 2023-01-05 ENCOUNTER — OFFICE VISIT (OUTPATIENT)
Dept: ONCOLOGY | Facility: CLINIC | Age: 68
End: 2023-01-05
Payer: MEDICARE

## 2023-01-05 ENCOUNTER — LAB (OUTPATIENT)
Dept: LAB | Facility: HOSPITAL | Age: 68
End: 2023-01-05
Payer: MEDICARE

## 2023-01-05 VITALS
HEART RATE: 80 BPM | OXYGEN SATURATION: 98 % | SYSTOLIC BLOOD PRESSURE: 132 MMHG | RESPIRATION RATE: 18 BRPM | HEIGHT: 64 IN | TEMPERATURE: 98 F | WEIGHT: 150 LBS | BODY MASS INDEX: 25.61 KG/M2 | DIASTOLIC BLOOD PRESSURE: 74 MMHG

## 2023-01-05 DIAGNOSIS — D64.81 ANEMIA DUE TO ANTINEOPLASTIC CHEMOTHERAPY: ICD-10-CM

## 2023-01-05 DIAGNOSIS — D59.10 AUTOIMMUNE HEMOLYTIC ANEMIA: Primary | ICD-10-CM

## 2023-01-05 DIAGNOSIS — D50.8 IRON DEFICIENCY ANEMIA SECONDARY TO INADEQUATE DIETARY IRON INTAKE: ICD-10-CM

## 2023-01-05 DIAGNOSIS — T45.1X5A ANEMIA DUE TO ANTINEOPLASTIC CHEMOTHERAPY: ICD-10-CM

## 2023-01-05 LAB
ALBUMIN SERPL-MCNC: 3.9 G/DL (ref 3.5–5.2)
ALBUMIN/GLOB SERPL: 1.6 G/DL
ALP SERPL-CCNC: 82 U/L (ref 39–117)
ALT SERPL W P-5'-P-CCNC: 16 U/L (ref 1–33)
ANION GAP SERPL CALCULATED.3IONS-SCNC: 8 MMOL/L (ref 5–15)
AST SERPL-CCNC: 20 U/L (ref 1–32)
BASOPHILS # BLD AUTO: 0.16 10*3/MM3 (ref 0–0.2)
BASOPHILS NFR BLD AUTO: 1.1 % (ref 0–1.5)
BILIRUB SERPL-MCNC: 0.5 MG/DL (ref 0–1.2)
BUN SERPL-MCNC: 18 MG/DL (ref 8–23)
BUN/CREAT SERPL: 24.7 (ref 7–25)
CALCIUM SPEC-SCNC: 8.6 MG/DL (ref 8.6–10.5)
CHLORIDE SERPL-SCNC: 102 MMOL/L (ref 98–107)
CO2 SERPL-SCNC: 28 MMOL/L (ref 22–29)
CREAT SERPL-MCNC: 0.73 MG/DL (ref 0.57–1)
DEPRECATED RDW RBC AUTO: 57.2 FL (ref 37–54)
EGFRCR SERPLBLD CKD-EPI 2021: 90.8 ML/MIN/1.73
EOSINOPHIL # BLD AUTO: 0.36 10*3/MM3 (ref 0–0.4)
EOSINOPHIL NFR BLD AUTO: 2.5 % (ref 0.3–6.2)
ERYTHROCYTE [DISTWIDTH] IN BLOOD BY AUTOMATED COUNT: 18.3 % (ref 12.3–15.4)
FERRITIN SERPL-MCNC: 1309 NG/ML (ref 13–150)
GLOBULIN UR ELPH-MCNC: 2.5 GM/DL
GLUCOSE SERPL-MCNC: 106 MG/DL (ref 65–99)
HCT VFR BLD AUTO: 33 % (ref 34–46.6)
HGB BLD-MCNC: 11.2 G/DL (ref 12–15.9)
HOLD SPECIMEN: NORMAL
IMM GRANULOCYTES # BLD AUTO: 0.39 10*3/MM3 (ref 0–0.05)
IMM GRANULOCYTES NFR BLD AUTO: 2.7 % (ref 0–0.5)
IRON 24H UR-MRATE: 65 MCG/DL (ref 37–145)
IRON SATN MFR SERPL: 22 % (ref 20–50)
LDH SERPL-CCNC: 208 U/L (ref 135–214)
LYMPHOCYTES # BLD AUTO: 2.38 10*3/MM3 (ref 0.7–3.1)
LYMPHOCYTES NFR BLD AUTO: 16.7 % (ref 19.6–45.3)
MCH RBC QN AUTO: 30.4 PG (ref 26.6–33)
MCHC RBC AUTO-ENTMCNC: 33.9 G/DL (ref 31.5–35.7)
MCV RBC AUTO: 89.7 FL (ref 79–97)
MONOCYTES # BLD AUTO: 1.32 10*3/MM3 (ref 0.1–0.9)
MONOCYTES NFR BLD AUTO: 9.3 % (ref 5–12)
NEUTROPHILS NFR BLD AUTO: 67.7 % (ref 42.7–76)
NEUTROPHILS NFR BLD AUTO: 9.66 10*3/MM3 (ref 1.7–7)
NRBC BLD AUTO-RTO: 0 /100 WBC (ref 0–0.2)
PLATELET # BLD AUTO: 542 10*3/MM3 (ref 140–450)
PMV BLD AUTO: 11.2 FL (ref 6–12)
POTASSIUM SERPL-SCNC: 3.9 MMOL/L (ref 3.5–5.2)
PROT SERPL-MCNC: 6.4 G/DL (ref 6–8.5)
RBC # BLD AUTO: 3.68 10*6/MM3 (ref 3.77–5.28)
RETICS # AUTO: 0.23 10*6/MM3 (ref 0.02–0.13)
RETICS/RBC NFR AUTO: 6.55 % (ref 0.7–1.9)
SODIUM SERPL-SCNC: 138 MMOL/L (ref 136–145)
TIBC SERPL-MCNC: 294 MCG/DL (ref 298–536)
TRANSFERRIN SERPL-MCNC: 197 MG/DL (ref 200–360)
WBC NRBC COR # BLD: 14.27 10*3/MM3 (ref 3.4–10.8)

## 2023-01-05 PROCEDURE — 85025 COMPLETE CBC W/AUTO DIFF WBC: CPT

## 2023-01-05 PROCEDURE — 83540 ASSAY OF IRON: CPT

## 2023-01-05 PROCEDURE — 83615 LACTATE (LD) (LDH) ENZYME: CPT

## 2023-01-05 PROCEDURE — 83010 ASSAY OF HAPTOGLOBIN QUANT: CPT

## 2023-01-05 PROCEDURE — 80053 COMPREHEN METABOLIC PANEL: CPT

## 2023-01-05 PROCEDURE — 85045 AUTOMATED RETICULOCYTE COUNT: CPT

## 2023-01-05 PROCEDURE — 84466 ASSAY OF TRANSFERRIN: CPT

## 2023-01-05 PROCEDURE — 99214 OFFICE O/P EST MOD 30 MIN: CPT | Performed by: INTERNAL MEDICINE

## 2023-01-05 PROCEDURE — 82728 ASSAY OF FERRITIN: CPT

## 2023-01-05 PROCEDURE — 36415 COLL VENOUS BLD VENIPUNCTURE: CPT

## 2023-01-06 LAB — HAPTOGLOB SERPL-MCNC: <10 MG/DL (ref 30–200)

## 2023-01-31 DIAGNOSIS — M79.89 LEG SWELLING: ICD-10-CM

## 2023-02-01 ENCOUNTER — TELEPHONE (OUTPATIENT)
Dept: ONCOLOGY | Facility: CLINIC | Age: 68
End: 2023-02-01

## 2023-02-01 DIAGNOSIS — E53.8 LOW FOLATE: ICD-10-CM

## 2023-02-01 RX ORDER — FOLIC ACID 1 MG/1
1000 TABLET ORAL DAILY
Qty: 30 TABLET | Refills: 2 | Status: SHIPPED | OUTPATIENT
Start: 2023-02-01

## 2023-02-01 NOTE — TELEPHONE ENCOUNTER
Caller: Giana Yepez    Relationship: Self    Best call back number:[phone]    What is the best time to reach you: ANYTIME    Who are you requesting to speak with (clinical staff, provider,  specific staff member): SCHEDULING         What was the call regarding: NEEDING TO RESCHEDULE APPT LAB AND FOLLOW UP TODAY 02/01    Do you require a callback: YES

## 2023-02-01 NOTE — TELEPHONE ENCOUNTER
Called patient to let her know we were closed.  Scheduling would contact her to reschedule.  She did request a refill of folic acid.

## 2023-02-02 NOTE — PROGRESS NOTES
"[facility] ON[facility] HEMATOLOGY & ONCOLOGY [place] [id number] [facility] [address]  [facility] [address] [phone]    Patient Name: Giana Yepez  Encounter Date: 02/09/2023  YOB: 1955  Patient Number: [id number]      REASON FOR FOLLOW-UP: Giana Yepez is a pleasant 68 y.o.  female who is seen on follow-up for autoimmune hemolytic anemia.  She is seen 5.25 months post four weekly doses of rituximab, rechallenge. She had a dose of rituximab on 04/28/2022 and was intolerant.  She was treated with prednisone 1 mg /kg and decrease to 50 mg daily for 1 week from 07/11/2022 and 40 mg po daily from 07/18/2022 for 1 week and 30 mg p.o. daily from 07/25/2022, 20 mg daily from 08/01/2022 through 08/07/2022, 15 mg from 08/08/2022 through 08/14/2022, 10 mg from 08/15/2022 through 08/21/2022, and 5 mg from 08/22/2022 through 08/28/2022.  She is seen alone.  History is obtained from the patient.  History is considered accurate.        Problem List Items Addressed This Visit    None    Oncology/Hematology History    No history exists.       PAST MEDICAL HISTORY:  ALLERGIES:  Allergies   Allergen Reactions   • Corn-Containing Products Diarrhea   • Eggs Or Egg-Derived Products Diarrhea   • Nuts Shortness Of Breath     peanuts   • Milk-Related Compounds Nausea And Vomiting   • Gabapentin Dizziness     Patient states becomes weak and dizzy if she takes Gabapentin   • Levemir [Insulin Detemir] Itching and Other (See Comments)     Patient states gets a \"hard knot and itching\" at injection site.   • Wheat Rash     CURRENT MEDICATIONS:  Outpatient Encounter Medications as of 2/9/2023   Medication Sig Dispense Refill   • allopurinol (ZYLOPRIM) 100 MG tablet TAKE ONE TABLET BY MOUTH DAILY 30 tablet 1   • azelastine (ASTELIN) 0.1 % nasal spray 2 sprays into the nostril(s) as directed by provider 2 (Two) Times a Day. Use in each nostril as directed 1 each 12   • Calcium " Citrate (CITRACAL PO) Take  by mouth.     • cholecalciferol (VITAMIN D3) 25 MCG (1000 UT) tablet Take 2,000 Units by mouth Daily.     • Continuous Blood Gluc  (Dexcom G5  Kit) device Continuous.     • diphenhydrAMINE (BENADRYL) 25 mg capsule Take 25 mg by mouth Every 6 (Six) Hours As Needed for Itching.     • folic acid (FOLVITE) 1 MG tablet Take 1 tablet by mouth Daily. 30 tablet 2   • furosemide (Lasix) 20 MG tablet Take 1 tablet by mouth Daily. For relief of leg swelling 30 tablet 0   • Insulin Infusion Pump device Continuous.     • Insulin Lispro (humaLOG) 100 UNIT/ML injection Inject 25-40 Units under the skin into the appropriate area as directed Daily.     • Loratadine 10 MG capsule Take 10 mg by mouth every night at bedtime. As anti-histamine for allergies     • omeprazole (priLOSEC) 40 MG capsule Take 40 mg by mouth Daily.     • ondansetron (ZOFRAN) 8 MG tablet Take 1 tablet by mouth Every 8 (Eight) Hours As Needed for Nausea or Vomiting. 60 tablet 3   • rosuvastatin (CRESTOR) 20 MG tablet Take 20 mg by mouth Daily.     • vitamin B-12 (CYANOCOBALAMIN) 1000 MCG tablet Take 1,000 mcg by mouth Daily.     • [DISCONTINUED] fluticasone (FLONASE) 50 MCG/ACT nasal spray 2 sprays into the nostril(s) as directed by provider Daily. To control swelling and drainage from allergies 16 g 11     No facility-administered encounter medications on file as of 2/9/2023.     ADULT ILLNESSES:  Patient Active Problem List   Diagnosis Code   • Gastroesophageal reflux disease K21.9   • Type 1 diabetes mellitus with hyperglycemia (HCC), insulin depdendent E10.65   • Intermittent chest pain R07.9   • Hypercholesterolemia E78.00   • Vitamin D deficiency E55.9   • Vitamin B12 deficiency E53.8   • Diarrhea R19.7   • Abdominal cramping R10.9   • Nausea R11.0   • Collagenous colitis K52.831   • Autoimmune hemolytic anemia (HCC) D59.10   • Insulin pump in place Z96.41   • Hyperbilirubinemia E80.6   • Mediastinal  lymphadenopathy R59.0   • Tobacco abuse Z72.0   • Hyponatremia E87.1   • Orthostatic hypotension I95.1   • Iron deficiency anemia D50.9     SURGERIES:  Past Surgical History:   Procedure Laterality Date   • ADENOIDECTOMY  1974   • ANKLE SURGERY Right    • APPENDECTOMY     • BREAST BIOPSY     •  SECTION      X 2   • CHOLECYSTECTOMY     • COLONOSCOPY      [facility]   • COLONOSCOPY N/A 2017    Procedure: COLONOSCOPY WITH ANESTHESIA;  Surgeon: Ondina Abdul MD;  Location: [facility] ENDOSCOPY;  Service:    • ENDOSCOPY N/A 2017    Procedure: ESOPHAGOGASTRODUODENOSCOPY WITH ANESTHESIA;  Surgeon: Ondina Abdul MD;  Location: [facility] ENDOSCOPY;  Service:    • EYE SURGERY  2019    cataract   • SKIN CANCER EXCISION  2016    FACE   • TONSILLECTOMY       HEALTH MAINTENANCE ITEMS:  Health Maintenance Due   Topic Date Due   • TDAP/TD VACCINES (1 - Tdap) Never done   • ZOSTER VACCINE (1 of 2) Never done   • DIABETIC FOOT EXAM  Never done   • PAP SMEAR  Never done   • COVID-19 Vaccine (3 - Booster for Moderna series) 2021   • DIABETIC EYE EXAM  2022   • URINE MICROALBUMIN  2022   • ANNUAL WELLNESS VISIT  2022       <no information>  Last Completed Colonoscopy          COLORECTAL CANCER SCREENING (COLONOSCOPY - Every 10 Years) Next due on 3/3/2027    2017  Surgical Procedure: COLONOSCOPY    2017  COLONOSCOPY    2017  Occult Blood X 3, Stool              Immunization History   Administered Date(s) Administered   • COVID-19 (MODERNA) 1st, 2nd, 3rd Dose Only 2021, 10/02/2021     Last Completed Mammogram          MAMMOGRAM (Every 2 Years) Next due on 10/20/2023    10/20/2021  Outside Claim: [facility] MAMMOGRAM SCREENING BILAT DIGITAL W CAD,CHG SCREENING DIGITAL BREAST TOMOSYNTHESIS BI    2017  Mammo screening digital tomosynthesis bilateral w CAD    2016  Mammo screening digital tomosynthesis bilateral w CAD                  FAMILY  "HISTORY:  Family History   Problem Relation Age of Onset   • Asthma Mother    • Heart disease Mother    • COPD Mother    • Heart disease Father    • Early death Father         heart attack @ 60   • Heart disease Sister    • Hypertension Sister    • COPD Sister    • Diabetes Sister         Type 2   • Diabetes Sister    • Other Sister    • Hearing loss Sister         born deaf   • Heart disease Brother    • Breast cancer Paternal Aunt    • No Known Problems Son    • No Known Problems Son    • Hypertension Maternal Grandfather    • Stroke Maternal Grandfather    • Diabetes Maternal Grandmother         Type 2   • Vision loss Maternal Grandmother         from diabetes   • Heart disease Paternal Grandfather    • Hypertension Paternal Grandfather    • Cancer Paternal Aunt    • Hearing loss Brother         wears hearing aids   • Heart disease Brother    • Hypertension Brother    • Heart disease Sister    • Hypertension Sister      SOCIAL HISTORY:  Social History     Socioeconomic History   • Marital status:    Tobacco Use   • Smoking status: Every Day     Packs/day: 0.50     Years: 48.00     Pack years: 24.00     Types: Cigarettes     Start date: 2/18/1975     Passive exposure: Current   • Smokeless tobacco: Never   • Tobacco comments:     quit during two pregnancies but went back to smoking after delivery   Vaping Use   • Vaping Use: Never used   Substance and Sexual Activity   • Alcohol use: Not Currently     Alcohol/week: 12.0 standard drinks     Types: 12 Cans of beer per week     Comment: drink at early 20's   • Drug use: No   • Sexual activity: Not Currently     Partners: Male     Birth control/protection: None     Comment: went through menopause over 10 years go       REVIEW OF SYSTEMS:    Review of Systems   Constitutional: Positive for fatigue. Negative for fever and unexpected weight loss.        \"A little tired. Not like before.\"   HENT: Negative for congestion, nosebleeds and trouble swallowing.    Eyes: " "Negative for blurred vision and redness.   Respiratory: Negative for cough, shortness of breath and wheezing.    Cardiovascular: Negative for chest pain and palpitations.   Gastrointestinal: Negative for blood in stool, nausea and vomiting.   Endocrine: Negative for polydipsia and polyphagia.   Genitourinary: Negative for difficulty urinating, dysuria and flank pain.   Musculoskeletal: Negative for gait problem and myalgias.   Skin: Positive for pallor.   Allergic/Immunologic: Positive for food allergies.   Neurological: Negative for speech difficulty, weakness and confusion.   Hematological: Negative for adenopathy. Does not bruise/bleed easily.   Psychiatric/Behavioral: Negative for agitation, hallucinations and depressed mood.         VITAL SIGNS: /74   Pulse 74   Temp 97.9 °F (36.6 °C)   Resp 18   Ht 158.8 cm (62.5\")   Wt 69.3 kg (152 lb 11.2 oz)   LMP  (LMP Unknown)   SpO2 98%   Breastfeeding No   BMI 27.48 kg/m²  Body surface area is 1.71 meters squared.   Pain Score    02/09/23 1327   PainSc: 0-No pain         PHYSICAL EXAMINATION:     Physical Exam  Vitals reviewed.   Constitutional:       General: She is not in acute distress.  HENT:      Head: Normocephalic and atraumatic.   Eyes:      General: No scleral icterus.  Cardiovascular:      Rate and Rhythm: Normal rate.   Pulmonary:      Effort: No respiratory distress.      Breath sounds: No wheezing or rales.   Abdominal:      General: Bowel sounds are normal.      Palpations: Abdomen is soft.      Tenderness: There is no abdominal tenderness.   Musculoskeletal:      Cervical back: Neck supple.      Comments: Chronic bilateral leg edema.    Skin:     General: Skin is warm.      Coloration: Skin is pale.   Neurological:      Mental Status: She is alert and oriented to person, place, and time.   Psychiatric:         Mood and Affect: Mood normal.         Behavior: Behavior normal.         Thought Content: Thought content normal.         Judgment: " Judgment normal.         LABS    Lab Results - Last 18 Months   Lab Units 01/05/23  1355 12/22/22  1129 12/08/22  1349 11/23/22  1350 11/10/22  1333 10/27/22  1351 10/13/22  1336 09/29/22  1347 09/15/22  1309 08/24/22  1334 08/15/22  0727 07/25/22  0749 07/18/22  0802 04/28/22  0709 04/27/22  0539 04/19/22  0859 04/04/22  1034   HEMOGLOBIN g/dL 11.2* 10.6* 10.9* 10.4* 10.9* 10.3* 10.7* 10.5* 10.0*   < > 11.5*   < > 11.1*   < > 9.4*   < > 7.0*   HEMATOCRIT % 33.0* 31.6* 32.7* 30.8* 30.6* 30.9* 31.8* 30.7* 29.7*   < > 33.6*   < > 33.2*   < > 29.4*   < > 22.7*   MCV fL 89.7 90.8 95.1 94.5 91.3 94.2 95.2 95.3 95.8   < > 98.8*   < > 101.5*   < > 106.1*   < > 112.9*   WBC 10*3/mm3 14.27* 16.14* 9.56 11.35* 10.35 12.81* 14.37* 13.11* 11.63*   < > 14.33*   < > 17.82*   < > 15.25*   < > 18.99*   RDW % 18.3* 15.1 16.0* 16.4* 16.3* 16.8* 16.6* 17.3* 16.4*   < > 17.9*   < > 19.1*   < > 25.2*   < > 22.4*   MPV fL 11.2 11.1 11.5 11.6 11.6 11.4 11.5 11.4 11.5   < > 11.0   < > 11.2   < > 11.5   < > 11.2   PLATELETS 10*3/mm3 542* 414 268 275 248 314 298 293 314   < > 351   < > 245   < > 218   < > 329   IMM GRAN % % 2.7* 1.1*  --   --  0.4  --  0.9* 1.2* 0.9*   < >  --    < >  --    < >  --    < >  --    NEUTROS ABS 10*3/mm3 9.66* 10.50* 6.08 7.49* 6.67 10.12* 9.67* 8.72* 7.47*   < > 9.03*   < > 12.47*   < > 10.11*   < > 14.29*   LYMPHS ABS 10*3/mm3 2.38 2.74  --   --  2.27  --  2.63 2.65 2.34   < >  --    < >  --    < >  --    < >  --    MONOS ABS 10*3/mm3 1.32* 2.08*  --   --  0.86  --  1.34* 1.02* 1.21*   < >  --    < >  --    < >  --    < >  --    EOS ABS 10*3/mm3 0.36 0.53* 0.29 0.45* 0.42* 0.51* 0.51* 0.47* 0.40   < >  --    < > 0.36   < > 0.78*   < > 0.19   BASOS ABS 10*3/mm3 0.16 0.11 0.10 0.11 0.09  --  0.09 0.09 0.11   < > 0.14   < >  --    < >  --    < > 0.19   IMMATURE GRANS (ABS) 10*3/mm3 0.39* 0.18*  --   --  0.04  --  0.13* 0.16* 0.10*   < >  --    < >  --    < >  --    < >  --    NRBC /100 WBC 0.0 0.0  --   --  0.0   --  0.0 0.0 0.0   < >  --    < >  --    < > 3.1*   < >  --    NEUTROPHIL % %  --   --  63.6 62.0  --  77.0*  --   --   --   --  59.0  --  70.0  --  64.3   < > 74.2   MONOCYTES % %  --   --  5.1 3.0*  --  6.0  --   --   --   --  9.0  --  8.0  --  9.2   < > 8.2   BASOPHIL % %  --   --  1.0 1.0  --   --   --   --   --   --  1.0  --   --   --   --   --  1.0   ATYP LYMPH % %  --   --   --   --   --  2.0  --   --   --   --  5.0  --   --   --   --   --   --    ANISOCYTOSIS   --   --  Slight/1+ Slight/1+  --  Slight/1+  --   --   --   --  Mod/2+  --  Large/3+  --  Mod/2+   < > Mod/2+   GIANT PLT   --   --  Slight/1+  --   --  Slight/1+  --   --   --   --  Slight/1+  --   --   --   --   --   --     < > = values in this interval not displayed.       Lab Results - Last 18 Months   Lab Units 02/03/23  1417 01/05/23  1355 12/22/22  1129 12/08/22  1349 11/23/22  1350 11/10/22  1333 10/27/22  1351   GLUCOSE mg/dL  --  106* 113* 176* 135* 215* 198*   SODIUM mmol/L  --  138 137 139 138 137 135*   POTASSIUM mmol/L  --  3.9 3.7 3.8 4.2 4.0 3.9   CO2 mmol/L  --  28.0 27.0 28.0 26.0 26.0 27.0   CHLORIDE mmol/L  --  102 101 103 103 102 101   ANION GAP mmol/L  --  8.0 9.0 8.0 9.0 9.0 7.0   CREATININE mg/dL 1.20 0.73 0.70 0.62 0.82 0.70 0.59   BUN mg/dL  --  18 12 13 16 12 12   BUN / CREAT RATIO   --  24.7 17.1 21.0 19.5 17.1 20.3   CALCIUM mg/dL  --  8.6 8.9 8.8 9.0 9.1 8.9   ALK PHOS U/L  --  82 87 61 67 60 66   TOTAL PROTEIN g/dL  --  6.4 6.9 6.6 6.4 6.4 6.5   ALT (SGPT) U/L  --  16 9 12 12 10 12   AST (SGOT) U/L  --  20 17 17 18 15 18   BILIRUBIN mg/dL  --  0.5 0.8 0.8 0.8 0.9 0.9   ALBUMIN g/dL  --  3.9 3.60 4.10 4.20 4.20 3.80   GLOBULIN gm/dL  --  2.5 3.3 2.5 2.2 2.2 2.7       Lab Results - Last 18 Months   Lab Units 01/05/23  1355 12/22/22  1129 12/08/22  1349 11/23/22  1350 11/10/22  1333 10/27/22  1351 05/09/22  0738 04/29/22  0536 04/28/22  0709 04/27/22  0539 04/26/22  0646 04/25/22  0630 04/24/22  0819 04/23/22  0743  "04/22/22  1322 04/19/22  0859   URIC ACID mg/dL  --   --   --   --   --   --   --  1.9* 1.6* 1.5* 2.1* 3.7 6.4*   < > 5.8*  --    LDH U/L 208 197 214 198 194 214   < > 570* 650*  638* 668* 634* 593* 652*   < > 772* 834*   REFERENCE LAB REPORT   --   --   --   --   --   --   --   --   --   --   --   --   --   --  See Attached Report SEE ATTACHED REPORT    < > = values in this interval not displayed.       Lab Results - Last 18 Months   Lab Units 01/05/23  1355 12/08/22  1349 11/10/22  1333 10/13/22  1336 08/08/22  0713 07/11/22  0747 04/22/22  1542 04/19/22  0859 04/04/22  1219 04/04/22  1035 03/31/22  1226 08/17/21  0932   IRON mcg/dL 65 77 45 41 82 125  --   --   --  44   < >  --    TIBC mcg/dL 294* 276* 258* 283* 301 295*  --   --   --  261*   < >  --    IRON SATURATION % 22 28 17* 14* 27 42  --   --   --  17*   < >  --    FERRITIN ng/mL 1,309.00* 1,056.00* 867.60* 488.40* 330.10* 327.30*  --   --   --  311.90*   < >  --    TSH uIU/mL  --   --   --   --   --   --   --  1.960  --  2.100  --  1.302   FOLATE ng/mL  --   --   --   --   --   --  13.70 12.60 14.20  --   --   --     < > = values in this interval not displayed.         Giana Yepez reports a pain score of 0.        ASSESSMENT:  1.  Autoimmune hemolytic anemia.  Treatment status: Post IVIG x2 doses on 04/27/2022.  On prednisone 1 mg/kg from 05/09/2022  through 07/10/2022. 50 mg 07/11/20222 through 07/17/2022. 40 mg 07/18/2022 for 1 week, 30 mg p.o. daily from 07/25/2022, 20 mg daily from 08/01/2022 through 08/07/2022, 15 mg from 08/08/2022 through 08/14/2022, 10 mg from 08/15/2022 through 08/21/2022, and 5 mg from 08/22/2022 through present.   Rituximab 375 mgm2 on 04/28/2022.  Hypersensitivity reaction to rituximab.  Rituximab rechallenge on 07/25/2022 through 08/15/2022, 4 cycles.  Oral iron stopped 10/13/2022. \"I have been taking iron for a long time.\"  Venofer 200 mg on 11/14/2022  2.  Borderline enlarged mediastinal nodes on 04/23/2022. Followed by " pulmonary. Follow up 002.2024.  3.  9 mm hypodense well-circumscribed asymmetry at the level of the upper left vallecula and base of the tongue on the left on 04/23/2022. Smaller on CT 09/07/2022. Followed by Dr. Diomedes Moses.   4.  Performance status of 1.  5.  Unresponsive to oral iron.  IV iron as needed.  Post Injectafer 10/21/2022.  6.  LE autoantibody.            PLAN:  1.   Re:  Note from  on 01/11/2023.  She is no longer has hemolysis.  Reasonable to follow CBC monthly.  If stable, could follow her CBC every 2 to 3 months.  Recommend to schedule for colonoscopy.   2.   Re: Reticulocyte count 4.1 from 6.5.  3.   Re: Heme status.  WBC 8.4, hemoglobin 11.6 from 11.2 from 10.6, hematocrit 34.7, MCV 93.5, and platelet 301.  Ferritin pending from 1309 from 1056  from 867 from 488.4 and saturation pending from 22 from 28 from 17 from 14.     4.   Re: CMP.  Total bilirubin pending from 0.5 from 0.8 and glucose 106.  5.   Re: Haptoglobin is pending from < 10 from 85 from < 10 from <10 < 10 and LDH pending from  208 from 197 from 214 from 198 from 194 from 214 from 230 from 246.   6.   Re: Continue folic acid.   7.  Blood for CBC with differential, CMP, haptoglobin, LDH and reticulocyte count every 4 weeks.  8.  eRx folic acid 1 mg p.o. daily #90 with 3 refills if needed.  9.   Follow-up with Dr. Tolbert at [facility] as needed.  10.  Continue care per primary care physician.  11.  Plan of care discussed with patient.  Understanding expressed.  Patient agreeable to proceed.  12.  Transfuse 1 unit packed RBC if hemoglobin less than 6.9.  Premed Tylenol 500 mg p.o.  Lasix 20 mg IV push after transfusion.  Monitor for for transfusion reactions.  13.  Advance Care Planning   ACP discussion was declined by the patient. Patient does not have an advance directive, information provided.  14.  Ask Dr. Ondina Abdul for colonoscopy.  15.  Return to office in 8 weeks with ferritin  and iron panel.             I have reviewed the assessment and plan and verified the accuracy of it. No changes to assessment and plan since the information was documented. Filemon Elizalde MD 02/09/23         I spent 33 total minutes, face-to-face, caring for Giana today.  Greater than 50% of this time involved counseling and/or coordination of care as documented within this note regarding the patient's illness(es), pros and cons of various treatment options, instructions and/or risk reduction.             (Artem Mace MD)   (Ondina Abdul MD)  (Gerber Tolbert MD [place])  (Dominick Fernandez MD [place]

## 2023-02-03 ENCOUNTER — HOSPITAL ENCOUNTER (OUTPATIENT)
Dept: CT IMAGING | Facility: HOSPITAL | Age: 68
Discharge: HOME | End: 2023-02-03
Admitting: NURSE PRACTITIONER
Payer: MEDICARE

## 2023-02-03 DIAGNOSIS — R59.0 MEDIASTINAL LYMPHADENOPATHY: ICD-10-CM

## 2023-02-03 LAB — CREAT BLDA-MCNC: 1.2 MG/DL (ref 0.6–1.3)

## 2023-02-03 PROCEDURE — 25010000002 IOPAMIDOL 61 % SOLUTION: Performed by: NURSE PRACTITIONER

## 2023-02-03 PROCEDURE — 71260 CT THORAX DX C+: CPT

## 2023-02-03 PROCEDURE — 82565 ASSAY OF CREATININE: CPT

## 2023-02-03 RX ADMIN — IOPAMIDOL 100 ML: 612 INJECTION, SOLUTION INTRAVENOUS at 14:33

## 2023-02-06 NOTE — PROGRESS NOTES
Chief Complaint  Mediastinal lymphadenopathy    Subjective    History of Present Illness {CC  Problem List  Visit Diagnosis   Encounters  Notes  Medications  Labs  Result Review Imaging  Media     Giana Yepez presents to [facility] for:    History of Present Illness  Ms. Yepez is here for follow up and management of MLAD. CT scan was completed a few days ago and is stable. She has had some ongoing issues with sinus drainage for several years.  She continues on allergy medications.  She denies any shortness of breath.  Cough is minimal.  She is up-to-date on vaccinations.  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  She continues to follow with Dr. Elizalde and at [facility].       Prior to Admission medications    Medication Sig Start Date End Date Taking? Authorizing Provider   allopurinol (ZYLOPRIM) 100 MG tablet TAKE ONE TABLET BY MOUTH DAILY 11/23/22   Alexia Juarez APRN   Calcium Citrate (CITRACAL PO) Take  by mouth.    Alonzo Martinez MD   cholecalciferol (VITAMIN D3) 25 MCG (1000 UT) tablet Take 2,000 Units by mouth Daily.    Alonzo Martinez MD   Continuous Blood Gluc  (Dexcom G5  Kit) device Continuous.    Alonzo Martinez MD   diphenhydrAMINE (BENADRYL) 25 mg capsule Take 25 mg by mouth Every 6 (Six) Hours As Needed for Itching.    Alonzo Martinez MD   fluticasone (FLONASE) 50 MCG/ACT nasal spray 2 sprays into the nostril(s) as directed by provider Daily. To control swelling and drainage from allergies 6/8/22   Camila Montes MD   folic acid (FOLVITE) 1 MG tablet Take 1 tablet by mouth Daily. 2/1/23   Filemon Elizalde MD   furosemide (Lasix) 20 MG tablet Take 1 tablet by mouth Daily. For relief of leg swelling 8/11/22   Camila Montes MD   Insulin Infusion Pump device Continuous.    Alonzo Martinez MD   Insulin Lispro (humaLOG) 100 UNIT/ML injection Inject 25-40 Units  "under the skin into the appropriate area as directed Daily. 2/28/22 3/1/23  Alonzo Martinez MD   Loratadine 10 MG capsule Take 10 mg by mouth every night at bedtime. As anti-histamine for allergies    Alonzo Martinez MD   omeprazole (priLOSEC) 40 MG capsule Take 40 mg by mouth Daily. 9/15/15   Alonzo Martinez MD   ondansetron (ZOFRAN) 8 MG tablet Take 1 tablet by mouth Every 8 (Eight) Hours As Needed for Nausea or Vomiting. 7/25/22   Filemon Elizalde MD   rosuvastatin (CRESTOR) 20 MG tablet Take 20 mg by mouth Daily.    Alonzo Martinez MD   vitamin B-12 (CYANOCOBALAMIN) 1000 MCG tablet Take 1,000 mcg by mouth Daily.    Alonzo Martinez MD       Social History     Socioeconomic History   • Marital status:    Tobacco Use   • Smoking status: Every Day     Packs/day: 0.50     Years: 48.00     Pack years: 24.00     Types: Cigarettes     Start date: 2/18/1975     Passive exposure: Current   • Smokeless tobacco: Never   • Tobacco comments:     quit during two pregnancies but went back to smoking after delivery   Vaping Use   • Vaping Use: Never used   Substance and Sexual Activity   • Alcohol use: Not Currently     Alcohol/week: 12.0 standard drinks     Types: 12 Cans of beer per week     Comment: drink at early 20's   • Drug use: No   • Sexual activity: Not Currently     Partners: Male     Birth control/protection: None     Comment: went through menopause over 10 years go       Objective   Vital Signs:   /74   Pulse 78   Ht 158.8 cm (62.5\") Comment: measured  Wt 69.1 kg (152 lb 6.4 oz)   SpO2 97%   BMI 27.43 kg/m²     Physical Exam  Constitutional:       General: She is not in acute distress.     Interventions: Face mask in place.   HENT:      Head: Normocephalic.      Nose: Nose normal.      Mouth/Throat:      Mouth: Mucous membranes are moist.   Eyes:      General: No scleral icterus.  Cardiovascular:      Rate and Rhythm: Normal rate.   Pulmonary:      Effort: No respiratory " distress.   Abdominal:      General: There is no distension.   Neurological:      Mental Status: She is alert and oriented to person, place, and time.   Psychiatric:         Mood and Affect: Mood normal.         Behavior: Behavior is cooperative.        Result Review :{ Labs  Result Review  Imaging  Med Tab  Media :    PFT Values        Some values may be hidden. Unless noted otherwise, only the newest values recorded on each date are displayed.         Old Values PFT Results 2/7/23   No data to display.      Pre Drug PFT Results 2/7/23   FVC 95   FEV1 100   FEF 25-75% 120   FEV1/FVC 82      Post Drug PFT Results 2/7/23   No data to display.      Other Tests PFT Results 2/7/23   DLCO 80   D/VAsb 83               Results for orders placed in visit on 02/07/23    Pulmonary Function Test    Narrative  Pulmonary Function Test  Performed by: Darya Viveros, RRT  Authorized by: Jessica Mckenna, JAY    Pre Drug % Predicted  FVC: 95%  FEV1: 100%  FEF 25-75%: 120%  FEV1/FVC: 82%  DLCO: 80%  D/VAsb: 83%    Interpretation  Spirometry  Spirometry shows normal results. midflow is normal.  Review of FVL curve  Patient's effort is normal.  Diffusion Capacity  The patient's diffusion capacity is normal.  Diffusion capacity is normal when corrected for alveolar volume.               CT Chest With Contrast Diagnostic (02/03/2023 14:32)    My interpretation of imaging: Borderline enlarged hilar nodes stable when compared to prior imaging          Assessment and Plan {CC Problem List  Visit Diagnosis  ROS  Review (Popup)  Health Maintenance  Quality  BestPractice  Medications  SmartSets  SnapShot Encounters  Media      Diagnoses and all orders for this visit:    1. Mediastinal lymphadenopathy (Primary)  -     Pulmonary Function Test  -     CT Chest Without Contrast; Future    2. Rhinorrhea  -     azelastine (ASTELIN) 0.1 % nasal spray; 2 sprays into the nostril(s) as directed by provider 2 (Two) Times a Day. Use  in each nostril as directed  Dispense: 1 each; Refill: 12    3. Subacute cough  -     azelastine (ASTELIN) 0.1 % nasal spray; 2 sprays into the nostril(s) as directed by provider 2 (Two) Times a Day. Use in each nostril as directed  Dispense: 1 each; Refill: 12        BMI is >= 25 and <30. (Overweight) The following options were offered after discussion;: weight loss educational material (shared in after visit summary)      Rx azelastine.  Continue current allergy regimen.  We reviewed CT images and compared to prior.  The hilar nodes remain stable.  Follow-up CT in 1 year to ensure stability.  We reviewed PFT showing normal spirometry and normal diffusion capacity.  She will call if sinuses/cough worsen or fail to improve.  Otherwise she will follow-up in 1 year to review CT.  Call in the interim with issues.    Jessica Mckenna, APRN  2/7/2023  15:18 CST    Follow Up {Instructions Charge Capture  Follow-up Communications   Return in about 1 year (around 2/7/2024).    Patient was given instructions and counseling regarding her condition or for health maintenance advice. Please see specific information pulled into the AVS if appropriate.

## 2023-02-07 ENCOUNTER — PROCEDURE VISIT (OUTPATIENT)
Dept: PULMONOLOGY | Facility: CLINIC | Age: 68
End: 2023-02-07
Payer: MEDICARE

## 2023-02-07 ENCOUNTER — OFFICE VISIT (OUTPATIENT)
Dept: PULMONOLOGY | Facility: CLINIC | Age: 68
End: 2023-02-07
Payer: MEDICARE

## 2023-02-07 VITALS
HEIGHT: 63 IN | DIASTOLIC BLOOD PRESSURE: 74 MMHG | OXYGEN SATURATION: 97 % | HEART RATE: 78 BPM | BODY MASS INDEX: 27 KG/M2 | WEIGHT: 152.4 LBS | SYSTOLIC BLOOD PRESSURE: 126 MMHG

## 2023-02-07 DIAGNOSIS — R05.2 SUBACUTE COUGH: ICD-10-CM

## 2023-02-07 DIAGNOSIS — R59.0 MEDIASTINAL LYMPHADENOPATHY: Primary | ICD-10-CM

## 2023-02-07 DIAGNOSIS — J34.89 RHINORRHEA: ICD-10-CM

## 2023-02-07 PROCEDURE — 99213 OFFICE O/P EST LOW 20 MIN: CPT | Performed by: NURSE PRACTITIONER

## 2023-02-07 PROCEDURE — 94010 BREATHING CAPACITY TEST: CPT | Performed by: NURSE PRACTITIONER

## 2023-02-07 PROCEDURE — 94729 DIFFUSING CAPACITY: CPT | Performed by: NURSE PRACTITIONER

## 2023-02-07 RX ORDER — AZELASTINE 1 MG/ML
2 SPRAY, METERED NASAL 2 TIMES DAILY
Qty: 1 EACH | Refills: 12 | Status: SHIPPED | OUTPATIENT
Start: 2023-02-07

## 2023-02-07 NOTE — PROCEDURES
Pulmonary Function Test  Performed by: Darya Viveros, RRT  Authorized by: Jessica Mckenna APRN      Pre Drug % Predicted    FVC: 95%   FEV1: 100%   FEF 25-75%: 120%   FEV1/FVC: 82%   DLCO: 80%   D/VAsb: 83%    Interpretation   Spirometry   Spirometry shows normal results. midflow is normal.  Review of FVL curve   Patient's effort is normal.   Diffusion Capacity  The patient's diffusion capacity is normal.  Diffusion capacity is normal when corrected for alveolar volume.

## 2023-02-09 ENCOUNTER — OFFICE VISIT (OUTPATIENT)
Dept: ONCOLOGY | Facility: CLINIC | Age: 68
End: 2023-02-09
Payer: MEDICARE

## 2023-02-09 ENCOUNTER — LAB (OUTPATIENT)
Dept: LAB | Facility: HOSPITAL | Age: 68
End: 2023-02-09
Payer: MEDICARE

## 2023-02-09 VITALS
WEIGHT: 152.7 LBS | TEMPERATURE: 97.9 F | BODY MASS INDEX: 27.05 KG/M2 | DIASTOLIC BLOOD PRESSURE: 74 MMHG | RESPIRATION RATE: 18 BRPM | HEART RATE: 74 BPM | OXYGEN SATURATION: 98 % | SYSTOLIC BLOOD PRESSURE: 132 MMHG | HEIGHT: 63 IN

## 2023-02-09 DIAGNOSIS — D59.10 AUTOIMMUNE HEMOLYTIC ANEMIA: Primary | ICD-10-CM

## 2023-02-09 DIAGNOSIS — D50.8 IRON DEFICIENCY ANEMIA SECONDARY TO INADEQUATE DIETARY IRON INTAKE: ICD-10-CM

## 2023-02-09 DIAGNOSIS — D64.81 ANEMIA DUE TO ANTINEOPLASTIC CHEMOTHERAPY: ICD-10-CM

## 2023-02-09 DIAGNOSIS — T45.1X5A ANEMIA DUE TO ANTINEOPLASTIC CHEMOTHERAPY: ICD-10-CM

## 2023-02-09 LAB
ALBUMIN SERPL-MCNC: 4.1 G/DL (ref 3.5–5.2)
ALBUMIN/GLOB SERPL: 1.6 G/DL
ALP SERPL-CCNC: 64 U/L (ref 39–117)
ALT SERPL W P-5'-P-CCNC: 11 U/L (ref 1–33)
ANION GAP SERPL CALCULATED.3IONS-SCNC: 9 MMOL/L (ref 5–15)
AST SERPL-CCNC: 17 U/L (ref 1–32)
BASOPHILS # BLD AUTO: 0.05 10*3/MM3 (ref 0–0.2)
BASOPHILS NFR BLD AUTO: 0.6 % (ref 0–1.5)
BILIRUB SERPL-MCNC: 0.5 MG/DL (ref 0–1.2)
BUN SERPL-MCNC: 14 MG/DL (ref 8–23)
BUN/CREAT SERPL: 17.1 (ref 7–25)
CALCIUM SPEC-SCNC: 9.1 MG/DL (ref 8.6–10.5)
CHLORIDE SERPL-SCNC: 106 MMOL/L (ref 98–107)
CO2 SERPL-SCNC: 29 MMOL/L (ref 22–29)
CREAT SERPL-MCNC: 0.82 MG/DL (ref 0.57–1)
DEPRECATED RDW RBC AUTO: 54.1 FL (ref 37–54)
EGFRCR SERPLBLD CKD-EPI 2021: 78.5 ML/MIN/1.73
EOSINOPHIL # BLD AUTO: 0.5 10*3/MM3 (ref 0–0.4)
EOSINOPHIL NFR BLD AUTO: 5.9 % (ref 0.3–6.2)
ERYTHROCYTE [DISTWIDTH] IN BLOOD BY AUTOMATED COUNT: 15.9 % (ref 12.3–15.4)
GLOBULIN UR ELPH-MCNC: 2.5 GM/DL
GLUCOSE SERPL-MCNC: 83 MG/DL (ref 65–99)
HAPTOGLOB SERPL-MCNC: <10 MG/DL (ref 30–200)
HCT VFR BLD AUTO: 34.7 % (ref 34–46.6)
HGB BLD-MCNC: 11.6 G/DL (ref 12–15.9)
HOLD SPECIMEN: NORMAL
IMM GRANULOCYTES # BLD AUTO: 0.04 10*3/MM3 (ref 0–0.05)
IMM GRANULOCYTES NFR BLD AUTO: 0.5 % (ref 0–0.5)
LDH SERPL-CCNC: 230 U/L (ref 135–214)
LYMPHOCYTES # BLD AUTO: 2.15 10*3/MM3 (ref 0.7–3.1)
LYMPHOCYTES NFR BLD AUTO: 25.5 % (ref 19.6–45.3)
MCH RBC QN AUTO: 31.3 PG (ref 26.6–33)
MCHC RBC AUTO-ENTMCNC: 33.4 G/DL (ref 31.5–35.7)
MCV RBC AUTO: 93.5 FL (ref 79–97)
MONOCYTES # BLD AUTO: 0.68 10*3/MM3 (ref 0.1–0.9)
MONOCYTES NFR BLD AUTO: 8.1 % (ref 5–12)
NEUTROPHILS NFR BLD AUTO: 5.01 10*3/MM3 (ref 1.7–7)
NEUTROPHILS NFR BLD AUTO: 59.4 % (ref 42.7–76)
NRBC BLD AUTO-RTO: 0 /100 WBC (ref 0–0.2)
PLATELET # BLD AUTO: 301 10*3/MM3 (ref 140–450)
PMV BLD AUTO: 11.4 FL (ref 6–12)
POTASSIUM SERPL-SCNC: 3.8 MMOL/L (ref 3.5–5.2)
PROT SERPL-MCNC: 6.6 G/DL (ref 6–8.5)
RBC # BLD AUTO: 3.71 10*6/MM3 (ref 3.77–5.28)
RETICS # AUTO: 0.16 10*6/MM3 (ref 0.02–0.13)
RETICS/RBC NFR AUTO: 4.19 % (ref 0.7–1.9)
SODIUM SERPL-SCNC: 144 MMOL/L (ref 136–145)
WBC NRBC COR # BLD: 8.43 10*3/MM3 (ref 3.4–10.8)

## 2023-02-09 PROCEDURE — 36415 COLL VENOUS BLD VENIPUNCTURE: CPT

## 2023-02-09 PROCEDURE — 85025 COMPLETE CBC W/AUTO DIFF WBC: CPT

## 2023-02-09 PROCEDURE — 83615 LACTATE (LD) (LDH) ENZYME: CPT

## 2023-02-09 PROCEDURE — 99214 OFFICE O/P EST MOD 30 MIN: CPT | Performed by: INTERNAL MEDICINE

## 2023-02-09 PROCEDURE — 83010 ASSAY OF HAPTOGLOBIN QUANT: CPT

## 2023-02-09 PROCEDURE — 80053 COMPREHEN METABOLIC PANEL: CPT

## 2023-02-09 PROCEDURE — 85045 AUTOMATED RETICULOCYTE COUNT: CPT

## 2023-02-13 RX ORDER — ALLOPURINOL 100 MG/1
TABLET ORAL
Qty: 30 TABLET | Refills: 1 | Status: SHIPPED | OUTPATIENT
Start: 2023-02-13

## 2023-06-13 DIAGNOSIS — E53.8 LOW FOLATE: ICD-10-CM

## 2023-06-14 RX ORDER — FOLIC ACID 1 MG/1
TABLET ORAL
Qty: 90 TABLET | Refills: 0 | Status: SHIPPED | OUTPATIENT
Start: 2023-06-14

## 2023-11-21 ENCOUNTER — PRE-ADMISSION TESTING (OUTPATIENT)
Dept: PREADMISSION TESTING | Facility: HOSPITAL | Age: 68
End: 2023-11-21
Payer: MEDICARE

## 2023-11-21 ENCOUNTER — HOSPITAL ENCOUNTER (OUTPATIENT)
Dept: GENERAL RADIOLOGY | Facility: HOSPITAL | Age: 68
Discharge: HOME | End: 2023-11-21
Payer: MEDICARE

## 2023-11-21 VITALS
OXYGEN SATURATION: 96 % | RESPIRATION RATE: 16 BRPM | DIASTOLIC BLOOD PRESSURE: 67 MMHG | SYSTOLIC BLOOD PRESSURE: 140 MMHG | HEART RATE: 65 BPM | BODY MASS INDEX: 27.29 KG/M2 | WEIGHT: 159.83 LBS | HEIGHT: 64 IN

## 2023-11-21 LAB
ALBUMIN SERPL-MCNC: 4.2 G/DL (ref 3.5–5.2)
ALBUMIN/GLOB SERPL: 1.9 G/DL
ALP SERPL-CCNC: 74 U/L (ref 39–117)
ALT SERPL W P-5'-P-CCNC: 19 U/L (ref 1–33)
ANION GAP SERPL CALCULATED.3IONS-SCNC: 6 MMOL/L (ref 5–15)
APTT PPP: 28.7 SECONDS (ref 24.5–36)
AST SERPL-CCNC: 21 U/L (ref 1–32)
BILIRUB SERPL-MCNC: 0.4 MG/DL (ref 0–1.2)
BILIRUB UR QL STRIP: NEGATIVE
BUN SERPL-MCNC: 13 MG/DL (ref 8–23)
BUN/CREAT SERPL: 18.3 (ref 7–25)
CALCIUM SPEC-SCNC: 9.2 MG/DL (ref 8.6–10.5)
CHLORIDE SERPL-SCNC: 102 MMOL/L (ref 98–107)
CLARITY UR: CLEAR
CO2 SERPL-SCNC: 31 MMOL/L (ref 22–29)
COLOR UR: YELLOW
CREAT SERPL-MCNC: 0.71 MG/DL (ref 0.57–1)
DEPRECATED RDW RBC AUTO: 45.1 FL (ref 37–54)
EGFRCR SERPLBLD CKD-EPI 2021: 93.3 ML/MIN/1.73
ERYTHROCYTE [DISTWIDTH] IN BLOOD BY AUTOMATED COUNT: 13.7 % (ref 12.3–15.4)
GLOBULIN UR ELPH-MCNC: 2.2 GM/DL
GLUCOSE SERPL-MCNC: 200 MG/DL (ref 65–99)
GLUCOSE UR STRIP-MCNC: NEGATIVE MG/DL
HCT VFR BLD AUTO: 37 % (ref 34–46.6)
HGB BLD-MCNC: 12.4 G/DL (ref 12–15.9)
HGB UR QL STRIP.AUTO: NEGATIVE
INR PPP: 0.99 (ref 0.91–1.09)
KETONES UR QL STRIP: NEGATIVE
LEUKOCYTE ESTERASE UR QL STRIP.AUTO: NEGATIVE
MCH RBC QN AUTO: 30 PG (ref 26.6–33)
MCHC RBC AUTO-ENTMCNC: 33.5 G/DL (ref 31.5–35.7)
MCV RBC AUTO: 89.6 FL (ref 79–97)
NITRITE UR QL STRIP: NEGATIVE
PH UR STRIP.AUTO: 6.5 [PH] (ref 5–8)
PLATELET # BLD AUTO: 175 10*3/MM3 (ref 140–450)
PMV BLD AUTO: 12.3 FL (ref 6–12)
POTASSIUM SERPL-SCNC: 3.9 MMOL/L (ref 3.5–5.2)
PROT SERPL-MCNC: 6.4 G/DL (ref 6–8.5)
PROT UR QL STRIP: NEGATIVE
PROTHROMBIN TIME: 13.2 SECONDS (ref 11.8–14.8)
RBC # BLD AUTO: 4.13 10*6/MM3 (ref 3.77–5.28)
SODIUM SERPL-SCNC: 139 MMOL/L (ref 136–145)
SP GR UR STRIP: 1.01 (ref 1–1.03)
UROBILINOGEN UR QL STRIP: NORMAL
WBC NRBC COR # BLD AUTO: 8.21 10*3/MM3 (ref 3.4–10.8)

## 2023-11-21 PROCEDURE — 85610 PROTHROMBIN TIME: CPT

## 2023-11-21 PROCEDURE — 85027 COMPLETE CBC AUTOMATED: CPT

## 2023-11-21 PROCEDURE — 36415 COLL VENOUS BLD VENIPUNCTURE: CPT

## 2023-11-21 PROCEDURE — 81003 URINALYSIS AUTO W/O SCOPE: CPT

## 2023-11-21 PROCEDURE — 85730 THROMBOPLASTIN TIME PARTIAL: CPT

## 2023-11-21 PROCEDURE — 80053 COMPREHEN METABOLIC PANEL: CPT

## 2023-11-21 PROCEDURE — 93005 ELECTROCARDIOGRAM TRACING: CPT

## 2023-11-21 PROCEDURE — 71046 X-RAY EXAM CHEST 2 VIEWS: CPT

## 2023-11-21 NOTE — DISCHARGE INSTRUCTIONS
Before you come to the hospital        Arrival time: AS DIRECTED BY OFFICE     YOU MAY TAKE THE FOLLOWING MEDICATION(S) THE MORNING OF SURGERY WITH A SIP OF WATER: ***           ALL OTHER HOME MEDICATION CHECK WITH YOUR PHYSICIAN (especially if   you are taking diabetes medicines or blood thinners)    Do not take any Erectile Dysfunction medications (EX: CIALIS, VIAGRA) 24 hours prior to surgery.      If you were given and instructed to use a germ- killing soap, use as directed the night before surgery and again the morning of surgery or as directed by your surgeon. (Use one-half of the bottle with each shower.)   See attached information for How to Use Chlorhexidine for Bathing if applicable.            Eating and drinking restrictions prior to scheduled arrival time    2 Hours before arrival time STOP   Drinking Clear liquids (water, black coffee-NO CREAM,  apple juice-no pulp)    Clear Liquids    Water and flavored water                                                                      Clear Fruit juices, such as cranberry juice and apple juice.  Black coffee (NO cream of any kind, including powdered).  Plain tea  Clear bouillon or broth.  Flavored gelatin.  Soda.  Gatorade or Powerade.    8 Hours before arrival time STOP   All food, full liquids, and dairy products  Full liquid examples  Juices that have pulp.  Frozen ice pops that contain fruit pieces.  Coffee with creamer  Milk.  Yogurt.    (It is extremely important that you follow these guidelines to prevent delay or cancelation of your procedure)                       MANAGING PAIN AFTER SURGERY    We know you are probably wondering what your pain will be like after surgery.  Following surgery it is unrealistic to expect you will not have pain.   Pain is how our bodies let us know that something is wrong or cautions us to be careful.  That said, our goal is to make your pain tolerable.    Methods we may use to treat your pain include (oral or IV  medications, PCAs, epidurals, nerve blocks, etc.)   While some procedures require IV pain medications for a short time after surgery, transitioning to pain medications by mouth allows for better management of pain.   Your nurse will encourage you to take oral pain medications whenever possible.  IV medications work almost immediately, but only last a short while.  Taking medications by mouth allows for a more constant level of medication in your blood stream for a longer period of time.      Once your pain is out of control it is harder to get back under control.  It is important you are aware when your next dose of pain medication is due.  If you are admitted, your nurse may write the time of your next dose on the white board in your room to help you remember.      We are interested in your pain and encourage you to inform us about aggravating factors during your visit.   Many times a simple repositioning every few hours can make a big difference.    If your physician says it is okay, do not let your pain prevent you from getting out of bed. Be sure to call your nurse for assistance prior to getting up so you do not fall.      Before surgery, please decide your tolerable pain goal.  These faces help describe the pain ratings we use on a 0-10 scale.   Be prepared to tell us your goal and whether or not you take pain or anxiety medications at home.          Preparing for Surgery  Preparing for surgery is an important part of your care. It can make things go more smoothly and help you avoid complications. The steps leading up to surgery may vary among hospitals. Follow all instructions given to you by your health care providers. Ask questions if you do not understand something. Talk about any concerns that you have.  Here are some questions to consider asking before your surgery:  If my surgery is not an emergency (is elective), when would be the best time to have the surgery?  What arrangements do I need to make for  work, home, or school?  What will my recovery be like? How long will it be before I can return to normal activities?  Will I need to prepare my home? Will I need to arrange care for me or my children?  Should I expect to have pain after surgery? What are my pain management options? Are there nonmedical options that I can try for pain?  Tell a health care provider about:  Any allergies you have.  All medicines you are taking, including vitamins, herbs, eye drops, creams, and over-the-counter medicines.  Any problems you or family members have had with anesthetic medicines.  Any blood disorders you have.  Any surgeries you have had.  Any medical conditions you have.  Whether you are pregnant or may be pregnant.  What are the risks?  The risks and complications of surgery depend on the specific procedure that you have. Discuss all the risks with your health care providers before your surgery. Ask about common surgical complications, which may include:  Infection.  Bleeding or a need for blood replacement (transfusion).  Allergic reactions to medicines.  Damage to surrounding nerves, tissues, or structures.  A blood clot.  Scarring.  Failure of the surgery to correct the problem.  Follow these instructions before the procedure:  Several days or weeks before your procedure  You may have a physical exam by your primary health care provider to make sure it is safe for you to have surgery.  You may have testing. This may include a chest X-ray, blood and urine tests, electrocardiogram (ECG), or other testing.  Ask your health care provider about:  Changing or stopping your regular medicines. This is especially important if you are taking diabetes medicines or blood thinners.  Taking medicines such as aspirin and ibuprofen. These medicines can thin your blood. Do not take these medicines unless your health care provider tells you to take them.  Taking over-the-counter medicines, vitamins, herbs, and supplements.  Do not use  any products that contain nicotine or tobacco, such as cigarettes and e-cigarettes. If you need help quitting, ask your health care provider.  Avoid alcohol.  Ask your health care provider if there are exercises you can do to prepare for surgery.  Eat a healthy diet.   Plan to have someone 18 years of age or older to take you home from the hospital. We will need to verify your ride on the morning of surgery if you are being discharged home on the same day. Tell your ride to be expecting a call from the hospital prior to your procedure.   Plan to have a responsible adult care for you for at least 24 hours after you leave the hospital or clinic. This is important.  The day before your procedure  You may be given antibiotic medicine to take by mouth to help prevent infection. Take it as told by your health care provider.  You may be asked to shower with a germ-killing soap.  Follow instructions from your health care provider about eating and drinking restrictions. This includes gum, mints and hard candy.  Pack comfortable clothes according to your procedure.   The day of your procedure  You may need to take another shower with a germ-killing soap before you leave home in the morning.  With a small sip of water, take only the medicines that you are told to take.  Remove all jewelry including rings.   Leave anything you consider valuable at home except hearing aids if needed.  You do not need to bring your home medications into the hospital.   Do not wear any makeup, nail polish, powder, deodorant, lotion, hair accessories, or anything on your skin or body except your clothes.  If you will be staying in the hospital, bring a case to hold your glasses, contacts, or dentures. You may also want to bring your robe and non-skid footwear.       (Do not use denture adhesives since you will be asked to remove them during  surgery).   If you wear oxygen at home, bring it with you the day of surgery.  If instructed by your health  care provider, bring your sleep apnea device with you on the day of your surgery (if this applies to you).  You may want to leave your suitcase and sleep apnea device in the car until after surgery.   Arrive at the hospital as scheduled.  Bring a friend or family member with you who can help to answer questions and be present while you meet with your health care provider.  At the hospital  When you arrive at the hospital:  Go to registration located at the main entrance of the hospital. You will be registered and given a beeper and a sticker sheet. Take the stickers to the Outpatient nurses desk and place in the black tray. This is to notify staff that you have arrived. Then return to the lobby to wait.   When your beeper lights up and vibrates proceed through the double doors, under the stairs, and a member of the Outpatient Surgery staff will escort you to your preoperative room.  You may have to wear compression sleeves. These help to prevent blood clots and reduce swelling in your legs.  An IV may be inserted into one of your veins.              In the operating room, you may be given one or more of the following:        A medicine to help you relax (sedative).        A medicine to numb the area (local anesthetic).        A medicine to make you fall asleep (general anesthetic).        A medicine that is injected into an area of your body to numb everything below the                      injection site (regional anesthetic).  You may be given an antibiotic through your IV to help prevent infection.  Your surgical site will be marked or identified.    Contact a health care provider if you:  Develop a fever of more than 100.4°F (38°C) or other feelings of illness during the 48 hours before your surgery.  Have symptoms that get worse.  Have questions or concerns about your surgery.  Summary  Preparing for surgery can make the procedure go more smoothly and lower your risk of complications.  Before surgery, make a  list of questions and concerns to discuss with your surgeon. Ask about the risks and possible complications.  In the days or weeks before your surgery, follow all instructions from your health care provider. You may need to stop smoking, avoid alcohol, follow eating restrictions, and change or stop your regular medicines.  Contact your surgeon if you develop a fever or other signs of illness during the few days before your surgery.  This information is not intended to replace advice given to you by your health care provider. Make sure you discuss any questions you have with your health care provider.  Document Revised: 12/21/2018 Document Reviewed: 10/23/2018  [employer] Patient Education © 2021 [employer]      How to Use Chlorhexidine Before Surgery  Chlorhexidine gluconate (CHG) is a germ-killing (antiseptic) solution that is used to clean the skin. It can get rid of the bacteria that normally live on the skin and can keep them away for about 24 hours. To clean your skin with CHG, you may be given:  A CHG solution to use in the shower or as part of a sponge bath.  A prepackaged cloth that contains CHG.  Cleaning your skin with CHG may help lower the risk for infection:  While you are staying in the intensive care unit of the hospital.  If you have a vascular access, such as a central line, to provide short-term or long-term access to your veins.  If you have a catheter to drain urine from your bladder.  If you are on a ventilator. A ventilator is a machine that helps you breathe by moving air in and out of your lungs.  After surgery.  What are the risks?  Risks of using CHG include:  A skin reaction.  Hearing loss, if CHG gets in your ears and you have a perforated eardrum.  Eye injury, if CHG gets in your eyes and is not rinsed out.  The CHG product catching fire.  Make sure that you avoid smoking and flames after applying CHG to your skin.  Do not use CHG:  If you have a chlorhexidine allergy or have previously  reacted to chlorhexidine.  On babies younger than 2 months of age.  How to use CHG solution  Use CHG only as told by your health care provider, and follow the instructions on the label.  Use the full amount of CHG as directed. Usually, this is one bottle.  During a shower    Follow these steps when using CHG solution during a shower (unless your health care provider gives you different instructions):  Start the shower.  Use your normal soap and shampoo to wash your face and hair.  Turn off the shower or move out of the shower stream.  Pour the CHG onto a clean washcloth. Do not use any type of brush or rough-edged sponge.  Starting at your neck, lather your body down to your toes. Make sure you follow these instructions:  If you will be having surgery, pay special attention to the part of your body where you will be having surgery. Scrub this area for at least 1 minute.  Do not use CHG on your head or face. If the solution gets into your ears or eyes, rinse them well with water.  Avoid your genital area.  Avoid any areas of skin that have broken skin, cuts, or scrapes.  Scrub your back and under your arms. Make sure to wash skin folds.  Let the lather sit on your skin for 1-2 minutes or as long as told by your health care provider.  Thoroughly rinse your entire body in the shower. Make sure that all body creases and crevices are rinsed well.  Dry off with a clean towel. Do not put any substances on your body afterward--such as powder, lotion, or perfume--unless you are told to do so by your health care provider. Only use lotions that are recommended by the .  Put on clean clothes or pajamas.  If it is the night before your surgery, sleep in clean sheets.     During a sponge bath  Follow these steps when using CHG solution during a sponge bath (unless your health care provider gives you different instructions):  Use your normal soap and shampoo to wash your face and hair.  Pour the CHG onto a clean  washcloth.  Starting at your neck, lather your body down to your toes. Make sure you follow these instructions:  If you will be having surgery, pay special attention to the part of your body where you will be having surgery. Scrub this area for at least 1 minute.  Do not use CHG on your head or face. If the solution gets into your ears or eyes, rinse them well with water.  Avoid your genital area.  Avoid any areas of skin that have broken skin, cuts, or scrapes.  Scrub your back and under your arms. Make sure to wash skin folds.  Let the lather sit on your skin for 1-2 minutes or as long as told by your health care provider.  Using a different clean, wet washcloth, thoroughly rinse your entire body. Make sure that all body creases and crevices are rinsed well.  Dry off with a clean towel. Do not put any substances on your body afterward--such as powder, lotion, or perfume--unless you are told to do so by your health care provider. Only use lotions that are recommended by the .  Put on clean clothes or pajamas.  If it is the night before your surgery, sleep in clean sheets.  How to use CHG prepackaged cloths  Only use CHG cloths as told by your health care provider, and follow the instructions on the label.  Use the CHG cloth on clean, dry skin.  Do not use the CHG cloth on your head or face unless your health care provider tells you to.  When washing with the CHG cloth:  Avoid your genital area.  Avoid any areas of skin that have broken skin, cuts, or scrapes.  Before surgery    Follow these steps when using a CHG cloth to clean before surgery (unless your health care provider gives you different instructions):  Using the CHG cloth, vigorously scrub the part of your body where you will be having surgery. Scrub using a back-and-forth motion for 3 minutes. The area on your body should be completely wet with CHG when you are done scrubbing.  Do not rinse. Discard the cloth and let the area air-dry. Do not  put any substances on the area afterward, such as powder, lotion, or perfume.  Put on clean clothes or pajamas.  If it is the night before your surgery, sleep in clean sheets.     For general bathing  Follow these steps when using CHG cloths for general bathing (unless your health care provider gives you different instructions).  Use a separate CHG cloth for each area of your body. Make sure you wash between any folds of skin and between your fingers and toes. Wash your body in the following order, switching to a new cloth after each step:  The front of your neck, shoulders, and chest.  Both of your arms, under your arms, and your hands.  Your stomach and groin area, avoiding the genitals.  Your right leg and foot.  Your left leg and foot.  The back of your neck, your back, and your buttocks.  Do not rinse. Discard the cloth and let the area air-dry. Do not put any substances on your body afterward--such as powder, lotion, or perfume--unless you are told to do so by your health care provider. Only use lotions that are recommended by the .  Put on clean clothes or pajamas.  Contact a health care provider if:  Your skin gets irritated after scrubbing.  You have questions about using your solution or cloth.  You swallow any chlorhexidine. Call your local poison control center (1-349.745.6768 in the [place]  Get help right away if:  Your eyes itch badly, or they become very red or swollen.  Your skin itches badly and is red or swollen.  Your hearing changes.  You have trouble seeing.  You have swelling or tingling in your mouth or throat.  You have trouble breathing.  These symptoms may represent a serious problem that is an emergency. Do not wait to see if the symptoms will go away. Get medical help right away. Call your local emergency services (459 in the [place] Do not drive yourself to the hospital.  Summary  Chlorhexidine gluconate (CHG) is a germ-killing (antiseptic) solution that is used to clean the  skin. Cleaning your skin with CHG may help to lower your risk for infection.  You may be given CHG to use for bathing. It may be in a bottle or in a prepackaged cloth to use on your skin. Carefully follow your health care provider's instructions and the instructions on the product label.  Do not use CHG if you have a chlorhexidine allergy.  Contact your health care provider if your skin gets irritated after scrubbing.  This information is not intended to replace advice given to you by your health care provider. Make sure you discuss any questions you have with your health care provider.  Document Revised: 04/17/2023 Document Reviewed: 02/28/2022  [employer] Patient Education © 2023 [employer]

## 2023-11-23 LAB
QT INTERVAL: 454 MS
QTC INTERVAL: 454 MS

## 2023-12-04 ENCOUNTER — ANESTHESIA (OUTPATIENT)
Dept: PERIOP | Facility: HOSPITAL | Age: 68
End: 2023-12-04
Payer: MEDICARE

## 2023-12-04 ENCOUNTER — HOSPITAL ENCOUNTER (INPATIENT)
Facility: HOSPITAL | Age: 68
LOS: 3 days | Discharge: HOME | End: 2023-12-07
Attending: ORTHOPAEDIC SURGERY | Admitting: ORTHOPAEDIC SURGERY
Payer: MEDICARE

## 2023-12-04 ENCOUNTER — APPOINTMENT (OUTPATIENT)
Dept: GENERAL RADIOLOGY | Facility: HOSPITAL | Age: 68
End: 2023-12-04
Payer: MEDICARE

## 2023-12-04 ENCOUNTER — ANESTHESIA EVENT (OUTPATIENT)
Dept: PERIOP | Facility: HOSPITAL | Age: 68
End: 2023-12-04
Payer: MEDICARE

## 2023-12-04 DIAGNOSIS — M54.16 LUMBAR RADICULOPATHY: ICD-10-CM

## 2023-12-04 DIAGNOSIS — Z74.09 IMPAIRED MOBILITY: Primary | ICD-10-CM

## 2023-12-04 PROBLEM — G89.29 CHRONIC BILATERAL LOW BACK PAIN WITHOUT SCIATICA: Status: ACTIVE | Noted: 2023-12-04

## 2023-12-04 PROBLEM — M51.369 DDD (DEGENERATIVE DISC DISEASE), LUMBAR: Status: ACTIVE | Noted: 2023-12-04

## 2023-12-04 PROBLEM — M54.40 LUMBAGO OF MULTIPLE SITES IN SPINE WITH SCIATICA: Status: ACTIVE | Noted: 2023-12-04

## 2023-12-04 PROBLEM — M54.50 CHRONIC BILATERAL LOW BACK PAIN WITHOUT SCIATICA: Status: ACTIVE | Noted: 2023-12-04

## 2023-12-04 PROBLEM — M51.36 DDD (DEGENERATIVE DISC DISEASE), LUMBAR: Status: ACTIVE | Noted: 2023-12-04

## 2023-12-04 LAB
ABO GROUP BLD: NORMAL
BLD GP AB SCN SERPL QL: POSITIVE
GLUCOSE BLDC GLUCOMTR-MCNC: 222 MG/DL (ref 70–130)
GLUCOSE BLDC GLUCOMTR-MCNC: 265 MG/DL (ref 70–130)
GLUCOSE BLDC GLUCOMTR-MCNC: 280 MG/DL (ref 70–130)
GLUCOSE BLDC GLUCOMTR-MCNC: 281 MG/DL (ref 70–130)
Lab: NORMAL
RH BLD: POSITIVE
T&S EXPIRATION DATE: NORMAL

## 2023-12-04 PROCEDURE — 86902 BLOOD TYPE ANTIGEN DONOR EA: CPT

## 2023-12-04 PROCEDURE — 86901 BLOOD TYPING SEROLOGIC RH(D): CPT | Performed by: ORTHOPAEDIC SURGERY

## 2023-12-04 PROCEDURE — 0ST20ZZ RESECTION OF LUMBAR VERTEBRAL DISC, OPEN APPROACH: ICD-10-PCS | Performed by: ORTHOPAEDIC SURGERY

## 2023-12-04 PROCEDURE — C1713 ANCHOR/SCREW BN/BN,TIS/BN: HCPCS | Performed by: ORTHOPAEDIC SURGERY

## 2023-12-04 PROCEDURE — 25010000002 ONDANSETRON PER 1 MG: Performed by: ORTHOPAEDIC SURGERY

## 2023-12-04 PROCEDURE — 25810000003 SODIUM CHLORIDE 0.9 % SOLUTION: Performed by: ORTHOPAEDIC SURGERY

## 2023-12-04 PROCEDURE — 25010000002 ONDANSETRON PER 1 MG: Performed by: NURSE ANESTHETIST, CERTIFIED REGISTERED

## 2023-12-04 PROCEDURE — 25010000002 FENTANYL CITRATE (PF) 100 MCG/2ML SOLUTION: Performed by: NURSE ANESTHETIST, CERTIFIED REGISTERED

## 2023-12-04 PROCEDURE — 25010000002 PROCHLORPERAZINE 10 MG/2ML SOLUTION: Performed by: INTERNAL MEDICINE

## 2023-12-04 PROCEDURE — 25010000002 CEFAZOLIN 1-4 GM/50ML-% SOLUTION: Performed by: ORTHOPAEDIC SURGERY

## 2023-12-04 PROCEDURE — 25010000002 HYDROMORPHONE 1 MG/ML SOLUTION: Performed by: NURSE ANESTHETIST, CERTIFIED REGISTERED

## 2023-12-04 PROCEDURE — 82948 REAGENT STRIP/BLOOD GLUCOSE: CPT

## 2023-12-04 PROCEDURE — 25010000002 SUGAMMADEX 200 MG/2ML SOLUTION: Performed by: NURSE ANESTHETIST, CERTIFIED REGISTERED

## 2023-12-04 PROCEDURE — 25010000002 CEFAZOLIN PER 500 MG: Performed by: ORTHOPAEDIC SURGERY

## 2023-12-04 PROCEDURE — 86920 COMPATIBILITY TEST SPIN: CPT

## 2023-12-04 PROCEDURE — 25010000002 PROPOFOL 10 MG/ML EMULSION: Performed by: NURSE ANESTHETIST, CERTIFIED REGISTERED

## 2023-12-04 PROCEDURE — 86870 RBC ANTIBODY IDENTIFICATION: CPT | Performed by: ORTHOPAEDIC SURGERY

## 2023-12-04 PROCEDURE — 86922 COMPATIBILITY TEST ANTIGLOB: CPT

## 2023-12-04 PROCEDURE — 76000 FLUOROSCOPY <1 HR PHYS/QHP: CPT

## 2023-12-04 PROCEDURE — 4A10X4G MONITORING OF CENTRAL NERVOUS ELECTRICAL ACTIVITY, INTRAOPERATIVE, EXTERNAL APPROACH: ICD-10-PCS | Performed by: ORTHOPAEDIC SURGERY

## 2023-12-04 PROCEDURE — 86900 BLOOD TYPING SEROLOGIC ABO: CPT | Performed by: ORTHOPAEDIC SURGERY

## 2023-12-04 PROCEDURE — 25010000002 HYDROMORPHONE PER 4 MG: Performed by: ANESTHESIOLOGY

## 2023-12-04 PROCEDURE — 25810000003 LACTATED RINGERS PER 1000 ML: Performed by: ORTHOPAEDIC SURGERY

## 2023-12-04 PROCEDURE — 0SG10A0 FUSION OF 2 OR MORE LUMBAR VERTEBRAL JOINTS WITH INTERBODY FUSION DEVICE, ANTERIOR APPROACH, ANTERIOR COLUMN, OPEN APPROACH: ICD-10-PCS | Performed by: ORTHOPAEDIC SURGERY

## 2023-12-04 PROCEDURE — 86850 RBC ANTIBODY SCREEN: CPT | Performed by: ORTHOPAEDIC SURGERY

## 2023-12-04 PROCEDURE — 3E0U0GB INTRODUCTION OF RECOMBINANT BONE MORPHOGENETIC PROTEIN INTO JOINTS, OPEN APPROACH: ICD-10-PCS | Performed by: ORTHOPAEDIC SURGERY

## 2023-12-04 PROCEDURE — 72100 X-RAY EXAM L-S SPINE 2/3 VWS: CPT

## 2023-12-04 PROCEDURE — 86870 RBC ANTIBODY IDENTIFICATION: CPT

## 2023-12-04 DEVICE — SPACER 2111255 OLIF25 20MM 6 DEG 10X55
Type: IMPLANTABLE DEVICE | Site: SPINE LUMBAR | Status: FUNCTIONAL
Brand: PIVOX™ OBLIQUE LATERAL SPINAL SYSTEM

## 2023-12-04 DEVICE — BONE GRAFT KIT 7510200 INFUSE SMALL
Type: IMPLANTABLE DEVICE | Site: SPINE LUMBAR | Status: FUNCTIONAL
Brand: INFUSE® BONE GRAFT

## 2023-12-04 DEVICE — KT HEMOST ABS SURGIFOAM PORCN 1GRAM: Type: IMPLANTABLE DEVICE | Site: SPINE LUMBAR | Status: FUNCTIONAL

## 2023-12-04 DEVICE — BONE GRAFT KIT 7510050 INFUSE XX SMALL
Type: IMPLANTABLE DEVICE | Site: SPINE LUMBAR | Status: FUNCTIONAL
Brand: INFUSE® BONE GRAFT

## 2023-12-04 DEVICE — LIF AMP, TWO SCREW PLATE, 04 AND CENTER SCREW, 15MM
Type: IMPLANTABLE DEVICE | Site: SPINE LUMBAR | Status: FUNCTIONAL
Brand: AMP

## 2023-12-04 DEVICE — LIF AMP, Ø5.5MM X 35MM BONE SCREW X2
Type: IMPLANTABLE DEVICE | Site: SPINE LUMBAR | Status: FUNCTIONAL
Brand: AMP

## 2023-12-04 DEVICE — ALLOGRFT FIBR OSTEOAMP SELECT 10CC: Type: IMPLANTABLE DEVICE | Site: SPINE LUMBAR | Status: FUNCTIONAL

## 2023-12-04 RX ORDER — SODIUM CHLORIDE 9 MG/ML
100 INJECTION, SOLUTION INTRAVENOUS CONTINUOUS
Status: DISCONTINUED | OUTPATIENT
Start: 2023-12-04 | End: 2023-12-06 | Stop reason: SDUPTHER

## 2023-12-04 RX ORDER — NALOXONE HCL 0.4 MG/ML
0.4 VIAL (ML) INJECTION
Status: DISCONTINUED | OUTPATIENT
Start: 2023-12-04 | End: 2023-12-07 | Stop reason: HOSPADM

## 2023-12-04 RX ORDER — SODIUM CHLORIDE 0.9 % (FLUSH) 0.9 %
10 SYRINGE (ML) INJECTION AS NEEDED
Status: DISCONTINUED | OUTPATIENT
Start: 2023-12-04 | End: 2023-12-07 | Stop reason: HOSPADM

## 2023-12-04 RX ORDER — ALLOPURINOL 100 MG/1
100 TABLET ORAL DAILY
COMMUNITY

## 2023-12-04 RX ORDER — SODIUM CHLORIDE 0.9 % (FLUSH) 0.9 %
3 SYRINGE (ML) INJECTION EVERY 12 HOURS SCHEDULED
Status: DISCONTINUED | OUTPATIENT
Start: 2023-12-04 | End: 2023-12-07 | Stop reason: HOSPADM

## 2023-12-04 RX ORDER — FUROSEMIDE 20 MG/1
20 TABLET ORAL DAILY PRN
COMMUNITY

## 2023-12-04 RX ORDER — HYDROMORPHONE HYDROCHLORIDE 1 MG/ML
0.5 INJECTION, SOLUTION INTRAMUSCULAR; INTRAVENOUS; SUBCUTANEOUS
Status: DISCONTINUED | OUTPATIENT
Start: 2023-12-04 | End: 2023-12-07 | Stop reason: HOSPADM

## 2023-12-04 RX ORDER — FOLIC ACID 1 MG/1
1 TABLET ORAL DAILY
COMMUNITY

## 2023-12-04 RX ORDER — SODIUM CHLORIDE 0.9 % (FLUSH) 0.9 %
10 SYRINGE (ML) INJECTION AS NEEDED
Status: DISCONTINUED | OUTPATIENT
Start: 2023-12-04 | End: 2023-12-04 | Stop reason: HOSPADM

## 2023-12-04 RX ORDER — ACETAMINOPHEN 325 MG/1
650 TABLET ORAL EVERY 4 HOURS PRN
Status: DISCONTINUED | OUTPATIENT
Start: 2023-12-04 | End: 2023-12-07 | Stop reason: HOSPADM

## 2023-12-04 RX ORDER — FENTANYL CITRATE 50 UG/ML
25 INJECTION, SOLUTION INTRAMUSCULAR; INTRAVENOUS
Status: DISCONTINUED | OUTPATIENT
Start: 2023-12-04 | End: 2023-12-04 | Stop reason: HOSPADM

## 2023-12-04 RX ORDER — FENTANYL CITRATE 50 UG/ML
INJECTION, SOLUTION INTRAMUSCULAR; INTRAVENOUS AS NEEDED
Status: DISCONTINUED | OUTPATIENT
Start: 2023-12-04 | End: 2023-12-04 | Stop reason: SURG

## 2023-12-04 RX ORDER — ONDANSETRON 2 MG/ML
4 INJECTION INTRAMUSCULAR; INTRAVENOUS
Status: DISCONTINUED | OUTPATIENT
Start: 2023-12-04 | End: 2023-12-04 | Stop reason: HOSPADM

## 2023-12-04 RX ORDER — SODIUM CHLORIDE 9 MG/ML
40 INJECTION, SOLUTION INTRAVENOUS AS NEEDED
Status: DISCONTINUED | OUTPATIENT
Start: 2023-12-04 | End: 2023-12-04 | Stop reason: HOSPADM

## 2023-12-04 RX ORDER — OXYCODONE AND ACETAMINOPHEN 10; 325 MG/1; MG/1
1 TABLET ORAL ONCE AS NEEDED
Status: DISCONTINUED | OUTPATIENT
Start: 2023-12-04 | End: 2023-12-04 | Stop reason: HOSPADM

## 2023-12-04 RX ORDER — AMOXICILLIN 250 MG
1 CAPSULE ORAL 2 TIMES DAILY
Status: DISCONTINUED | OUTPATIENT
Start: 2023-12-04 | End: 2023-12-04

## 2023-12-04 RX ORDER — SODIUM CHLORIDE, SODIUM LACTATE, POTASSIUM CHLORIDE, CALCIUM CHLORIDE 600; 310; 30; 20 MG/100ML; MG/100ML; MG/100ML; MG/100ML
100 INJECTION, SOLUTION INTRAVENOUS CONTINUOUS PRN
Status: DISCONTINUED | OUTPATIENT
Start: 2023-12-04 | End: 2023-12-04 | Stop reason: HOSPADM

## 2023-12-04 RX ORDER — ONDANSETRON 2 MG/ML
INJECTION INTRAMUSCULAR; INTRAVENOUS AS NEEDED
Status: DISCONTINUED | OUTPATIENT
Start: 2023-12-04 | End: 2023-12-04 | Stop reason: SURG

## 2023-12-04 RX ORDER — SODIUM CHLORIDE 0.9 % (FLUSH) 0.9 %
10 SYRINGE (ML) INJECTION EVERY 12 HOURS SCHEDULED
Status: DISCONTINUED | OUTPATIENT
Start: 2023-12-04 | End: 2023-12-04 | Stop reason: HOSPADM

## 2023-12-04 RX ORDER — MAGNESIUM HYDROXIDE 1200 MG/15ML
LIQUID ORAL AS NEEDED
Status: DISCONTINUED | OUTPATIENT
Start: 2023-12-04 | End: 2023-12-04 | Stop reason: HOSPADM

## 2023-12-04 RX ORDER — ONDANSETRON 4 MG/1
4 TABLET, FILM COATED ORAL EVERY 6 HOURS PRN
Status: DISCONTINUED | OUTPATIENT
Start: 2023-12-04 | End: 2023-12-07 | Stop reason: HOSPADM

## 2023-12-04 RX ORDER — HYDROCODONE BITARTRATE AND ACETAMINOPHEN 7.5; 325 MG/1; MG/1
1 TABLET ORAL EVERY 4 HOURS PRN
Status: DISCONTINUED | OUTPATIENT
Start: 2023-12-04 | End: 2023-12-07 | Stop reason: HOSPADM

## 2023-12-04 RX ORDER — AMOXICILLIN 250 MG
1 CAPSULE ORAL 2 TIMES DAILY
Status: DISCONTINUED | OUTPATIENT
Start: 2023-12-04 | End: 2023-12-07 | Stop reason: HOSPADM

## 2023-12-04 RX ORDER — SODIUM CHLORIDE 9 MG/ML
40 INJECTION, SOLUTION INTRAVENOUS AS NEEDED
Status: DISCONTINUED | OUTPATIENT
Start: 2023-12-04 | End: 2023-12-07 | Stop reason: HOSPADM

## 2023-12-04 RX ORDER — LABETALOL HYDROCHLORIDE 5 MG/ML
5 INJECTION, SOLUTION INTRAVENOUS
Status: DISCONTINUED | OUTPATIENT
Start: 2023-12-04 | End: 2023-12-04 | Stop reason: HOSPADM

## 2023-12-04 RX ORDER — ROSUVASTATIN CALCIUM 20 MG/1
20 TABLET, COATED ORAL DAILY
Status: DISCONTINUED | OUTPATIENT
Start: 2023-12-04 | End: 2023-12-07 | Stop reason: HOSPADM

## 2023-12-04 RX ORDER — NALOXONE HCL 0.4 MG/ML
0.04 VIAL (ML) INJECTION AS NEEDED
Status: DISCONTINUED | OUTPATIENT
Start: 2023-12-04 | End: 2023-12-04 | Stop reason: HOSPADM

## 2023-12-04 RX ORDER — ROCURONIUM BROMIDE 10 MG/ML
INJECTION, SOLUTION INTRAVENOUS AS NEEDED
Status: DISCONTINUED | OUTPATIENT
Start: 2023-12-04 | End: 2023-12-04 | Stop reason: SURG

## 2023-12-04 RX ORDER — AZELASTINE 1 MG/ML
2 SPRAY, METERED NASAL 2 TIMES DAILY
Status: DISCONTINUED | OUTPATIENT
Start: 2023-12-04 | End: 2023-12-07 | Stop reason: HOSPADM

## 2023-12-04 RX ORDER — HYDROCODONE BITARTRATE AND ACETAMINOPHEN 7.5; 325 MG/1; MG/1
2 TABLET ORAL EVERY 4 HOURS PRN
Status: DISCONTINUED | OUTPATIENT
Start: 2023-12-04 | End: 2023-12-07 | Stop reason: HOSPADM

## 2023-12-04 RX ORDER — DIPHENHYDRAMINE HCL 25 MG
25 CAPSULE ORAL EVERY 6 HOURS PRN
Status: DISCONTINUED | OUTPATIENT
Start: 2023-12-04 | End: 2023-12-07 | Stop reason: HOSPADM

## 2023-12-04 RX ORDER — DEXTROSE MONOHYDRATE 25 G/50ML
12.5 INJECTION, SOLUTION INTRAVENOUS AS NEEDED
Status: DISCONTINUED | OUTPATIENT
Start: 2023-12-04 | End: 2023-12-04 | Stop reason: HOSPADM

## 2023-12-04 RX ORDER — CEFAZOLIN SODIUM 1 G/50ML
1 INJECTION, SOLUTION INTRAVENOUS ONCE
Status: COMPLETED | OUTPATIENT
Start: 2023-12-04 | End: 2023-12-04

## 2023-12-04 RX ORDER — IBUPROFEN 600 MG/1
600 TABLET ORAL ONCE AS NEEDED
Status: DISCONTINUED | OUTPATIENT
Start: 2023-12-04 | End: 2023-12-04 | Stop reason: HOSPADM

## 2023-12-04 RX ORDER — CYCLOBENZAPRINE HCL 10 MG
10 TABLET ORAL 3 TIMES DAILY PRN
Status: DISCONTINUED | OUTPATIENT
Start: 2023-12-04 | End: 2023-12-07 | Stop reason: HOSPADM

## 2023-12-04 RX ORDER — DROPERIDOL 2.5 MG/ML
0.62 INJECTION, SOLUTION INTRAMUSCULAR; INTRAVENOUS ONCE AS NEEDED
Status: DISCONTINUED | OUTPATIENT
Start: 2023-12-04 | End: 2023-12-04 | Stop reason: HOSPADM

## 2023-12-04 RX ORDER — PREGABALIN 75 MG/1
75 CAPSULE ORAL EVERY 12 HOURS SCHEDULED
Status: DISCONTINUED | OUTPATIENT
Start: 2023-12-04 | End: 2023-12-07 | Stop reason: HOSPADM

## 2023-12-04 RX ORDER — PROPOFOL 10 MG/ML
VIAL (ML) INTRAVENOUS AS NEEDED
Status: DISCONTINUED | OUTPATIENT
Start: 2023-12-04 | End: 2023-12-04 | Stop reason: SURG

## 2023-12-04 RX ORDER — PROCHLORPERAZINE EDISYLATE 5 MG/ML
5 INJECTION INTRAMUSCULAR; INTRAVENOUS EVERY 6 HOURS PRN
Status: DISCONTINUED | OUTPATIENT
Start: 2023-12-04 | End: 2023-12-07 | Stop reason: HOSPADM

## 2023-12-04 RX ORDER — FLUMAZENIL 0.1 MG/ML
0.2 INJECTION INTRAVENOUS AS NEEDED
Status: DISCONTINUED | OUTPATIENT
Start: 2023-12-04 | End: 2023-12-04 | Stop reason: HOSPADM

## 2023-12-04 RX ORDER — MIDAZOLAM HYDROCHLORIDE 1 MG/ML
0.5 INJECTION INTRAMUSCULAR; INTRAVENOUS
Status: DISCONTINUED | OUTPATIENT
Start: 2023-12-04 | End: 2023-12-04 | Stop reason: HOSPADM

## 2023-12-04 RX ORDER — OXYCODONE HCL 20 MG/1
20 TABLET, FILM COATED, EXTENDED RELEASE ORAL ONCE
Status: COMPLETED | OUTPATIENT
Start: 2023-12-04 | End: 2023-12-04

## 2023-12-04 RX ORDER — PREGABALIN 75 MG/1
75 CAPSULE ORAL 2 TIMES DAILY
Status: ON HOLD | COMMUNITY
Start: 2023-08-08 | End: 2023-12-07 | Stop reason: SDUPTHER

## 2023-12-04 RX ORDER — ONDANSETRON 2 MG/ML
4 INJECTION INTRAMUSCULAR; INTRAVENOUS EVERY 6 HOURS PRN
Status: DISCONTINUED | OUTPATIENT
Start: 2023-12-04 | End: 2023-12-07 | Stop reason: HOSPADM

## 2023-12-04 RX ORDER — SODIUM CHLORIDE, SODIUM LACTATE, POTASSIUM CHLORIDE, CALCIUM CHLORIDE 600; 310; 30; 20 MG/100ML; MG/100ML; MG/100ML; MG/100ML
9 INJECTION, SOLUTION INTRAVENOUS CONTINUOUS
Status: DISCONTINUED | OUTPATIENT
Start: 2023-12-04 | End: 2023-12-04

## 2023-12-04 RX ORDER — SODIUM CHLORIDE, SODIUM LACTATE, POTASSIUM CHLORIDE, CALCIUM CHLORIDE 600; 310; 30; 20 MG/100ML; MG/100ML; MG/100ML; MG/100ML
1000 INJECTION, SOLUTION INTRAVENOUS CONTINUOUS
Status: DISCONTINUED | OUTPATIENT
Start: 2023-12-04 | End: 2023-12-04

## 2023-12-04 RX ORDER — LIDOCAINE HYDROCHLORIDE 20 MG/ML
INJECTION, SOLUTION EPIDURAL; INFILTRATION; INTRACAUDAL; PERINEURAL AS NEEDED
Status: DISCONTINUED | OUTPATIENT
Start: 2023-12-04 | End: 2023-12-04 | Stop reason: SURG

## 2023-12-04 RX ORDER — HYDROMORPHONE HYDROCHLORIDE 1 MG/ML
0.5 INJECTION, SOLUTION INTRAMUSCULAR; INTRAVENOUS; SUBCUTANEOUS
Status: DISCONTINUED | OUTPATIENT
Start: 2023-12-04 | End: 2023-12-04 | Stop reason: HOSPADM

## 2023-12-04 RX ORDER — POLYETHYLENE GLYCOL 3350 17 G/17G
17 POWDER, FOR SOLUTION ORAL 3 TIMES DAILY
Status: DISCONTINUED | OUTPATIENT
Start: 2023-12-04 | End: 2023-12-07 | Stop reason: HOSPADM

## 2023-12-04 RX ORDER — POLYETHYLENE GLYCOL 3350 17 G/17G
17 POWDER, FOR SOLUTION ORAL DAILY PRN
Status: DISCONTINUED | OUTPATIENT
Start: 2023-12-04 | End: 2023-12-04

## 2023-12-04 RX ORDER — ALLOPURINOL 100 MG/1
100 TABLET ORAL DAILY
Status: DISCONTINUED | OUTPATIENT
Start: 2023-12-04 | End: 2023-12-07 | Stop reason: HOSPADM

## 2023-12-04 RX ORDER — EPHEDRINE SULFATE 50 MG/ML
INJECTION, SOLUTION INTRAVENOUS AS NEEDED
Status: DISCONTINUED | OUTPATIENT
Start: 2023-12-04 | End: 2023-12-04 | Stop reason: SURG

## 2023-12-04 RX ORDER — LIDOCAINE HYDROCHLORIDE 10 MG/ML
0.5 INJECTION, SOLUTION EPIDURAL; INFILTRATION; INTRACAUDAL; PERINEURAL ONCE AS NEEDED
Status: DISCONTINUED | OUTPATIENT
Start: 2023-12-04 | End: 2023-12-04 | Stop reason: HOSPADM

## 2023-12-04 RX ADMIN — HYDROMORPHONE HYDROCHLORIDE 1 MG: 1 INJECTION, SOLUTION INTRAMUSCULAR; INTRAVENOUS; SUBCUTANEOUS at 11:09

## 2023-12-04 RX ADMIN — FENTANYL CITRATE 100 MCG: 50 INJECTION, SOLUTION INTRAMUSCULAR; INTRAVENOUS at 10:04

## 2023-12-04 RX ADMIN — ROCURONIUM BROMIDE 30 MG: 10 INJECTION INTRAVENOUS at 10:04

## 2023-12-04 RX ADMIN — SODIUM CHLORIDE, POTASSIUM CHLORIDE, SODIUM LACTATE AND CALCIUM CHLORIDE 100 ML/HR: 600; 310; 30; 20 INJECTION, SOLUTION INTRAVENOUS at 07:49

## 2023-12-04 RX ADMIN — ONDANSETRON 4 MG: 2 INJECTION INTRAMUSCULAR; INTRAVENOUS at 12:09

## 2023-12-04 RX ADMIN — PROPOFOL 200 MG: 10 INJECTION, EMULSION INTRAVENOUS at 10:04

## 2023-12-04 RX ADMIN — PROCHLORPERAZINE EDISYLATE 5 MG: 5 INJECTION INTRAMUSCULAR; INTRAVENOUS at 20:56

## 2023-12-04 RX ADMIN — ROSUVASTATIN CALCIUM 20 MG: 20 TABLET, FILM COATED ORAL at 18:21

## 2023-12-04 RX ADMIN — EPHEDRINE SULFATE 25 MG: 50 INJECTION INTRAVENOUS at 10:50

## 2023-12-04 RX ADMIN — POLYETHYLENE GLYCOL 3350 17 G: 17 POWDER, FOR SOLUTION ORAL at 18:22

## 2023-12-04 RX ADMIN — EPHEDRINE SULFATE 15 MG: 50 INJECTION INTRAVENOUS at 10:24

## 2023-12-04 RX ADMIN — CEFAZOLIN 2000 MG: 2 INJECTION, POWDER, FOR SOLUTION INTRAMUSCULAR; INTRAVENOUS at 18:22

## 2023-12-04 RX ADMIN — ONDANSETRON 4 MG: 2 INJECTION INTRAMUSCULAR; INTRAVENOUS at 18:21

## 2023-12-04 RX ADMIN — EPHEDRINE SULFATE 10 MG: 50 INJECTION INTRAVENOUS at 10:37

## 2023-12-04 RX ADMIN — SUGAMMADEX 200 MG: 100 INJECTION, SOLUTION INTRAVENOUS at 10:32

## 2023-12-04 RX ADMIN — FENTANYL CITRATE 100 MCG: 50 INJECTION, SOLUTION INTRAMUSCULAR; INTRAVENOUS at 10:32

## 2023-12-04 RX ADMIN — HYDROMORPHONE HYDROCHLORIDE 0.5 MG: 1 INJECTION, SOLUTION INTRAMUSCULAR; INTRAVENOUS; SUBCUTANEOUS at 12:51

## 2023-12-04 RX ADMIN — HYDROMORPHONE HYDROCHLORIDE 0.5 MG: 1 INJECTION, SOLUTION INTRAMUSCULAR; INTRAVENOUS; SUBCUTANEOUS at 12:08

## 2023-12-04 RX ADMIN — ALLOPURINOL 100 MG: 100 TABLET ORAL at 18:21

## 2023-12-04 RX ADMIN — SODIUM CHLORIDE 100 ML/HR: 9 INJECTION, SOLUTION INTRAVENOUS at 18:20

## 2023-12-04 RX ADMIN — OXYCODONE HYDROCHLORIDE 20 MG: 20 TABLET, FILM COATED, EXTENDED RELEASE ORAL at 07:49

## 2023-12-04 RX ADMIN — CEFAZOLIN SODIUM 1 G: 1 INJECTION, SOLUTION INTRAVENOUS at 10:09

## 2023-12-04 RX ADMIN — HYDROMORPHONE HYDROCHLORIDE 0.5 MG: 1 INJECTION, SOLUTION INTRAMUSCULAR; INTRAVENOUS; SUBCUTANEOUS at 12:15

## 2023-12-04 RX ADMIN — HYDROMORPHONE HYDROCHLORIDE 0.5 MG: 1 INJECTION, SOLUTION INTRAMUSCULAR; INTRAVENOUS; SUBCUTANEOUS at 13:14

## 2023-12-04 RX ADMIN — HYDROCODONE BITARTRATE AND ACETAMINOPHEN 1 TABLET: 7.5; 325 TABLET ORAL at 18:21

## 2023-12-04 RX ADMIN — SODIUM CHLORIDE, POTASSIUM CHLORIDE, SODIUM LACTATE AND CALCIUM CHLORIDE 1000 ML: 600; 310; 30; 20 INJECTION, SOLUTION INTRAVENOUS at 07:49

## 2023-12-04 RX ADMIN — LIDOCAINE HYDROCHLORIDE 100 MG: 20 INJECTION, SOLUTION EPIDURAL; INFILTRATION; INTRACAUDAL; PERINEURAL at 10:04

## 2023-12-04 NOTE — ANESTHESIA POSTPROCEDURE EVALUATION
Patient: Giana Yepez    Procedure Summary       Date: 12/04/23 Room / Location:  [facility]  /  [facility]    Anesthesia Start: 0959 Anesthesia Stop: 1236    Procedure: RIGHT LATERAL LUMBAR INTERBODY FUSION L2-4 WITH INSTRUMENTATION L2-3 (Right: Spine Lumbar) Diagnosis: (M54.16)    Surgeons: WESLEY Herbert MD Provider: Elise Dow CRNA    Anesthesia Type: general ASA Status: 3            Anesthesia Type: general    Vitals  Vitals Value Taken Time   /54 12/04/23 1514   Temp 97.3 °F (36.3 °C) 12/04/23 1514   Pulse 67 12/04/23 1514   Resp 16 12/04/23 1514   SpO2 100 % 12/04/23 1514           Post Anesthesia Care and Evaluation    Patient location during evaluation: PACU  Patient participation: complete - patient participated  Level of consciousness: awake and alert  Pain management: adequate    Airway patency: patent  Anesthetic complications: No anesthetic complications    Cardiovascular status: acceptable  Respiratory status: acceptable  Hydration status: acceptable    Comments: Blood pressure 102/54, pulse 67, temperature 97.3 °F (36.3 °C), temperature source Oral, resp. rate 16, weight 72 kg (158 lb 11.7 oz), SpO2 100%, not currently breastfeeding.    Pt discharged from PACU based on jorge score >8

## 2023-12-04 NOTE — OP NOTE
Lateral lumbar interbody fusion procedure Note    Giana Yepez  12/4/2023    Pre-op Diagnosis:     1. Increasing chronic back pain.   2. Bilateral buttock and thigh radiculopathy.   3. Neurogenic claudication.   4. Degenerative disc disease L1 to L4, severe L2-3.   5. Facet arthropathy L2-4.   6. Degenerative scoliosis concave left L1-2.   7. Foraminal disc herniation left L3-4.   8. Retrolisthesis L2-3.   9. Central and foraminal stenosis L2 to L4.   10. Osteopenia.     Post-op Diagnosis:    same    Procedure/CPT® Codes:    1.  Right lateral lumbar interbody fusion L2-3, L3-4  2.  Anterior spinal instrumentation L2-3 ([facility] lateral plate and screws)  3.  Use of PEEK interbody biomechanical device for fusion L2-3, L3-4 ([employer] PEEK spacers x 2)  4.  Use of bone morphogenic protein for fusion L3-4  5.  Use of allograft bone matrix for fusion L2-3  6.  Use of fluoroscopy for confirmation of surgical level, placement of PEEK spacers, and instrumentation  7.  Intraoperative neural monitoring    Anesthesia: General    Surgeon: JOLLY Herbert MD    Assistant: Tirso Prince PA-C    Estimated Blood Loss: 50 mL    Complications: None    Condition: Stable to PACU.    Indications:    The patient is a 68-year-old who sees Alexia Angeles nurse practitioner for medical issues.  She presented to the office with increasing chronic back pain along with bilateral buttock and thigh radiculopathy along with symptoms consistent with neurogenic claudication.  Imaging studies revealed degenerative disc disease from L1-4 that was severe at L2-3, where there was also retrolisthesis, along with facet arthropathy from L2-4.  She was also noted to have degenerative scoliosis concave left at L1-2 and a foraminal disc herniation on the left side of L3-4.  The degenerative changes created central and foraminal stenosis that was most prominent from L2-4.     After failing conservative measures, it was mutually decided that surgery would be  the best option. Risks, benefits, and complications of surgery were discussed with the patient. The patient appeared well informed and wished to proceed. We specifically discussed the risk of infection, blood loss, nerve root injury, CSF leak, and the possibility of incomplete resolution of symptoms. We also discussed the possible risk of a nonunion and the potential need for additional surgery in the event of a pseudoarthrosis or hardware failure.     We elected proceed with a staged operation.  Today were performing a lateral fusion from L2 to 4.  It is planned that we will be returning to surgery for a second posterior procedure involving a posterior spinal fusion with instrumentation spanning L2-4.    Operative Procedure:    After obtaining informed consent and verifying the correct operative site, the patient was brought to the operating room and placed supine on an operating table.  A general anesthetic was provided by the anesthesia service with the assistance of an endotracheal tube.  Once this was appropriately positioned and secured, the patient was carefully rotated into the lateral decubitus position with the right side up.  All bony prominences were well-padded.  3 inch wide cloth tape was used to maintain the patient's position.  It was also used to tip open the pelvis slightly allowing better access to the lumbar spine through a lateral approach.  Fluoroscopy was then used to identify the disc spaces of L2-3 and L3-4, and the skin was marked for our planned incision.  The right flank was then prepped and draped in the usual sterile fashion.  A surgical timeout was taken to confirm this was the correct patient, we were working at the correct levels, and that preoperative antibiotics were given in a timely fashion.    An oblique incision was created of the right flank using a 10 blade scalpel directly centered between the L2-3 and L3-4 segments. Dissection was carried bluntly through subcutaneous  tissues. Blunt dissection was also carried between the external oblique and internal oblique musculature. The transversalis fascia was pierced allowing access to the retroperitoneal space. At this point, a series of neuro monitoring probes were used to safely access first the L3-4 level. We used stimulated and free run EMG for this purpose. Once nerve roots were confirmed to be out of harm's way, self retaining retractors were placed for continuous exposure.     An annulotomy was then created at the L3-4 area using a 15 blade scalpel on a long handle. A Marte elevator was used to remove disc material off of the endplates. Disc material was removed using Kerrisons, pituitaries, and forward angled curettes. Once all disc material had been retrieved, I used the Marte elevator to divide the contralateral annulus. This assisted with mobilization of the vertebral bodies. We then used a series of endplate scrapers to prepare the endplates for interbody fusion. Trial spacers were malleted into position and for this disc space it was felt that a 10 mm x 55 mm implant would be the best fit to restore disc height. The disc space was then thoroughly irrigated with saline solution.     A PEEK spacer from the Medtronic instrumentation set measuring 10 mm x 55 mm x 20 mm was then packed with a a collagen sponge soaked with bone morphogenic protein (Medtronic Infuse).  For this I spacer we opened a small package as well as an XX small package of Infuse.  This PEEK spacer was then malleted into the L3-4 disc space under fluoroscopic guidance. It was placed as a PEEK interbody biomechanical device to assist with interbody fusion.  After confirming the spacer was properly positioned in the L3-4 disc space, the area was thoroughly inspected to ensure that we had adequate hemostasis.  Bleeding at this point was controlled using thrombin gelfoam powder and bipolar cautery.  After once again ensuring that we had adequate hemostasis, the  self retaining retractors were then removed and attention was turned L2-3.    Once again the neuro monitoring probes were used in the retroperitoneal space, this time to access L2-3.  We used stimulated and free run EMG for this purpose.  Once nerve roots were confirmed to be out of harm's way, self retaining retractors were placed for continuous exposure of L2-3.  The L2-3 disc space was prepared in identical fashion as L3-4.  We used the Marte elevators to remove disc material off of the endplates.  Disc material was retrieved using forward angled curettes, pituitaries, and Kerrisons.  We then used a series of endplate scrapers to prepare the endplates for interbody fusion.  The Marte elevator was used to divide the contralateral annulus helping to mobilize the vertebral bodies.  Trial spacers were then malleted into position.  It was felt for this disc space a 10 mm x 55 mm implant would again be the best fit to restore disc height. The disc space was then thoroughly irrigated with saline solution.    A second PEEK spacer from the [employer] instrumentation set measuring 10 mm x 55 mm x 20 mm was then packed with an allograft bone matrix called OsteoAmp as tightly as possible. This PEEK spacer was then malleted into the L2-3 disc space under fluoroscopic guidance. It was placed as a PEEK interbody biomechanical device to assist with interbody fusion.  Once this spacer was confirmed to be properly positioned, I chose a 2-hole plate from [facility] to help augment the fusion of L2-3. This plate was held into position using 2 screws. I placed 35 mm screws into both L2 and L3 as fixation.     The wound was then irrigated thoroughly. A thorough inspection was then undertaken to ensure that we had adequate hemostasis. Bleeding at this point was controlled using thrombin with Gelfoam powder and bipolar cautery.     After ensuring that we had adequate hemostasis in the area, final fluoroscopy imaging was taken to confirm adequate  position of both PEEK spacers as well as the instrumentation.  There was excellent restoration of disc height and creation of lordosis compared to preoperative films.  The wound was then copiously irrigated with saline solution.  Again we ensured that we had adequate hemostasis by using bipolar cautery and thrombin gelfoam powder.    Closure was then accomplished with a #1 Vicryl reapproximating the transversalis fascia as well as the internal and external oblique musculature. Immediate subcutaneous cutaneous tissues were closed with a 3-0 Vicryl and skin closure was accomplished using Mastisol and Steri-Strips. The wound was then sterilely dressed. The patient was then carefully rotated supine onto a hospital gurney, extubated, and sent to the recovery room in good stable condition.     The patient tolerated the procedure well. There were no complications. We estimated blood loss to be approximately 50 mL. The patient remained hemodynamically stable.     Intraoperative neuro monitoring was ordered and carried out throughout the procedure to add an increased level of safety for the patient.  The interpreting physician was available by means of real-time continuous, bidirectional, remote audio and visual communication as needed throughout the entire procedure.  Modalities used during the procedure included SSEP, EEG, MEP, EMG, and TOF.  There were no neuro monitoring signal changes during the procedure.    Tirso Prince PA-C provided critical assistance during the procedure. His assistance was medically necessary in order to allow the procedure to occur in the most safe and efficient manner.  He assisted not only with patient positioning and wound closure, but more importantly, he also assisted with disc space preparation, as well as the placement of PEEK spacers and instrumentation to obtain a fusion.    JLOLY Herbert MD     Date: 12/4/2023  Time: 12:30 CST

## 2023-12-05 LAB
ANION GAP SERPL CALCULATED.3IONS-SCNC: 9 MMOL/L (ref 5–15)
BASOPHILS # BLD AUTO: 0.03 10*3/MM3 (ref 0–0.2)
BASOPHILS NFR BLD AUTO: 0.2 % (ref 0–1.5)
BUN SERPL-MCNC: 7 MG/DL (ref 8–23)
BUN/CREAT SERPL: 12.7 (ref 7–25)
CALCIUM SPEC-SCNC: 8.4 MG/DL (ref 8.6–10.5)
CHLORIDE SERPL-SCNC: 102 MMOL/L (ref 98–107)
CO2 SERPL-SCNC: 25 MMOL/L (ref 22–29)
CREAT SERPL-MCNC: 0.55 MG/DL (ref 0.57–1)
DEPRECATED RDW RBC AUTO: 48.6 FL (ref 37–54)
EGFRCR SERPLBLD CKD-EPI 2021: 100.6 ML/MIN/1.73
EOSINOPHIL # BLD AUTO: 0.04 10*3/MM3 (ref 0–0.4)
EOSINOPHIL NFR BLD AUTO: 0.3 % (ref 0.3–6.2)
ERYTHROCYTE [DISTWIDTH] IN BLOOD BY AUTOMATED COUNT: 14.6 % (ref 12.3–15.4)
FERRITIN SERPL-MCNC: 951.4 NG/ML (ref 13–150)
FOLATE SERPL-MCNC: >20 NG/ML (ref 4.78–24.2)
GLUCOSE SERPL-MCNC: 179 MG/DL (ref 65–99)
HCT VFR BLD AUTO: 34.5 % (ref 34–46.6)
HGB BLD-MCNC: 11.4 G/DL (ref 12–15.9)
IMM GRANULOCYTES # BLD AUTO: 0.06 10*3/MM3 (ref 0–0.05)
IMM GRANULOCYTES NFR BLD AUTO: 0.4 % (ref 0–0.5)
IRON 24H UR-MRATE: 17 MCG/DL (ref 37–145)
IRON SATN MFR SERPL: 8 % (ref 20–50)
LYMPHOCYTES # BLD AUTO: 1.55 10*3/MM3 (ref 0.7–3.1)
LYMPHOCYTES NFR BLD AUTO: 10.4 % (ref 19.6–45.3)
MCH RBC QN AUTO: 30.4 PG (ref 26.6–33)
MCHC RBC AUTO-ENTMCNC: 33 G/DL (ref 31.5–35.7)
MCV RBC AUTO: 92 FL (ref 79–97)
MONOCYTES # BLD AUTO: 1.37 10*3/MM3 (ref 0.1–0.9)
MONOCYTES NFR BLD AUTO: 9.2 % (ref 5–12)
NEUTROPHILS NFR BLD AUTO: 11.79 10*3/MM3 (ref 1.7–7)
NEUTROPHILS NFR BLD AUTO: 79.5 % (ref 42.7–76)
NRBC BLD AUTO-RTO: 0 /100 WBC (ref 0–0.2)
PLATELET # BLD AUTO: 182 10*3/MM3 (ref 140–450)
PMV BLD AUTO: 11.8 FL (ref 6–12)
POTASSIUM SERPL-SCNC: 4.1 MMOL/L (ref 3.5–5.2)
RBC # BLD AUTO: 3.75 10*6/MM3 (ref 3.77–5.28)
SODIUM SERPL-SCNC: 136 MMOL/L (ref 136–145)
TIBC SERPL-MCNC: 216 MCG/DL (ref 298–536)
TRANSFERRIN SERPL-MCNC: 145 MG/DL (ref 200–360)
VIT B12 BLD-MCNC: >2000 PG/ML (ref 211–946)
WBC NRBC COR # BLD AUTO: 14.84 10*3/MM3 (ref 3.4–10.8)

## 2023-12-05 PROCEDURE — 97530 THERAPEUTIC ACTIVITIES: CPT

## 2023-12-05 PROCEDURE — 83540 ASSAY OF IRON: CPT | Performed by: STUDENT IN AN ORGANIZED HEALTH CARE EDUCATION/TRAINING PROGRAM

## 2023-12-05 PROCEDURE — 97166 OT EVAL MOD COMPLEX 45 MIN: CPT

## 2023-12-05 PROCEDURE — 82607 VITAMIN B-12: CPT | Performed by: STUDENT IN AN ORGANIZED HEALTH CARE EDUCATION/TRAINING PROGRAM

## 2023-12-05 PROCEDURE — 25810000003 SODIUM CHLORIDE 0.9 % SOLUTION: Performed by: ORTHOPAEDIC SURGERY

## 2023-12-05 PROCEDURE — 82746 ASSAY OF FOLIC ACID SERUM: CPT | Performed by: STUDENT IN AN ORGANIZED HEALTH CARE EDUCATION/TRAINING PROGRAM

## 2023-12-05 PROCEDURE — 85025 COMPLETE CBC W/AUTO DIFF WBC: CPT | Performed by: STUDENT IN AN ORGANIZED HEALTH CARE EDUCATION/TRAINING PROGRAM

## 2023-12-05 PROCEDURE — 84466 ASSAY OF TRANSFERRIN: CPT | Performed by: STUDENT IN AN ORGANIZED HEALTH CARE EDUCATION/TRAINING PROGRAM

## 2023-12-05 PROCEDURE — 25010000002 CEFAZOLIN PER 500 MG: Performed by: ORTHOPAEDIC SURGERY

## 2023-12-05 PROCEDURE — 25010000002 PROMETHAZINE PER 50 MG: Performed by: INTERNAL MEDICINE

## 2023-12-05 PROCEDURE — 97161 PT EVAL LOW COMPLEX 20 MIN: CPT | Performed by: PHYSICAL THERAPIST

## 2023-12-05 PROCEDURE — 25010000002 PROCHLORPERAZINE 10 MG/2ML SOLUTION: Performed by: INTERNAL MEDICINE

## 2023-12-05 PROCEDURE — 80048 BASIC METABOLIC PNL TOTAL CA: CPT | Performed by: STUDENT IN AN ORGANIZED HEALTH CARE EDUCATION/TRAINING PROGRAM

## 2023-12-05 PROCEDURE — 82728 ASSAY OF FERRITIN: CPT | Performed by: STUDENT IN AN ORGANIZED HEALTH CARE EDUCATION/TRAINING PROGRAM

## 2023-12-05 PROCEDURE — 97116 GAIT TRAINING THERAPY: CPT

## 2023-12-05 RX ADMIN — DOCUSATE SODIUM 50 MG AND SENNOSIDES 8.6 MG 1 TABLET: 8.6; 5 TABLET, FILM COATED ORAL at 08:48

## 2023-12-05 RX ADMIN — AZELASTINE HYDROCHLORIDE 2 SPRAY: 137 SPRAY, METERED NASAL at 08:47

## 2023-12-05 RX ADMIN — PROMETHAZINE HYDROCHLORIDE 12.5 MG: 25 INJECTION INTRAMUSCULAR; INTRAVENOUS at 02:58

## 2023-12-05 RX ADMIN — Medication 3 ML: at 21:24

## 2023-12-05 RX ADMIN — CYCLOBENZAPRINE 10 MG: 10 TABLET, FILM COATED ORAL at 01:36

## 2023-12-05 RX ADMIN — ALLOPURINOL 100 MG: 100 TABLET ORAL at 08:49

## 2023-12-05 RX ADMIN — PROCHLORPERAZINE EDISYLATE 5 MG: 5 INJECTION INTRAMUSCULAR; INTRAVENOUS at 06:41

## 2023-12-05 RX ADMIN — DOCUSATE SODIUM 50 MG AND SENNOSIDES 8.6 MG 1 TABLET: 8.6; 5 TABLET, FILM COATED ORAL at 21:24

## 2023-12-05 RX ADMIN — POLYETHYLENE GLYCOL 3350 17 G: 17 POWDER, FOR SOLUTION ORAL at 21:24

## 2023-12-05 RX ADMIN — PREGABALIN 75 MG: 75 CAPSULE ORAL at 21:24

## 2023-12-05 RX ADMIN — PREGABALIN 75 MG: 75 CAPSULE ORAL at 08:47

## 2023-12-05 RX ADMIN — ROSUVASTATIN CALCIUM 20 MG: 20 TABLET, FILM COATED ORAL at 08:49

## 2023-12-05 RX ADMIN — CEFAZOLIN 2000 MG: 2 INJECTION, POWDER, FOR SOLUTION INTRAMUSCULAR; INTRAVENOUS at 01:27

## 2023-12-05 RX ADMIN — SODIUM CHLORIDE 100 ML/HR: 9 INJECTION, SOLUTION INTRAVENOUS at 01:31

## 2023-12-05 NOTE — PLAN OF CARE
Goal Outcome Evaluation:  Plan of Care Reviewed With: (P) patient, spouse        Progress: (P) improving  Outcome Evaluation: (P) Pt is AOx4. VSS. Complaints of nausea, PRN anti-nausea meds given and good relief given. IV fluids given, increase spontaneous voiding. Dsg is CDI, no complaints of n/t. PPP. Spouse is attentive and at bedside. Call light within reach and safety maintained.

## 2023-12-05 NOTE — PLAN OF CARE
Goal Outcome Evaluation:  Plan of Care Reviewed With: patient        Progress: no change  Outcome Evaluation: The patient presents alert and oriented x4 lying in bed with LSO present in the room. The patient demonstrates new weakness of R hip flexion which is expected of the R lateral approach of surgery. She is able to slight clear her R foot during gait. She has chronic weakness of the L LE. She demonstrates no knee buckling today during gait, however she only ambulates a short distance due to lightheadedness. She has no deficits in sensation or coordination in B LEs. PT will continue to work with her to improve her strength and improve her gait mechanics and activity tolerance. She plans to have a second surgery tomorrow so discharge recommendations will follow.      Anticipated Discharge Disposition (PT): home with assist

## 2023-12-05 NOTE — CONSULTS
Adult Nutrition  Assessment/PES    Patient Name:  Giana Yepez  YOB: 1955  MRN: [id number]  Admit Date:  12/4/2023    Assessment Date:  12/5/2023    NTN consult. Chronic poor intake. Visited pt at bedside. Pt stated she was experiencing severe nausea the past couple of days and was unable to keep food down. Today, pt stated appetite has improved and is tolerating meals. Pt had RIGHT LATERAL LUMBAR INTERBODY FUSION L2-4 WITH INSTRUMENTATION L2-3 (Right: Spine Lumbar) on 12/4. Ordering Ryan BID to optimize surgical wound repair. Last BM 12/4, per pt. No reports of nausea/vomiting, per pt. Will continue to follow per protocol.     Reason for Assessment       Row Name 12/05/23 1513          Reason for Assessment    Reason For Assessment physician consult     Diagnosis diabetes diagnosis/complications;nutrition related history                    Nutrition/Diet History       Row Name 12/05/23 1514          Nutrition/Diet History    Typical Intake (Food/Fluid/EN/PN) NTN consult. Chronic poor intake. Visited pt at bedside. Pt stated she was experiencing severe nausea the past couple of days and was unable to keep food down. Today,  pt stated appetite has improved and is tolerating meals. Pt had RIGHT LATERAL LUMBAR INTERBODY FUSION L2-4 WITH INSTRUMENTATION L2-3 (Right: Spine Lumbar) on 12/4.  Ordering Ryan BID to optimize surgical wound repair. Last BM 12/4, per pt. No reports of nausea/vomiting, per pt. Will continue to follow per protocol.     Factors Affecting Nutritional Intake nausea                    Labs/Tests/Procedures/Meds       Row Name 12/05/23 1510          Labs/Procedures/Meds    Lab Results Reviewed reviewed, pertinent     Lab Results Comments BUN 7, Cr 0.55, GLu 179, Ca 8.4, Hgb 11.4        Diagnostic Tests/Procedures    Diagnostic Test/Procedure Reviewed reviewed     Diagnostic Test/Procedures Comments RIGHT LATERAL LUMBAR INTERBODY FUSION L2-4 WITH INSTRUMENTATION L2-3      on 12/4         Medications    Pertinent Medications Reviewed reviewed     Pertinent Medications Comments miralax, pericolace, crestor                    Physical Findings       Row Name 12/05/23 1515          Physical Findings    Overall Physical Appearance Wound-right lower flank (incision). BR 19. Last BM 12/3                    Estimated/Assessed Needs - Anthropometrics       Row Name 12/05/23 1515          Anthropometrics    Weight for Calculation 72 kg (158 lb 11.7 oz)        Estimated/Assessed Needs    Additional Documentation KCAL/KG (Group);Fluid Requirements (Group);Protein Requirements (Group)        KCAL/KG    KCAL/KG 25 Kcal/Kg (kcal)     25 Kcal/Kg (kcal) 1800        Protein Requirements    Weight Used For Protein Calculations 72 kg (158 lb 11.7 oz)     Est Protein Requirement Amount (gms/kg) 1.5 gm protein     Estimated Protein Requirements (gms/day) 108        Fluid Requirements    Fluid Requirements (mL/day) 1800     Estimated Fluid Requirement Method RDA Method     RDA Method (mL) 1800                    Nutrition Prescription Ordered       Row Name 12/05/23 1516          Nutrition Prescription PO    Current PO Diet Regular     Fluid Consistency Thin     Common Modifiers Consistent Carbohydrate                    Evaluation of Received Nutrient/Fluid Intake       Row Name 12/05/23 1516          Nutrient/Fluid Evaluation    Number of Days Evaluated 1 day     Additional Documentation Fluid Intake Evaluation (Group)        Fluid Intake Evaluation    Oral Fluid (mL) 340     Other Fluid (mL) 1250                       Problem/Interventions:   Problem 1       Row Name 12/05/23 1530          Nutrition Diagnoses Problem 1    Problem 1 Increased Nutrient Needs     Macronutrient Kcal;Protein     Etiology (related to) Medical Diagnosis     Skin Surgical wound     Signs/Symptoms (evidenced by) Other (comment);Report/Observation     Other Comment Wound-right lower flank incision                          Intervention Goal        Row Name 12/05/23 1531          Intervention Goal    General Meet nutritional needs for age/condition;Reduce/improve symptoms     PO Increase intake;Meet estimated needs     Weight Maintain weight                    Nutrition Intervention       Row Name 12/05/23 1531          Nutrition Intervention    RD/Tech Action Care plan reviewd;Recommend/ordered     Recommended/Ordered Supplement                    Nutrition Prescription       Row Name 12/05/23 1531          Nutrition Prescription PO    PO Prescription Begin/change supplement     Supplement Ryan     Supplement Frequency 2 times a day     New PO Prescription Ordered? Yes                    Education/Evaluation       Row Name 12/05/23 1531          Education    Education Advised regarding habits/behavior     Advised Regarding Habits/Behavior Use supplement        Monitor/Evaluation    Monitor Per protocol;PO intake;Supplement intake                     Electronically signed by:  Charli Price RD  12/05/23 15:31 CST

## 2023-12-05 NOTE — PLAN OF CARE
Goal Outcome Evaluation:  Plan of Care Reviewed With: patient, spouse        Progress: no change  Outcome Evaluation: A&Ox4. Dressing to rt lumbar,cdi. LSO. SCD. RA. IVF and IV abx. Pt has been nauseous with vomiting on/off since beginning of shift.  Compazine given with relief. Pt reported nausea and vomiting after Cefazolin IV abx. Phenegan given with good results. PO prn pain med with relief. Sleeping between care.  at bs. Part II of surgery scheduled for Wednesday.

## 2023-12-05 NOTE — THERAPY EVALUATION
Patient Name: Giana Yepez  : 1955    MRN: [id number]                              Today's Date: 2023       Admit Date: 2023    Visit Dx:     ICD-10-CM ICD-9-CM   1. Impaired mobility [Z74.09]  Z74.09 799.89     Patient Active Problem List   Diagnosis    Gastroesophageal reflux disease    Type 1 diabetes mellitus with hyperglycemia    Intermittent chest pain    Hypercholesterolemia    Vitamin D deficiency    Vitamin B12 deficiency    Diarrhea    Abdominal cramping    Nausea    Collagenous colitis    Autoimmune hemolytic anemia    Insulin pump in place    Hyperbilirubinemia    Mediastinal lymphadenopathy    Tobacco abuse    Hyponatremia    Orthostatic hypotension    Iron deficiency anemia    Chronic bilateral low back pain without sciatica    DDD (degenerative disc disease), lumbar    Lumbago of multiple sites in spine with sciatica    Lumbar radiculopathy     Past Medical History:   Diagnosis Date    Allergic to grasses 89    Arthritis     Asthma     Cancer     skin basal cell    Dental disease 23    My teeth started getting loose and one broke off    Diabetes mellitus     Dizziness 23    Cant bend over or look up long or turn fast    GERD (gastroesophageal reflux disease)     Hypercholesterolemia 2016    Intermittent chest pain 2016    Osteopenia     osteopenia    PONV (postoperative nausea and vomiting)     Scoliosis 2018    Spinal headache     Tinnitus     Vitamin B12 deficiency 2016    Vitamin D deficiency 2016    Warm autoimmune hemolytic anemia      Past Surgical History:   Procedure Laterality Date    ADENOIDECTOMY  1974    ANKLE SURGERY Right     APPENDECTOMY      BREAST BIOPSY      CARPAL TUNNEL RELEASE Right      SECTION      X 2    CHOLECYSTECTOMY      COLONOSCOPY      [facility]    COLONOSCOPY N/A 2017    Procedure: COLONOSCOPY WITH ANESTHESIA;  Surgeon: Ondina Abdul MD;  Location: [facility] ENDOSCOPY;   Service:     ENDOSCOPY N/A 08/31/2017    Procedure: ESOPHAGOGASTRODUODENOSCOPY WITH ANESTHESIA;  Surgeon: Ondina Abdul MD;  Location: [facility] ENDOSCOPY;  Service:     EYE SURGERY  5/12/2019    cataract    KNEE MENISCECTOMY Bilateral     SKIN CANCER EXCISION  2016    FACE    TONSILLECTOMY        General Information       Row Name 12/05/23 0950          Physical Therapy Time and Intention    Document Type evaluation  s/p R LLIF L2-4 with instrumentation L2-3, increase chronic back pain, B buttock and thigh radiculopathy, neurogenic claudication  -MS     Mode of Treatment physical therapy;co-treatment  -MS       Row Name 12/05/23 0950          General Information    Patient Profile Reviewed yes  -MS     Prior Level of Function independent:;all household mobility;community mobility;ADL's;driving;shopping  -MS     Existing Precautions/Restrictions brace worn when out of bed;fall;LSO;spinal  -MS     Barriers to Rehab physical barrier  -MS       Row Name 12/05/23 0950          Living Environment    People in Home spouse  -MS       Row Name 12/05/23 0950          Home Main Entrance    Number of Stairs, Main Entrance four  walk in shower with shower chair  -MS     Stair Railings, Main Entrance railings on both sides of stairs  -MS       Row Name 12/05/23 0950          Stairs Within Home, Primary    Number of Stairs, Within Home, Primary none  -MS       Row Name 12/05/23 0950          Cognition    Orientation Status (Cognition) oriented x 4  -MS       Row Name 12/05/23 0950          Safety Issues, Functional Mobility    Impairments Affecting Function (Mobility) balance;endurance/activity tolerance;pain;strength;range of motion (ROM)  -MS               User Key  (r) = Recorded By, (t) = Taken By, (c) = Cosigned By      Initials Name Provider Type    MS Manda Birmingham R, PT, DPT, NCS Physical Therapist                   Mobility       Row Name 12/05/23 0950          Bed Mobility    Bed Mobility rolling left;sidelying-sit  -MS      Rolling Left [place] (Bed Mobility) supervision;verbal cues  -MS     Sidelying-Sit [place] (Bed Mobility) supervision;verbal cues  -MS     Assistive Device (Bed Mobility) bed rails  -MS       Row Name 12/05/23 0950          Sit-Stand Transfer    Sit-Stand [place] (Transfers) contact guard;verbal cues;nonverbal cues (demo/gesture)  -MS     Assistive Device (Sit-Stand Transfers) --  HHA  -MS       Row Name 12/05/23 0950          Gait/Stairs (Locomotion)    [place] Level (Gait) contact guard;verbal cues;nonverbal cues (demo/gesture)  -MS     Assistive Device (Gait) --  HHA x2  -MS     Distance in Feet (Gait) 25ft with slow cadance and decreased step length B. Pt reports feeling lightheaded  -MS               User Key  (r) = Recorded By, (t) = Taken By, (c) = Cosigned By      Initials Name Provider Type    MS Manda Birmingham, PT, DPT, NCS Physical Therapist                   Obj/Interventions       Row Name 12/05/23 0950          Range of Motion Comprehensive    General Range of Motion bilateral upper extremity ROM WFL  -MS     Comment, General Range of Motion R hip flexion impaired 75%, L hip flexion impaired 50%  -MS       Row Name 12/05/23 0950          Strength Comprehensive (MMT)    Comment, General Manual Muscle Testing (MMT) Assessment R hip flexion 2-/5, L hip flexion 2+/5, L quad 3+/5, L hamstring 3+/5  -MS       Row Name 12/05/23 0950          Balance    Balance Assessment sitting static balance;sitting dynamic balance;standing static balance;standing dynamic balance  -MS     Static Sitting Balance independent  -MS     Dynamic Sitting Balance independent  -MS     Position, Sitting Balance unsupported;sitting edge of bed  -MS     Static Standing Balance contact guard  -MS     Dynamic Standing Balance contact guard  -MS     Position/Device Used, Standing Balance supported  -MS       Row Name 12/05/23 0950          Sensory Assessment (Somatosensory)    Sensory Assessment (Somatosensory)  sensation intact  -MS               User Key  (r) = Recorded By, (t) = Taken By, (c) = Cosigned By      Initials Name Provider Type    Manda Garcia LEATHA, PT, DPT, NCS Physical Therapist                   Goals/Plan       Row Name 12/05/23 0950          Bed Mobility Goal 1 (PT)    Activity/Assistive Device (Bed Mobility Goal 1, PT) bed mobility activities, all  -MS     [place] Level/Cues Needed (Bed Mobility Goal 1, PT) independent  -MS     Time Frame (Bed Mobility Goal 1, PT) long term goal (LTG);by discharge  -MS     Progress/Outcomes (Bed Mobility Goal 1, PT) new goal  -MS       Row Name 12/05/23 0950          Transfer Goal 1 (PT)    Activity/Assistive Device (Transfer Goal 1, PT) sit-to-stand/stand-to-sit;bed-to-chair/chair-to-bed  -MS     [place] Level/Cues Needed (Transfer Goal 1, PT) independent  -MS     Time Frame (Transfer Goal 1, PT) long term goal (LTG);by discharge  -MS     Progress/Outcome (Transfer Goal 1, PT) new goal  -MS       Row Name 12/05/23 0950          Gait Training Goal 1 (PT)    Activity/Assistive Device (Gait Training Goal 1, PT) gait (walking locomotion);decrease fall risk;increase endurance/gait distance;improve balance and speed  -MS     [place] Level (Gait Training Goal 1, PT) independent  -MS     Distance (Gait Training Goal 1, PT) 100ft  -MS     Time Frame (Gait Training Goal 1, PT) long term goal (LTG);by discharge  -MS     Progress/Outcome (Gait Training Goal 1, PT) new goal  -MS       Row Name 12/05/23 0950          Therapy Assessment/Plan (PT)    Planned Therapy Interventions (PT) balance training;bed mobility training;gait training;patient/family education;orthotic fitting/training;transfer training  -MS               User Key  (r) = Recorded By, (t) = Taken By, (c) = Cosigned By      Initials Name Provider Type    MS Birmingham Manda ZHANG, PT, DPT, NCS Physical Therapist                   Clinical Impression       Row Name 12/05/23 0950          Pain    Pretreatment  Pain Rating 6/10  -MS     Posttreatment Pain Rating 6/10  -MS     Pain Location lower  -MS     Pain Location - back  -MS     Pre/Posttreatment Pain Comment radicular pain in R hip and thigh  -MS     Pain Intervention(s) Medication (See MAR);Repositioned;Ambulation/increased activity  -MS       Row Name 12/05/23 0983          Plan of Care Review    Plan of Care Reviewed With patient  -MS     Progress no change  -MS     Outcome Evaluation The patient presents alert and oriented x4 lying in bed with LSO present in the room. The patient demonstrates new weakness of R hip flexion which is expected of the R lateral approach of surgery. She is able to slight clear her R foot during gait. She has chronic weakness of the L LE. She demonstrates no knee buckling today during gait, however she only ambulates a short distance due to lightheadedness. She has no deficits in sensation or coordination in B LEs. PT will continue to work with her to improve her strength and improve her gait mechanics and activity tolerance. She plans to have a second surgery tomorrow so discharge recommendations will follow.  -MS       Row Name 12/05/23 0911          Therapy Assessment/Plan (PT)    Patient/Family Therapy Goals Statement (PT) go home  -MS     Rehab Potential (PT) good, to achieve stated therapy goals  -MS     Criteria for Skilled Interventions Met (PT) yes;meets criteria;skilled treatment is necessary  -MS     Therapy Frequency (PT) 2 times/day  -MS     Predicted Duration of Therapy Intervention (PT) until discharge  -MS       Row Name 12/05/23 0901          Positioning and Restraints    Post Treatment Position chair  -MS     In Chair sitting;call light within reach;encouraged to call for assist;with brace  -MS               User Key  (r) = Recorded By, (t) = Taken By, (c) = Cosigned By      Initials Name Provider Type    MS Manda Birmingham R, PT, DPT, NCS Physical Therapist                   Outcome Measures       Row Name 12/05/23  0950          How much help from another person do you currently need...    Turning from your back to your side while in flat bed without using bedrails? 3  -MS     Moving from lying on back to sitting on the side of a flat bed without bedrails? 3  -MS     Moving to and from a bed to a chair (including a wheelchair)? 3  -MS     Standing up from a chair using your arms (e.g., wheelchair, bedside chair)? 3  -MS     Climbing 3-5 steps with a railing? 3  -MS     To walk in hospital room? 3  -MS     AM-PAC 6 Clicks Score (PT) 18  -MS     Highest Level of Mobility Goal 6 --> Walk 10 steps or more  -MS       Row Name 12/05/23 0950          Functional Assessment    Outcome Measure Options AM-PAC 6 Clicks Basic Mobility (PT)  -MS               User Key  (r) = Recorded By, (t) = Taken By, (c) = Cosigned By      Initials Name Provider Type    MS Manda Birmingham, PT, DPT, NCS Physical Therapist                                 Physical Therapy Education       Title: PT OT SLP Therapies (In Progress)       Topic: Physical Therapy (In Progress)       Point: Mobility training (Done)       Learning Progress Summary             Patient Acceptance, E, VU by MS at 12/5/2023 1044    Comment: role of PT in her care, spinal restrictions, use of LSO                         Point: Home exercise program (Not Started)       Learner Progress:  Not documented in this visit.              Point: Body mechanics (Not Started)       Learner Progress:  Not documented in this visit.              Point: Precautions (Done)       Learning Progress Summary             Patient Acceptance, E, VU by MS at 12/5/2023 1044    Comment: role of PT in her care, spinal restrictions, use of LSO                                         User Key       Initials Effective Dates Name Provider Type Discipline    MS 07/11/23 -  Manda Birmingham, PT, DPT, NCS Physical Therapist PT                  PT Recommendation and Plan  Planned Therapy Interventions (PT): balance  training, bed mobility training, gait training, patient/family education, orthotic fitting/training, transfer training  Plan of Care Reviewed With: patient  Progress: no change  Outcome Evaluation: The patient presents alert and oriented x4 lying in bed with LSO present in the room. The patient demonstrates new weakness of R hip flexion which is expected of the R lateral approach of surgery. She is able to slight clear her R foot during gait. She has chronic weakness of the L LE. She demonstrates no knee buckling today during gait, however she only ambulates a short distance due to lightheadedness. She has no deficits in sensation or coordination in B LEs. PT will continue to work with her to improve her strength and improve her gait mechanics and activity tolerance. She plans to have a second surgery tomorrow so discharge recommendations will follow.     Time Calculation:         PT Charges       Row Name 12/05/23 0950             Time Calculation    Start Time 0950  5 min chart review  -MS      Stop Time 1023  -MS      Time Calculation (min) 33 min  -MS      PT Received On 12/05/23  -MS      PT Goal Re-Cert Due Date 12/15/23  -MS         Untimed Charges    PT Eval/Re-eval Minutes 33  -MS         Total Minutes    Untimed Charges Total Minutes 33  -MS       Total Minutes 33  -MS                User Key  (r) = Recorded By, (t) = Taken By, (c) = Cosigned By      Initials Name Provider Type    Manda Garcia PT, DPT, MANASA Physical Therapist                      PT G-Codes  Outcome Measure Options: AM-PAC 6 Clicks Basic Mobility (PT)  AM-PAC 6 Clicks Score (PT): 18  PT Discharge Summary  Anticipated Discharge Disposition (PT): home with assist    Manda Birmingham PT, DPT, MANASA  12/5/2023

## 2023-12-05 NOTE — THERAPY EVALUATION
Patient Name: Giana Yepez  : 1955    MRN: [id number]                              Today's Date: 2023       Admit Date: 2023    Visit Dx:     ICD-10-CM ICD-9-CM   1. Impaired mobility [Z74.09]  Z74.09 799.89     Patient Active Problem List   Diagnosis    Gastroesophageal reflux disease    Type 1 diabetes mellitus with hyperglycemia    Intermittent chest pain    Hypercholesterolemia    Vitamin D deficiency    Vitamin B12 deficiency    Diarrhea    Abdominal cramping    Nausea    Collagenous colitis    Autoimmune hemolytic anemia    Insulin pump in place    Hyperbilirubinemia    Mediastinal lymphadenopathy    Tobacco abuse    Hyponatremia    Orthostatic hypotension    Iron deficiency anemia    Chronic bilateral low back pain without sciatica    DDD (degenerative disc disease), lumbar    Lumbago of multiple sites in spine with sciatica    Lumbar radiculopathy     Past Medical History:   Diagnosis Date    Allergic to grasses 89    Arthritis     Asthma     Cancer     skin basal cell    Dental disease 23    My teeth started getting loose and one broke off    Diabetes mellitus     Dizziness 23    Cant bend over or look up long or turn fast    GERD (gastroesophageal reflux disease)     Hypercholesterolemia 2016    Intermittent chest pain 2016    Osteopenia     osteopenia    PONV (postoperative nausea and vomiting)     Scoliosis 2018    Spinal headache     Tinnitus     Vitamin B12 deficiency 2016    Vitamin D deficiency 2016    Warm autoimmune hemolytic anemia      Past Surgical History:   Procedure Laterality Date    ADENOIDECTOMY  1974    ANKLE SURGERY Right     APPENDECTOMY      BREAST BIOPSY      CARPAL TUNNEL RELEASE Right      SECTION      X 2    CHOLECYSTECTOMY      COLONOSCOPY      [facility]    COLONOSCOPY N/A 2017    Procedure: COLONOSCOPY WITH ANESTHESIA;  Surgeon: Ondina Abdul MD;  Location: [facility] ENDOSCOPY;   Service:     ENDOSCOPY N/A 08/31/2017    Procedure: ESOPHAGOGASTRODUODENOSCOPY WITH ANESTHESIA;  Surgeon: Ondina Abdul MD;  Location: [facility] ENDOSCOPY;  Service:     EYE SURGERY  5/12/2019    cataract    KNEE MENISCECTOMY Bilateral     SKIN CANCER EXCISION  2016    FACE    TONSILLECTOMY        General Information       Row Name 12/05/23 0951          OT Time and Intention    Document Type evaluation  s/p R LLIF L2-4 with instrumentation L2-3, increase chronic back pain, B buttock and thigh radiculopathy, neurogenic claudication  -CS     Mode of Treatment occupational therapy;co-treatment  -CS       Row Name 12/05/23 0951          General Information    Patient Profile Reviewed yes  -CS     Prior Level of Function independent:;all household mobility;community mobility;ADL's;driving;shopping  -CS     Existing Precautions/Restrictions brace worn when out of bed;fall;LSO;spinal  -CS     Barriers to Rehab physical barrier  -CS       Row Name 12/05/23 0951          Occupational Profile    Environmental Supports and Barriers (Occupational Profile) walk in shower with shower chair  -CS       Row Name 12/05/23 0951          Living Environment    People in Home spouse  -CS       Row Name 12/05/23 0951          Home Main Entrance    Number of Stairs, Main Entrance four  -CS     Stair Railings, Main Entrance railings on both sides of stairs  -CS       Row Name 12/05/23 0951          Stairs Within Home, Primary    Number of Stairs, Within Home, Primary none  -CS       Row Name 12/05/23 0951          Cognition    Orientation Status (Cognition) oriented x 4  -CS       Row Name 12/05/23 0951          Safety Issues, Functional Mobility    Impairments Affecting Function (Mobility) balance;endurance/activity tolerance;pain;strength;range of motion (ROM)  -CS               User Key  (r) = Recorded By, (t) = Taken By, (c) = Cosigned By      Initials Name Provider Type    CS Trice Jacob, OTR/L, CNT Occupational Therapist                      Mobility/ADL's       Row Name 12/05/23 0951          Bed Mobility    Bed Mobility rolling left;sidelying-sit  -CS     Rolling Left [place] (Bed Mobility) supervision;verbal cues  -     Sidelying-Sit [place] (Bed Mobility) supervision;verbal cues  -CS     Assistive Device (Bed Mobility) bed rails  -       Row Name 12/05/23 0951          Transfers    Transfers sit-stand transfer;stand-sit transfer  -       Row Name 12/05/23 0951          Sit-Stand Transfer    Sit-Stand [place] (Transfers) contact guard;verbal cues;nonverbal cues (demo/gesture)  -     Assistive Device (Sit-Stand Transfers) --  gary hand held assist  -CS       Row Name 12/05/23 0951          Stand-Sit Transfer    Stand-Sit [place] (Transfers) contact guard;verbal cues  gary hand held assist  -       Row Name 12/05/23 0951          Functional Mobility    Functional Mobility- Ind. Level contact guard assist;minimum assist (75% patient effort);verbal cues required  -     Functional Mobility- Device --  gary hand held assist  -     Functional Mobility- Comment Pt walked to the bedroom door and back to bedside chair.  -       Row Name 12/05/23 0951          Activities of Daily Living    BADL Assessment/Intervention lower body dressing  -       Row Name 12/05/23 0951          Lower Body Dressing Assessment/Training    [place] Level (Lower Body Dressing) don;doff;socks;maximum assist (25% patient effort);dependent (less than 25% patient effort);verbal cues  -     Position (Lower Body Dressing) edge of bed sitting  -               User Key  (r) = Recorded By, (t) = Taken By, (c) = Cosigned By      Initials Name Provider Type    CS Trice Jacob, OTR/L, CNT Occupational Therapist                   Obj/Interventions       Row Name 12/05/23 0951          Sensory Assessment (Somatosensory)    Sensory Assessment (Somatosensory) UE sensation intact  -[facility] Name 12/05/23 0951          Range of Motion  Comprehensive    General Range of Motion bilateral upper extremity ROM WNL  -CS       Row Name 12/05/23 0951          Strength Comprehensive (MMT)    Comment, General Manual Muscle Testing (MMT) Assessment BUE functional strength: 4+/5  -CS       Row Name 12/05/23 0951          Balance    Balance Assessment sitting static balance;sitting dynamic balance;standing static balance;standing dynamic balance  -CS     Static Sitting Balance independent  -CS     Dynamic Sitting Balance independent  -CS     Position, Sitting Balance unsupported;sitting edge of bed  -CS     Static Standing Balance contact guard  -CS     Dynamic Standing Balance contact guard  -CS     Position/Device Used, Standing Balance supported  -CS               User Key  (r) = Recorded By, (t) = Taken By, (c) = Cosigned By      Initials Name Provider Type    CS Trice Jacob S, OTR/L, CNT Occupational Therapist                   Goals/Plan       Row Name 12/05/23 0951          Transfer Goal 1 (OT)    Activity/Assistive Device (Transfer Goal 1, OT) toilet  -CS     [place] Level/Cues Needed (Transfer Goal 1, OT) supervision required  -CS     Time Frame (Transfer Goal 1, OT) long term goal (LTG)  -CS     Progress/Outcome (Transfer Goal 1, OT) new goal  -CS       Row Name 12/05/23 0951          Dressing Goal 1 (OT)    Activity/Device (Dressing Goal 1, OT) lower body dressing  -CS     [place]/Cues Needed (Dressing Goal 1, OT) supervision required  -CS     Time Frame (Dressing Goal 1, OT) long term goal (LTG)  -CS     Strategies/Barriers (Dressing Goal 1, OT) AE PRN  -CS     Progress/Outcome (Dressing Goal 1, OT) new goal  -CS       Row Name 12/05/23 0951          Toileting Goal 1 (OT)    Activity/Device (Toileting Goal 1, OT) toileting skills, all  -CS     [place] Level/Cues Needed (Toileting Goal 1, OT) supervision required  -CS     Time Frame (Toileting Goal 1, OT) long term goal (LTG)  -CS     Progress/Outcome (Toileting Goal 1, OT) new  goal  -CS       Row Name 12/05/23 0951          Therapy Assessment/Plan (OT)    Planned Therapy Interventions (OT) activity tolerance training;adaptive equipment training;BADL retraining;transfer/mobility retraining;occupation/activity based interventions;orthotic fabrication/fitting/training;patient/caregiver education/training;functional balance retraining  -CS               User Key  (r) = Recorded By, (t) = Taken By, (c) = Cosigned By      Initials Name Provider Type    CS Trice Jacob S, OTR/L, CNT Occupational Therapist                   Clinical Impression       Row Name 12/05/23 0951          Pain Assessment    Pretreatment Pain Rating 6/10  -CS     Posttreatment Pain Rating 6/10  -CS     Pain Location lower  -CS     Pain Location - back  -CS     Pre/Posttreatment Pain Comment radicular pain in the R hip and thigh  -CS     Pain Intervention(s) Medication (See MAR);Repositioned;Ambulation/increased activity  -CS       Row Name 12/05/23 0951          Plan of Care Review    Plan of Care Reviewed With patient  -CS     Progress no change  -CS     Outcome Evaluation OT evaluation completed.  Pt is A&Ox4.  She is in pain, lethargic, and nauseous.  Pt completed bed mobility with S and vcs.  She completed functional transfers and mobility in the room to the door with CGA and gary hand held assist.  She required max A for LB dressing of her socks sitting EOB due to BLE weakness.  LLE weakness noted prior to sx and new R hip weakness noted.  Pt able to tres LSO brace with S and vcs.  Pt edu on spinal precautions and wear/care of LSO brace.  Pt is limited with mobility and ADLs this date due to her pain and weakness.  OT will cont to follow to maximize her I and safety with ADLs and functional mobility.  Plan is for pt to have next part sx tomorrow 12/6.  -CS       Row Name 12/05/23 0951          Therapy Assessment/Plan (OT)    Patient/Family Therapy Goal Statement (OT) to go home  -CS     Rehab Potential (OT) good, to  achieve stated therapy goals  -CS     Criteria for Skilled Therapeutic Interventions Met (OT) yes;skilled treatment is necessary  -CS     Therapy Frequency (OT) 5 times/wk  -CS     Predicted Duration of Therapy Intervention (OT) until hospital discharge  -CS       Row Name 12/05/23 0951          Therapy Plan Review/Discharge Plan (OT)    Anticipated Discharge Disposition (OT) home with assist;home with home health  -CS       Row Name 12/05/23 0951          Positioning and Restraints    Pre-Treatment Position in bed  -CS     Post Treatment Position chair  -CS     In Chair sitting;call light within reach;encouraged to call for assist;with brace;notified nsg  -CS               User Key  (r) = Recorded By, (t) = Taken By, (c) = Cosigned By      Initials Name Provider Type    CS Trice Jacob, OTR/L, CNT Occupational Therapist                   Outcome Measures       Row Name 12/05/23 0951          How much help from another is currently needed...    Putting on and taking off regular lower body clothing? 2  -CS     Bathing (including washing, rinsing, and drying) 3  -CS     Toileting (which includes using toilet bed pan or urinal) 3  -CS     Putting on and taking off regular upper body clothing 3  -CS     Taking care of personal grooming (such as brushing teeth) 4  -CS     Eating meals 4  -CS     AM-PAC 6 Clicks Score (OT) 19  -CS       Row Name 12/05/23 0950 12/05/23 0752       How much help from another person do you currently need...    Turning from your back to your side while in flat bed without using bedrails? 3  -CP (r) MS (t) CP (c) 3  -CP (r) FRANK (t) CP (c)    Moving from lying on back to sitting on the side of a flat bed without bedrails? 3  -CP (r) MS (t) CP (c) 3  -CP (r) FRANK (t) CP (c)    Moving to and from a bed to a chair (including a wheelchair)? 3  -CP (r) MS (t) CP (c) 3  -CP (r) FRANK (t) CP (c)    Standing up from a chair using your arms (e.g., wheelchair, bedside chair)? 3  -CP (r) MS (t) CP (c) 3  -CP  (r) FRANK (t) CP (c)    Climbing 3-5 steps with a railing? 3  -CP (r) MS (t) CP (c) 3  -CP (r) FRANK (t) CP (c)    To walk in hospital room? 3  -CP (r) MS (t) CP (c) 3  -CP (r) FRANK (t) CP (c)    AM-PAC 6 Clicks Score (PT) 18  -MS 18  -CP (r) FRANK (t)    Highest Level of Mobility Goal 6 --> Walk 10 steps or more  -MS 6 --> Walk 10 steps or more  -CP (r) FRANK (t)      Row Name 12/05/23 0951 12/05/23 0950       Functional Assessment    Outcome Measure Options AM-PAC 6 Clicks Daily Activity (OT)  -CS AM-PAC 6 Clicks Basic Mobility (PT)  -MS              User Key  (r) = Recorded By, (t) = Taken By, (c) = Cosigned By      Initials Name Provider Type    MS Vinnie Manda R, PT, DPT, NCS Physical Therapist    Trice Govea, OTR/L, CNT Occupational Therapist    CP Haylee Wilson, RN Registered Nurse    Naomi Brannon, Nursing Student Nursing Student                    Occupational Therapy Education       Title: PT OT SLP Therapies (In Progress)       Topic: Occupational Therapy (Done)       Point: ADL training (Done)       Description:   Instruct learner(s) on proper safety adaptation and remediation techniques during self care or transfers.   Instruct in proper use of assistive devices.                  Learning Progress Summary             Patient Acceptance, E, VU,NR by  at 12/5/2023 1517                         Point: Home exercise program (Done)       Description:   Instruct learner(s) on appropriate technique for monitoring, assisting and/or progressing therapeutic exercises/activities.                  Learning Progress Summary             Patient Acceptance, E, VU,NR by CS at 12/5/2023 1517                         Point: Precautions (Done)       Description:   Instruct learner(s) on prescribed precautions during self-care and functional transfers.                  Learning Progress Summary             Patient Acceptance, E, VU,NR by  at 12/5/2023 1517                         Point: Body mechanics (Done)        Description:   Instruct learner(s) on proper positioning and spine alignment during self-care, functional mobility activities and/or exercises.                  Learning Progress Summary             Patient Acceptance, E, VU,NR by  at 12/5/2023 8726                                         User Key       Initials Effective Dates Name Provider Type Discipline     02/03/23 -  Trice Jacob S, OTR/L, CNT Occupational Therapist OT                  OT Recommendation and Plan  Planned Therapy Interventions (OT): activity tolerance training, adaptive equipment training, BADL retraining, transfer/mobility retraining, occupation/activity based interventions, orthotic fabrication/fitting/training, patient/caregiver education/training, functional balance retraining  Therapy Frequency (OT): 5 times/wk  Plan of Care Review  Plan of Care Reviewed With: patient  Progress: no change  Outcome Evaluation: OT evaluation completed.  Pt is A&Ox4.  She is in pain, lethargic, and nauseous.  Pt completed bed mobility with S and vcs.  She completed functional transfers and mobility in the room to the door with CGA and gary hand held assist.  She required max A for LB dressing of her socks sitting EOB due to BLE weakness.  LLE weakness noted prior to sx and new R hip weakness noted.  Pt able to tres LSO brace with S and vcs.  Pt edu on spinal precautions and wear/care of LSO brace.  Pt is limited with mobility and ADLs this date due to her pain and weakness.  OT will cont to follow to maximize her I and safety with ADLs and functional mobility.  Plan is for pt to have next part sx tomorrow 12/6.     Time Calculation:         Time Calculation- OT       Row Name 12/05/23 1516             Time Calculation- OT    OT Start Time 0951  + 5 min chart review  -      OT Stop Time 1010  -      OT Time Calculation (min) 19 min  -      OT Received On 12/05/23  -      OT Goal Re-Cert Due Date 12/15/23  -         Untimed Charges    OT  Eval/Re-eval Minutes 24  -CS         Total Minutes    Untimed Charges Total Minutes 24  -CS       Total Minutes 24  -CS                User Key  (r) = Recorded By, (t) = Taken By, (c) = Cosigned By      Initials Name Provider Type    CS Trice Jacob OTR/L, ELIO Occupational Therapist                  Therapy Charges for Today       Code Description Service Date Service Provider Modifiers Qty    34929747212 HC OT EVAL MOD COMPLEXITY 2 12/5/2023 Trice Jacob OTR/L, ELIO GO 1                 PATY Finnegan/L, CNT  12/5/2023

## 2023-12-05 NOTE — THERAPY TREATMENT NOTE
Acute Care - Physical Therapy Treatment Note  [facility]     Patient Name: Giana Yepez  : 1955  MRN: [id number]  Today's Date: 2023      Visit Dx:     ICD-10-CM ICD-9-CM   1. Impaired mobility [Z74.09]  Z74.09 799.89     Patient Active Problem List   Diagnosis    Gastroesophageal reflux disease    Type 1 diabetes mellitus with hyperglycemia    Intermittent chest pain    Hypercholesterolemia    Vitamin D deficiency    Vitamin B12 deficiency    Diarrhea    Abdominal cramping    Nausea    Collagenous colitis    Autoimmune hemolytic anemia    Insulin pump in place    Hyperbilirubinemia    Mediastinal lymphadenopathy    Tobacco abuse    Hyponatremia    Orthostatic hypotension    Iron deficiency anemia    Chronic bilateral low back pain without sciatica    DDD (degenerative disc disease), lumbar    Lumbago of multiple sites in spine with sciatica    Lumbar radiculopathy     Past Medical History:   Diagnosis Date    Allergic to grasses 89    Arthritis     Asthma     Cancer     skin basal cell    Dental disease 23    My teeth started getting loose and one broke off    Diabetes mellitus     Dizziness 23    Cant bend over or look up long or turn fast    GERD (gastroesophageal reflux disease)     Hypercholesterolemia 2016    Intermittent chest pain 2016    Osteopenia     osteopenia    PONV (postoperative nausea and vomiting)     Scoliosis 2018    Spinal headache     Tinnitus     Vitamin B12 deficiency 2016    Vitamin D deficiency 2016    Warm autoimmune hemolytic anemia      Past Surgical History:   Procedure Laterality Date    ADENOIDECTOMY  1974    ANKLE SURGERY Right     APPENDECTOMY      BREAST BIOPSY      CARPAL TUNNEL RELEASE Right      SECTION      X 2    CHOLECYSTECTOMY      COLONOSCOPY      [facility]    COLONOSCOPY N/A 2017    Procedure: COLONOSCOPY WITH ANESTHESIA;  Surgeon: Ondina Abdul MD;  Location: [facility] ENDOSCOPY;   Service:     ENDOSCOPY N/A 08/31/2017    Procedure: ESOPHAGOGASTRODUODENOSCOPY WITH ANESTHESIA;  Surgeon: Ondina Abdul MD;  Location: [facility] ENDOSCOPY;  Service:     EYE SURGERY  5/12/2019    cataract    KNEE MENISCECTOMY Bilateral     SKIN CANCER EXCISION  2016    FACE    TONSILLECTOMY       PT Assessment (last 12 hours)       PT Evaluation and Treatment       Row Name 12/05/23 1505          Physical Therapy Time and Intention    Subjective Information no complaints  -KJ     Document Type therapy note (daily note)  -KJ     Mode of Treatment physical therapy  -KJ     Patient Effort good  -KJ       Row Name 12/05/23 1505          General Information    Existing Precautions/Restrictions fall;brace worn when out of bed;LSO;spinal  -KJ       Row Name 12/05/23 1505          Pain    Pretreatment Pain Rating 7/10  -KJ     Posttreatment Pain Rating 7/10  -KJ     Pain Location lower  -KJ     Pain Location - back  -KJ       Row Name 12/05/23 1505          Bed Mobility    Comment, (Bed Mobility) up in bathroom  -KJ       Row Name 12/05/23 1505          Sit-Stand Transfer    Sit-Stand [place] (Transfers) contact guard  -KJ       Row Name 12/05/23 1505          Stand-Sit Transfer    Stand-Sit [place] (Transfers) contact guard  -KJ       Row Name 12/05/23 1505          Gait/Stairs (Locomotion)    [place] Level (Gait) contact guard;minimum assist (75% patient effort)  -KJ     Assistive Device (Gait) --  gave pt rolling walker after walking  -KJ     Distance in Feet (Gait) 40' x 2  -KJ       Row Name             Wound 12/04/23 1054 Right lower flank Incision    Wound - Properties Group Placement Date: 12/04/23  -KT Placement Time: 1054  -KT Present on Original Admission: N  -KT Side: Right  -KT Orientation: lower  -KT Location: flank  -KT Primary Wound Type: Incision  -KT    Retired Wound - Properties Group Placement Date: 12/04/23  -KT Placement Time: 1054  -KT Present on Original Admission: N  -KT Side: Right   -KT Orientation: lower  -KT Location: flank  -KT Primary Wound Type: Incision  -KT    Retired Wound - Properties Group Date first assessed: 12/04/23  -KT Time first assessed: 1054  -KT Present on Original Admission: N  -KT Side: Right  -KT Location: flank  -KT Primary Wound Type: Incision  -KT      Row Name 12/05/23 1505          Positioning and Restraints    Pre-Treatment Position bathroom  -KJ     Post Treatment Position chair  -KJ               User Key  (r) = Recorded By, (t) = Taken By, (c) = Cosigned By      Initials Name Provider Type    Nakita Colindres, PTA Physical Therapist Assistant    Axel Deng, RN Registered Nurse                    Physical Therapy Education       Title: PT OT SLP Therapies (In Progress)       Topic: Physical Therapy (In Progress)       Point: Mobility training (Done)       Learning Progress Summary             Patient Acceptance, E, VU by MS at 12/5/2023 1044    Comment: role of PT in her care, spinal restrictions, use of LSO                         Point: Home exercise program (Not Started)       Learner Progress:  Not documented in this visit.              Point: Body mechanics (Not Started)       Learner Progress:  Not documented in this visit.              Point: Precautions (Done)       Learning Progress Summary             Patient Acceptance, E, VU by MS at 12/5/2023 1044    Comment: role of PT in her care, spinal restrictions, use of LSO                                         User Key       Initials Effective Dates Name Provider Type Discipline    MS 07/11/23 -  Manda Birmingham, PT, DPT, NCS Physical Therapist PT                  PT Recommendation and Plan             Time Calculation:    PT Charges       Row Name 12/05/23 1533 12/05/23 0950          Time Calculation    Start Time 1505  -KJ 0950  5 min chart review  -MS     Stop Time 1530  -KJ 1023  -MS     Time Calculation (min) 25 min  -KJ 33 min  -MS     PT Received On -- 12/05/23  -MS     PT Goal Re-Cert Due Date  -- 12/15/23  -MS        Time Calculation- PT    Total Timed Code Minutes- PT 25 minute(s)  -KJ --        Untimed Charges    PT Eval/Re-eval Minutes -- 33  -MS        Total Minutes    Untimed Charges Total Minutes -- 33  -MS      Total Minutes -- 33  -MS               User Key  (r) = Recorded By, (t) = Taken By, (c) = Cosigned By      Initials Name Provider Type    Nakita Colindres, DOUGLAS Physical Therapist Assistant    Manda Garcia, PT, DPT, NCS Physical Therapist                  Therapy Charges for Today       Code Description Service Date Service Provider Modifiers Qty    14290794509 HC GAIT TRAINING EA 15 MIN 12/5/2023 Nakita Neff, PTA GP 1    67416836406 HC PT THERAPEUTIC ACT EA 15 MIN 12/5/2023 Nakita Neff, PTA GP 1            PT G-Codes  Outcome Measure Options: AM-PAC 6 Clicks Daily Activity (OT)  AM-PAC 6 Clicks Score (PT): 18  AM-PAC 6 Clicks Score (OT): 19    Nakita Neff PTA  12/5/2023

## 2023-12-05 NOTE — PLAN OF CARE
Goal Outcome Evaluation:  Plan of Care Reviewed With: patient        Progress: no change     Pt A/O X4, resp e/u, HRR, VSS. Pt having increased nausea, Zofran not effective. Physician notified to get additional medication for nausea.  at bedside. Pt has IV to right wrist IID. IV to left FA has NS @100ml/hr infusing. Pt's surgical dressing to right side cdi. Pt rates pain from 4 to 5, [place] PO given. SCD's to BLE in place. Bed in low position, call light in reach, safety maintained.

## 2023-12-05 NOTE — PROGRESS NOTES
[facility]  Spine Surgery  JEAN CLAUDE Colon   Progress Note        Subjective/Overnight Events:  Nausea overnight, a little better this AM, chronic poor intake, concerns about management of blood sugar with npo status tomorrow.     Vitals  Vitals:    12/04/23 1514 12/04/23 1939 12/04/23 2338 12/05/23 0300   BP: 102/54 122/55 92/43 114/49   BP Location: Left arm Left arm Left arm Left arm   Patient Position: Lying Lying Lying Lying   Pulse: 67 73 92 79   Resp: 16 16 16 16   Temp: 97.3 °F (36.3 °C) 97.5 °F (36.4 °C) 98.4 °F (36.9 °C) 98.3 °F (36.8 °C)   TempSrc: Oral Axillary Oral Oral   SpO2: 100% 99% 96% 96%   Weight:           Current Facility-Administered Medications   Medication Dose Route Frequency Provider Last Rate Last Admin    acetaminophen (TYLENOL) tablet 650 mg  650 mg Oral Q4H PRN WESLEY Herbert MD        allopurinol (ZYLOPRIM) tablet 100 mg  100 mg Oral Daily WESLEY Herbert MD   100 mg at 12/04/23 1821    azelastine (ASTELIN) nasal spray 2 spray  2 spray Each Nare BID WESLEY Herbert MD        [START ON 12/6/2023] ceFAZolin 2000 mg IVPB in 100 mL NS (MBP)  2,000 mg Intravenous On Call to OR Leonides Prince PA        cyclobenzaprine (FLEXERIL) tablet 10 mg  10 mg Oral TID PRN WESLEY Herbert MD   10 mg at 12/05/23 0136    diphenhydrAMINE (BENADRYL) capsule 25 mg  25 mg Oral Q6H PRN WESLEY Herbert MD        HYDROcodone-acetaminophen (NORCO) 7.5-325 MG per tablet 1 tablet  1 tablet Oral Q4H PRN WESLEY Herbert MD   1 tablet at 12/04/23 1821    HYDROcodone-acetaminophen (NORCO) 7.5-325 MG per tablet 2 tablet  2 tablet Oral Q4H PRN WESLEY Herbert MD        HYDROmorphone (DILAUDID) injection 0.5 mg  0.5 mg Intravenous Q3H PRN WESLEY Herbert MD        And    naloxone (NARCAN) injection 0.4 mg  0.4 mg Intravenous Q5 Min PRN WESLEY Herbert MD        ondansetron (ZOFRAN) tablet 4 mg  4 mg Oral Q6H PRN WESLEY Herbert,  MD        Or    ondansetron (ZOFRAN) injection 4 mg  4 mg Intravenous Q6H PRN WESLEY Herbert MD   4 mg at 12/04/23 1821    polyethylene glycol (MIRALAX) packet 17 g  17 g Oral TID Chet Shi MD   17 g at 12/04/23 1822    pregabalin (LYRICA) capsule 75 mg  75 mg Oral Q12H WESLEY Herbert MD        prochlorperazine (COMPAZINE) injection 5 mg  5 mg Intravenous Q6H PRN Leonides Shi MD   5 mg at 12/05/23 0641    promethazine (PHENERGAN) 12.5 mg in sodium chloride 0.9 % 50 mL  12.5 mg Intravenous Q6H PRN Leonides Shi MD   12.5 mg at 12/05/23 0258    rosuvastatin (CRESTOR) tablet 20 mg  20 mg Oral Daily WESLEY Herbert MD   20 mg at 12/04/23 1821    sennosides-docusate (PERICOLACE) 8.6-50 MG per tablet 1 tablet  1 tablet Oral BID Chet Shi MD        sodium chloride 0.9 % flush 10 mL  10 mL Intravenous PRN WESLEY Herbert MD        sodium chloride 0.9 % flush 3 mL  3 mL Intravenous Q12H WESLEY Herbert MD        sodium chloride 0.9 % infusion 40 mL  40 mL Intravenous PRN WESLEY Herbert MD        sodium chloride 0.9 % infusion  100 mL/hr Intravenous Continuous WESLEY Herbert  mL/hr at 12/05/23 0131 100 mL/hr at 12/05/23 0131       PHYSICAL EXAM:    Orientation:  alert and oriented to person, place, and time    Incision:  no significant drainage    Upper Extremity Motor :  5/5 bilaterally    Upper Motor Neuron Signs:  none     Lower Extremity Motor :  equal bilaterally    Lower Extremity Sensory:  Tibial nerve: Intact, Superficial peroneal nerve: Intact, and Deep peroneal nerve: Intact    Flatus:  flatus    ABNORMAL EXAM FINDINGS:  none    LABS:    Lab Results (last 24 hours)       Procedure Component Value Units Date/Time    POC Glucose Once [[id number]]  (Abnormal) Collected: 12/04/23 1620    Specimen: Blood Updated: 12/04/23 1631     Glucose 281 mg/dL      Comment: : [id number] Vipul MontillaMetelizabeth ID: [id number]       POC Glucose Once  [[id number]]  (Abnormal) Collected: 12/04/23 1344    Specimen: Blood Updated: 12/04/23 1355     Glucose 280 mg/dL      Comment: : [id number] Cat Zhou ID: [id number]       POC Glucose Once [[id number]]  (Abnormal) Collected: 12/04/23 1241    Specimen: Blood Updated: 12/04/23 1253     Glucose 265 mg/dL      Comment: : [id number] Ewa PainterMeter ID: [id number]       POC Glucose Once [[id number]]  (Abnormal) Collected: 12/04/23 0744    Specimen: Blood Updated: 12/04/23 0755     Glucose 222 mg/dL      Comment: : [id number] Reynaldo Hidalgo (Kee) ID: [id number]               ASSESSMENT AND PLAN:    Post operative day 1 status post lateral lumbar fusion L2-4    1:  Activity Level:  up with pt/ot  2:  Pain Control:  oral analgesia   3:  Discharge Planning:  pending completion of next stage  4:  Other:  perioperative orders, nutrition consult       Electronically signed by JEAN CLAUDE Colon 12/5/2023 07:31 CST

## 2023-12-05 NOTE — PLAN OF CARE
Goal Outcome Evaluation:  Plan of Care Reviewed With: patient        Progress: no change  Outcome Evaluation: OT evaluation completed.  Pt is A&Ox4.  She is in pain, lethargic, and nauseous.  Pt completed bed mobility with S and vcs.  She completed functional transfers and mobility in the room to the door with CGA and gary hand held assist.  She required max A for LB dressing of her socks sitting EOB due to BLE weakness.  LLE weakness noted prior to sx and new R hip weakness noted.  Pt able to tres LSO brace with S and vcs.  Pt edu on spinal precautions and wear/care of LSO brace.  Pt is limited with mobility and ADLs this date due to her pain and weakness.  OT will cont to follow to maximize her I and safety with ADLs and functional mobility.  Plan is for pt to have next part sx tomorrow 12/6.      Anticipated Discharge Disposition (OT): home with assist, home with home health

## 2023-12-05 NOTE — CONSULTS
Consult Note    Referring Provider: Dr. Herbert  Reason for Consultation: Medical co-management    Patient Care Team:  Alexia Angeles APRN as PCP - General (Nurse Practitioner)  Jeimy Villatoro DO as Referring Physician (Family Medicine)  Brian Banda MD as Cardiologist (Cardiology)  Jessica Mckenna APRN as Nurse Practitioner (Nurse Practitioner)    Chief complaint: Back pain    Subjective .     History of present illness:      68-year-old female with past medical history of type 1 diabetes treated on insulin pump who presents for lumbar surgery.  She is long history of low back pain associated with right lower extremity radicular symptoms and ultimately decided to pursue surgical fixation after failing conservative measures.  She underwent operation on 12/4 and Toller procedure well.  She did have some postoperative nausea and vomiting which is now improved.      REVIEW OF SYSTEMS:    CONSTITUTIONAL:  Negative for anorexia, chills, fevers, night sweats and weight loss  EYES:  negative for eye dryness, icterus and redness  HEENT:   negative for dental problems, epistaxis, facial trauma and thrush  RESPIRATORY:  negative for chest tightness, cough, dyspnea on exertion, pneumonia and sputum  CARDIOVASCULAR: negative for chest pain, dyspnea, exertional chest pressure/discomfort, irregular heart beat, palpitations, paroxysmal nocturnal dyspnea and syncope  GASTROINTESTINAL:  negative for abdominal pain, hematemesis, jaundice, melena and rectal bleeding. No significant changes in bowel habits preoperatively.   MUSCULOSKELETAL:  negative for muscle weakness, myalgias and neck pain, outside of surgical issues noted above  NEUROLOGICAL:   negative for dizziness, headaches, seizures, speech problems, tremors and vertigo  INTEGUMENT: negative for pruritus, rash, skin color change and skin lesion(s)         History    Past Medical History:   Diagnosis Date    Allergic to grasses 4/20/89    Arthritis      Asthma     Cancer     skin basal cell    Dental disease 23    My teeth started getting loose and one broke off    Diabetes mellitus     Dizziness 23    Cant bend over or look up long or turn fast    GERD (gastroesophageal reflux disease)     Hypercholesterolemia 2016    Intermittent chest pain 2016    Osteopenia     osteopenia    PONV (postoperative nausea and vomiting)     Scoliosis 2018    Spinal headache     Tinnitus     Vitamin B12 deficiency 2016    Vitamin D deficiency 2016    Warm autoimmune hemolytic anemia      Past Surgical History:   Procedure Laterality Date    ADENOIDECTOMY  1974    ANKLE SURGERY Right     APPENDECTOMY      BREAST BIOPSY      CARPAL TUNNEL RELEASE Right      SECTION      X 2    CHOLECYSTECTOMY      COLONOSCOPY  2005    [facility]    COLONOSCOPY N/A 2017    Procedure: COLONOSCOPY WITH ANESTHESIA;  Surgeon: Ondina Abdul MD;  Location: [facility] ENDOSCOPY;  Service:     ENDOSCOPY N/A 2017    Procedure: ESOPHAGOGASTRODUODENOSCOPY WITH ANESTHESIA;  Surgeon: Ondina Abdul MD;  Location: [facility] ENDOSCOPY;  Service:     EYE SURGERY  2019    cataract    KNEE MENISCECTOMY Bilateral     SKIN CANCER EXCISION  2016    FACE    TONSILLECTOMY       Family History   Problem Relation Age of Onset    Asthma Mother     Heart disease Mother     COPD Mother     Heart disease Father     Early death Father         heart attack @ 60    Heart disease Sister     Hypertension Sister     COPD Sister     Diabetes Sister         Type 2    Diabetes Sister     Other Sister     Hearing loss Sister         born deaf    Heart disease Brother     Breast cancer Paternal Aunt     No Known Problems Son     No Known Problems Son     Hypertension Maternal Grandfather     Stroke Maternal Grandfather     Diabetes Maternal Grandmother         Type 2    Vision loss Maternal Grandmother         from diabetes    Heart disease Paternal Grandfather      Hypertension Paternal Grandfather     Cancer Paternal Aunt     Hearing loss Brother         wears hearing aids    Heart disease Brother     Hypertension Brother     Heart disease Sister     Hypertension Sister      Social History     Tobacco Use    Smoking status: Every Day     Packs/day: 0.25     Years: 48.00     Additional pack years: 0.00     Total pack years: 12.00     Types: Cigarettes     Start date: 2/18/1975     Passive exposure: Current    Smokeless tobacco: Never    Tobacco comments:     quit during two pregnancies but went back to smoking after delivery   Vaping Use    Vaping Use: Never used   Substance Use Topics    Alcohol use: Not Currently     Alcohol/week: 12.0 standard drinks of alcohol     Types: 12 Cans of beer per week     Comment: drink at early 20's    Drug use: No     Medications Prior to Admission   Medication Sig Dispense Refill Last Dose    allopurinol (ZYLOPRIM) 100 MG tablet Take 1 tablet by mouth Daily.   12/3/2023    azelastine (ASTELIN) 0.1 % nasal spray 2 sprays into the nostril(s) as directed by provider 2 (Two) Times a Day. Use in each nostril as directed 1 each 12 12/3/2023    Calcium Citrate (CITRACAL PO) Take 1 tablet by mouth Daily.   12/3/2023    cholecalciferol (VITAMIN D3) 25 MCG (1000 UT) tablet Take 2 tablets by mouth Daily.   12/3/2023    Continuous Blood Gluc  (Dexcom G5  Kit) device Continuous.   Past Week    diphenhydrAMINE (BENADRYL) 25 mg capsule Take 1 capsule by mouth Every 6 (Six) Hours As Needed for Itching.   Past Week    folic acid (FOLVITE) 1 MG tablet Take 1 tablet by mouth Daily.   12/3/2023    furosemide (LASIX) 20 MG tablet Take 1 tablet by mouth Daily As Needed (leg swelling).   Past Month    Insulin Infusion Pump device Continuous.   12/3/2023    Loratadine 10 MG capsule Take 1 capsule by mouth every night at bedtime. As anti-histamine for allergies   12/3/2023    omeprazole (priLOSEC) 40 MG capsule Take 1 capsule by mouth Daily.    12/3/2023    pregabalin (LYRICA) 75 MG capsule Take 1 capsule by mouth 2 (Two) Times a Day.   12/3/2023    rosuvastatin (CRESTOR) 20 MG tablet Take 1 tablet by mouth Daily.   12/3/2023    vitamin B-12 (CYANOCOBALAMIN) 1000 MCG tablet Take 1 tablet by mouth Daily.   12/3/2023       Allergies:  Corn-containing products, Eggs or egg-derived products, Nuts, Milk-related compounds, Cefazolin, Gabapentin, Levemir [insulin detemir], and Wheat    Objective     Vital Signs   Temp:  [97.3 °F (36.3 °C)-98.4 °F (36.9 °C)] 98.3 °F (36.8 °C)  Heart Rate:  [67-92] 79  Resp:  [15-16] 16  BP: ()/(43-66) 114/49          Physical Exam:  Constitutional: oriented to person, place, and time. appears well-developed.   Head: Normocephalic and atraumatic.   Eyes: Pupils are equal, round, and reactive to light.  No icterus or erythema  Neck: Neck supple.  Without masses or carotid bruit  Cardiovascular: Regular rhythm and normal heart sounds.  No significant lift, rub or murmur noted  Pulmonary/Chest: Effort normal and breath sounds normal. CTAB, encourage deep breathing.  Abdominal: Soft. Bowel sounds are normal to hypoactive. No significant distension. There is no rebound and no guarding.   Musculoskeletal: Normal range of motion and no edema or tenderness outside of surgical area.   Neurological: Pt is alert and oriented to person, place, and time.  normal reflexes present.  Somewhat sedated from anesthesia and pain medicine noted  Skin: Skin is warm and dry.  No new rashes of concern.    Results Review:   I reviewed the patient's new imaging results and agree with the interpretation.      Assessment & Plan       Lumbago of multiple sites in spine with sciatica    Gastroesophageal reflux disease    Type 1 diabetes mellitus with hyperglycemia    Chronic bilateral low back pain without sciatica    DDD (degenerative disc disease), lumbar    Lumbar radiculopathy      Assessment: 68-year-old female with past medical history of type 1  diabetes on insulin pump presents for lumbar surgery.  First operation on 12/4.    Plan:    Perioperative care  Started bowel regimen, counseled to work with physical and occupational therapy, counseled on appropriate use of incentive spirometry.  Surgery tomorrow morning as well which will be her final stage.    Type 1 diabetes  Continue insulin pump with continuous glucose monitor.  Anticipate elevated sugars probably related to stress from surgery.  Continue to monitor.    Postoperative nausea and vomiting  Has been told as needed medications available.  This is probably a side effect of anesthesia or pain medications.  Minimizes tolerated.    GERD  Continue PPI.    I discussed the patient's findings and my recommendations with patient    Chet Shi MD  12/05/23  07:07 CST

## 2023-12-06 ENCOUNTER — APPOINTMENT (OUTPATIENT)
Dept: GENERAL RADIOLOGY | Facility: HOSPITAL | Age: 68
End: 2023-12-06
Payer: MEDICARE

## 2023-12-06 ENCOUNTER — ANESTHESIA EVENT (OUTPATIENT)
Dept: PERIOP | Facility: HOSPITAL | Age: 68
End: 2023-12-06
Payer: MEDICARE

## 2023-12-06 ENCOUNTER — ANESTHESIA (OUTPATIENT)
Dept: PERIOP | Facility: HOSPITAL | Age: 68
End: 2023-12-06
Payer: MEDICARE

## 2023-12-06 LAB
ANION GAP SERPL CALCULATED.3IONS-SCNC: 11 MMOL/L (ref 5–15)
BASOPHILS # BLD AUTO: 0.04 10*3/MM3 (ref 0–0.2)
BASOPHILS NFR BLD AUTO: 0.3 % (ref 0–1.5)
BUN SERPL-MCNC: 9 MG/DL (ref 8–23)
BUN/CREAT SERPL: 16.7 (ref 7–25)
CALCIUM SPEC-SCNC: 8.5 MG/DL (ref 8.6–10.5)
CHLORIDE SERPL-SCNC: 102 MMOL/L (ref 98–107)
CO2 SERPL-SCNC: 24 MMOL/L (ref 22–29)
CREAT SERPL-MCNC: 0.54 MG/DL (ref 0.57–1)
DEPRECATED RDW RBC AUTO: 47.9 FL (ref 37–54)
EGFRCR SERPLBLD CKD-EPI 2021: 101.1 ML/MIN/1.73
EOSINOPHIL # BLD AUTO: 0.06 10*3/MM3 (ref 0–0.4)
EOSINOPHIL NFR BLD AUTO: 0.4 % (ref 0.3–6.2)
ERYTHROCYTE [DISTWIDTH] IN BLOOD BY AUTOMATED COUNT: 14.2 % (ref 12.3–15.4)
GLUCOSE BLDC GLUCOMTR-MCNC: 158 MG/DL (ref 70–130)
GLUCOSE BLDC GLUCOMTR-MCNC: 172 MG/DL (ref 70–130)
GLUCOSE SERPL-MCNC: 172 MG/DL (ref 65–99)
HCT VFR BLD AUTO: 32.1 % (ref 34–46.6)
HGB BLD-MCNC: 10.3 G/DL (ref 12–15.9)
IMM GRANULOCYTES # BLD AUTO: 0.3 10*3/MM3 (ref 0–0.05)
IMM GRANULOCYTES NFR BLD AUTO: 2.1 % (ref 0–0.5)
LYMPHOCYTES # BLD AUTO: 1.69 10*3/MM3 (ref 0.7–3.1)
LYMPHOCYTES NFR BLD AUTO: 12 % (ref 19.6–45.3)
MCH RBC QN AUTO: 29.5 PG (ref 26.6–33)
MCHC RBC AUTO-ENTMCNC: 32.1 G/DL (ref 31.5–35.7)
MCV RBC AUTO: 92 FL (ref 79–97)
MONOCYTES # BLD AUTO: 1.59 10*3/MM3 (ref 0.1–0.9)
MONOCYTES NFR BLD AUTO: 11.3 % (ref 5–12)
NEUTROPHILS NFR BLD AUTO: 10.39 10*3/MM3 (ref 1.7–7)
NEUTROPHILS NFR BLD AUTO: 73.9 % (ref 42.7–76)
NRBC BLD AUTO-RTO: 0 /100 WBC (ref 0–0.2)
PLATELET # BLD AUTO: 145 10*3/MM3 (ref 140–450)
PMV BLD AUTO: 12.2 FL (ref 6–12)
POTASSIUM SERPL-SCNC: 3.5 MMOL/L (ref 3.5–5.2)
RBC # BLD AUTO: 3.49 10*6/MM3 (ref 3.77–5.28)
SODIUM SERPL-SCNC: 137 MMOL/L (ref 136–145)
WBC NRBC COR # BLD AUTO: 14.07 10*3/MM3 (ref 3.4–10.8)

## 2023-12-06 PROCEDURE — C1713 ANCHOR/SCREW BN/BN,TIS/BN: HCPCS | Performed by: ORTHOPAEDIC SURGERY

## 2023-12-06 PROCEDURE — 85025 COMPLETE CBC W/AUTO DIFF WBC: CPT | Performed by: STUDENT IN AN ORGANIZED HEALTH CARE EDUCATION/TRAINING PROGRAM

## 2023-12-06 PROCEDURE — 0 BUPIVACAINE LIPOSOME 1.3 % SUSPENSION 10 ML VIAL: Performed by: ORTHOPAEDIC SURGERY

## 2023-12-06 PROCEDURE — 25810000003 LACTATED RINGERS PER 1000 ML: Performed by: ORTHOPAEDIC SURGERY

## 2023-12-06 PROCEDURE — 25010000002 SUGAMMADEX 200 MG/2ML SOLUTION: Performed by: NURSE ANESTHETIST, CERTIFIED REGISTERED

## 2023-12-06 PROCEDURE — 25810000003 LACTATED RINGERS PER 1000 ML: Performed by: ANESTHESIOLOGY

## 2023-12-06 PROCEDURE — 72040 X-RAY EXAM NECK SPINE 2-3 VW: CPT

## 2023-12-06 PROCEDURE — 25010000002 CEFAZOLIN PER 500 MG: Performed by: PHYSICIAN ASSISTANT

## 2023-12-06 PROCEDURE — 25010000002 HYDROMORPHONE PER 4 MG: Performed by: ANESTHESIOLOGY

## 2023-12-06 PROCEDURE — 4A10X4G MONITORING OF CENTRAL NERVOUS ELECTRICAL ACTIVITY, INTRAOPERATIVE, EXTERNAL APPROACH: ICD-10-PCS | Performed by: ORTHOPAEDIC SURGERY

## 2023-12-06 PROCEDURE — 76000 FLUOROSCOPY <1 HR PHYS/QHP: CPT

## 2023-12-06 PROCEDURE — 25010000002 DEXAMETHASONE PER 1 MG: Performed by: ANESTHESIOLOGY

## 2023-12-06 PROCEDURE — 25010000002 PROPOFOL 10 MG/ML EMULSION: Performed by: NURSE ANESTHETIST, CERTIFIED REGISTERED

## 2023-12-06 PROCEDURE — 0SG1071 FUSION OF 2 OR MORE LUMBAR VERTEBRAL JOINTS WITH AUTOLOGOUS TISSUE SUBSTITUTE, POSTERIOR APPROACH, POSTERIOR COLUMN, OPEN APPROACH: ICD-10-PCS | Performed by: ORTHOPAEDIC SURGERY

## 2023-12-06 PROCEDURE — 82948 REAGENT STRIP/BLOOD GLUCOSE: CPT

## 2023-12-06 PROCEDURE — 25010000002 FENTANYL CITRATE (PF) 100 MCG/2ML SOLUTION: Performed by: NURSE ANESTHETIST, CERTIFIED REGISTERED

## 2023-12-06 PROCEDURE — 80048 BASIC METABOLIC PNL TOTAL CA: CPT | Performed by: STUDENT IN AN ORGANIZED HEALTH CARE EDUCATION/TRAINING PROGRAM

## 2023-12-06 PROCEDURE — C1769 GUIDE WIRE: HCPCS | Performed by: ORTHOPAEDIC SURGERY

## 2023-12-06 PROCEDURE — 25010000002 ONDANSETRON PER 1 MG: Performed by: NURSE ANESTHETIST, CERTIFIED REGISTERED

## 2023-12-06 PROCEDURE — C9290 INJ, BUPIVACAINE LIPOSOME: HCPCS | Performed by: ORTHOPAEDIC SURGERY

## 2023-12-06 DEVICE — CANNULATED EXTENDED TAB POLYAXIAL REDUCTION SCREW, 6.5 MM X 45 MM
Type: IMPLANTABLE DEVICE | Site: SPINE LUMBAR | Status: FUNCTIONAL
Brand: INVICTUS

## 2023-12-06 DEVICE — TI, MIS LORDOTIC ROD, VI2, 5.5MM X 75MM
Type: IMPLANTABLE DEVICE | Site: SPINE LUMBAR | Status: FUNCTIONAL
Brand: INVICTUS

## 2023-12-06 DEVICE — SET SCREW
Type: IMPLANTABLE DEVICE | Site: SPINE LUMBAR | Status: FUNCTIONAL
Brand: INVICTUS

## 2023-12-06 RX ORDER — FENTANYL CITRATE 50 UG/ML
INJECTION, SOLUTION INTRAMUSCULAR; INTRAVENOUS AS NEEDED
Status: DISCONTINUED | OUTPATIENT
Start: 2023-12-06 | End: 2023-12-06 | Stop reason: SURG

## 2023-12-06 RX ORDER — DROPERIDOL 2.5 MG/ML
0.62 INJECTION, SOLUTION INTRAMUSCULAR; INTRAVENOUS ONCE AS NEEDED
Status: DISCONTINUED | OUTPATIENT
Start: 2023-12-06 | End: 2023-12-06 | Stop reason: HOSPADM

## 2023-12-06 RX ORDER — SCOLOPAMINE TRANSDERMAL SYSTEM 1 MG/1
1 PATCH, EXTENDED RELEASE TRANSDERMAL ONCE
Status: DISCONTINUED | OUTPATIENT
Start: 2023-12-06 | End: 2023-12-06

## 2023-12-06 RX ORDER — DEXTROSE MONOHYDRATE 25 G/50ML
12.5 INJECTION, SOLUTION INTRAVENOUS AS NEEDED
Status: DISCONTINUED | OUTPATIENT
Start: 2023-12-06 | End: 2023-12-06 | Stop reason: HOSPADM

## 2023-12-06 RX ORDER — DEXAMETHASONE SODIUM PHOSPHATE 4 MG/ML
4 INJECTION, SOLUTION INTRA-ARTICULAR; INTRALESIONAL; INTRAMUSCULAR; INTRAVENOUS; SOFT TISSUE ONCE AS NEEDED
Status: COMPLETED | OUTPATIENT
Start: 2023-12-06 | End: 2023-12-06

## 2023-12-06 RX ORDER — LABETALOL HYDROCHLORIDE 5 MG/ML
5 INJECTION, SOLUTION INTRAVENOUS
Status: DISCONTINUED | OUTPATIENT
Start: 2023-12-06 | End: 2023-12-06 | Stop reason: HOSPADM

## 2023-12-06 RX ORDER — ROCURONIUM BROMIDE 10 MG/ML
INJECTION, SOLUTION INTRAVENOUS AS NEEDED
Status: DISCONTINUED | OUTPATIENT
Start: 2023-12-06 | End: 2023-12-06 | Stop reason: SURG

## 2023-12-06 RX ORDER — LIDOCAINE HYDROCHLORIDE 10 MG/ML
0.5 INJECTION, SOLUTION EPIDURAL; INFILTRATION; INTRACAUDAL; PERINEURAL ONCE AS NEEDED
Status: DISCONTINUED | OUTPATIENT
Start: 2023-12-06 | End: 2023-12-06 | Stop reason: HOSPADM

## 2023-12-06 RX ORDER — SODIUM CHLORIDE, SODIUM LACTATE, POTASSIUM CHLORIDE, CALCIUM CHLORIDE 600; 310; 30; 20 MG/100ML; MG/100ML; MG/100ML; MG/100ML
9 INJECTION, SOLUTION INTRAVENOUS CONTINUOUS
Status: DISCONTINUED | OUTPATIENT
Start: 2023-12-06 | End: 2023-12-07

## 2023-12-06 RX ORDER — ONDANSETRON 2 MG/ML
INJECTION INTRAMUSCULAR; INTRAVENOUS AS NEEDED
Status: DISCONTINUED | OUTPATIENT
Start: 2023-12-06 | End: 2023-12-06 | Stop reason: SURG

## 2023-12-06 RX ORDER — IBUPROFEN 600 MG/1
1 TABLET ORAL
Status: DISCONTINUED | OUTPATIENT
Start: 2023-12-06 | End: 2023-12-07 | Stop reason: HOSPADM

## 2023-12-06 RX ORDER — OXYCODONE AND ACETAMINOPHEN 10; 325 MG/1; MG/1
1 TABLET ORAL ONCE AS NEEDED
Status: COMPLETED | OUTPATIENT
Start: 2023-12-06 | End: 2023-12-06

## 2023-12-06 RX ORDER — IBUPROFEN 600 MG/1
600 TABLET ORAL ONCE AS NEEDED
Status: DISCONTINUED | OUTPATIENT
Start: 2023-12-06 | End: 2023-12-06 | Stop reason: HOSPADM

## 2023-12-06 RX ORDER — DEXTROSE MONOHYDRATE 25 G/50ML
25 INJECTION, SOLUTION INTRAVENOUS
Status: DISCONTINUED | OUTPATIENT
Start: 2023-12-06 | End: 2023-12-07 | Stop reason: HOSPADM

## 2023-12-06 RX ORDER — LIDOCAINE HYDROCHLORIDE 20 MG/ML
INJECTION, SOLUTION EPIDURAL; INFILTRATION; INTRACAUDAL; PERINEURAL AS NEEDED
Status: DISCONTINUED | OUTPATIENT
Start: 2023-12-06 | End: 2023-12-06 | Stop reason: SURG

## 2023-12-06 RX ORDER — ONDANSETRON 2 MG/ML
4 INJECTION INTRAMUSCULAR; INTRAVENOUS
Status: DISCONTINUED | OUTPATIENT
Start: 2023-12-06 | End: 2023-12-06 | Stop reason: HOSPADM

## 2023-12-06 RX ORDER — SODIUM CHLORIDE, SODIUM LACTATE, POTASSIUM CHLORIDE, CALCIUM CHLORIDE 600; 310; 30; 20 MG/100ML; MG/100ML; MG/100ML; MG/100ML
100 INJECTION, SOLUTION INTRAVENOUS CONTINUOUS
Status: DISCONTINUED | OUTPATIENT
Start: 2023-12-06 | End: 2023-12-07

## 2023-12-06 RX ORDER — SODIUM CHLORIDE 0.9 % (FLUSH) 0.9 %
10 SYRINGE (ML) INJECTION AS NEEDED
Status: DISCONTINUED | OUTPATIENT
Start: 2023-12-06 | End: 2023-12-06 | Stop reason: HOSPADM

## 2023-12-06 RX ORDER — NALOXONE HCL 0.4 MG/ML
0.04 VIAL (ML) INJECTION AS NEEDED
Status: DISCONTINUED | OUTPATIENT
Start: 2023-12-06 | End: 2023-12-06 | Stop reason: HOSPADM

## 2023-12-06 RX ORDER — SUCCINYLCHOLINE/SOD CL,ISO/PF 200MG/10ML
SYRINGE (ML) INTRAVENOUS AS NEEDED
Status: DISCONTINUED | OUTPATIENT
Start: 2023-12-06 | End: 2023-12-06 | Stop reason: SURG

## 2023-12-06 RX ORDER — HYDROMORPHONE HYDROCHLORIDE 1 MG/ML
0.5 INJECTION, SOLUTION INTRAMUSCULAR; INTRAVENOUS; SUBCUTANEOUS
Status: DISCONTINUED | OUTPATIENT
Start: 2023-12-06 | End: 2023-12-06 | Stop reason: HOSPADM

## 2023-12-06 RX ORDER — SODIUM CHLORIDE 0.9 % (FLUSH) 0.9 %
10 SYRINGE (ML) INJECTION EVERY 12 HOURS SCHEDULED
Status: DISCONTINUED | OUTPATIENT
Start: 2023-12-06 | End: 2023-12-06 | Stop reason: HOSPADM

## 2023-12-06 RX ORDER — FENTANYL CITRATE 50 UG/ML
25 INJECTION, SOLUTION INTRAMUSCULAR; INTRAVENOUS
Status: DISCONTINUED | OUTPATIENT
Start: 2023-12-06 | End: 2023-12-06 | Stop reason: HOSPADM

## 2023-12-06 RX ORDER — NICOTINE POLACRILEX 4 MG
15 LOZENGE BUCCAL
Status: DISCONTINUED | OUTPATIENT
Start: 2023-12-06 | End: 2023-12-07 | Stop reason: HOSPADM

## 2023-12-06 RX ORDER — MAGNESIUM HYDROXIDE 1200 MG/15ML
LIQUID ORAL AS NEEDED
Status: DISCONTINUED | OUTPATIENT
Start: 2023-12-06 | End: 2023-12-06 | Stop reason: HOSPADM

## 2023-12-06 RX ORDER — FAMOTIDINE 10 MG/ML
20 INJECTION, SOLUTION INTRAVENOUS
Status: DISCONTINUED | OUTPATIENT
Start: 2023-12-06 | End: 2023-12-06 | Stop reason: HOSPADM

## 2023-12-06 RX ORDER — PROPOFOL 10 MG/ML
VIAL (ML) INTRAVENOUS AS NEEDED
Status: DISCONTINUED | OUTPATIENT
Start: 2023-12-06 | End: 2023-12-06 | Stop reason: SURG

## 2023-12-06 RX ORDER — OXYCODONE HCL 20 MG/1
20 TABLET, FILM COATED, EXTENDED RELEASE ORAL ONCE
Status: COMPLETED | OUTPATIENT
Start: 2023-12-06 | End: 2023-12-06

## 2023-12-06 RX ORDER — FLUMAZENIL 0.1 MG/ML
0.2 INJECTION INTRAVENOUS AS NEEDED
Status: DISCONTINUED | OUTPATIENT
Start: 2023-12-06 | End: 2023-12-06 | Stop reason: HOSPADM

## 2023-12-06 RX ORDER — MIDAZOLAM HYDROCHLORIDE 1 MG/ML
0.5 INJECTION INTRAMUSCULAR; INTRAVENOUS
Status: DISCONTINUED | OUTPATIENT
Start: 2023-12-06 | End: 2023-12-06 | Stop reason: HOSPADM

## 2023-12-06 RX ADMIN — FENTANYL CITRATE 50 MCG: 50 INJECTION, SOLUTION INTRAMUSCULAR; INTRAVENOUS at 12:17

## 2023-12-06 RX ADMIN — AZELASTINE HYDROCHLORIDE 2 SPRAY: 137 SPRAY, METERED NASAL at 20:50

## 2023-12-06 RX ADMIN — ROCURONIUM BROMIDE 5 MG: 10 INJECTION, SOLUTION INTRAVENOUS at 12:06

## 2023-12-06 RX ADMIN — FENTANYL CITRATE 50 MCG: 50 INJECTION, SOLUTION INTRAMUSCULAR; INTRAVENOUS at 12:06

## 2023-12-06 RX ADMIN — SODIUM CHLORIDE, POTASSIUM CHLORIDE, SODIUM LACTATE AND CALCIUM CHLORIDE 100 ML/HR: 600; 310; 30; 20 INJECTION, SOLUTION INTRAVENOUS at 10:50

## 2023-12-06 RX ADMIN — FENTANYL CITRATE 25 MCG: 50 INJECTION, SOLUTION INTRAMUSCULAR; INTRAVENOUS at 13:06

## 2023-12-06 RX ADMIN — FENTANYL CITRATE 25 MCG: 50 INJECTION, SOLUTION INTRAMUSCULAR; INTRAVENOUS at 12:43

## 2023-12-06 RX ADMIN — DEXAMETHASONE SODIUM PHOSPHATE 4 MG: 4 INJECTION INTRA-ARTICULAR; INTRALESIONAL; INTRAMUSCULAR; INTRAVENOUS; SOFT TISSUE at 10:53

## 2023-12-06 RX ADMIN — ONDANSETRON 4 MG: 2 INJECTION INTRAMUSCULAR; INTRAVENOUS at 13:08

## 2023-12-06 RX ADMIN — SCOPALAMINE 1 PATCH: 1 PATCH, EXTENDED RELEASE TRANSDERMAL at 10:53

## 2023-12-06 RX ADMIN — PREGABALIN 75 MG: 75 CAPSULE ORAL at 20:49

## 2023-12-06 RX ADMIN — DOCUSATE SODIUM 50 MG AND SENNOSIDES 8.6 MG 1 TABLET: 8.6; 5 TABLET, FILM COATED ORAL at 20:49

## 2023-12-06 RX ADMIN — FENTANYL CITRATE 25 MCG: 50 INJECTION, SOLUTION INTRAMUSCULAR; INTRAVENOUS at 13:04

## 2023-12-06 RX ADMIN — LIDOCAINE HYDROCHLORIDE 80 MG: 20 INJECTION, SOLUTION EPIDURAL; INFILTRATION; INTRACAUDAL; PERINEURAL at 12:06

## 2023-12-06 RX ADMIN — HYDROMORPHONE HYDROCHLORIDE 0.5 MG: 1 INJECTION, SOLUTION INTRAMUSCULAR; INTRAVENOUS; SUBCUTANEOUS at 14:08

## 2023-12-06 RX ADMIN — SODIUM CHLORIDE, POTASSIUM CHLORIDE, SODIUM LACTATE AND CALCIUM CHLORIDE 9 ML/HR: 600; 310; 30; 20 INJECTION, SOLUTION INTRAVENOUS at 10:50

## 2023-12-06 RX ADMIN — FAMOTIDINE 20 MG: 10 INJECTION INTRAVENOUS at 10:53

## 2023-12-06 RX ADMIN — OXYCODONE HYDROCHLORIDE 20 MG: 20 TABLET, FILM COATED, EXTENDED RELEASE ORAL at 10:53

## 2023-12-06 RX ADMIN — PROPOFOL INJECTABLE EMULSION 120 MG: 10 INJECTION, EMULSION INTRAVENOUS at 12:06

## 2023-12-06 RX ADMIN — FENTANYL CITRATE 25 MCG: 50 INJECTION, SOLUTION INTRAMUSCULAR; INTRAVENOUS at 13:20

## 2023-12-06 RX ADMIN — SUGAMMADEX 50 MG: 100 INJECTION, SOLUTION INTRAVENOUS at 12:59

## 2023-12-06 RX ADMIN — CEFAZOLIN 1000 MG: 2 INJECTION, POWDER, FOR SOLUTION INTRAMUSCULAR; INTRAVENOUS at 12:20

## 2023-12-06 RX ADMIN — Medication 3 ML: at 20:50

## 2023-12-06 RX ADMIN — Medication 100 MG: at 12:06

## 2023-12-06 RX ADMIN — POLYETHYLENE GLYCOL 3350 17 G: 17 POWDER, FOR SOLUTION ORAL at 20:50

## 2023-12-06 RX ADMIN — ROCURONIUM BROMIDE 20 MG: 10 INJECTION, SOLUTION INTRAVENOUS at 12:41

## 2023-12-06 RX ADMIN — OXYCODONE HYDROCHLORIDE AND ACETAMINOPHEN 1 TABLET: 10; 325 TABLET ORAL at 14:22

## 2023-12-06 NOTE — PLAN OF CARE
Goal Outcome Evaluation:  Plan of Care Reviewed With: patient, spouse        Progress: improving  Outcome Evaluation: Pt A&Ox4. Sx today. Dsg to R flank and back CDI. IVF given per orders. Due to void. Sugars monitored. Insulin pump in use. Spouse at BS. Call light in reach. Safety maintained.

## 2023-12-06 NOTE — ANESTHESIA POSTPROCEDURE EVALUATION
Patient: Giana Yepez    Procedure Summary       Date: 12/06/23 Room / Location:  [facility] OR  /  [facility]    Anesthesia Start: 1159 Anesthesia Stop: 1348    Procedure: POSTERIOR SPINAL FUSION WITH INSTRUMENTATION L2-4 (Spine Lumbar) Diagnosis: (M54.16)    Surgeons: WESLEY Herbert MD Provider: Alvarado Ellis CRNA    Anesthesia Type: general ASA Status: 3            Anesthesia Type: general    Vitals  Vitals Value Taken Time   /65 12/06/23 1445   Temp 97.5 °F (36.4 °C) 12/06/23 1445   Pulse 85 12/06/23 1448   Resp 14 12/06/23 1445   SpO2 92 % 12/06/23 1448   Vitals shown include unfiled device data.        Post Anesthesia Care and Evaluation    PONV Status: none  Comments: Patient d/c from PACU prior to anes eval based on Margo score.  Please see RN notes for details of d/c criteria.    Blood pressure 117/55, pulse 87, temperature 97.6 °F (36.4 °C), temperature source Oral, resp. rate 16, weight 72 kg (158 lb 11.7 oz), SpO2 97%, not currently breastfeeding.

## 2023-12-06 NOTE — PROGRESS NOTES
Daily Progress Note  Giana Yepez  MRN: [id number] LOS: 2    Admit Date: 2023 06:52 CST    Subjective:         Interval History:    Reviewed overnight events and nursing notes.     Doing well this morning.  Nausea and vomiting improved throughout the day yesterday.  Expected amount of pain.  Did well with therapy yesterday.  No bowel movement yet.    ROS:  Review of Systems   Constitutional:  Negative for chills and fever.   Respiratory:  Negative for cough, chest tightness and shortness of breath.    Cardiovascular:  Negative for chest pain.   Gastrointestinal:  Negative for abdominal pain, diarrhea, nausea and vomiting.       DIET:  NPO Diet NPO Type: Sips with Meds    Medications:   sodium chloride, 100 mL/hr, Last Rate: 100 mL/hr (23 0131)      allopurinol, 100 mg, Oral, Daily  azelastine, 2 spray, Each Nare, BID  ceFAZolin, 2,000 mg, Intravenous, On Call to OR  polyethylene glycol, 17 g, Oral, TID  pregabalin, 75 mg, Oral, Q12H  rosuvastatin, 20 mg, Oral, Daily  senna-docusate sodium, 1 tablet, Oral, BID  sodium chloride, 3 mL, Intravenous, Q12H          Objective:     Vitals: /44 (BP Location: Left arm, Patient Position: Lying)   Pulse 91   Temp 98.2 °F (36.8 °C) (Oral)   Resp 16   Wt 72 kg (158 lb 11.7 oz)   LMP  (LMP Unknown)   SpO2 94%   Breastfeeding No   BMI 27.43 kg/m²    Intake/Output Summary (Last 24 hours) at 2023 0652  Last data filed at 2023 2130  Gross per 24 hour   Intake 100 ml   Output --   Net 100 ml    Temp (24hrs), Av.2 °F (36.8 °C), Min:97.5 °F (36.4 °C), Max:99 °F (37.2 °C)    Glucose:  Lab Results   Component Value Date    POCGLU 281 (H) 2023    POCGLU 280 (H) 2023    POCGLU 265 (H) 2023    POCGLU 222 (H) 2023     Physical Examination:   Physical Exam  Constitutional:       General: She is not in acute distress.     Appearance: Normal appearance. She is not ill-appearing or toxic-appearing.   Cardiovascular:       Rate and Rhythm: Normal rate and regular rhythm.      Pulses: Normal pulses.      Heart sounds: Normal heart sounds. No murmur heard.  Pulmonary:      Effort: Pulmonary effort is normal. No respiratory distress.      Breath sounds: Normal breath sounds. No stridor. No wheezing or rales.   Abdominal:      General: Abdomen is flat. Bowel sounds are normal. There is no distension.      Palpations: Abdomen is soft.      Tenderness: There is no abdominal tenderness.   Neurological:      Mental Status: She is alert.         Labs:  Lab Results (last 24 hours)       Procedure Component Value Units Date/Time    CBC & Differential [[id number]]  (Abnormal) Collected: 12/06/23 0608    Specimen: Blood Updated: 12/06/23 0637    Narrative:      The following orders were created for panel order CBC & Differential.  Procedure                               Abnormality         Status                     ---------                               -----------         ------                     CBC Auto Differential[[id number]]        Abnormal            Final result                 Please view results for these tests on the individual orders.    CBC Auto Differential [[id number]]  (Abnormal) Collected: 12/06/23 0608    Specimen: Blood Updated: 12/06/23 0637     WBC 14.07 10*3/mm3      RBC 3.49 10*6/mm3      Hemoglobin 10.3 g/dL      Hematocrit 32.1 %      MCV 92.0 fL      MCH 29.5 pg      MCHC 32.1 g/dL      RDW 14.2 %      RDW-SD 47.9 fl      MPV 12.2 fL      Platelets 145 10*3/mm3      Neutrophil % 73.9 %      Lymphocyte % 12.0 %      Monocyte % 11.3 %      Eosinophil % 0.4 %      Basophil % 0.3 %      Immature Grans % 2.1 %      Neutrophils, Absolute 10.39 10*3/mm3      Lymphocytes, Absolute 1.69 10*3/mm3      Monocytes, Absolute 1.59 10*3/mm3      Eosinophils, Absolute 0.06 10*3/mm3      Basophils, Absolute 0.04 10*3/mm3      Immature Grans, Absolute 0.30 10*3/mm3      nRBC 0.0 /100 WBC     Basic Metabolic Panel [[id number]] Collected:  12/06/23 0608    Specimen: Blood Updated: 12/06/23 0630    Folate [[id number]]  (Normal) Collected: 12/05/23 1320    Specimen: Blood Updated: 12/05/23 2021     Folate >20.00 ng/mL     Narrative:      Results may be falsely increased if patient taking Biotin.      Vitamin B12 [[id number]]  (Abnormal) Collected: 12/05/23 1320    Specimen: Blood Updated: 12/05/23 2021     Vitamin B-12 >2,000 pg/mL     Narrative:      Results may be falsely increased if patient taking Biotin.      Ferritin [[id number]]  (Abnormal) Collected: 12/05/23 1320    Specimen: Blood Updated: 12/05/23 1407     Ferritin 951.40 ng/mL     Narrative:      Results may be falsely decreased if patient taking Biotin.      Iron Profile [[id number]]  (Abnormal) Collected: 12/05/23 1320    Specimen: Blood Updated: 12/05/23 1407     Iron 17 mcg/dL      Iron Saturation (TSAT) 8 %      Transferrin 145 mg/dL      TIBC 216 mcg/dL     Basic Metabolic Panel [[id number]]  (Abnormal) Collected: 12/05/23 1320    Specimen: Blood Updated: 12/05/23 1407     Glucose 179 mg/dL      BUN 7 mg/dL      Creatinine 0.55 mg/dL      Sodium 136 mmol/L      Potassium 4.1 mmol/L      Chloride 102 mmol/L      CO2 25.0 mmol/L      Calcium 8.4 mg/dL      BUN/Creatinine Ratio 12.7     Anion Gap 9.0 mmol/L      eGFR 100.6 mL/min/1.73     Narrative:      GFR Normal >60  Chronic Kidney Disease <60  Kidney Failure <15      CBC & Differential [[id number]]  (Abnormal) Collected: 12/05/23 1320    Specimen: Blood Updated: 12/05/23 1335    Narrative:      The following orders were created for panel order CBC & Differential.  Procedure                               Abnormality         Status                     ---------                               -----------         ------                     CBC Auto Differential[[id number]]        Abnormal            Final result                 Please view results for these tests on the individual orders.    CBC Auto Differential [[id number]]  (Abnormal)  Collected: 12/05/23 1320    Specimen: Blood Updated: 12/05/23 1335     WBC 14.84 10*3/mm3      RBC 3.75 10*6/mm3      Hemoglobin 11.4 g/dL      Hematocrit 34.5 %      MCV 92.0 fL      MCH 30.4 pg      MCHC 33.0 g/dL      RDW 14.6 %      RDW-SD 48.6 fl      MPV 11.8 fL      Platelets 182 10*3/mm3      Neutrophil % 79.5 %      Lymphocyte % 10.4 %      Monocyte % 9.2 %      Eosinophil % 0.3 %      Basophil % 0.2 %      Immature Grans % 0.4 %      Neutrophils, Absolute 11.79 10*3/mm3      Lymphocytes, Absolute 1.55 10*3/mm3      Monocytes, Absolute 1.37 10*3/mm3      Eosinophils, Absolute 0.04 10*3/mm3      Basophils, Absolute 0.03 10*3/mm3      Immature Grans, Absolute 0.06 10*3/mm3      nRBC 0.0 /100 WBC              Imaging:  XR Spine Lumbar AP & Lateral    Result Date: 12/4/2023  XR SPINE LUMBAR AP AND LATERAL- 12/4/2023 10:25 AM  HISTORY: fusion  COMPARISON: None  FLUOROSCOPY TIME: 1.0 minutes  FLUOROSCOPY DOSE: 25.8 mGy  NUMBER OF IMAGES: 2      Impression:  Intraoperative fluoroscopic images during lumbar fusion.  Please refer to the operative note for more details.  This report was signed and finalized on 12/4/2023 12:46 PM by Dr. Vinnie Kevin MD.      FL C Arm During Surgery    Result Date: 12/4/2023  This procedure was auto-finalized with no dictation required.        Assessment and Plan:     Primary Problem:  Lumbago of multiple sites in spine with sciatica    Hospital Problem list:    Lumbago of multiple sites in spine with sciatica    Gastroesophageal reflux disease    Type 1 diabetes mellitus with hyperglycemia    Chronic bilateral low back pain without sciatica    DDD (degenerative disc disease), lumbar    Lumbar radiculopathy        Assessment: 68-year-old female with past medical history of type 1 diabetes on insulin pump presents for lumbar surgery.  First operation on 12/4.     Plan:     Perioperative care  Continue bowel regimen, counseled to work with physical and occupational therapy, counseled  on appropriate use of incentive spirometry.  Second and final stage today.     Type 1 diabetes  Continue insulin pump with continuous glucose monitor.  Anticipate elevated sugars probably related to stress from surgery.  Continue to monitor.     Postoperative nausea and vomiting  Improved yesterday as time went on.  Probably side effect from anesthesia, pain medicines, or both.  Anticipate recurrence postoperatively again today.     GERD  Continue PPI.    Leukocytosis  Likely related to surgical intervention.  No signs of infection on interval history or physical examination.  Continue to monitor.    Discharge planning:   Home    Reviewed treatment plans with the patient and/or family.     Code Status:   Code Status and Medical Interventions:   Ordered at: 12/04/23 1526     Code Status (Patient has no pulse and is not breathing):    CPR (Attempt to Resuscitate)     Medical Interventions (Patient has pulse or is breathing):    Full Support       Electronically signed by Chet Shi MD on 12/6/2023 at 06:52 CST

## 2023-12-06 NOTE — OP NOTE
LUMBAR LAMINECTOMY POSTERIOR LUMBAR INTERBODY FUSION  Procedure Note    Giana Yepez  12/6/2023    Pre-op Diagnosis:     1. Increasing chronic back pain.   2. Bilateral buttock and thigh radiculopathy.   3. Neurogenic claudication.   4. Degenerative disc disease L1 to L4, severe L2-3.   5. Facet arthropathy L2-4.   6. Degenerative scoliosis concave left L1-2.   7. Foraminal disc herniation left L3-4.   8. Retrolisthesis L2-3.   9. Central and foraminal stenosis L2 to L4.   10. Osteopenia.   11.  Status post right LLIF L2-4 with instrumentation L2-3, 12/4/2023    Post-op Diagnosis:    same    Procedure/CPT® Codes:    1.  Posterior spinal fusion L2-3, L3-4  2.  Posterior spinal instrumentation L2-4 (ATEC pedicle screws and rods)  3.  Use of locally obtained autograft bone for fusion  4.  Use of fluoroscopy for confirmation of surgical level and placement of instrumentation  5.  Intraoperative neural monitoring with pedicle screw stimulation    Anesthesia: General    Surgeon: JOLLY Herbert MD    Assistant: Tirso Prince PA-C    Estimated Blood Loss: minimal    Complications: None    Condition: Stable to PACU.    Indications:    The patient is a 68-year-old who sees Alexia Angeles nurse practitioner for medical issues.  She presented to the office with increasing chronic back pain along with bilateral buttock and thigh radiculopathy along with symptoms consistent with neurogenic claudication.  Imaging studies revealed degenerative disc disease from L1-4 that was severe at L2-3, where there was also retrolisthesis, along with facet arthropathy from L2-4.  She was also noted to have degenerative scoliosis concave left at L1-2 and a foraminal disc herniation on the left side of L3-4.  The degenerative changes created central and foraminal stenosis that was most prominent from L2-4.     After failing conservative measures, it was mutually decided that surgery would be her best option. Risks, benefits, and complications  of surgery were discussed with the patient. The patient appeared well informed and wished to proceed. We specifically discussed the risk of infection, blood loss, nerve root injury, CSF leak, and the possibility of incomplete resolution of symptoms. We also discussed the possible risk of a nonunion and the potential need for additional surgery in the event of a pseudoarthrosis or hardware failure.    We elected proceed with a staged operation.  Previously on 12/4/2023, the patient underwent a lateral fusion from L2 to 4 with instrumentation at L2-3.  Today we return to surgery for the second posterior stage of the procedure.     Operative Procedure:    After obtaining informed consent and verifying the correct operative site, the patient was brought to the operating room. A general anesthetic was provided by the anesthesia service with the assistance of an endotracheal tube. Once this was appropriately positioned and secured, the patient was carefully rotated prone onto a Joe frame. All bony processes were well-padded. The lumbar region was prepped and draped in usual sterile fashion. A surgical timeout was taken to confirm this was the correct patient, we were working at the correct levels, and that preoperative antibiotics were given in a timely fashion.    Using fluoroscopy for guidance, a left sided Wiltsey incision was created spanning L2 to L4 using a 10 blade scalpel.  Dissection was carried through subcutaneous tissues using Bovie cautery.  The lumbar fascia was divided in line with the incision and blunt dissection was carried between the multifidus and the longissimus down to the facet joints of L2-3 and L3-4.  Dissection was carried laterally exposing the transverse processes of L2, L3, and L4.  We then exposed the L2-3 and L3-4 facet joints.  The capsules were destroyed using Bovie cautery exposing as much bone as possible.  The high-speed bur was then used to decorticate the facet joints, destroying  as much of the facet cartilage as possible.  I also decorticated the lateral pars region and the tranverse processes.  The locally obtained autograft bone was left in situ in the posterolateral gutter.  This constituted a posterior fusion of L2-3 and L3-4.    Next under fluoroscopic guidance, a Jamshidi needles were used to cannulate the pedicles of L2, L3, and L4.  Once the needles were properly positioned in the pedicles, guidewires were placed to maintain position.  Over each of the guidewires, an ATEC pedicle screw was placed.  We used 6.5 mm x 45 mm screws into each of the pedicles.  After confirming the screws were properly positioned, an appropriate-sized diana was chosen to span the pedicle screws.  The diana was tightened into position using set screws.  The setscrews were tightened using the appropriate antitorque wrench and torque limiting screwdriver provided by [facility].    Next under fluoroscopic guidance, I created stab incisions over the right pedicles of L2 and L4.  Through these incisions, I advanced Jamshidi needles into the pedicles.  Once properly positioned, guidewires were placed to maintain position.  Over each of the guidewires, I placed a 6.5 mm x 45 mm ATEC pedicle screw.  After confirming the screws were properly positioned under fluoroscopic guidance, a second diana was chosen to span the pedicle screws.  This was also held into position using set screws which were tightened in a similar fashion as the contralateral side.    The wounds were then thoroughly irrigated and an inspection was then undertaken to ensure that we had adequate hemostasis.  Bleeding at this point was controlled using FloSeal and bipolar cautery.  Final fluoroscopy imaging confirmed adequate position of the newly placed posterior instrumentation.    Wound closure was then accomplished by reapproximating the fascia with a #1 Vicryl area immediate subcutaneous tissues were closed using a 2-0 Vicryl and skin closure was  augmented using Mastisol and Steri-Strips.  The incisions were then washed and sterilely dressed with a Bioclusive dressing.    The patient was then carefully rotated supine onto a hospital gurney, extubated, and sent to the recovery room in good stable condition.  The patient tolerated the procedure well.  There were no complications.  We estimated blood loss to be minimal at approximately 25 mL.    Intraoperative neuro monitoring was ordered and carried out throughout the procedure to add an increased level of safety for the patient.  The interpreting physician was available by means of real-time continuous, bidirectional, remote audio and visual communication as needed throughout the entire procedure.  Modalities used during the procedure included SSEP, EEG, EMG, TOF, and pedicle screw stimulation.  There were no neuro monitoring signal changes during the procedure.    Tirso Prince PA-C provided critical assistance during the procedure.  His assistance was medically necessary in order to allow the procedure to occur in the most safe and efficient manner.    JOLLY Herbert MD     Date: 12/6/2023  Time: 15:20 CST

## 2023-12-06 NOTE — ANESTHESIA PROCEDURE NOTES
Airway  Urgency: elective    Date/Time: 12/6/2023 12:06 PM  End Time:12/6/2023 12:09 PM  Airway not difficult    General Information and Staff    Patient location during procedure: OR  CRNA/CAA: Alvarado Ellis CRNA  SRNA: Regna Carlos SRNA  Indications and Patient Condition  Indications for airway management: airway protection    Preoxygenated: yes  MILS maintained throughout  Mask difficulty assessment: 0 - not attempted    Final Airway Details  Final airway type: endotracheal airway      Successful airway: ETT  Cuffed: yes   Successful intubation technique: video laryngoscopy  Facilitating devices/methods: intubating stylet  Endotracheal tube insertion site: oral  Blade: Joseph  Blade size: 3  ETT size (mm): 7.0  Cormack-Lehane Classification: grade I - full view of glottis  Placement verified by: chest auscultation and capnometry   Measured from: lips  ETT/EBT  to lips (cm): 20  Number of attempts at approach: 1  Assessment: lips, teeth, and gum same as pre-op and atraumatic intubation

## 2023-12-06 NOTE — PLAN OF CARE
Goal Outcome Evaluation:  Plan of Care Reviewed With: patient        Progress: no change  Outcome Evaluation: A&Ox4. Dressing to rt flank, cdi. LSO with ambulation and walker. Isulin pump.  RA. SCD. No c/o pain or n/v. Posterior fustion today.

## 2023-12-07 VITALS
HEART RATE: 77 BPM | BODY MASS INDEX: 27.43 KG/M2 | SYSTOLIC BLOOD PRESSURE: 124 MMHG | TEMPERATURE: 98 F | RESPIRATION RATE: 18 BRPM | OXYGEN SATURATION: 96 % | DIASTOLIC BLOOD PRESSURE: 55 MMHG | WEIGHT: 158.73 LBS

## 2023-12-07 LAB — GLUCOSE BLDC GLUCOMTR-MCNC: 147 MG/DL (ref 70–130)

## 2023-12-07 PROCEDURE — 82948 REAGENT STRIP/BLOOD GLUCOSE: CPT

## 2023-12-07 PROCEDURE — 97164 PT RE-EVAL EST PLAN CARE: CPT | Performed by: PHYSICAL THERAPIST

## 2023-12-07 PROCEDURE — 97168 OT RE-EVAL EST PLAN CARE: CPT

## 2023-12-07 RX ORDER — HYDROCODONE BITARTRATE AND ACETAMINOPHEN 7.5; 325 MG/1; MG/1
1 TABLET ORAL EVERY 4 HOURS PRN
Qty: 18 TABLET | Refills: 0 | Status: SHIPPED | OUTPATIENT
Start: 2023-12-07 | End: 2023-12-10

## 2023-12-07 RX ORDER — PREGABALIN 75 MG/1
75 CAPSULE ORAL 2 TIMES DAILY
Qty: 60 CAPSULE | Refills: 0 | Status: SHIPPED | OUTPATIENT
Start: 2023-12-07

## 2023-12-07 RX ADMIN — Medication 3 ML: at 09:26

## 2023-12-07 RX ADMIN — ROSUVASTATIN CALCIUM 20 MG: 20 TABLET, FILM COATED ORAL at 09:26

## 2023-12-07 RX ADMIN — PREGABALIN 75 MG: 75 CAPSULE ORAL at 09:25

## 2023-12-07 RX ADMIN — ALLOPURINOL 100 MG: 100 TABLET ORAL at 09:25

## 2023-12-07 RX ADMIN — AZELASTINE HYDROCHLORIDE 2 SPRAY: 137 SPRAY, METERED NASAL at 09:24

## 2023-12-07 NOTE — THERAPY DISCHARGE NOTE
Acute Care - Physical Therapy Discharge Summary  [facility]       Patient Name: Giana Yepez  : 1955  MRN: [id number]    Today's Date: 2023                 Admit Date: 2023      PT Recommendation and Plan    Visit Dx:    ICD-10-CM ICD-9-CM   1. Impaired mobility [Z74.09]  Z74.09 799.89   2. Lumbar radiculopathy  M54.16 724.4            PT Charges       Row Name 23 0930             Time Calculation    Start Time 0810  -SB      Stop Time 0833  -SB      Time Calculation (min) 23 min  -SB      PT Received On 23  -SB      PT Goal Re-Cert Due Date 23  -SB         Untimed Charges    PT Eval/Re-eval Minutes 23  -SB         Total Minutes    Untimed Charges Total Minutes 23  -SB       Total Minutes 23  -SB                User Key  (r) = Recorded By, (t) = Taken By, (c) = Cosigned By      Initials Name Provider Type    Steffany Coyle, PT DPT Physical Therapist                     PT Rehab Goals       Row Name 23 1300 23 0810          Bed Mobility Goal 1 (PT)    Activity/Assistive Device (Bed Mobility Goal 1, PT) bed mobility activities, all  -AB bed mobility activities, all  -SB     [place] Level/Cues Needed (Bed Mobility Goal 1, PT) independent  -AB independent  -SB     Time Frame (Bed Mobility Goal 1, PT) long term goal (LTG);by discharge  -AB long term goal (LTG);by discharge  -SB     Progress/Outcomes (Bed Mobility Goal 1, PT) goal not met  -AB goal ongoing  -SB        Transfer Goal 1 (PT)    Activity/Assistive Device (Transfer Goal 1, PT) sit-to-stand/stand-to-sit;bed-to-chair/chair-to-bed  -AB sit-to-stand/stand-to-sit;bed-to-chair/chair-to-bed  -SB     [place] Level/Cues Needed (Transfer Goal 1, PT) independent  -AB independent  -SB     Time Frame (Transfer Goal 1, PT) long term goal (LTG);by discharge  -AB long term goal (LTG);by discharge  -SB     Progress/Outcome (Transfer Goal 1, PT) goal not met  -AB goal ongoing  -SB        Gait Training Goal 1 (PT)     Activity/Assistive Device (Gait Training Goal 1, PT) gait (walking locomotion);decrease fall risk;increase endurance/gait distance;improve balance and speed;walker, rolling  -AB gait (walking locomotion);decrease fall risk;increase endurance/gait distance;improve balance and speed;walker, rolling  -SB     [place] Level (Gait Training Goal 1, PT) modified independence  -AB modified independence  -SB     Distance (Gait Training Goal 1, PT) 100ft  -AB 100ft  -SB     Time Frame (Gait Training Goal 1, PT) long term goal (LTG);by discharge  -AB long term goal (LTG);by discharge  -SB     Progress/Outcome (Gait Training Goal 1, PT) goal not met  -AB goal ongoing;goal revised this date  -SB        Problem Specific Goal 1 (PT)    Problem Specific Goal 1 (PT) Pt will tres/doff LSO independently  -AB Pt will tres/doff LSO independently  -SB     Time Frame (Problem Specific Goal 1, PT) long-term goal (LTG)  -AB long-term goal (LTG)  -SB     Progress/Outcome (Problem Specific Goal 1, PT) goal not met  -AB new goal  -SB               User Key  (r) = Recorded By, (t) = Taken By, (c) = Cosigned By      Initials Name Provider Type Discipline    Mary Anne PTA Physical Therapist Assistant PT    Steffany Coyle, PT DPT Physical Therapist PT                        PT Discharge Summary  Anticipated Discharge Disposition (PT): home with assist  Reason for Discharge: Discharge from facility  Outcomes Achieved: Refer to plan of care for updates on goals achieved, Discharge from facility occurred on same date as evluation  Discharge Destination: Home with assist      Mary Vizcaino PTA   12/7/2023

## 2023-12-07 NOTE — THERAPY RE-EVALUATION
Patient Name: Giana Yepez  : 1955    MRN: [id number]                              Today's Date: 2023       Admit Date: 2023    Visit Dx:     ICD-10-CM ICD-9-CM   1. Impaired mobility [Z74.09]  Z74.09 799.89   2. Lumbar radiculopathy  M54.16 724.4     Patient Active Problem List   Diagnosis    Gastroesophageal reflux disease    Type 1 diabetes mellitus with hyperglycemia    Intermittent chest pain    Hypercholesterolemia    Vitamin D deficiency    Vitamin B12 deficiency    Diarrhea    Abdominal cramping    Nausea    Collagenous colitis    Autoimmune hemolytic anemia    Insulin pump in place    Hyperbilirubinemia    Mediastinal lymphadenopathy    Tobacco abuse    Hyponatremia    Orthostatic hypotension    Iron deficiency anemia    Chronic bilateral low back pain without sciatica    DDD (degenerative disc disease), lumbar    Lumbago of multiple sites in spine with sciatica    Lumbar radiculopathy     Past Medical History:   Diagnosis Date    Allergic to grasses 89    Arthritis     Asthma     Cancer     skin basal cell    Dental disease 23    My teeth started getting loose and one broke off    Diabetes mellitus     Dizziness 23    Cant bend over or look up long or turn fast    GERD (gastroesophageal reflux disease)     Hypercholesterolemia 2016    Intermittent chest pain 2016    Osteopenia 2015    osteopenia    PONV (postoperative nausea and vomiting)     Scoliosis 2018    Spinal headache     Tinnitus     Vitamin B12 deficiency 2016    Vitamin D deficiency 2016    Warm autoimmune hemolytic anemia      Past Surgical History:   Procedure Laterality Date    ADENOIDECTOMY  1974    ANKLE SURGERY Right     APPENDECTOMY      BREAST BIOPSY      CARPAL TUNNEL RELEASE Right      SECTION      X 2    CHOLECYSTECTOMY      COLONOSCOPY      [facility]    COLONOSCOPY N/A 2017    Procedure: COLONOSCOPY WITH ANESTHESIA;  Surgeon: Ondina Abdul  MD;  Location: [facility] ENDOSCOPY;  Service:     ENDOSCOPY N/A 08/31/2017    Procedure: ESOPHAGOGASTRODUODENOSCOPY WITH ANESTHESIA;  Surgeon: Ondina Abdul MD;  Location: [facility] ENDOSCOPY;  Service:     EYE SURGERY  5/12/2019    cataract    KNEE MENISCECTOMY Bilateral     LUMBAR FUSION Right 12/4/2023    Procedure: RIGHT LATERAL LUMBAR INTERBODY FUSION L2-4 WITH INSTRUMENTATION L2-3;  Surgeon: WESLEY Herbert MD;  Location: [facility] OR;  Service: Orthopedic Spine;  Laterality: Right;    LUMBAR LAMINECTOMY WITH FUSION N/A 12/6/2023    Procedure: POSTERIOR SPINAL FUSION WITH INSTRUMENTATION L2-4;  Surgeon: WESLEY Herbert MD;  Location: [facility] OR;  Service: Orthopedic Spine;  Laterality: N/A;    SKIN CANCER EXCISION  2016    FACE    TONSILLECTOMY        General Information       Row Name 12/07/23 0810          Physical Therapy Time and Intention    Document Type re-evaluation  s/p POSTERIOR SPINAL FUSION WITH INSTRUMENTATION L2-4 12/6  -     Mode of Treatment physical therapy  -SB       Row Name 12/07/23 0810          General Information    Patient Profile Reviewed yes  -SB     Existing Precautions/Restrictions fall;brace worn when out of bed;LSO;spinal  -SB     Barriers to Rehab none identified  -SB       Row Name 12/07/23 0810          Cognition    Orientation Status (Cognition) oriented x 4  -SB       Row Name 12/07/23 0810          Safety Issues, Functional Mobility    Impairments Affecting Function (Mobility) pain;strength;range of motion (ROM)  -SB               User Key  (r) = Recorded By, (t) = Taken By, (c) = Cosigned By      Initials Name Provider Type    Steffany Coyle PT DPT Physical Therapist                   Mobility       Row Name 12/07/23 0810          Bed Mobility    Comment, (Bed Mobility) up in chair upon arrival  -SB       Row Name 12/07/23 0810          Sit-Stand Transfer    Sit-Stand [place] (Transfers) contact guard  -SB     Comment, (Sit-Stand Transfer) cues to push from chair  instead of pulling on RW  -SB       Row Name 12/07/23 0810          Gait/Stairs (Locomotion)    [place] Level (Gait) contact guard;verbal cues  -SB     Assistive Device (Gait) walker, front-wheeled  -SB     Patient was able to Ambulate yes  -SB     Distance in Feet (Gait) 80  -SB     Deviations/Abnormal Patterns (Gait) gait speed decreased;stride length decreased  -SB     Comment, (Gait/Stairs) dec foot clearance bilaterally  -SB               User Key  (r) = Recorded By, (t) = Taken By, (c) = Cosigned By      Initials Name Provider Type    SB Steffany Hobson PT DPT Physical Therapist                   Obj/Interventions       Row Name 12/07/23 0810          Range of Motion Comprehensive    General Range of Motion lower extremity range of motion deficits identified  -SB     Comment, General Range of Motion B hip flex imp 25%  -SB       Row Name 12/07/23 0810          Strength Comprehensive (MMT)    General Manual Muscle Testing (MMT) Assessment lower extremity strength deficits identified  -SB     Comment, General Manual Muscle Testing (MMT) Assessment B hip flex 3-/5; all others 4/5  -SB       Row Name 12/07/23 0810          Balance    Balance Assessment sitting static balance;sitting dynamic balance;standing static balance;standing dynamic balance  -SB     Static Sitting Balance independent  -SB     Dynamic Sitting Balance independent  -SB     Position, Sitting Balance supported;sitting in chair  -SB     Static Standing Balance contact guard  -SB     Dynamic Standing Balance contact guard  -SB     Position/Device Used, Standing Balance walker, front-wheeled;supported  -SB       Row Name 12/07/23 0810          Sensory Assessment (Somatosensory)    Sensory Assessment (Somatosensory) LE sensation intact  -SB               User Key  (r) = Recorded By, (t) = Taken By, (c) = Cosigned By      Initials Name Provider Type    Steffany Coyle PT DPT Physical Therapist                   Goals/Plan       Row Name  12/07/23 0810          Bed Mobility Goal 1 (PT)    Activity/Assistive Device (Bed Mobility Goal 1, PT) bed mobility activities, all  -SB     [place] Level/Cues Needed (Bed Mobility Goal 1, PT) independent  -SB     Time Frame (Bed Mobility Goal 1, PT) long term goal (LTG);by discharge  -SB     Progress/Outcomes (Bed Mobility Goal 1, PT) goal ongoing  -SB       Row Name 12/07/23 0810          Transfer Goal 1 (PT)    Activity/Assistive Device (Transfer Goal 1, PT) sit-to-stand/stand-to-sit;bed-to-chair/chair-to-bed  -SB     [place] Level/Cues Needed (Transfer Goal 1, PT) independent  -SB     Time Frame (Transfer Goal 1, PT) long term goal (LTG);by discharge  -SB     Progress/Outcome (Transfer Goal 1, PT) goal ongoing  -SB       Row Name 12/07/23 0810          Gait Training Goal 1 (PT)    Activity/Assistive Device (Gait Training Goal 1, PT) gait (walking locomotion);decrease fall risk;increase endurance/gait distance;improve balance and speed;walker, rolling  -SB     [place] Level (Gait Training Goal 1, PT) modified independence  -SB     Distance (Gait Training Goal 1, PT) 100ft  -SB     Time Frame (Gait Training Goal 1, PT) long term goal (LTG);by discharge  -SB     Progress/Outcome (Gait Training Goal 1, PT) goal ongoing;goal revised this date  -SB       Row Name 12/07/23 0810          Problem Specific Goal 1 (PT)    Problem Specific Goal 1 (PT) Pt will tres/doff LSO independently  -SB     Time Frame (Problem Specific Goal 1, PT) long-term goal (LTG)  -SB     Progress/Outcome (Problem Specific Goal 1, PT) new goal  -SB       Row Name 12/07/23 0810          Therapy Assessment/Plan (PT)    Planned Therapy Interventions (PT) balance training;bed mobility training;gait training;patient/family education;orthotic fitting/training;transfer training;strengthening  -SB               User Key  (r) = Recorded By, (t) = Taken By, (c) = Cosigned By      Initials Name Provider Type    Steffany Coyle, PT DPT  Physical Therapist                   Clinical Impression       Row Name 12/07/23 0810          Pain    Pretreatment Pain Rating 5/10  -SB     Posttreatment Pain Rating 5/10  -SB     Pain Location lower  -SB     Pain Location - back  -SB     Pre/Posttreatment Pain Comment and RLE; c/o weakness  -SB     Pain Intervention(s) Medication (See MAR);Repositioned;Ambulation/increased activity  -SB     Additional Documentation Pain Scale: Numbers Pre/Post-Treatment (Group)  -SB       Row Name 12/07/23 0810          Plan of Care Review    Plan of Care Reviewed With patient  -SB     Progress no change  -SB     Outcome Evaluation PT re-eval completed. Pt alert and oriented x4 and sitting in chair upon arrival. Pt recites 3/3 spinal precautions, brace wear schedule and log rolling. Pt demos decreased strength in B hip flex but no other deficits. Pt performs sit to stand t/f and gait training into hallway with CGA and RW, demos dec foot clearance and reqd cues for AD placement. Pt will cont to benefit from skilled PT to improve strength, gait and independence. Recommend d/c home with assist and RW.  -SB       Row Name 12/07/23 0810          Therapy Assessment/Plan (PT)    Patient/Family Therapy Goals Statement (PT) improve function  -SB     Rehab Potential (PT) good, to achieve stated therapy goals  -SB     Criteria for Skilled Interventions Met (PT) yes;meets criteria;skilled treatment is necessary  -SB     Therapy Frequency (PT) 2 times/day  -SB     Predicted Duration of Therapy Intervention (PT) until d/c or goals met  -SB       Row Name 12/07/23 0810          Positioning and Restraints    Pre-Treatment Position sitting in chair/recliner  -SB     Post Treatment Position chair  -SB     In Chair sitting;call light within reach;encouraged to call for assist;with brace  -SB               User Key  (r) = Recorded By, (t) = Taken By, (c) = Cosigned By      Initials Name Provider Type    Steffany Coyle, PT DPT Physical Therapist                    Outcome Measures       Row Name 12/07/23 0810          How much help from another person do you currently need...    Turning from your back to your side while in flat bed without using bedrails? 4  -SB     Moving from lying on back to sitting on the side of a flat bed without bedrails? 4  -SB     Moving to and from a bed to a chair (including a wheelchair)? 3  -SB     Standing up from a chair using your arms (e.g., wheelchair, bedside chair)? 3  -SB     Climbing 3-5 steps with a railing? 3  -SB     To walk in hospital room? 4  -SB     AM-PAC 6 Clicks Score (PT) 21  -SB     Highest Level of Mobility Goal 6 --> Walk 10 steps or more  -SB       Row Name 12/07/23 0810          Functional Assessment    Outcome Measure Options AM-PAC 6 Clicks Basic Mobility (PT)  -SB               User Key  (r) = Recorded By, (t) = Taken By, (c) = Cosigned By      Initials Name Provider Type    Steffany Coyle, PT DPT Physical Therapist                                 Physical Therapy Education       Title: PT OT SLP Therapies (In Progress)       Topic: Physical Therapy (In Progress)       Point: Mobility training (Done)       Learning Progress Summary             Patient Acceptance, E, VU by SB at 12/7/2023 0929    Comment: pt edu on POC, spinal precautions, LSO, log rolling    Acceptance, E, VU by MS at 12/5/2023 1044    Comment: role of PT in her care, spinal restrictions, use of LSO                         Point: Home exercise program (Not Started)       Learner Progress:  Not documented in this visit.              Point: Body mechanics (Not Started)       Learner Progress:  Not documented in this visit.              Point: Precautions (Done)       Learning Progress Summary             Patient Acceptance, E, VU by SB at 12/7/2023 0929    Comment: pt edu on POC, spinal precautions, LSO, log rolling    Acceptance, E, VU by MS at 12/5/2023 1044    Comment: role of PT in her care, spinal restrictions, use of LSO                                          User Key       Initials Effective Dates Name Provider Type Discipline    MS 07/11/23 -  Manda Birmingham R, PT, DPT, NCS Physical Therapist PT    SB 07/11/23 -  Steffany Hobson PT DPT Physical Therapist PT                  PT Recommendation and Plan  Planned Therapy Interventions (PT): balance training, bed mobility training, gait training, patient/family education, orthotic fitting/training, transfer training, strengthening  Plan of Care Reviewed With: patient  Progress: no change  Outcome Evaluation: PT re-eval completed. Pt alert and oriented x4 and sitting in chair upon arrival. Pt recites 3/3 spinal precautions, brace wear schedule and log rolling. Pt demos decreased strength in B hip flex but no other deficits. Pt performs sit to stand t/f and gait training into hallway with CGA and RW, demos dec foot clearance and reqd cues for AD placement. Pt will cont to benefit from skilled PT to improve strength, gait and independence. Recommend d/c home with assist and RW.     Time Calculation:         PT Charges       Row Name 12/07/23 0930             Time Calculation    Start Time 0810  -SB      Stop Time 0833  -SB      Time Calculation (min) 23 min  -SB      PT Received On 12/07/23  -SB      PT Goal Re-Cert Due Date 12/17/23  -SB         Untimed Charges    PT Eval/Re-eval Minutes 23  -SB         Total Minutes    Untimed Charges Total Minutes 23  -SB       Total Minutes 23  -SB                User Key  (r) = Recorded By, (t) = Taken By, (c) = Cosigned By      Initials Name Provider Type    SB Steffany Hobson, PT DPT Physical Therapist                  Therapy Charges for Today       Code Description Service Date Service Provider Modifiers Qty    [id number]  PT RE-EVAL ESTABLISHED PLAN 2 12/7/2023 Steffany Hobson PT DPT GP 1            PT G-Codes  Outcome Measure Options: AM-PAC 6 Clicks Basic Mobility (PT)  AM-PAC 6 Clicks Score (PT): 21  AM-PAC 6 Clicks Score (OT): 19        Steffany Hobson PT DPT  12/7/2023

## 2023-12-07 NOTE — THERAPY DISCHARGE NOTE
Acute Care - Occupational Therapy Discharge  [facility]    Patient Name: Giana Yepez  : 1955    MRN: [id number]                              Today's Date: 2023       Admit Date: 2023    Visit Dx:     ICD-10-CM ICD-9-CM   1. Impaired mobility [Z74.09]  Z74.09 799.89   2. Lumbar radiculopathy  M54.16 724.4     Patient Active Problem List   Diagnosis    Gastroesophageal reflux disease    Type 1 diabetes mellitus with hyperglycemia    Intermittent chest pain    Hypercholesterolemia    Vitamin D deficiency    Vitamin B12 deficiency    Diarrhea    Abdominal cramping    Nausea    Collagenous colitis    Autoimmune hemolytic anemia    Insulin pump in place    Hyperbilirubinemia    Mediastinal lymphadenopathy    Tobacco abuse    Hyponatremia    Orthostatic hypotension    Iron deficiency anemia    Chronic bilateral low back pain without sciatica    DDD (degenerative disc disease), lumbar    Lumbago of multiple sites in spine with sciatica    Lumbar radiculopathy     Past Medical History:   Diagnosis Date    Allergic to grasses 89    Arthritis     Asthma     Cancer     skin basal cell    Dental disease 23    My teeth started getting loose and one broke off    Diabetes mellitus     Dizziness 23    Cant bend over or look up long or turn fast    GERD (gastroesophageal reflux disease)     Hypercholesterolemia 2016    Intermittent chest pain 2016    Osteopenia     osteopenia    PONV (postoperative nausea and vomiting)     Scoliosis 2018    Spinal headache     Tinnitus     Vitamin B12 deficiency 2016    Vitamin D deficiency 2016    Warm autoimmune hemolytic anemia      Past Surgical History:   Procedure Laterality Date    ADENOIDECTOMY  1974    ANKLE SURGERY Right     APPENDECTOMY      BREAST BIOPSY      CARPAL TUNNEL RELEASE Right      SECTION      X 2    CHOLECYSTECTOMY      COLONOSCOPY      [facility]    COLONOSCOPY N/A 2017     Procedure: COLONOSCOPY WITH ANESTHESIA;  Surgeon: Ondina Abdul MD;  Location: [facility] ENDOSCOPY;  Service:     ENDOSCOPY N/A 08/31/2017    Procedure: ESOPHAGOGASTRODUODENOSCOPY WITH ANESTHESIA;  Surgeon: Ondina Abdul MD;  Location: [facility] ENDOSCOPY;  Service:     EYE SURGERY  5/12/2019    cataract    KNEE MENISCECTOMY Bilateral     LUMBAR FUSION Right 12/4/2023    Procedure: RIGHT LATERAL LUMBAR INTERBODY FUSION L2-4 WITH INSTRUMENTATION L2-3;  Surgeon: WESLEY Herbert MD;  Location: [facility] OR;  Service: Orthopedic Spine;  Laterality: Right;    LUMBAR LAMINECTOMY WITH FUSION N/A 12/6/2023    Procedure: POSTERIOR SPINAL FUSION WITH INSTRUMENTATION L2-4;  Surgeon: WESLEY Herbert MD;  Location: [facility] OR;  Service: Orthopedic Spine;  Laterality: N/A;    SKIN CANCER EXCISION  2016    FACE    TONSILLECTOMY        General Information       Row Name 12/07/23 0935          OT Time and Intention    Document Type re-evaluation  s/p POSTERIOR SPINAL FUSION WITH INSTRUMENTATION L2-4 12/6  -     Mode of Treatment occupational therapy  -       Row Name 12/07/23 0935          General Information    Patient Profile Reviewed yes  -     Existing Precautions/Restrictions fall;brace worn when out of bed;LSO;spinal  -       Row Name 12/07/23 0935          Cognition    Orientation Status (Cognition) oriented x 4  -       Row Name 12/07/23 0935          Safety Issues, Functional Mobility    Impairments Affecting Function (Mobility) pain;strength;range of motion (ROM)  -               User Key  (r) = Recorded By, (t) = Taken By, (c) = Cosigned By      Initials Name Provider Type     Pat Hernandez, OTR/L Occupational Therapist                   Mobility/ADL's       Row Name 12/07/23 0935          Transfers    Transfers sit-stand transfer;stand-sit transfer;toilet transfer  -       Row Name 12/07/23 0935          Sit-Stand Transfer    Sit-Stand [place] (Transfers) standby assist  -       Row Name 12/07/23  0935          Stand-Sit Transfer    Stand-Sit [place] (Transfers) standby assist  -LS       Row Name 12/07/23 0935          Toilet Transfer    Type (Toilet Transfer) sit-stand;stand-sit  -LS     [place] Level (Toilet Transfer) standby assist  -LS       Row Name 12/07/23 0935          Functional Mobility    Functional Mobility- Ind. Level standby assist  Pt ambulated from recliner<>BR utilizing rwx with SBA.  -LS     Functional Mobility- Device walker, front-wheeled  -LS       Row Name 12/07/23 0935          Activities of Daily Living    BADL Assessment/Intervention toileting;lower body dressing;upper body dressing  -       Row Name 12/07/23 0935          Lower Body Dressing Assessment/Training    [place] Level (Lower Body Dressing) lower body dressing skills;minimum assist (75% patient effort)  -LS     Position (Lower Body Dressing) unsupported sitting;supported standing  -LS       Row Name 12/07/23 0935          Toileting Assessment/Training    [place] Level (Toileting) toileting skills;standby assist  -LS     Position (Toileting) unsupported sitting;supported standing  -LS       Row Name 12/07/23 0935          Upper Body Dressing Assessment/Training    [place] Level (Upper Body Dressing) upper body dressing skills;independent  -LS     Position (Upper Body Dressing) unsupported sitting  -LS               User Key  (r) = Recorded By, (t) = Taken By, (c) = Cosigned By      Initials Name Provider Type    Pat Mercado, OTR/L Occupational Therapist                   Obj/Interventions       Row Name 12/07/23 0935          Sensory Assessment (Somatosensory)    Sensory Assessment (Somatosensory) UE sensation intact  -       Row Name 12/07/23 0935          Vision Assessment/Intervention    Visual Impairment/Limitations WFL  -       Row Name 12/07/23 0935          Strength Comprehensive (MMT)    Comment, General Manual Muscle Testing (MMT) Assessment BUE strength grossly 4+/5.  -        Row Name 12/07/23 0935          Balance    Balance Assessment sitting static balance;sitting dynamic balance;standing static balance;standing dynamic balance  -LS     Static Sitting Balance independent  -LS     Dynamic Sitting Balance independent  -LS     Position, Sitting Balance sitting in chair  -LS     Static Standing Balance standby assist  -LS     Dynamic Standing Balance standby assist  -LS     Position/Device Used, Standing Balance supported;walker, front-wheeled  -LS               User Key  (r) = Recorded By, (t) = Taken By, (c) = Cosigned By      Initials Name Provider Type    LS Pat Hernandez OTR/L Occupational Therapist                   Goals/Plan    No documentation.                  Clinical Impression       Row Name 12/07/23 0935          Pain Assessment    Pretreatment Pain Rating 5/10  -LS     Posttreatment Pain Rating 5/10  -LS     Pain Location - Side/Orientation Right  -LS     Pain Location lower  -LS     Pain Location - back;extremity  -LS     Pain Intervention(s) Repositioned;Ambulation/increased activity  -LS       Row Name 12/07/23 0935          Plan of Care Review    Plan of Care Reviewed With patient;spouse  -LS     Progress improving  -LS     Outcome Evaluation OT re-eval completed. Pt seated in recliner upon therapist arrival; A&Ox4; Reported 5/10 pain in low back and R thigh. Pt able to recall 3/3 spinal precautions and brace wearing schedule. Pt doffed/donned LSO I. Pt performed sit<>stand utilizing rwx with SBA. Pt ambulated from recliner<>BR utilizing rwx with SBA. Pt performed sit<>stand toilet transfer utilizing grab bar and rwx with SBA; Pt performed carlos hygiene while seated with supervision and clothing management in standing with SBA. Pt discharging home with her  and home health on this date; Pt will benefit from  OT in order to address remaining deficits limiting Pt's independence with BADLs upon return home.  -       Row Name 12/07/23 0935          Therapy  Assessment/Plan (OT)    Therapy Frequency (OT) evaluation only  Eval only due to Pt discharging; will cont with HH OT  -LS       Row Name 12/07/23 0935          Therapy Plan Review/Discharge Plan (OT)    Anticipated Discharge Disposition (OT) home with assist;home with home health  -LS       Row Name 12/07/23 0935          Positioning and Restraints    Pre-Treatment Position sitting in chair/recliner  -LS     Post Treatment Position chair  -LS     In Chair sitting;call light within reach;encouraged to call for assist;with brace;with family/caregiver  -LS               User Key  (r) = Recorded By, (t) = Taken By, (c) = Cosigned By      Initials Name Provider Type    LS Pat Hernandez, OTR/L Occupational Therapist                   Outcome Measures       Row Name 12/07/23 0935          How much help from another is currently needed...    Putting on and taking off regular lower body clothing? 3  -LS     Bathing (including washing, rinsing, and drying) 3  -LS     Toileting (which includes using toilet bed pan or urinal) 3  -LS     Putting on and taking off regular upper body clothing 4  -LS     Taking care of personal grooming (such as brushing teeth) 4  -LS     Eating meals 4  -LS     AM-PAC 6 Clicks Score (OT) 21  -LS       Row Name 12/07/23 0815 12/07/23 0810       How much help from another person do you currently need...    Turning from your back to your side while in flat bed without using bedrails? 4  -MS 4  -SB    Moving from lying on back to sitting on the side of a flat bed without bedrails? 4  -MS 4  -SB    Moving to and from a bed to a chair (including a wheelchair)? 3  -MS 3  -SB    Standing up from a chair using your arms (e.g., wheelchair, bedside chair)? 3  -MS 3  -SB    Climbing 3-5 steps with a railing? 3  -MS 3  -SB    To walk in hospital room? 4  -MS 4  -SB    AM-PAC 6 Clicks Score (PT) 21  -MS 21  -SB    Highest Level of Mobility Goal 6 --> Walk 10 steps or more  -MS 6 --> Walk 10 steps or more  -SB       Row Name 12/07/23 0935 12/07/23 0810       Functional Assessment    Outcome Measure Options AM-PAC 6 Clicks Daily Activity (OT)  -LS AM-PAC 6 Clicks Basic Mobility (PT)  -SB              User Key  (r) = Recorded By, (t) = Taken By, (c) = Cosigned By      Initials Name Provider Type    Steffany Coyle, PT DPT Physical Therapist    Monserrat Rivera, RN Registered Nurse    Pat Mercado, OTR/L Occupational Therapist                  Occupational Therapy Education       Title: PT OT SLP Therapies (In Progress)       Topic: Occupational Therapy (Done)       Point: ADL training (Done)       Description:   Instruct learner(s) on proper safety adaptation and remediation techniques during self care or transfers.   Instruct in proper use of assistive devices.                  Learning Progress Summary             Patient Acceptance, E, VU,NR by  at 12/5/2023 1517                         Point: Home exercise program (Done)       Description:   Instruct learner(s) on appropriate technique for monitoring, assisting and/or progressing therapeutic exercises/activities.                  Learning Progress Summary             Patient Acceptance, E, VU,NR by  at 12/5/2023 1517                         Point: Precautions (Done)       Description:   Instruct learner(s) on prescribed precautions during self-care and functional transfers.                  Learning Progress Summary             Patient Acceptance, E, VU,NR by  at 12/5/2023 1517                         Point: Body mechanics (Done)       Description:   Instruct learner(s) on proper positioning and spine alignment during self-care, functional mobility activities and/or exercises.                  Learning Progress Summary             Patient Acceptance, E, VU,NR by  at 12/5/2023 1517                                         User Key       Initials Effective Dates Name Provider Type Discipline     02/03/23 -  Trice Jacob, OTR/L, ELIO Occupational  Therapist OT                  OT Recommendation and Plan  Therapy Frequency (OT): evaluation only (Eval only due to Pt discharging; will cont with HH OT)  Plan of Care Review  Plan of Care Reviewed With: patient, spouse  Progress: improving  Outcome Evaluation: OT re-eval completed. Pt seated in recliner upon therapist arrival; A&Ox4; Reported 5/10 pain in low back and R thigh. Pt able to recall 3/3 spinal precautions and brace wearing schedule. Pt doffed/donned LSO I. Pt performed sit<>stand utilizing rwx with SBA. Pt ambulated from recliner<>BR utilizing rwx with SBA. Pt performed sit<>stand toilet transfer utilizing grab bar and rwx with SBA; Pt performed carlos hygiene while seated with supervision and clothing management in standing with SBA. Pt discharging home with her  and home health on this date; Pt will benefit from HH OT in order to address remaining deficits limiting Pt's independence with BADLs upon return home.  Plan of Care Reviewed With: patient, spouse  Outcome Evaluation: OT re-eval completed. Pt seated in recliner upon therapist arrival; A&Ox4; Reported 5/10 pain in low back and R thigh. Pt able to recall 3/3 spinal precautions and brace wearing schedule. Pt doffed/donned LSO I. Pt performed sit<>stand utilizing rwx with SBA. Pt ambulated from recliner<>BR utilizing rwx with SBA. Pt performed sit<>stand toilet transfer utilizing grab bar and rwx with SBA; Pt performed carlos hygiene while seated with supervision and clothing management in standing with SBA. Pt discharging home with her  and home health on this date; Pt will benefit from  OT in order to address remaining deficits limiting Pt's independence with BADLs upon return home.     Time Calculation:         Time Calculation- OT       Row Name 12/07/23 0935             Time Calculation- OT    OT Start Time 0935  +10 minutes chart review  -      OT Stop Time 0956  -      OT Time Calculation (min) 21 min  -LS      OT  Non-Billable Time (min) 31 min  -      OT Received On 12/07/23  -                User Key  (r) = Recorded By, (t) = Taken By, (c) = Cosigned By      Initials Name Provider Type    Pat Mercado OTR/L Occupational Therapist                  Therapy Charges for Today       Code Description Service Date Service Provider Modifiers Qty    [id number]  OT RE-EVAL 2 12/7/2023 Pat Hernandez OTR/L GO 1               OT Discharge Summary  Anticipated Discharge Disposition (OT): home with assist, home with home health  Reason for Discharge: Discharge from facility  Outcomes Achieved: Refer to plan of care for updates on goals achieved  Discharge Destination: Home with assist, Home with home health    PATY Woods/ROCCO  12/7/2023

## 2023-12-07 NOTE — DISCHARGE SUMMARY
Date of Discharge:  12/7/2023    Admission Diagnosis: M54.16    Discharge Diagnosis:   1. Increasing chronic back pain.   2. Bilateral buttock and thigh radiculopathy.   3. Neurogenic claudication.   4. Degenerative disc disease L1 to L4, severe L2-3.   5. Facet arthropathy L2-4.   6. Degenerative scoliosis concave left L1-2.   7. Foraminal disc herniation left L3-4.   8. Retrolisthesis L2-3.   9. Central and foraminal stenosis L2 to L4.   10. Osteopenia.   11.  Status post right LLIF L2-4 with instrumentation L2-3, 12/4/2023  12.  Status post PSF with instrumentation L2-4, 12/6/2023     Consults During Admission: Dr. Shi    Hospital Course  Patient is a 68 y.o. female Known to our practice. Admitted for the above staged fusion.  This has been well tolerated and the patient will be discharged home today in good stable condition with instructions for brace when out of bed.  No driving until directed.  Patient will follow-up with Dr. Herbert's clinic in two weeks. They will call if problems arise.       Condition on Discharge:  STABLE    Vital Signs  Temp:  [97.5 °F (36.4 °C)-98.7 °F (37.1 °C)] 98 °F (36.7 °C)  Heart Rate:  [76-87] 77  Resp:  [14-22] 18  BP: (108-133)/(42-68) 124/55    Physical Exam:   Alert and oriented ×3, no acute distress, grossly neurovascularly intact, vital signs stable, dressing clean dry and intact, moving all extremities without focal deficit      Discharge Disposition      Discharge Medications     Discharge Medications        Continue These Medications        Instructions Start Date   Dexcom G5  Kit device   Does not apply, Continuous      Insulin Infusion Pump device   Does not apply, Continuous             ASK your doctor about these medications        Instructions Start Date   allopurinol 100 MG tablet  Commonly known as: ZYLOPRIM  Ask about: Which instructions should I use?   100 mg, Oral, Daily      azelastine 0.1 % nasal spray  Commonly known as: ASTELIN   2 sprays,  Nasal, 2 Times Daily, Use in each nostril as directed      cholecalciferol 25 MCG (1000 UT) tablet  Commonly known as: VITAMIN D3   2,000 Units, Oral, Daily      CITRACAL PO   1 tablet, Oral, Daily      diphenhydrAMINE 25 mg capsule  Commonly known as: BENADRYL   25 mg, Oral, Every 6 Hours PRN      folic acid 1 MG tablet  Commonly known as: FOLVITE  Ask about: Which instructions should I use?   1 mg, Oral, Daily      furosemide 20 MG tablet  Commonly known as: LASIX  Ask about: Which instructions should I use?   20 mg, Oral, Daily PRN      Loratadine 10 MG capsule   10 mg, Oral, Every Night at Bedtime, As anti-histamine for allergies      omeprazole 40 MG capsule  Commonly known as: priLOSEC   40 mg, Oral, Daily      pregabalin 75 MG capsule  Commonly known as: LYRICA   75 mg, Oral, 2 Times Daily      rosuvastatin 20 MG tablet  Commonly known as: CRESTOR   20 mg, Oral, Daily      vitamin B-12 1000 MCG tablet  Commonly known as: CYANOCOBALAMIN   1,000 mcg, Oral, Daily               Discharge Diet: Resume Home diet, advance as tolerated    Activity at Discharge: Resume home activity, advance as tolerated, no lifting, no twisting, no bending, brace as directed, no driving until directed.     Follow-up Appointments  Followup with PCP within one week  Followup [facility] at 2 weeks post-op         Geronimo Jose PA-C  12/07/23  07:57 CST

## 2023-12-07 NOTE — PROGRESS NOTES
Daily Progress Note  Giana Yepez  MRN: [id number] LOS: 3    Admit Date: 2023 06:58 CST    Subjective:         Interval History:    Reviewed overnight events and nursing notes.     Doing well this morning.  No new status changes.  Has had a bowel movement and is urinating independently.  Had just gotten back from walking the halls when I evaluated her.    ROS:  Review of Systems   Constitutional:  Negative for chills and fever.   Respiratory:  Negative for cough, chest tightness and shortness of breath.    Cardiovascular:  Negative for chest pain.   Gastrointestinal:  Negative for abdominal pain, diarrhea, nausea and vomiting.       DIET:  Diet: Diabetic Diets; Consistent Carbohydrate; Texture: Regular Texture (IDDSI 7); Fluid Consistency: Thin (IDDSI 0)    Medications:   lactated ringers, 100 mL/hr, Last Rate: 100 mL/hr (23 1159)  lactated ringers, 9 mL/hr, Last Rate: 9 mL/hr (23 1159)      allopurinol, 100 mg, Oral, Daily  azelastine, 2 spray, Each Nare, BID  polyethylene glycol, 17 g, Oral, TID  pregabalin, 75 mg, Oral, Q12H  rosuvastatin, 20 mg, Oral, Daily  senna-docusate sodium, 1 tablet, Oral, BID  sodium chloride, 3 mL, Intravenous, Q12H          Objective:     Vitals: /42 (BP Location: Left arm, Patient Position: Lying)   Pulse 78   Temp 98.1 °F (36.7 °C) (Oral)   Resp 16   Wt 72 kg (158 lb 11.7 oz)   LMP  (LMP Unknown)   SpO2 95%   Breastfeeding No   BMI 27.43 kg/m²    Intake/Output Summary (Last 24 hours) at 2023 0658  Last data filed at 2023 1313  Gross per 24 hour   Intake 550 ml   Output --   Net 550 ml    Temp (24hrs), Av °F (36.7 °C), Min:97.5 °F (36.4 °C), Max:98.7 °F (37.1 °C)    Glucose:  Lab Results   Component Value Date    POCGLU 172 (H) 2023    POCGLU 158 (H) 2023    POCGLU 281 (H) 2023    POCGLU 280 (H) 2023     Physical Examination:   Physical Exam  Constitutional:       General: She is not in acute  distress.     Appearance: Normal appearance. She is not ill-appearing or toxic-appearing.   Cardiovascular:      Rate and Rhythm: Normal rate and regular rhythm.      Pulses: Normal pulses.      Heart sounds: Normal heart sounds. No murmur heard.  Pulmonary:      Effort: Pulmonary effort is normal. No respiratory distress.      Breath sounds: Normal breath sounds. No stridor. No wheezing or rales.   Abdominal:      General: Abdomen is flat. Bowel sounds are normal. There is no distension.      Palpations: Abdomen is soft.      Tenderness: There is no abdominal tenderness.   Musculoskeletal:      Right lower leg: Edema present.      Left lower leg: Edema present.   Neurological:      Mental Status: She is alert.         Labs:  Lab Results (last 24 hours)       Procedure Component Value Units Date/Time    POC Glucose Once [[id number]]  (Abnormal) Collected: 12/06/23 2225    Specimen: Blood Updated: 12/06/23 2235     Glucose 172 mg/dL      Comment: : [id number]  AllisonMeter ID: [id number]       POC Glucose Once [[id number]]  (Abnormal) Collected: 12/06/23 1347    Specimen: Blood Updated: 12/06/23 1358     Glucose 158 mg/dL      Comment: : [id number] Ewa JoshuaMeter ID: [id number]                Imaging:  XR Spine Cervical 2 View    Result Date: 12/6/2023  EXAM: XR SPINE CERVICAL 2 VW- - 12/6/2023 12:20 PM  HISTORY: posterior fusion; Z74.09-Other reduced mobility   COMPARISON: No existing relevant imaging studies available.  TECHNIQUE:  Intraoperative fluoroscopy images submitted.  Number of images: 3 Fluoroscopy time: 45.8 seconds Air kerma: 24.377 mGy  FINDINGS:  See impression.       Impression: 1. Posterior fixation hardware at the lumbar spine, level difficult to determine on provided images. 2. A radiologist was not present for the acquisition or intraoperative the interpretation of these images. Please see the operative report for details pertaining to this exam.  This report was signed and  finalized on 12/6/2023 3:55 PM by Dr Tatiana Lovett MD.      FL C Arm During Surgery    Result Date: 12/6/2023  This procedure was auto-finalized with no dictation required.        Assessment and Plan:     Primary Problem:  Lumbago of multiple sites in spine with sciatica    Hospital Problem list:    Lumbago of multiple sites in spine with sciatica    Gastroesophageal reflux disease    Type 1 diabetes mellitus with hyperglycemia    Chronic bilateral low back pain without sciatica    DDD (degenerative disc disease), lumbar    Lumbar radiculopathy      Assessment: 68-year-old female with past medical history of type 1 diabetes on insulin pump presents for lumbar surgery.  First operation on 12/4, second on 12/6, both tolerated well.     Plan:     Perioperative care  Continue bowel regimen, counseled to work with physical and occupational therapy, counseled on appropriate use of incentive spirometry.  Completed surgical intervention     Type 1 diabetes  Continue insulin pump with continuous glucose monitor.  Blood sugars well-controlled.       Postoperative nausea and vomiting  Did not experience significant nausea and vomiting yesterday.     GERD  Continue PPI.    Medically stable for discharge from my standpoint.    Discharge planning:   Home    Reviewed treatment plans with the patient and/or family.     Code Status:   Code Status and Medical Interventions:   Ordered at: 12/04/23 1526     Code Status (Patient has no pulse and is not breathing):    CPR (Attempt to Resuscitate)     Medical Interventions (Patient has pulse or is breathing):    Full Support       Electronically signed by Chet Shi MD on 12/7/2023 at 06:58 CST

## 2023-12-07 NOTE — PLAN OF CARE
Goal Outcome Evaluation:  Plan of Care Reviewed With: patient        Progress: no change  Outcome Evaluation: PT re-eval completed. Pt alert and oriented x4 and sitting in chair upon arrival. Pt recites 3/3 spinal precautions, brace wear schedule and log rolling. Pt demos decreased strength in B hip flex but no other deficits. Pt performs sit to stand t/f and gait training into hallway with CGA and RW, demos dec foot clearance and reqd cues for AD placement. Pt will cont to benefit from skilled PT to improve strength, gait and independence. Recommend d/c home with assist and RW.

## 2023-12-07 NOTE — PLAN OF CARE
Goal Outcome Evaluation:  Plan of Care Reviewed With: patient, spouse        Progress: improving  Outcome Evaluation: Pt is A&Ox4. VSS. 2L O2 when sleeping, RA while awake. No c/o pain. Voiding via BR/toilet. Last BM 12/03. Dressings clean, dry, intact. LSO when OOB. Up standby/adlib. IV L FA, LR infusing at 100mL/hr per order. Accucheck ACHS. SCD for VTE. Call light in reach. Safety maintained.

## 2023-12-07 NOTE — PLAN OF CARE
Goal Outcome Evaluation:  Plan of Care Reviewed With: patient, spouse        Progress: improving  Outcome Evaluation: OT re-eval completed. Pt seated in recliner upon therapist arrival; A&Ox4; Reported 5/10 pain in low back and R thigh. Pt able to recall 3/3 spinal precautions and brace wearing schedule. Pt doffed/donned LSO I. Pt performed sit<>stand utilizing rwx with SBA. Pt ambulated from recliner<>BR utilizing rwx with SBA. Pt performed sit<>stand toilet transfer utilizing grab bar and rwx with SBA; Pt performed carlos hygiene while seated with supervision and clothing management in standing with SBA. Pt discharging home with her  and home health on this date; Pt will benefit from  OT in order to address remaining deficits limiting Pt's independence with BADLs upon return home.      Anticipated Discharge Disposition (OT): home with assist, home with home health

## 2023-12-08 ENCOUNTER — READMISSION MANAGEMENT (OUTPATIENT)
Dept: CALL CENTER | Facility: HOSPITAL | Age: 68
End: 2023-12-08
Payer: MEDICARE

## 2023-12-08 LAB

## 2023-12-08 NOTE — OUTREACH NOTE
Prep Survey      Flowsheet Row Responses   [facility] facility patient discharged from? [facility]   Is LACE score < 7 ? No   Eligibility Readm Mgmt   Discharge diagnosis Lumbago of multiple sites in spine with sciatica, spinal fusion   Does the patient have one of the following disease processes/diagnoses(primary or secondary)? General Surgery   Does the patient have Home health ordered? No   Is there a DME ordered? No   Comments regarding appointments Dr Herbert--two weeks   Medication alerts for this patient see avs for all new meds--pain meds   Prep survey completed? Yes            Jenniefr SAMANO - Registered Nurse

## 2023-12-11 ENCOUNTER — READMISSION MANAGEMENT (OUTPATIENT)
Dept: CALL CENTER | Facility: HOSPITAL | Age: 68
End: 2023-12-11
Payer: MEDICARE

## 2023-12-11 NOTE — OUTREACH NOTE
General Surgery Week 1 Survey      Flowsheet Row Responses   [facility] patient discharged from? [facility]   Does the patient have one of the following disease processes/diagnoses(primary or secondary)? General Surgery   Week 1 attempt successful? Yes   Call start time 1732   Call end time 1734   Discharge diagnosis Lumbago of multiple sites in spine with sciatica, spinal fusion   Person spoke with today (if not patient) and relationship patient   Meds reviewed with patient/caregiver? Yes   Is the patient having any side effects they believe may be caused by any medication additions or changes? No   Does the patient have all medications related to this admission filled (includes all antibiotics, pain medications, etc.) Yes   Is the patient taking all medications as directed (includes completed medication regime)? Yes   Does the patient have a follow up appointment scheduled with their surgeon? Yes   Has the patient kept scheduled appointments due by today? Yes   Psychosocial issues? No   Did the patient receive a copy of their discharge instructions? Yes   Nursing interventions Reviewed instructions with patient   What is the patient's perception of their health status since discharge? Improving  [Patient is having some constipation issues. She will discuss with her doctor.]   Is the patient/caregiver able to teach back signs and symptoms of incisional infection? Fever, Increased redness, swelling or pain at the incisonal site   Is the patient/caregiver able to teach back steps to recovery at home? Set small, achievable goals for return to baseline health, Rest and rebuild strength, gradually increase activity   Is the patient/caregiver able to teach back the hierarchy of who to call/visit for symptoms/problems? PCP, Specialist, Home health nurse, Urgent Care, ED, 911 Yes   Week 1 call completed? Yes   Graduated Yes   Is the patient interested in additional calls from an ambulatory ? No   Would this  patient benefit from a Referral to [facility] Social Work? No   Call end time [id number]            Diomedes JONES - Registered Nurse

## 2024-01-12 ENCOUNTER — TRANSCRIBE ORDERS (OUTPATIENT)
Dept: ADMINISTRATIVE | Facility: HOSPITAL | Age: 69
End: 2024-01-12
Payer: MEDICARE

## 2024-01-12 DIAGNOSIS — M54.16 LUMBAR RADICULOPATHY: Primary | ICD-10-CM

## 2024-01-15 ENCOUNTER — HOSPITAL ENCOUNTER (OUTPATIENT)
Dept: GENERAL RADIOLOGY | Facility: HOSPITAL | Age: 69
Discharge: HOME | End: 2024-01-15
Admitting: ORTHOPAEDIC SURGERY
Payer: MEDICARE

## 2024-01-15 DIAGNOSIS — M54.16 LUMBAR RADICULOPATHY: ICD-10-CM

## 2024-01-15 PROCEDURE — 72100 X-RAY EXAM L-S SPINE 2/3 VWS: CPT

## 2024-01-30 NOTE — PROGRESS NOTES
Chief Complaint  MEDIASTINAL LYMPHADENOPATHY    Subjective    History of Present Illness {CC  Problem List  Visit Diagnosis   Encounters  Notes  Medications  Labs  Result Review Imaging  Media     Giana Yepez presents to [facility] PULMONARY [facility]ARE MEDICINE for:    History of Present Illness  Ms. Yepez is here for follow up and management of MLAD.  She has had some ongoing issues with sinus drainage for several years.  She continues on allergy medications.  She denies any shortness of breath.  Cough is minimal.  She is up-to-date on vaccinations.  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  She continues to follow with Dr. Clement and at [place].  She tells me she had back surgery last month and had an infection following but feels like she is getting better now.  She had several hospitalizations after this surgery with back issues.  She has not started therapy and is feeling better.  She had a repeat CT chest today that she is here to review.  She is vaccine hesitant.       Prior to Admission medications    Medication Sig Start Date End Date Taking? Authorizing Provider   allopurinol (ZYLOPRIM) 100 MG tablet Take 1 tablet by mouth Daily.    Alonzo Martinez MD   azelastine (ASTELIN) 0.1 % nasal spray 2 sprays into the nostril(s) as directed by provider 2 (Two) Times a Day. Use in each nostril as directed 2/7/23   Jessica Mckenna APRN   Calcium Citrate (CITRACAL PO) Take 1 tablet by mouth Daily.    Alonzo Martinez MD   cholecalciferol (VITAMIN D3) 25 MCG (1000 UT) tablet Take 2 tablets by mouth Daily.    Alonzo Martinez MD   Continuous Blood Gluc  (Dexcom G5  Kit) device Continuous.    Alonzo Martinez MD   diphenhydrAMINE (BENADRYL) 25 mg capsule Take 1 capsule by mouth Every 6 (Six) Hours As Needed for Itching.    Alonzo Martinez MD   folic acid (FOLVITE) 1 MG tablet Take 1 tablet by mouth Daily.     "Alonzo aMrtinez MD   furosemide (LASIX) 20 MG tablet Take 1 tablet by mouth Daily As Needed (leg swelling).    Alonzo Martinez MD   Insulin Infusion Pump device Continuous.    Alonzo Martinez MD   Loratadine 10 MG capsule Take 1 capsule by mouth every night at bedtime. As anti-histamine for allergies    Alonzo Martinez MD   omeprazole (priLOSEC) 40 MG capsule Take 1 capsule by mouth Daily. 9/15/15   Alonzo Martinez MD   pregabalin (LYRICA) 75 MG capsule Take 1 capsule by mouth 2 (Two) Times a Day. 12/7/23   WESLEY Herbert MD   rosuvastatin (CRESTOR) 20 MG tablet Take 1 tablet by mouth Daily.    Alonzo Martinez MD   vitamin B-12 (CYANOCOBALAMIN) 1000 MCG tablet Take 1 tablet by mouth Daily.    Alonzo Martinez MD       Social History     Socioeconomic History    Marital status:    Tobacco Use    Smoking status: Every Day     Packs/day: 0.25     Years: 48.00     Additional pack years: 0.00     Total pack years: 12.00     Types: Cigarettes     Start date: 2/18/1975     Passive exposure: Current    Smokeless tobacco: Never    Tobacco comments:     quit during two pregnancies but went back to smoking after delivery   Vaping Use    Vaping Use: Never used   Substance and Sexual Activity    Alcohol use: Not Currently     Alcohol/week: 12.0 standard drinks of alcohol     Types: 12 Cans of beer per week     Comment: drink at early 20's    Drug use: No    Sexual activity: Defer     Partners: Male     Birth control/protection: None     Comment: went through menopause over 10 years go       Objective   Vital Signs:   /82   Pulse 85   Ht 162 cm (63.78\")   Wt 67.6 kg (149 lb)   SpO2 99% Comment: RA  BMI 25.75 kg/m²     Physical Exam  Constitutional:       General: She is not in acute distress.  HENT:      Head: Normocephalic.      Nose: Nose normal.      Mouth/Throat:      Mouth: Mucous membranes are moist.   Eyes:      General: No scleral icterus.  Cardiovascular:    "   Rate and Rhythm: Normal rate.   Pulmonary:      Effort: No respiratory distress.   Abdominal:      General: There is no distension.   Neurological:      Mental Status: She is alert and oriented to person, place, and time.   Psychiatric:         Mood and Affect: Mood normal.         Behavior: Behavior is cooperative.        Result Review :{ Labs  Result Review  Imaging  Med Tab  Media :    PFT Values          2/7/2023    14:00   Pre Drug PFT Results   FVC 95   FEV1 100   FEF 25-75% 120   FEV1/FVC 82   Other Tests PFT Results   DLCO 80   D/VAsb 83         Results for orders placed in visit on 02/07/23    Pulmonary Function Test    Narrative  Pulmonary Function Test  Performed by: Darya Viveros, RRT  Authorized by: Jessica Mckenna APRN    Pre Drug % Predicted  FVC: 95%  FEV1: 100%  FEF 25-75%: 120%  FEV1/FVC: 82%  DLCO: 80%  D/VAsb: 83%    Interpretation  Spirometry  Spirometry shows normal results. midflow is normal.  Review of FVL curve  Patient's effort is normal.  Diffusion Capacity  The patient's diffusion capacity is normal.  Diffusion capacity is normal when corrected for alveolar volume.             CT Chest Without Contrast Diagnostic (02/07/2024 08:14)     My interpretation of imaging: Subclavicular and mediastinal nodes stable, tiny right lower lobe lung nodule stable        Assessment and Plan {CC Problem List  Visit Diagnosis  ROS  Review (Popup)  Health Maintenance  Quality  BestPractice  Medications  SmartSets  SnapShot Encounters  Media      Diagnoses and all orders for this visit:    1. Mediastinal lymphadenopathy (Primary)        She is overall stable from a pulmonary standpoint.  We reviewed CT imaging and compared to prior showing stability and lymphadenopathy.  Plan on repeating in 1 year.  If stable at that time can follow-up if needed.  Call with issues.    JAY Chawla  2/7/2024  10:26 CST    Follow Up {Instructions Charge Capture  Follow-up Communications    Return in about 1 year (around 2/7/2025) for Review CT.    Patient was given instructions and counseling regarding her condition or for health maintenance advice. Please see specific information pulled into the AVS if appropriate.

## 2024-02-07 ENCOUNTER — HOSPITAL ENCOUNTER (OUTPATIENT)
Dept: CT IMAGING | Facility: HOSPITAL | Age: 69
Discharge: HOME | End: 2024-02-07
Admitting: NURSE PRACTITIONER
Payer: MEDICARE

## 2024-02-07 ENCOUNTER — OFFICE VISIT (OUTPATIENT)
Dept: PULMONOLOGY | Facility: CLINIC | Age: 69
End: 2024-02-07
Payer: MEDICARE

## 2024-02-07 VITALS
BODY MASS INDEX: 25.44 KG/M2 | SYSTOLIC BLOOD PRESSURE: 134 MMHG | HEIGHT: 64 IN | DIASTOLIC BLOOD PRESSURE: 82 MMHG | WEIGHT: 149 LBS | OXYGEN SATURATION: 99 % | HEART RATE: 85 BPM

## 2024-02-07 DIAGNOSIS — R59.0 MEDIASTINAL LYMPHADENOPATHY: ICD-10-CM

## 2024-02-07 DIAGNOSIS — R59.0 MEDIASTINAL LYMPHADENOPATHY: Primary | Chronic | ICD-10-CM

## 2024-02-07 PROCEDURE — 71250 CT THORAX DX C-: CPT

## 2024-02-07 PROCEDURE — 1159F MED LIST DOCD IN RCRD: CPT | Performed by: NURSE PRACTITIONER

## 2024-02-07 PROCEDURE — 1160F RVW MEDS BY RX/DR IN RCRD: CPT | Performed by: NURSE PRACTITIONER

## 2024-02-07 PROCEDURE — 99213 OFFICE O/P EST LOW 20 MIN: CPT | Performed by: NURSE PRACTITIONER

## 2024-02-23 ENCOUNTER — TRANSCRIBE ORDERS (OUTPATIENT)
Dept: GENERAL RADIOLOGY | Facility: HOSPITAL | Age: 69
End: 2024-02-23
Payer: MEDICARE

## 2024-02-23 DIAGNOSIS — M54.16 LUMBAR RADICULOPATHY: Primary | ICD-10-CM

## 2024-02-27 ENCOUNTER — HOSPITAL ENCOUNTER (OUTPATIENT)
Dept: GENERAL RADIOLOGY | Facility: HOSPITAL | Age: 69
Discharge: HOME | End: 2024-02-27
Admitting: ORTHOPAEDIC SURGERY
Payer: MEDICARE

## 2024-02-27 DIAGNOSIS — M54.16 LUMBAR RADICULOPATHY: ICD-10-CM

## 2024-02-27 PROCEDURE — 72110 X-RAY EXAM L-2 SPINE 4/>VWS: CPT

## 2025-02-04 NOTE — PROGRESS NOTES
Chief Complaint  Mediastinal lymphadenopathy    Subjective    History of Present Illness {CC  Problem List  Visit Diagnosis   Encounters  Notes  Medications  Labs  Result Review Imaging  Media     Giana Yepez presents to [facility]ARE MEDICINE for:    History of Present Illness  Ms. Yepez is here for follow up and management of MLAD.  Follow-up CT chest completed recently showing stability in lymph nodes. Normal PFT in 2023. She has been sick this winter and treated for bronchitis once. She tells me she has had similar symptoms recently CT showing rul gg infiltrate. Mild wheeze on auscultation. Unfortunately she continues to smoke.        Prior to Admission medications    Medication Sig Start Date End Date Taking? Authorizing Provider   allopurinol (ZYLOPRIM) 100 MG tablet Take 1 tablet by mouth Daily.    Alonzo Martinez MD   azelastine (ASTELIN) 0.1 % nasal spray 2 sprays into the nostril(s) as directed by provider 2 (Two) Times a Day. Use in each nostril as directed 2/7/23   Jessica Mckenna APRN   Calcium Citrate (CITRACAL PO) Take 1 tablet by mouth Daily.    Alonzo Martinez MD   cholecalciferol (VITAMIN D3) 25 MCG (1000 UT) tablet Take 2 tablets by mouth Daily.    Alonzo Martinez MD   Continuous Blood Gluc  (Dexcom G5  Kit) device Continuous.    Alonzo Martinez MD   diphenhydrAMINE (BENADRYL) 25 mg capsule Take 1 capsule by mouth Every 6 (Six) Hours As Needed for Itching.    Alonzo Martinez MD   folic acid (FOLVITE) 1 MG tablet Take 1 tablet by mouth Daily.    Alonzo Martinez MD   furosemide (LASIX) 20 MG tablet Take 1 tablet by mouth Daily As Needed (leg swelling).    Alonzo Martinez MD   Insulin Infusion Pump device Continuous.    Alonzo Martinez MD   Loratadine 10 MG capsule Take 1 capsule by mouth every night at bedtime. As anti-histamine for allergies    Alonzo Martinez MD   omeprazole  "(priLOSEC) 40 MG capsule Take 1 capsule by mouth Daily. 9/15/15   ProviderAlonzo MD   pregabalin (LYRICA) 75 MG capsule Take 1 capsule by mouth 2 (Two) Times a Day. 12/7/23   WESLEY Herbert MD   rosuvastatin (CRESTOR) 20 MG tablet Take 1 tablet by mouth Daily.    Alonzo Martinez MD   vitamin B-12 (CYANOCOBALAMIN) 1000 MCG tablet Take 1 tablet by mouth Daily.    ProviderAlonzo MD       Social History     Socioeconomic History    Marital status:    Tobacco Use    Smoking status: Every Day     Current packs/day: 0.50     Average packs/day: 0.5 packs/day for 50.0 years (25.0 ttl pk-yrs)     Types: Cigarettes     Start date: 2/18/1975     Passive exposure: Current    Smokeless tobacco: Never    Tobacco comments:     quit during two pregnancies but went back to smoking after delivery   Vaping Use    Vaping status: Never Used   Substance and Sexual Activity    Alcohol use: Not Currently     Alcohol/week: 12.0 standard drinks of alcohol     Types: 12 Cans of beer per week     Comment: drink at early 20's    Drug use: No    Sexual activity: Not Currently     Partners: Male     Birth control/protection: None     Comment: went through menopause over 10 years go       Objective   Vital Signs:   /78   Pulse 78   Ht 162 cm (63.78\")   Wt 68 kg (150 lb)   SpO2 98% Comment: RA  BMI 25.93 kg/m²     Physical Exam  Constitutional:       General: She is not in acute distress.  HENT:      Head: Normocephalic.      Nose: Nose normal.      Mouth/Throat:      Mouth: Mucous membranes are moist.   Eyes:      General: No scleral icterus.  Cardiovascular:      Rate and Rhythm: Normal rate.   Pulmonary:      Effort: No respiratory distress.      Breath sounds: Wheezing (mild) present.   Abdominal:      General: There is no distension.   Neurological:      Mental Status: She is alert and oriented to person, place, and time.   Psychiatric:         Mood and Affect: Mood normal.         Behavior: Behavior " is cooperative.        Result Review :{ Labs  Result Review  Imaging  Med Tab  Media :            Results for orders placed in visit on 02/07/23    Pulmonary Function Test    Narrative  Pulmonary Function Test  Performed by: Darya Viveros, RRT  Authorized by: Jessica Mckenna APRN    Pre Drug % Predicted  FVC: 95%  FEV1: 100%  FEF 25-75%: 120%  FEV1/FVC: 82%  DLCO: 80%  D/VAsb: 83%    Interpretation  Spirometry  Spirometry shows normal results. midflow is normal.  Review of FVL curve  Patient's effort is normal.  Diffusion Capacity  The patient's diffusion capacity is normal.  Diffusion capacity is normal when corrected for alveolar volume.             Giana Yepez  reports that she has been smoking cigarettes. She started smoking about 50 years ago. She has a 25 pack-year smoking history. She has been exposed to tobacco smoke. She has never used smokeless tobacco.       CT Chest Without Contrast Diagnostic (02/05/2025 09:26)   My interpretation of imaging:  mild rul gg infiltrate, stable lymph nodes. New 4mm lingular lung nodule         Assessment and Plan {CC Problem List  Visit Diagnosis  ROS  Review (Popup)  Health Maintenance  Quality  BestPractice  Medications  SmartSets  SnapShot Encounters  Media      Diagnoses and all orders for this visit:    1. Mediastinal lymphadenopathy (Primary)  Comments:  we review ct chest showing stability. Favor benign    2. MEDINA (dyspnea on exertion)  -     predniSONE (DELTASONE) 10 MG tablet; Take 4 tabs daily x 3 days, then take 3 tabs daily x 3 days, then take 2 tabs daily x 3 days, then take 1 tab daily x 3 days  Dispense: 31 tablet; Refill: 0  -     cefdinir (OMNICEF) 300 MG capsule; Take 1 capsule by mouth 2 (Two) Times a Day for 7 days.  Dispense: 14 capsule; Refill: 0  -     albuterol sulfate  (90 Base) MCG/ACT inhaler; Inhale 2 puffs Every 4 (Four) Hours As Needed for Shortness of Air or Wheezing.  Dispense: 18 g; Refill: 3    3. Acute  bronchitis, unspecified organism  -     predniSONE (DELTASONE) 10 MG tablet; Take 4 tabs daily x 3 days, then take 3 tabs daily x 3 days, then take 2 tabs daily x 3 days, then take 1 tab daily x 3 days  Dispense: 31 tablet; Refill: 0  -     cefdinir (OMNICEF) 300 MG capsule; Take 1 capsule by mouth 2 (Two) Times a Day for 7 days.  Dispense: 14 capsule; Refill: 0  -     albuterol sulfate  (90 Base) MCG/ACT inhaler; Inhale 2 puffs Every 4 (Four) Hours As Needed for Shortness of Air or Wheezing.  Dispense: 18 g; Refill: 3    4. Lung nodule  Comments:  follow up ct chest in 1 year to ensure stability.  Orders:  -      CT Chest Low Dose Cancer Screening WO; Future    5. Current every day smoker  Comments:  yearly LDCT. cessation recommended  Orders:  -      CT Chest Low Dose Cancer Screening WO; Future    6. Personal history of nicotine dependence  -      CT Chest Low Dose Cancer Screening WO; Future          Plan as above. Office follow up in 6 months with spirometry. Call sooner if needed.     Jessica Mckenna, APRN  2/11/2025  10:31 CST    Follow Up {Instructions Charge Capture  Follow-up Communications   Return in about 6 months (around 8/11/2025) for spirometry.    Patient was given instructions and counseling regarding her condition or for health maintenance advice. Please see specific information pulled into the AVS if appropriate.

## 2025-02-05 ENCOUNTER — HOSPITAL ENCOUNTER (OUTPATIENT)
Dept: CT IMAGING | Facility: HOSPITAL | Age: 70
Discharge: HOME | End: 2025-02-05
Admitting: NURSE PRACTITIONER
Payer: MEDICARE

## 2025-02-05 ENCOUNTER — TELEPHONE (OUTPATIENT)
Dept: PULMONOLOGY | Facility: CLINIC | Age: 70
End: 2025-02-05
Payer: MEDICARE

## 2025-02-05 DIAGNOSIS — R59.0 MEDIASTINAL LYMPHADENOPATHY: Chronic | ICD-10-CM

## 2025-02-05 PROCEDURE — 71250 CT THORAX DX C-: CPT

## 2025-02-05 NOTE — TELEPHONE ENCOUNTER
----- Message from Jessica Mckenna sent at 2/5/2025 10:16 AM CST -----  Lymph nodes are stable and unchanged compared to prior imaging.  New very small nodule in left lung that we will follow-up in 1 year.  Nothing else to do for now keep follow-up as planned.

## 2025-02-11 ENCOUNTER — OFFICE VISIT (OUTPATIENT)
Dept: PULMONOLOGY | Facility: CLINIC | Age: 70
End: 2025-02-11
Payer: MEDICARE

## 2025-02-11 VITALS
DIASTOLIC BLOOD PRESSURE: 78 MMHG | SYSTOLIC BLOOD PRESSURE: 128 MMHG | HEART RATE: 78 BPM | BODY MASS INDEX: 25.61 KG/M2 | HEIGHT: 64 IN | WEIGHT: 150 LBS | OXYGEN SATURATION: 98 %

## 2025-02-11 DIAGNOSIS — R91.1 LUNG NODULE: Chronic | ICD-10-CM

## 2025-02-11 DIAGNOSIS — J20.9 ACUTE BRONCHITIS, UNSPECIFIED ORGANISM: ICD-10-CM

## 2025-02-11 DIAGNOSIS — Z87.891 PERSONAL HISTORY OF NICOTINE DEPENDENCE: ICD-10-CM

## 2025-02-11 DIAGNOSIS — R06.09 DOE (DYSPNEA ON EXERTION): ICD-10-CM

## 2025-02-11 DIAGNOSIS — F17.200 CURRENT EVERY DAY SMOKER: Chronic | ICD-10-CM

## 2025-02-11 DIAGNOSIS — R59.0 MEDIASTINAL LYMPHADENOPATHY: Primary | Chronic | ICD-10-CM

## 2025-02-11 RX ORDER — CEFDINIR 300 MG/1
300 CAPSULE ORAL 2 TIMES DAILY
Qty: 14 CAPSULE | Refills: 0 | Status: SHIPPED | OUTPATIENT
Start: 2025-02-11 | End: 2025-02-18

## 2025-02-11 RX ORDER — PREDNISONE 10 MG/1
TABLET ORAL
Qty: 31 TABLET | Refills: 0 | Status: SHIPPED | OUTPATIENT
Start: 2025-02-11

## 2025-02-11 RX ORDER — INSULIN GLARGINE 100 [IU]/ML
15 INJECTION, SOLUTION SUBCUTANEOUS DAILY
COMMUNITY
Start: 2025-02-10 | End: 2026-02-11

## 2025-02-11 RX ORDER — SUCRALFATE 1 G/1
1 TABLET ORAL 4 TIMES DAILY
COMMUNITY
Start: 2024-10-29

## 2025-02-11 RX ORDER — ALBUTEROL SULFATE 90 UG/1
2 INHALANT RESPIRATORY (INHALATION) EVERY 4 HOURS PRN
Qty: 18 G | Refills: 3 | Status: SHIPPED | OUTPATIENT
Start: 2025-02-11

## 2025-02-11 RX ORDER — INSULIN LISPRO 100 [IU]/ML
INJECTION, SOLUTION INTRAVENOUS; SUBCUTANEOUS
COMMUNITY
Start: 2025-02-10

## 2025-02-11 RX ORDER — IBUPROFEN 600 MG/1
1 TABLET ORAL DAILY PRN
COMMUNITY
Start: 2025-02-10

## 2025-02-26 ENCOUNTER — TELEPHONE (OUTPATIENT)
Dept: PULMONOLOGY | Facility: CLINIC | Age: 70
End: 2025-02-26
Payer: MEDICARE

## 2025-02-26 ENCOUNTER — HOSPITAL ENCOUNTER (OUTPATIENT)
Dept: GENERAL RADIOLOGY | Facility: HOSPITAL | Age: 70
Discharge: HOME | End: 2025-02-26
Admitting: NURSE PRACTITIONER
Payer: MEDICARE

## 2025-02-26 DIAGNOSIS — R06.02 SOB (SHORTNESS OF BREATH): ICD-10-CM

## 2025-02-26 DIAGNOSIS — R06.02 SOB (SHORTNESS OF BREATH): Primary | ICD-10-CM

## 2025-02-26 DIAGNOSIS — R06.2 WHEEZING: ICD-10-CM

## 2025-02-26 DIAGNOSIS — R07.89 NON-CARDIAC CHEST PAIN: ICD-10-CM

## 2025-02-26 PROCEDURE — 71046 X-RAY EXAM CHEST 2 VIEWS: CPT

## 2025-02-26 RX ORDER — ALBUTEROL SULFATE 0.83 MG/ML
2.5 SOLUTION RESPIRATORY (INHALATION) 4 TIMES DAILY PRN
Qty: 1 EACH | Refills: 3 | Status: SHIPPED | OUTPATIENT
Start: 2025-02-26

## 2025-02-26 NOTE — TELEPHONE ENCOUNTER
I would like her to get a chest xray. Does she have a nebulizer machine? If chest xray ok she can come in for a depomedrol injection.

## 2025-02-26 NOTE — TELEPHONE ENCOUNTER
I have sent her in some albuterol for her nebulizer machine and we will see what the xray shows and go from there.

## 2025-02-26 NOTE — TELEPHONE ENCOUNTER
Spoke with patient and provided results. She voiced understanding. Declined depo shot for now but will call if she thinks she ends up needing one.

## 2025-02-26 NOTE — TELEPHONE ENCOUNTER
----- Message from Jessica Mckenna sent at 2/26/2025 11:59 AM CST -----  Chest xray looks ok. See if she wants to come in today for a depomedrol injection for the wheezing and shortness of breath and lung pain.     Depomedrol 80mg x1

## 2025-02-26 NOTE — TELEPHONE ENCOUNTER
Spoke with patient and she will have her CXR completed today. She states she does have a nebulizer machine.

## 2025-02-26 NOTE — TELEPHONE ENCOUNTER
When did your symptoms start? Since her last visit with you about two weeks ago    What are your symptoms? Right lung still hurting, coughing up yellow sputum, wheezing when lying down. No fever.    Have you been treated recently by another provider for this problem? No    Have you had any recent testing (ex. COVID or x-ray)? No testing done    Have you had any exposure to anyone sick? Not that she is aware.    Are you taking your medication for your breathing as prescribed? Yes.       Allergies: Up to date  Pharmacy: [facility] in [place]

## 2025-08-18 ENCOUNTER — OFFICE VISIT (OUTPATIENT)
Dept: PULMONOLOGY | Facility: CLINIC | Age: 70
End: 2025-08-18
Payer: MEDICARE

## 2025-08-18 VITALS
WEIGHT: 156.2 LBS | BODY MASS INDEX: 28.74 KG/M2 | HEIGHT: 62 IN | OXYGEN SATURATION: 96 % | DIASTOLIC BLOOD PRESSURE: 64 MMHG | HEART RATE: 72 BPM | SYSTOLIC BLOOD PRESSURE: 118 MMHG

## 2025-08-18 DIAGNOSIS — R06.09 DOE (DYSPNEA ON EXERTION): Primary | Chronic | ICD-10-CM

## 2025-08-18 DIAGNOSIS — R59.0 MEDIASTINAL LYMPHADENOPATHY: Chronic | ICD-10-CM

## 2025-08-18 DIAGNOSIS — Z87.891 PERSONAL HISTORY OF NICOTINE DEPENDENCE: Chronic | ICD-10-CM

## 2025-08-18 PROCEDURE — 99214 OFFICE O/P EST MOD 30 MIN: CPT | Performed by: NURSE PRACTITIONER

## 2025-08-18 PROCEDURE — 94375 RESPIRATORY FLOW VOLUME LOOP: CPT | Performed by: NURSE PRACTITIONER

## 2025-08-18 RX ORDER — PRAVASTATIN SODIUM 10 MG
10 TABLET ORAL DAILY
COMMUNITY

## 2025-08-18 RX ORDER — GLUCAGON INJECTION, SOLUTION 1 MG/.2ML
1 INJECTION, SOLUTION SUBCUTANEOUS
COMMUNITY
Start: 2025-05-07

## 2025-08-18 RX ORDER — INSULIN DEGLUDEC 200 U/ML
10 INJECTION, SOLUTION SUBCUTANEOUS EVERY 24 HOURS
COMMUNITY

## 2025-08-18 RX ORDER — FLUTICASONE PROPIONATE 50 MCG
1 SPRAY, SUSPENSION (ML) NASAL EVERY 24 HOURS
COMMUNITY

## (undated) DEVICE — ELECTRD BLD EZ CLN MOD XLNG 2.75IN

## (undated) DEVICE — GLV SURG BIOGEL LTX PF 7 1/2

## (undated) DEVICE — GLV SURG GRN DERMASSURE LF PF 7.5

## (undated) DEVICE — GLV SURG BIOGEL LTX PF 6 1/2

## (undated) DEVICE — SAFEOP STIMULATING BALL-TIP PROBE, STERILE: Brand: SAFEOP

## (undated) DEVICE — CONMED SCOPE SAVER BITE BLOCK, 20X27 MM: Brand: SCOPE SAVER

## (undated) DEVICE — APPL CHLORAPREP HI/LITE 26ML ORNG

## (undated) DEVICE — TP SILK DURAPORE 3IN

## (undated) DEVICE — GLV SURG DERMASSURE GRN LF PF 8.0

## (undated) DEVICE — THE CHANNEL CLEANING BRUSH IS A NYLON FLEXI BRUSH ATTACHED TO A FLEXIBLE PLASTIC SHEATH DESIGNED TO SAFELY REMOVE DEBRIS FROM FLEXIBLE ENDOSCOPES.

## (undated) DEVICE — ANTIBACTERIAL UNDYED BRAIDED (POLYGLACTIN 910), SYNTHETIC ABSORBABLE SUTURE: Brand: COATED VICRYL

## (undated) DEVICE — TBG SMPL FLTR LINE NASL 02/C02 A/ BX/100

## (undated) DEVICE — CVR UNIV C/ARM

## (undated) DEVICE — TROCAR TIP NITINOL GUIDEWIRE 18": Brand: INVICTUS

## (undated) DEVICE — SENSR O2 OXIMAX FNGR A/ 18IN NONSTR

## (undated) DEVICE — Device: Brand: DEFENDO AIR/WATER/SUCTION AND BIOPSY VALVE

## (undated) DEVICE — FRCP BX RADJAW4 NDL 2.8 240 STD OG

## (undated) DEVICE — SPNG GZ WOVN 4X4IN 12PLY 10/BX STRL

## (undated) DEVICE — PK SPINE LAT 30

## (undated) DEVICE — ENDOGATOR AUXILIARY WATER JET CONNECTOR: Brand: ENDOGATOR

## (undated) DEVICE — MSK O2 MD CONCENTR A/ LF 7FT 1P/U

## (undated) DEVICE — DRAPE,UTILITY,TAPE,15X26,STERILE: Brand: MEDLINE

## (undated) DEVICE — NDL TP BVL JAMSHIDI ACC GUIDE ARCUS

## (undated) DEVICE — SPK10277 JACKSON/PRO-AXIS KIT: Brand: SPK10277 JACKSON/PRO-AXIS KIT

## (undated) DEVICE — PK SPINE POST 30

## (undated) DEVICE — SAFEOP EMG & SSEP NEEDLE ELECTRODE KIT: Brand: SAFEOP

## (undated) DEVICE — GLV SURG SENSICARE W/ALOE PF LF 7.5 STRL

## (undated) DEVICE — CUFF,BP,DISP,1 TUBE,ADULT,HP: Brand: MEDLINE

## (undated) DEVICE — DRP C/ARMOR

## (undated) DEVICE — YANKAUER SUCTION INSTRUMENT WITHOUT CONTROL VENT, OPEN TIP, CLEAR: Brand: YANKAUER

## (undated) DEVICE — Device

## (undated) DEVICE — 4-PORT MANIFOLD: Brand: NEPTUNE 2

## (undated) DEVICE — SUREFIT, DUAL DISPERSIVE ELECTRODE, CONTACT QUALITY MONITOR: Brand: SUREFIT

## (undated) DEVICE — TRAP FLD MINIVAC MEGADYNE 100ML